# Patient Record
Sex: FEMALE | Race: BLACK OR AFRICAN AMERICAN | NOT HISPANIC OR LATINO | Employment: OTHER | URBAN - METROPOLITAN AREA
[De-identification: names, ages, dates, MRNs, and addresses within clinical notes are randomized per-mention and may not be internally consistent; named-entity substitution may affect disease eponyms.]

---

## 2017-01-05 ENCOUNTER — HOSPITAL ENCOUNTER (EMERGENCY)
Facility: HOSPITAL | Age: 47
Discharge: HOME | End: 2017-01-05
Attending: EMERGENCY MEDICINE
Payer: MEDICAID

## 2017-01-05 VITALS
WEIGHT: 185 LBS | TEMPERATURE: 99 F | RESPIRATION RATE: 16 BRPM | BODY MASS INDEX: 26.48 KG/M2 | OXYGEN SATURATION: 99 % | DIASTOLIC BLOOD PRESSURE: 61 MMHG | HEART RATE: 74 BPM | SYSTOLIC BLOOD PRESSURE: 112 MMHG | HEIGHT: 70 IN

## 2017-01-05 DIAGNOSIS — S83.92XA SPRAIN OF LEFT KNEE, UNSPECIFIED LIGAMENT, INITIAL ENCOUNTER: Primary | ICD-10-CM
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
## 2017-01-05 PROCEDURE — 99283 EMERGENCY DEPT VISIT LOW MDM: CPT | Mod: 25

## 2017-01-05 PROCEDURE — 29505 APPLICATION LONG LEG SPLINT: CPT | Mod: LT

## 2017-01-05 NOTE — ED NOTES
Discharge instructions, diagnosis, and follow up discussed with patient. Patient verbalized understanding. All questions and concerns answered. No needs expressed at the time. Pt is awake, alert, and oriented with no acute distress noted. Respirations even and unlabored. Ambulatory out of ed.

## 2017-01-05 NOTE — ED PROVIDER NOTES
Encounter Date: 1/5/2017    SCRIBE #1 NOTE: I, Shawna Gutierrez, am scribing for, and in the presence of, Dr. Liu.       History     Chief Complaint   Patient presents with    Knee Pain     fell with walking boot on pta. twisted knee.      Review of patient's allergies indicates:   Allergen Reactions    Amoxicillin Hives     HPI Comments: 1/5/2017  3:50 PM     Chief Complaint: Knee pain    The patient is a 46 y.o. female who presents with new onset of knee pain. Patient tripped and fell while walking with her boot a few minutes PTA. Pt states she twisted her left knee. She complains of intermittent, moderate dull pain to the left knee. She has swelling to the left knee which has been present since her previous injury. Pt takes norco for pain which gives her mild relief. No numbness or weakness.        The history is provided by the patient and the spouse.     Past Medical History   Diagnosis Date    Mitral incompetence      Past Medical History Pertinent Negatives   Diagnosis Date Noted    Abnormal Pap smear 8/14/2014     Past Surgical History   Procedure Laterality Date    Cardiac surgery       to repair mitral valve     History reviewed. No pertinent family history.  Social History   Substance Use Topics    Smoking status: Never Smoker    Smokeless tobacco: Never Used    Alcohol use No     Review of Systems   Constitutional: Negative for appetite change, chills and fever.   HENT: Negative for congestion, rhinorrhea and sore throat.    Respiratory: Negative for cough and shortness of breath.    Cardiovascular: Negative for chest pain.   Gastrointestinal: Negative for abdominal pain, diarrhea, nausea and vomiting.   Genitourinary: Negative for dysuria.   Musculoskeletal: Positive for arthralgias (left knee) and joint swelling (left knee). Negative for back pain and myalgias.   Skin: Negative for rash.   Neurological: Negative for weakness and numbness.   Hematological: Does not bruise/bleed easily.   All  other systems reviewed and are negative.      Physical Exam   Initial Vitals   BP Pulse Resp Temp SpO2   01/05/17 1524 01/05/17 1524 01/05/17 1524 01/05/17 1524 01/05/17 1524   112/61 74 16 98.7 °F (37.1 °C) 99 %     Physical Exam    Nursing note and vitals reviewed.  Constitutional: No distress.   HENT:   Head: Normocephalic and atraumatic.   Mouth/Throat: Mucous membranes are normal.   Eyes: EOM are normal. Pupils are equal, round, and reactive to light.   Neck: Normal range of motion.   Cardiovascular: Normal rate, regular rhythm, normal heart sounds and intact distal pulses. Exam reveals no gallop and no friction rub.    No murmur heard.  Pulmonary/Chest: Breath sounds normal. She has no wheezes. She has no rhonchi. She has no rales.   Musculoskeletal: Normal range of motion. She exhibits tenderness (lateral left knee TTP). She exhibits no edema.        Left knee: She exhibits swelling and effusion (to the lateral left knee).   Neurological: She is alert and oriented to person, place, and time.   Skin: Skin is dry and intact. No abrasion, no bruising, no ecchymosis and no laceration noted. No erythema.   Psychiatric: She has a normal mood and affect.         ED Course   Procedures  Labs Reviewed - No data to display          Medical Decision Making:   Independently Interpreted Test(s):   I have ordered and independently interpreted X-rays - see summary below.       <> Summary of X-Ray Reading(s): Left knee x-ray independently interpreted by me demonstrates soft tissue swelling with degenerative changes.  There is no fracture or dislocation seen.  ED Management:  Riya Soto is a 46 y.o. female who presents with  left knee pain and swelling after a mechanical fall.  X-rays fail demonstrate any evidence of fracture dislocation.  She is placed in a knee immobilizer given crutches orthopedics.            Scribe Attestation:   Scribe #1: I performed the above scribed service and the documentation accurately  describes the services I performed. I attest to the accuracy of the note.    Attending Attestation:           Physician Attestation for Scribe:  Physician Attestation Statement for Scribe #1: I, Dr. Liu, reviewed documentation, as scribed by Shawna Gutierrez in my presence, and it is both accurate and complete.                 ED Course     Clinical Impression:   The primary encounter diagnosis was Sprain of left knee, unspecified ligament, initial encounter. A diagnosis of Fall was also pertinent to this visit.          Terry Liu III, MD  01/05/17 8471

## 2017-01-05 NOTE — ED NOTES
C/o left knee pain and swelling after tripping and twisting her left knee PTA, pt has left foot walking boot for a foot injury and is being seeing MD for this. Swelling noted to knee able to ambulate but pain increases alert calm spouse remains at bedside. Aware to notify nurse of needs or concerns

## 2017-01-05 NOTE — ED AVS SNAPSHOT
[facility] [address] [place] [address]               Riya Thompsonph   2017  3:27 PM   ED    Description:  Female : 1970   Department:  [facility]           Your Care was Coordinated By:     Provider Role From To    Terry Liu III, MD Attending Provider 17 1543 --      Reason for Visit     Knee Pain           Diagnoses this Visit        Comments    Sprain of left knee, unspecified ligament, initial encounter    -  Primary     Fall           ED Disposition     None           To Do List           Follow-up Information     Follow up with Jermaine Young MD In 3 days.    Specialty:  Orthopedic Surgery    Contact information:    [address] [place] [address] DR Alpesh JACNITO [address]  [phone]        [facility]     [facility] Nurse Care [facility] -  Assistance  Registered nurses in the [facility] provide clinical advisement, health education, appointment booking, and other advisory services.  Call for this free service at [phone].             Medications           Message regarding Medications     Verify the changes and/or additions to your medication regime listed below are the same as discussed with your clinician today.  If any of these changes or additions are incorrect, please notify your healthcare provider.             Verify that the below list of medications is an accurate representation of the medications you are currently taking.  If none reported, the list may be blank. If incorrect, please contact your healthcare provider. Carry this list with you in case of emergency.           Current Medications     hydrocodone-acetaminophen 5-325mg (NORCO) 5-325 mg per tablet Take 1 tablet by mouth every 4 (four) hours as needed for Pain.    ibuprofen (ADVIL,MOTRIN) 600 MG tablet Take 1 tablet (600 mg total) by mouth every 6 (six) hours as needed for Pain.           Clinical Reference Information          "  Your Vitals Were     BP Pulse Temp Resp Height Weight    112/61 (BP Location: Right arm, Patient Position: Sitting) 74 98.7 °F (37.1 °C) (Oral) 16 5' 10" (1.778 m) 83.9 kg (185 lb)    Last Period SpO2 BMI          11/03/2016 99% 26.54 kg/m2        Allergies as of 1/5/2017        Reactions    Amoxicillin Hives      Immunizations Administered on Date of Encounter - 1/5/2017     None      ED Micro, Lab, POCT     None      ED Imaging Orders     Start Ordered       Status Ordering Provider    01/05/17 1553 01/05/17 1553  X-Ray Knee 3 View Left  1 time imaging      Final result       Discharge References/Attachments     KNEE SPRAIN (ENGLISH)      [facility] Sign-Up     Activating your [facility] account is as easy as 1-2-3!     1) Visit [email or web address], select Sign Up Now, enter this activation code and your date of birth, then select Next.  [email or web address]  Expires: 2/19/2017  4:33 PM      2) Create a username and password to use when you visit [facility] in the future and select a security question in case you lose your password and select Next.    3) Enter your e-mail address and click Sign Up!    Additional Information  If you have questions, please e-mail [email or web address] or call [phone] to talk to our [facility] staff. Remember, [facility] is NOT to be used for urgent needs. For medical emergencies, dial 911.          [facility] complies with applicable Federal civil rights laws and does not discriminate on the basis of race, color, national origin, age, disability, or sex.        Language Assistance Services     ATTENTION: Language assistance services are available, free of charge. Please call [phone].      ATENCIÓN: Si habla español, tiene a kilgore disposición servicios gratuitos de asistencia lingüística. Llame al [phone].     CHÚ Ý: N?u b?n nói Ti?ng Vi?t, có các d?ch v? h? tr? ngôn ng? mi?n phí dành cho b?n. G?i s? [phone].        "

## 2017-10-27 LAB
ALBUMIN SERPL-MCNC: 3.6 G/DL (ref 3.1–4.7)
ALP SERPL-CCNC: 122 IU/L (ref 40–104)
ALT (SGPT): 283 IU/L (ref 3–33)
AST SERPL-CCNC: 280 IU/L (ref 10–40)
BASOPHILS NFR BLD: 0 K/UL (ref 0–0.2)
BASOPHILS NFR BLD: 0.5 %
BILIRUB SERPL-MCNC: 1.8 MG/DL (ref 0.3–1)
BUN SERPL-MCNC: 9 MG/DL (ref 8–20)
CALCIUM SERPL-MCNC: 8.3 MG/DL (ref 7.7–10.4)
CHLORIDE: 106 MMOL/L (ref 98–110)
CO2 SERPL-SCNC: 26.9 MMOL/L (ref 22.8–31.6)
CREATININE: 0.77 MG/DL (ref 0.6–1.4)
EOSINOPHIL NFR BLD: 0.1 K/UL (ref 0–0.7)
EOSINOPHIL NFR BLD: 1.3 %
ERYTHROCYTE [DISTWIDTH] IN BLOOD BY AUTOMATED COUNT: 13.3 % (ref 11.7–14.9)
GLUCOSE: 93 MG/DL (ref 70–99)
GRAN #: 2 K/UL (ref 1.4–6.5)
GRAN%: 52.1 %
HCT VFR BLD AUTO: 30.1 % (ref 36–48)
HGB BLD-MCNC: 9.9 G/DL (ref 12–15)
IMMATURE GRANS (ABS): 0 K/UL (ref 0–1)
IMMATURE GRANULOCYTES: 0.3 %
LYMPH #: 1.3 K/UL (ref 1.2–3.4)
LYMPH%: 34.7 %
MCH RBC QN AUTO: 31.2 PG (ref 25–35)
MCHC RBC AUTO-ENTMCNC: 32.9 G/DL (ref 31–36)
MCV RBC AUTO: 95 FL (ref 79–98)
MONO #: 0.4 K/UL (ref 0.1–0.6)
MONO%: 11.1 %
NUCLEATED RBCS: 0 %
PLATELET # BLD AUTO: 177 K/UL (ref 140–440)
PMV BLD AUTO: 12.1 FL (ref 8.8–12.7)
POTASSIUM SERPL-SCNC: 3.4 MMOL/L (ref 3.5–5)
PROT SERPL-MCNC: 6.7 G/DL (ref 6–8.2)
RBC # BLD AUTO: 3.17 M/UL (ref 3.5–5.5)
SODIUM: 138 MMOL/L (ref 134–144)
WBC # BLD AUTO: 3.8 K/UL (ref 5–10)

## 2017-11-07 ENCOUNTER — OFFICE VISIT (OUTPATIENT)
Dept: SURGERY | Facility: CLINIC | Age: 47
End: 2017-11-07
Payer: MEDICAID

## 2017-11-07 VITALS
SYSTOLIC BLOOD PRESSURE: 112 MMHG | BODY MASS INDEX: 26.48 KG/M2 | DIASTOLIC BLOOD PRESSURE: 61 MMHG | WEIGHT: 185 LBS | HEIGHT: 70 IN

## 2017-11-07 DIAGNOSIS — K80.10 CHRONIC CALCULOUS CHOLECYSTITIS: Primary | ICD-10-CM

## 2017-11-07 PROCEDURE — 99024 POSTOP FOLLOW-UP VISIT: CPT | Mod: ,,, | Performed by: SURGERY

## 2017-11-07 RX ORDER — HYDROCODONE BITARTRATE AND ACETAMINOPHEN 7.5; 325 MG/1; MG/1
TABLET ORAL
Refills: 0 | COMMUNITY
Start: 2017-08-18 | End: 2019-07-26

## 2017-11-10 NOTE — PROGRESS NOTES
Subjective:       Patient ID: Riya Soto is a 47 y.o. female.    Chief Complaint: Post-op Evaluation (FU DOS 10/25/17 Laparoscopic Cholecystectomy)      HPI:  Patient presents for post op visit.  She is s/p lap antionette 10/25.  She is doing well.  She has no complaints.  Pain resolved.  Tolerating diet.  Afebrile.      Review of Systems   Constitutional: Negative for appetite change, chills, fever and unexpected weight change.   HENT: Negative for hearing loss, rhinorrhea, sore throat and voice change.    Eyes: Negative for photophobia and visual disturbance.   Respiratory: Negative for cough, choking and shortness of breath.    Cardiovascular: Negative for chest pain, palpitations and leg swelling.   Gastrointestinal: Negative for abdominal pain, blood in stool, constipation, diarrhea, nausea and vomiting.   Endocrine: Negative for cold intolerance, heat intolerance, polydipsia and polyuria.   Musculoskeletal: Negative for arthralgias, back pain, joint swelling and neck stiffness.   Skin: Negative for color change, pallor and rash.   Neurological: Negative for dizziness, seizures, syncope and headaches.   Hematological: Negative for adenopathy. Does not bruise/bleed easily.   Psychiatric/Behavioral: Negative for agitation, behavioral problems and confusion.       Objective:      Physical Exam   Constitutional: She is oriented to person, place, and time. She appears well-developed and well-nourished. She is cooperative. No distress.   HENT:   Head: Normocephalic and atraumatic.   Eyes: Conjunctivae are normal.   Neck: Neck supple.   Pulmonary/Chest: Effort normal. No respiratory distress.   Abdominal: Soft. She exhibits no distension. There is no tenderness. There is no rebound and no guarding.   Neurological: She is alert and oriented to person, place, and time.   Skin:   Incisions are clean, dry and intact  There is no evidence of infection, hematoma or seroma        Assessment/Plan:   Chronic calculous  cholecystitis    s/p lap antionette.  Doing well.  RTC PRN

## 2018-06-21 ENCOUNTER — HOSPITAL ENCOUNTER (EMERGENCY)
Facility: HOSPITAL | Age: 48
Discharge: HOME | End: 2018-06-21
Attending: EMERGENCY MEDICINE
Payer: MEDICARE

## 2018-06-21 VITALS
TEMPERATURE: 98 F | RESPIRATION RATE: 18 BRPM | HEART RATE: 73 BPM | WEIGHT: 185 LBS | DIASTOLIC BLOOD PRESSURE: 73 MMHG | BODY MASS INDEX: 27.4 KG/M2 | SYSTOLIC BLOOD PRESSURE: 121 MMHG | HEIGHT: 69 IN | OXYGEN SATURATION: 99 %

## 2018-06-21 DIAGNOSIS — M54.2 NECK PAIN: ICD-10-CM

## 2018-06-21 DIAGNOSIS — M54.12 CERVICAL RADICULOPATHY: Primary | ICD-10-CM

## 2018-06-21 PROCEDURE — 63600175 PHARM REV CODE 636 W HCPCS: Performed by: EMERGENCY MEDICINE

## 2018-06-21 PROCEDURE — 25000003 PHARM REV CODE 250: Performed by: EMERGENCY MEDICINE

## 2018-06-21 PROCEDURE — 99283 EMERGENCY DEPT VISIT LOW MDM: CPT

## 2018-06-21 RX ORDER — IBUPROFEN 400 MG/1
800 TABLET ORAL
Status: COMPLETED | OUTPATIENT
Start: 2018-06-21 | End: 2018-06-21

## 2018-06-21 RX ORDER — IBUPROFEN 600 MG/1
600 TABLET ORAL EVERY 6 HOURS PRN
Qty: 20 TABLET | Refills: 0 | Status: SHIPPED | OUTPATIENT
Start: 2018-06-21 | End: 2019-03-15 | Stop reason: SDUPTHER

## 2018-06-21 RX ORDER — METHOCARBAMOL 500 MG/1
1000 TABLET, FILM COATED ORAL 3 TIMES DAILY PRN
Qty: 30 TABLET | Refills: 0 | Status: SHIPPED | OUTPATIENT
Start: 2018-06-21 | End: 2018-06-26

## 2018-06-21 RX ORDER — METHOCARBAMOL 500 MG/1
1000 TABLET, FILM COATED ORAL
Status: COMPLETED | OUTPATIENT
Start: 2018-06-21 | End: 2018-06-21

## 2018-06-21 RX ADMIN — DEXAMETHASONE 6 MG: 1 TABLET ORAL at 08:06

## 2018-06-21 RX ADMIN — IBUPROFEN 800 MG: 400 TABLET, FILM COATED ORAL at 08:06

## 2018-06-21 RX ADMIN — METHOCARBAMOL 1000 MG: 500 TABLET ORAL at 08:06
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
## 2018-08-23 ENCOUNTER — HOSPITAL ENCOUNTER (EMERGENCY)
Facility: HOSPITAL | Age: 48
Discharge: HOME | End: 2018-08-23
Attending: EMERGENCY MEDICINE
Payer: MEDICARE

## 2018-08-23 VITALS
TEMPERATURE: 98 F | WEIGHT: 202 LBS | OXYGEN SATURATION: 99 % | SYSTOLIC BLOOD PRESSURE: 131 MMHG | HEART RATE: 77 BPM | BODY MASS INDEX: 28.92 KG/M2 | RESPIRATION RATE: 18 BRPM | HEIGHT: 70 IN | DIASTOLIC BLOOD PRESSURE: 59 MMHG

## 2018-08-23 DIAGNOSIS — J02.9 VIRAL PHARYNGITIS: Primary | ICD-10-CM

## 2018-08-23 LAB — DEPRECATED S PYO AG THROAT QL EIA: NEGATIVE

## 2018-08-23 PROCEDURE — 99283 EMERGENCY DEPT VISIT LOW MDM: CPT

## 2018-08-23 PROCEDURE — 87880 STREP A ASSAY W/OPTIC: CPT

## 2018-08-23 PROCEDURE — 87081 CULTURE SCREEN ONLY: CPT

## 2018-08-26 LAB — BACTERIA THROAT CULT: NORMAL

## 2019-03-15 ENCOUNTER — HOSPITAL ENCOUNTER (EMERGENCY)
Facility: HOSPITAL | Age: 49
Discharge: HOME | End: 2019-03-15
Attending: EMERGENCY MEDICINE
Payer: MEDICARE

## 2019-03-15 VITALS
OXYGEN SATURATION: 100 % | WEIGHT: 190 LBS | BODY MASS INDEX: 27.2 KG/M2 | SYSTOLIC BLOOD PRESSURE: 129 MMHG | HEART RATE: 74 BPM | DIASTOLIC BLOOD PRESSURE: 62 MMHG | RESPIRATION RATE: 16 BRPM | TEMPERATURE: 97 F | HEIGHT: 70 IN

## 2019-03-15 DIAGNOSIS — R10.9 LEFT FLANK PAIN: Primary | ICD-10-CM

## 2019-03-15 LAB
ALBUMIN SERPL BCP-MCNC: 4.3 G/DL
ALP SERPL-CCNC: 146 U/L
ALT SERPL W/O P-5'-P-CCNC: 33 U/L
ANION GAP SERPL CALC-SCNC: 9 MMOL/L
AST SERPL-CCNC: 48 U/L
B-HCG UR QL: NEGATIVE
BASOPHILS # BLD AUTO: 0 K/UL
BASOPHILS NFR BLD: 0.6 %
BILIRUB SERPL-MCNC: 1.2 MG/DL
BILIRUB UR QL STRIP: NEGATIVE
BUN SERPL-MCNC: 21 MG/DL
CALCIUM SERPL-MCNC: 9.9 MG/DL
CHLORIDE SERPL-SCNC: 104 MMOL/L
CLARITY UR: CLEAR
CO2 SERPL-SCNC: 26 MMOL/L
COLOR UR: YELLOW
CREAT SERPL-MCNC: 1.1 MG/DL
CTP QC/QA: YES
DIFFERENTIAL METHOD: ABNORMAL
EOSINOPHIL # BLD AUTO: 0 K/UL
EOSINOPHIL NFR BLD: 0.7 %
ERYTHROCYTE [DISTWIDTH] IN BLOOD BY AUTOMATED COUNT: 14.7 %
EST. GFR  (AFRICAN AMERICAN): >60 ML/MIN/1.73 M^2
EST. GFR  (NON AFRICAN AMERICAN): 59 ML/MIN/1.73 M^2
GLUCOSE SERPL-MCNC: 102 MG/DL
GLUCOSE UR QL STRIP: NEGATIVE
HCT VFR BLD AUTO: 36.1 %
HGB BLD-MCNC: 11.8 G/DL
HGB UR QL STRIP: ABNORMAL
KETONES UR QL STRIP: NEGATIVE
LEUKOCYTE ESTERASE UR QL STRIP: NEGATIVE
LYMPHOCYTES # BLD AUTO: 1.9 K/UL
LYMPHOCYTES NFR BLD: 32.9 %
MCH RBC QN AUTO: 29.5 PG
MCHC RBC AUTO-ENTMCNC: 32.5 G/DL
MCV RBC AUTO: 91 FL
MONOCYTES # BLD AUTO: 0.6 K/UL
MONOCYTES NFR BLD: 10.2 %
NEUTROPHILS # BLD AUTO: 3.2 K/UL
NEUTROPHILS NFR BLD: 55.6 %
NITRITE UR QL STRIP: NEGATIVE
PH UR STRIP: 6 [PH] (ref 5–8)
PLATELET # BLD AUTO: 268 K/UL
PLATELET BLD QL SMEAR: ABNORMAL
PMV BLD AUTO: 9.9 FL
POTASSIUM SERPL-SCNC: 4.1 MMOL/L
PROT SERPL-MCNC: 8.8 G/DL
PROT UR QL STRIP: NEGATIVE
RBC # BLD AUTO: 3.98 M/UL
SODIUM SERPL-SCNC: 139 MMOL/L
SP GR UR STRIP: >=1.03 (ref 1–1.03)
URN SPEC COLLECT METH UR: ABNORMAL
UROBILINOGEN UR STRIP-ACNC: 1 EU/DL
WBC # BLD AUTO: 5.8 K/UL

## 2019-03-15 PROCEDURE — 80053 COMPREHEN METABOLIC PANEL: CPT

## 2019-03-15 PROCEDURE — 25000003 PHARM REV CODE 250: Performed by: EMERGENCY MEDICINE

## 2019-03-15 PROCEDURE — 99284 EMERGENCY DEPT VISIT MOD MDM: CPT | Mod: 25

## 2019-03-15 PROCEDURE — 85025 COMPLETE CBC W/AUTO DIFF WBC: CPT

## 2019-03-15 PROCEDURE — 81025 URINE PREGNANCY TEST: CPT | Performed by: PHYSICIAN ASSISTANT

## 2019-03-15 PROCEDURE — 81003 URINALYSIS AUTO W/O SCOPE: CPT

## 2019-03-15 PROCEDURE — 36415 COLL VENOUS BLD VENIPUNCTURE: CPT

## 2019-03-15 RX ORDER — IBUPROFEN 600 MG/1
600 TABLET ORAL
Status: COMPLETED | OUTPATIENT
Start: 2019-03-15 | End: 2019-03-15

## 2019-03-15 RX ORDER — GABAPENTIN 300 MG/1
300 CAPSULE ORAL 3 TIMES DAILY
COMMUNITY
End: 2019-07-26

## 2019-03-15 RX ORDER — IBUPROFEN 600 MG/1
600 TABLET ORAL EVERY 6 HOURS PRN
Qty: 20 TABLET | Refills: 0 | Status: SHIPPED | OUTPATIENT
Start: 2019-03-15 | End: 2019-07-26

## 2019-03-15 RX ADMIN — IBUPROFEN 600 MG: 600 TABLET, FILM COATED ORAL at 06:03

## 2019-03-15 NOTE — ED PROVIDER NOTES
Encounter Date: 3/15/2019       History     Chief Complaint   Patient presents with    Flank Pain     pt c/o L flank pain that began 1pm today, -denies n/v/d, denies dysuria     HPI   Patient is a 48-year-old woman who presents emergency department complaining of left lateral flank/abdominal pain that began approximately around 1:00 p.m. today.  Symptoms are constant, nonradiating and dull in nature.  No associated dysuria, nausea, vomiting or diarrhea.  No associated fever.  No alleviating treatments.  Review of patient's allergies indicates:   Allergen Reactions    Amoxicillin Hives     Past Medical History:   Diagnosis Date    Back pain     Mitral incompetence     Neck pain      Past Surgical History:   Procedure Laterality Date    CARDIAC SURGERY      to repair mitral valve    CHOLECYSTECTOMY       Family History   Family history unknown: Yes     Social History     Tobacco Use    Smoking status: Never Smoker    Smokeless tobacco: Never Used   Substance Use Topics    Alcohol use: No     Alcohol/week: 0.0 oz    Drug use: No     Review of Systems  REVIEW OF SYSTEMS  CONSTITUTIONAL: Negative for fever.  HEENT:  Negative for sore throat.   HEART:   Negative for chest pain..  LUNG:  Negative for shortness of breath.  ABDOMEN:  Negative for nausea.  Positive for left flank pain.  :  No discharge, dysuria  EXTREMITIES:  No swelling  NEURO:  Negative for weakness.   SKIN:  Negative for rash.  Psych: No depression  HEME: Does not bruise/bleed easily.           Physical Exam     Initial Vitals [03/15/19 0205]   BP Pulse Resp Temp SpO2   129/62 74 16 97.4 °F (36.3 °C) 100 %      MAP       --         Physical Exam  Physical Exam   Nurses notes and vitals reviewed.  Constitutional:Oriented to person, place, and time. Appears well-developed and well-nourished and in no acute distress. Answering all questions appropriate   HEENT: PERRL, EOMI, Conjunctivae are normal. Moist mucous membranes. Normocephalic.  Atraumatic, no acute, traumatic or infectious process noted.  Neck: Normal range of motion, supple, no JVD.  Cardiovascular: Normal rate, regular rhythm, normal heart sounds and intact distal pulses.   Pulmonary/Chest: Effort normal and breath sounds normal. No respiratory distress. No wheezes,  rales or ronchi.   Abdominal: Soft, no distension. There is tenderness to the left lateral abdomen.  No masses appreciated. There is no rebound or gaurding.  No Edmonds's, Rovsing's, or McBurney's point tenderness.  No CVA tenderness.   Back:  No midline TTP; no acute abnormal, traumatic or infectious process noted  Musculoskeletal: Moves all extremities well.  Full range of motion.  5/5 strength.  No sensory deficits.  No C/C/E.  2+ pulses throughout  Neurological: Alert and oriented to person, place, and time. Cranial nerves II through XII are grossly intact without anyfocal motor, sensory, and cerebellar findings.  Skin: Skin is warm and dry, no rashes.  Psych: Full affect, cooperative      ED Course   Procedures  Labs Reviewed   CBC W/ AUTO DIFFERENTIAL - Abnormal; Notable for the following components:       Result Value    RBC 3.98 (*)     Hemoglobin 11.8 (*)     Hematocrit 36.1 (*)     RDW 14.7 (*)     All other components within normal limits   COMPREHENSIVE METABOLIC PANEL - Abnormal; Notable for the following components:    BUN, Bld 21 (*)     Total Protein 8.8 (*)     Total Bilirubin 1.2 (*)     Alkaline Phosphatase 146 (*)     AST 48 (*)     eGFR if non  59 (*)     All other components within normal limits   URINALYSIS, REFLEX TO URINE CULTURE - Abnormal; Notable for the following components:    Specific Gravity, UA >=1.030 (*)     Occult Blood UA Trace (*)     All other components within normal limits    Narrative:     Preferred Collection Type->Urine, Clean Catch   POCT URINE PREGNANCY          Imaging Results          CT Renal Stone Study ABD Pelvis WO (In process)                  Medical  Decision Making:   Initial Assessment:   48-year-old woman presents to the ER for evaluation of left lateral abdominal/flank pain for over 12 hr.  Well-appearing nontoxic on examination. She does have some focal tenderness in the left lateral abdomen.  No CVA tenderness. No adnexal tenderness, rebound or guarding.  Laboratory evaluation reveals no evidence of leukocytosis.  She is afebrile making infectious etiologies less likely.  Patient has chronically elevated bilirubin in mild chronic elevation of her transaminases.  I have low suspicion for acute pancreatitis, or peritonitis.  I doubt TOA or ovarian torsion.  CT abdomen pelvis without contrast was obtained shows no evidence of renal stones, no diverticulitis, colitis or any acute abnormalities.  Unsure as to with the cause of the patient's pain is.  She is provided with ibuprofen for pain.  I discussed with patient and/or family/caretaker that evaluation in the ED does not suggest any emergent or life threatening condition medical condition requiring immediate intervention beyond what was provided in the ED, and I believe patient is safe for discharge.  Regardless, an unremarkable evaluation in the ED does not preclude the development or presence of a serious of life threatening condition. As such, patient was instructed to return immediately for any worsening or change in current symptoms                        Clinical Impression:       ICD-10-CM ICD-9-CM   1. Left flank pain R10.9 789.09                                Sedrick Miranda MD  03/15/19 0651

## 2019-07-26 ENCOUNTER — CLINICAL SUPPORT (OUTPATIENT)
Dept: PRIMARY CARE CLINIC | Facility: CLINIC | Age: 49
End: 2019-07-26
Payer: MEDICARE

## 2019-07-26 ENCOUNTER — OFFICE VISIT (OUTPATIENT)
Dept: PRIMARY CARE CLINIC | Facility: CLINIC | Age: 49
End: 2019-07-26
Payer: MEDICARE

## 2019-07-26 VITALS
SYSTOLIC BLOOD PRESSURE: 96 MMHG | HEART RATE: 69 BPM | HEIGHT: 70 IN | DIASTOLIC BLOOD PRESSURE: 62 MMHG | RESPIRATION RATE: 19 BRPM | BODY MASS INDEX: 29.92 KG/M2 | WEIGHT: 209 LBS | OXYGEN SATURATION: 100 % | TEMPERATURE: 97 F

## 2019-07-26 DIAGNOSIS — S16.1XXD STRAIN OF NECK MUSCLE, SUBSEQUENT ENCOUNTER: ICD-10-CM

## 2019-07-26 DIAGNOSIS — Z12.31 VISIT FOR SCREENING MAMMOGRAM: ICD-10-CM

## 2019-07-26 DIAGNOSIS — Z90.49 HISTORY OF CHOLECYSTECTOMY: ICD-10-CM

## 2019-07-26 DIAGNOSIS — E66.9 OBESITY, UNSPECIFIED CLASSIFICATION, UNSPECIFIED OBESITY TYPE, UNSPECIFIED WHETHER SERIOUS COMORBIDITY PRESENT: ICD-10-CM

## 2019-07-26 DIAGNOSIS — M79.7 FIBROMYALGIA: ICD-10-CM

## 2019-07-26 DIAGNOSIS — S39.012D LUMBOSACRAL STRAIN, SUBSEQUENT ENCOUNTER: ICD-10-CM

## 2019-07-26 DIAGNOSIS — E66.3 OVERWEIGHT (BMI 25.0-29.9): ICD-10-CM

## 2019-07-26 DIAGNOSIS — Z98.890 HISTORY OF HEART SURGERY: ICD-10-CM

## 2019-07-26 DIAGNOSIS — G89.4 CHRONIC PAIN SYNDROME: ICD-10-CM

## 2019-07-26 DIAGNOSIS — G89.4 CHRONIC PAIN SYNDROME: Primary | ICD-10-CM

## 2019-07-26 PROBLEM — S16.1XXA CERVICAL STRAIN: Status: ACTIVE | Noted: 2019-07-26

## 2019-07-26 PROBLEM — S39.012A LUMBOSACRAL STRAIN: Status: ACTIVE | Noted: 2019-07-26

## 2019-07-26 PROBLEM — E78.5 HYPERLIPIDEMIA: Status: ACTIVE | Noted: 2019-07-26

## 2019-07-26 LAB
ALBUMIN SERPL BCP-MCNC: 4.6 G/DL (ref 3.5–5.2)
ALP SERPL-CCNC: 109 U/L (ref 38–126)
ALT SERPL W/O P-5'-P-CCNC: 22 U/L (ref 14–54)
ANION GAP SERPL CALC-SCNC: 10 MMOL/L (ref 8–16)
AST SERPL-CCNC: 36 U/L (ref 15–41)
BASOPHILS # BLD AUTO: 0 K/UL (ref 0–0.2)
BASOPHILS NFR BLD: 0.6 % (ref 0–1.9)
BILIRUB SERPL-MCNC: 1.2 MG/DL (ref 0.3–1.2)
BILIRUB SERPL-MCNC: ABNORMAL MG/DL
BLOOD URINE, POC: ABNORMAL
BUN SERPL-MCNC: 21 MG/DL (ref 6–20)
CALCIUM SERPL-MCNC: 9.4 MG/DL (ref 8.6–10)
CHLORIDE SERPL-SCNC: 99 MMOL/L (ref 101–111)
CHOLEST SERPL-MCNC: 234 MG/DL (ref 80–200)
CHOLEST/HDLC SERPL: 3.6 {RATIO} (ref 2–5)
CO2 SERPL-SCNC: 28 MMOL/L (ref 23–29)
COLOR, POC UA: YELLOW
CREAT SERPL-MCNC: 0.8 MG/DL (ref 0.5–1.4)
DIFFERENTIAL METHOD: ABNORMAL
EOSINOPHIL # BLD AUTO: 0 K/UL (ref 0–0.5)
EOSINOPHIL NFR BLD: 0.2 % (ref 0–8)
ERYTHROCYTE [DISTWIDTH] IN BLOOD BY AUTOMATED COUNT: 14.6 % (ref 11.5–14.5)
EST. GFR  (AFRICAN AMERICAN): >60 ML/MIN/1.73 M^2
EST. GFR  (NON AFRICAN AMERICAN): >60 ML/MIN/1.73 M^2
GLUCOSE SERPL-MCNC: 101 MG/DL (ref 74–118)
GLUCOSE UR QL STRIP: NORMAL
HCT VFR BLD AUTO: 37.3 % (ref 37–48.5)
HDLC SERPL-MCNC: 65 MG/DL (ref 40–75)
HDLC SERPL: 27.8 % (ref 20–50)
HGB BLD-MCNC: 12.3 G/DL (ref 12–16)
KETONES UR QL STRIP: ABNORMAL
LDLC SERPL CALC-MCNC: 159 MG/DL
LEUKOCYTE ESTERASE URINE, POC: ABNORMAL
LYMPHOCYTES # BLD AUTO: 1.5 K/UL (ref 1–4.8)
LYMPHOCYTES NFR BLD: 32.3 % (ref 18–48)
MCH RBC QN AUTO: 30.1 PG (ref 27–31)
MCHC RBC AUTO-ENTMCNC: 32.8 G/DL (ref 32–36)
MCV RBC AUTO: 92 FL (ref 82–98)
MONOCYTES # BLD AUTO: 0.5 K/UL (ref 0.3–1)
MONOCYTES NFR BLD: 11 % (ref 4–15)
NEUTROPHILS # BLD AUTO: 2.6 K/UL (ref 1.8–7.7)
NEUTROPHILS NFR BLD: 55.9 % (ref 38–73)
NITRITE, POC UA: ABNORMAL
NONHDLC SERPL-MCNC: 169 MG/DL
PH, POC UA: 6
PLATELET # BLD AUTO: 232 K/UL (ref 150–350)
PMV BLD AUTO: 10.6 FL (ref 9.2–12.9)
POTASSIUM SERPL-SCNC: 4.2 MMOL/L (ref 3.5–5.1)
PROT SERPL-MCNC: 8.8 G/DL (ref 6–8.4)
PROTEIN, POC: ABNORMAL
RBC # BLD AUTO: 4.07 M/UL (ref 4–5.4)
SODIUM SERPL-SCNC: 137 MMOL/L (ref 136–145)
SPECIFIC GRAVITY, POC UA: 1.01
T4 FREE SERPL-MCNC: 0.9 NG/DL (ref 0.61–1.12)
TRIGL SERPL-MCNC: 51 MG/DL (ref 30–150)
TSH SERPL DL<=0.005 MIU/L-ACNC: 1.5 UIU/ML (ref 0.45–5.33)
UROBILINOGEN, POC UA: 1
WBC # BLD AUTO: 4.6 K/UL (ref 3.9–12.7)

## 2019-07-26 PROCEDURE — 93010 ELECTROCARDIOGRAM REPORT: CPT | Mod: S$GLB,,, | Performed by: INTERNAL MEDICINE

## 2019-07-26 PROCEDURE — 80053 COMPREHEN METABOLIC PANEL: CPT

## 2019-07-26 PROCEDURE — 3008F PR BODY MASS INDEX (BMI) DOCUMENTED: ICD-10-PCS | Mod: CPTII,S$GLB,, | Performed by: FAMILY MEDICINE

## 2019-07-26 PROCEDURE — 3008F BODY MASS INDEX DOCD: CPT | Mod: CPTII,S$GLB,, | Performed by: FAMILY MEDICINE

## 2019-07-26 PROCEDURE — 99999 PR PBB SHADOW E&M-EST. PATIENT-LVL III: ICD-10-PCS | Mod: PBBFAC,,, | Performed by: FAMILY MEDICINE

## 2019-07-26 PROCEDURE — 81002 POCT URINE DIPSTICK WITHOUT MICROSCOPE: ICD-10-PCS | Mod: S$GLB,,, | Performed by: FAMILY MEDICINE

## 2019-07-26 PROCEDURE — 84443 ASSAY THYROID STIM HORMONE: CPT

## 2019-07-26 PROCEDURE — 84439 ASSAY OF FREE THYROXINE: CPT

## 2019-07-26 PROCEDURE — 99204 OFFICE O/P NEW MOD 45 MIN: CPT | Mod: 25,S$GLB,, | Performed by: FAMILY MEDICINE

## 2019-07-26 PROCEDURE — 36415 COLL VENOUS BLD VENIPUNCTURE: CPT | Mod: S$GLB,,, | Performed by: FAMILY MEDICINE

## 2019-07-26 PROCEDURE — 99999 PR PBB SHADOW E&M-EST. PATIENT-LVL III: CPT | Mod: PBBFAC,,, | Performed by: FAMILY MEDICINE

## 2019-07-26 PROCEDURE — 85025 COMPLETE CBC W/AUTO DIFF WBC: CPT

## 2019-07-26 PROCEDURE — 93005 ELECTROCARDIOGRAM TRACING: CPT | Mod: S$GLB,,, | Performed by: FAMILY MEDICINE

## 2019-07-26 PROCEDURE — 93010 EKG 12-LEAD: ICD-10-PCS | Mod: S$GLB,,, | Performed by: INTERNAL MEDICINE

## 2019-07-26 PROCEDURE — 36415 PR COLLECTION VENOUS BLOOD,VENIPUNCTURE: ICD-10-PCS | Mod: S$GLB,,, | Performed by: FAMILY MEDICINE

## 2019-07-26 PROCEDURE — 93005 EKG 12-LEAD: ICD-10-PCS | Mod: S$GLB,,, | Performed by: FAMILY MEDICINE

## 2019-07-26 PROCEDURE — 80061 LIPID PANEL: CPT

## 2019-07-26 PROCEDURE — 81002 URINALYSIS NONAUTO W/O SCOPE: CPT | Mod: S$GLB,,, | Performed by: FAMILY MEDICINE

## 2019-07-26 PROCEDURE — 99204 PR OFFICE/OUTPT VISIT, NEW, LEVL IV, 45-59 MIN: ICD-10-PCS | Mod: 25,S$GLB,, | Performed by: FAMILY MEDICINE

## 2019-07-26 NOTE — PROGRESS NOTES
Subjective:       Patient ID: Riya Soto is a 48 y.o. female.    Chief Complaint: Pre-op Exam and Establish Care    HPI:  48-year-old female in for PCP in Needs preop clearance-- pt not sure exact procedure. To do a left and ?? Implants     PMH   Surg -history of left foot surgery, cholecystectomy, heart valve surgery  Hosp-Hx mitral valve surgery chronic neck and back pain due AA --was going to a pain clinic   Meds norco 7.5, gabapentin  Allergies-amoxicillin    Social history-smoking none, ETOH social,single, 7 children, work disbaled due AA, lives with 5 children    Fa HX --Mo DM, HTN, CVA, Bro MI 50, Sister breast cancer age 41        ROS:  Skin: no psoriasis, eczema, skin cancer  HEENT: No headache, ocular pain, blurred vision, diplopia, epistaxis, hoarseness change in voice, thyroid trouble  Lung: No pneumonia, asthma, Tb, wheezing, SOB, no smoking   Heart: No chest pain, ankle edema, palpitations, MI, ronald murmur, hypertension, hyperlipidemia--history of valve surgery?  Mitral valve due to mitral valve incompetence no bypass stents arrhythmia  Abdomen: No nausea, vomiting, diarrhea, constipation, ulcers, hepatitis, gallbladder disease, melena, hematochezia, hematemesis  : no UTI, renal disease, stones  MS: no fractures, O/A, lupus, rheumatoid, gout  Neuro: No dizziness, LOC, seizures   No diabetes, no anemia, no anxiety, no depression   Soc Hx see above     Objective:   Physical Exam:  General: Well nourished, well developed, no acute distress  Skin: No lesions  HEENT: Eyes PERRLA, EOM intact, nose patent, throat non-erythematous edentulous upper jaw--partial plate lower jaw missing most of molars bilaterally ears TMs clear  NECK: Supple, no bruits, No JVD, no nodes  Lungs: Clear, no rales, rhonchi, wheezing  Heart: Regular rate and rhythm, no murmurs, gallops, or rubs  Abdomen: flat, bowel sounds positive, no tenderness, or organomegaly  MS:  Tenderness is cervical spine but overall range of motion  muscle strength appear to be intact tenderness in lumbar spine L1-S1 anterior flexion 10° extension 10° lateral flexion rotation 10° pain with straight leg lift pulling sensation but no radiculopathy reflexes 2/4  Neuro: Alert, CN intact, oriented X 3  Extremities: No cyanosis, clubbing, or edema         Assessment:       1. Chronic pain syndrome    2. Strain of neck muscle, subsequent encounter    3. Lumbosacral strain, subsequent encounter    4. History of heart surgery    5. History of cholecystectomy    6. Obesity, unspecified classification, unspecified obesity type, unspecified whether serious comorbidity present    7. Overweight (BMI 25.0-29.9)    8. Visit for screening mammogram    9. Fibromyalgia         Plan:       Chronic pain syndrome  -     CBC auto differential; Future; Expected date: 07/26/2019  -     Comprehensive metabolic panel; Future; Expected date: 07/26/2019  -     EKG 12-lead; Future  -     Fecal Immunochemical Test (iFOBT); Future; Expected date: 07/26/2019  -     Lipid panel; Future; Expected date: 07/26/2019  -     X-Ray Chest PA And Lateral; Future; Expected date: 07/26/2019  -     POCT urine dipstick without microscope  -     T4, free; Future; Expected date: 07/26/2019  -     TSH; Future; Expected date: 07/26/2019    Strain of neck muscle, subsequent encounter  -     CBC auto differential; Future; Expected date: 07/26/2019  -     Comprehensive metabolic panel; Future; Expected date: 07/26/2019  -     EKG 12-lead; Future  -     Fecal Immunochemical Test (iFOBT); Future; Expected date: 07/26/2019  -     Lipid panel; Future; Expected date: 07/26/2019  -     X-Ray Chest PA And Lateral; Future; Expected date: 07/26/2019  -     POCT urine dipstick without microscope  -     T4, free; Future; Expected date: 07/26/2019  -     TSH; Future; Expected date: 07/26/2019    Lumbosacral strain, subsequent encounter  -     CBC auto differential; Future; Expected date: 07/26/2019  -     Comprehensive  metabolic panel; Future; Expected date: 07/26/2019  -     EKG 12-lead; Future  -     Fecal Immunochemical Test (iFOBT); Future; Expected date: 07/26/2019  -     Lipid panel; Future; Expected date: 07/26/2019  -     X-Ray Chest PA And Lateral; Future; Expected date: 07/26/2019  -     POCT urine dipstick without microscope  -     T4, free; Future; Expected date: 07/26/2019  -     TSH; Future; Expected date: 07/26/2019    History of heart surgery  -     CBC auto differential; Future; Expected date: 07/26/2019  -     Comprehensive metabolic panel; Future; Expected date: 07/26/2019  -     EKG 12-lead; Future  -     Fecal Immunochemical Test (iFOBT); Future; Expected date: 07/26/2019  -     Lipid panel; Future; Expected date: 07/26/2019  -     X-Ray Chest PA And Lateral; Future; Expected date: 07/26/2019  -     POCT urine dipstick without microscope  -     T4, free; Future; Expected date: 07/26/2019  -     TSH; Future; Expected date: 07/26/2019    History of cholecystectomy  -     CBC auto differential; Future; Expected date: 07/26/2019  -     Comprehensive metabolic panel; Future; Expected date: 07/26/2019  -     EKG 12-lead; Future  -     Fecal Immunochemical Test (iFOBT); Future; Expected date: 07/26/2019  -     Lipid panel; Future; Expected date: 07/26/2019  -     X-Ray Chest PA And Lateral; Future; Expected date: 07/26/2019  -     POCT urine dipstick without microscope  -     T4, free; Future; Expected date: 07/26/2019  -     TSH; Future; Expected date: 07/26/2019    Obesity, unspecified classification, unspecified obesity type, unspecified whether serious comorbidity present  -     CBC auto differential; Future; Expected date: 07/26/2019  -     Comprehensive metabolic panel; Future; Expected date: 07/26/2019  -     EKG 12-lead; Future  -     Fecal Immunochemical Test (iFOBT); Future; Expected date: 07/26/2019  -     Lipid panel; Future; Expected date: 07/26/2019  -     X-Ray Chest PA And Lateral; Future; Expected  date: 07/26/2019  -     POCT urine dipstick without microscope  -     T4, free; Future; Expected date: 07/26/2019  -     TSH; Future; Expected date: 07/26/2019    Overweight (BMI 25.0-29.9)  -     CBC auto differential; Future; Expected date: 07/26/2019  -     Comprehensive metabolic panel; Future; Expected date: 07/26/2019  -     EKG 12-lead; Future  -     Fecal Immunochemical Test (iFOBT); Future; Expected date: 07/26/2019  -     Lipid panel; Future; Expected date: 07/26/2019  -     X-Ray Chest PA And Lateral; Future; Expected date: 07/26/2019  -     POCT urine dipstick without microscope  -     T4, free; Future; Expected date: 07/26/2019  -     TSH; Future; Expected date: 07/26/2019    Visit for screening mammogram  -     Mammo Digital Screening Bilat without CA; Future; Expected date: 08/09/2019  -     CBC auto differential; Future; Expected date: 07/26/2019  -     Comprehensive metabolic panel; Future; Expected date: 07/26/2019  -     EKG 12-lead; Future  -     Fecal Immunochemical Test (iFOBT); Future; Expected date: 07/26/2019  -     Lipid panel; Future; Expected date: 07/26/2019  -     X-Ray Chest PA And Lateral; Future; Expected date: 07/26/2019  -     POCT urine dipstick without microscope  -     T4, free; Future; Expected date: 07/26/2019  -     TSH; Future; Expected date: 07/26/2019    Fibromyalgia   -     T4, free; Future; Expected date: 07/26/2019  -     TSH; Future; Expected date: 07/26/2019      needs lab CBCs CMP lipids T4 TSH stool guaiac UA chest x-ray EKG is physical  Due to sister with breast cancer--needs mammogram  History mitral incompetence--with valve repair  Edentulous upper jaw missing several teeth lower jaw has upper dentures and partial plate  Health maintenance tetanus needs lipid Pap smear  Patient advised that breast implants and breast lift is an elective procedure--sister had breast cancer--needs to discuss risk of breast cancer with breast implants and 1st degree relative of  patient with breast cancer prior to procedure with surgeon

## 2019-08-06 ENCOUNTER — OFFICE VISIT (OUTPATIENT)
Dept: PRIMARY CARE CLINIC | Facility: CLINIC | Age: 49
End: 2019-08-06
Payer: MEDICARE

## 2019-08-06 VITALS
TEMPERATURE: 98 F | BODY MASS INDEX: 30.21 KG/M2 | RESPIRATION RATE: 19 BRPM | OXYGEN SATURATION: 100 % | WEIGHT: 211 LBS | HEART RATE: 70 BPM | HEIGHT: 70 IN | DIASTOLIC BLOOD PRESSURE: 76 MMHG | SYSTOLIC BLOOD PRESSURE: 103 MMHG

## 2019-08-06 DIAGNOSIS — G89.4 CHRONIC PAIN SYNDROME: Primary | ICD-10-CM

## 2019-08-06 DIAGNOSIS — S39.012D LUMBOSACRAL STRAIN, SUBSEQUENT ENCOUNTER: ICD-10-CM

## 2019-08-06 DIAGNOSIS — Z98.890 HISTORY OF HEART SURGERY: ICD-10-CM

## 2019-08-06 DIAGNOSIS — E78.5 HYPERLIPIDEMIA, UNSPECIFIED HYPERLIPIDEMIA TYPE: ICD-10-CM

## 2019-08-06 DIAGNOSIS — E66.3 OVERWEIGHT (BMI 25.0-29.9): ICD-10-CM

## 2019-08-06 PROCEDURE — 3008F BODY MASS INDEX DOCD: CPT | Mod: CPTII,S$GLB,, | Performed by: FAMILY MEDICINE

## 2019-08-06 PROCEDURE — 99999 PR PBB SHADOW E&M-EST. PATIENT-LVL III: CPT | Mod: PBBFAC,,, | Performed by: FAMILY MEDICINE

## 2019-08-06 PROCEDURE — 3008F PR BODY MASS INDEX (BMI) DOCUMENTED: ICD-10-PCS | Mod: CPTII,S$GLB,, | Performed by: FAMILY MEDICINE

## 2019-08-06 PROCEDURE — 99213 OFFICE O/P EST LOW 20 MIN: CPT | Mod: S$GLB,,, | Performed by: FAMILY MEDICINE

## 2019-08-06 PROCEDURE — 99213 PR OFFICE/OUTPT VISIT, EST, LEVL III, 20-29 MIN: ICD-10-PCS | Mod: S$GLB,,, | Performed by: FAMILY MEDICINE

## 2019-08-06 PROCEDURE — 99999 PR PBB SHADOW E&M-EST. PATIENT-LVL III: ICD-10-PCS | Mod: PBBFAC,,, | Performed by: FAMILY MEDICINE

## 2019-08-06 RX ORDER — ATORVASTATIN CALCIUM 10 MG/1
10 TABLET, FILM COATED ORAL DAILY
Qty: 90 TABLET | Refills: 3 | Status: SHIPPED | OUTPATIENT
Start: 2019-08-06 | End: 2021-10-05 | Stop reason: SDUPTHER
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
## 2019-11-04 ENCOUNTER — PATIENT OUTREACH (OUTPATIENT)
Dept: ADMINISTRATIVE | Facility: HOSPITAL | Age: 49
End: 2019-11-04

## 2019-11-20 ENCOUNTER — HOSPITAL ENCOUNTER (EMERGENCY)
Facility: HOSPITAL | Age: 49
Discharge: HOME | End: 2019-11-20
Attending: EMERGENCY MEDICINE
Payer: MEDICARE

## 2019-11-20 VITALS
SYSTOLIC BLOOD PRESSURE: 123 MMHG | HEART RATE: 72 BPM | HEIGHT: 70 IN | TEMPERATURE: 98 F | WEIGHT: 185 LBS | RESPIRATION RATE: 17 BRPM | BODY MASS INDEX: 26.48 KG/M2 | OXYGEN SATURATION: 100 % | DIASTOLIC BLOOD PRESSURE: 64 MMHG

## 2019-11-20 DIAGNOSIS — M79.673 FOOT PAIN: ICD-10-CM

## 2019-11-20 DIAGNOSIS — S93.609A SPRAIN OF FOOT, UNSPECIFIED LATERALITY, INITIAL ENCOUNTER: Primary | ICD-10-CM

## 2019-11-20 PROCEDURE — 29580 STRAPPING UNNA BOOT: CPT | Mod: LT

## 2019-11-20 PROCEDURE — 99283 EMERGENCY DEPT VISIT LOW MDM: CPT | Mod: 25

## 2019-11-20 RX ORDER — DICLOFENAC SODIUM 50 MG/1
50 TABLET, DELAYED RELEASE ORAL 3 TIMES DAILY
Qty: 15 TABLET | Refills: 0 | Status: SHIPPED | OUTPATIENT
Start: 2019-11-20 | End: 2019-11-24

## 2019-11-21 NOTE — ED PROVIDER NOTES
Encounter Date: 11/20/2019    SCRIBE #1 NOTE: IAlix, am scribing for, and in the presence of, Dr. Liu.       History     Chief Complaint   Patient presents with    Foot Pain     left foot pain x 2-3 days          Time seen by provider: 9:51 PM on 11/20/2019    Riya Soto is a 49 y.o. female who presents to the ED with c/o left foot pain. She states she fell 10 days ago injuring her foot. She reports some swelling to her foot and denies any ankle pain. No prior injury or trauma to her left foot. She has no other complaints at this time. No musculoskeletal PSHx or PMHx.     The history is provided by the patient.     Review of patient's allergies indicates:   Allergen Reactions    Amoxicillin Hives and Other (See Comments)     Past Medical History:   Diagnosis Date    Back pain     Mitral incompetence     Neck pain      Past Surgical History:   Procedure Laterality Date    CARDIAC SURGERY      to repair mitral valve    CHOLECYSTECTOMY       Family History   Problem Relation Age of Onset    Breast cancer Sister      Social History     Tobacco Use    Smoking status: Never Smoker    Smokeless tobacco: Never Used   Substance Use Topics    Alcohol use: No     Alcohol/week: 0.0 standard drinks    Drug use: No     Review of Systems   Constitutional: Negative for fever.   Respiratory: Negative for shortness of breath.    Genitourinary: Negative for flank pain.   Musculoskeletal: Negative for arthralgias and gait problem.        +left foot pain   Neurological: Negative for weakness.   Psychiatric/Behavioral: Negative for confusion.       Physical Exam     Initial Vitals [11/20/19 2144]   BP Pulse Resp Temp SpO2   123/64 72 17 97.6 °F (36.4 °C) 100 %      MAP       --         Physical Exam    Nursing note and vitals reviewed.  Constitutional: She appears well-developed and well-nourished.   HENT:   Head: Normocephalic and atraumatic.   Eyes: Conjunctivae are normal.   Neck: Normal range of motion.  Neck supple.   Cardiovascular: Normal rate, regular rhythm and normal heart sounds. Exam reveals no gallop and no friction rub.    No murmur heard.  Pulmonary/Chest: Effort normal and breath sounds normal. No respiratory distress. She has no wheezes. She has no rhonchi. She has no rales.   Abdominal: Soft. She exhibits no distension. There is no tenderness.   Musculoskeletal: Normal range of motion.        Left ankle: She exhibits no swelling. No tenderness.        Left foot: There is tenderness and swelling.   Tenderness over the left 3rd toe with mild swelling.    Neurological: She is alert and oriented to person, place, and time.   Skin: Skin is warm and dry. No erythema.   Psychiatric: She has a normal mood and affect.         ED Course   Procedures  Labs Reviewed - No data to display       Imaging Results    None          Medical Decision Making:   History:   Old Medical Records: I decided to obtain old medical records.  Clinical Tests:   Radiological Study: Ordered and Reviewed  ED Management:  49-year-old female presents with persistent foot pain after a fall.  X-rays independently interpreted by me failed to demonstrate any fracture or dislocation.  She is placed in a walking boot and referred to Orthopedic surgery.       APC / Resident Notes:   I, Dr. Terry Liu III, personally performed the services described in this documentation. All medical record entries made by the scribe were at my direction and in my presence.  I have reviewed the chart and agree that the record reflects my personal performance and is accurate and complete       Scribe Attestation:   Scribe #1: I performed the above scribed service and the documentation accurately describes the services I performed. I attest to the accuracy of the note.                          Clinical Impression:       ICD-10-CM ICD-9-CM   1. Sprain of foot, unspecified laterality, initial encounter S93.609A 845.10   2. Foot pain M79.673 729.5          Disposition:   Disposition: Discharged  Condition: Stable                     Terry Liu III, MD  11/20/19 3056

## 2019-11-21 NOTE — ED NOTES
Pt in room 12 for evaluation of foot pain.  Pt is awake, alert and oriented. Resp even and unlabored. Ben breath sounds clear.  Pt denies chest pain or sob. Pt reports left foot injury from fall x 1 week ago.  Pt has pain and swelling to top of left foot and 3rd and 4th toes.

## 2019-11-24 ENCOUNTER — HOSPITAL ENCOUNTER (EMERGENCY)
Facility: HOSPITAL | Age: 49
Discharge: HOME | End: 2019-11-24
Attending: EMERGENCY MEDICINE
Payer: MEDICARE

## 2019-11-24 VITALS
SYSTOLIC BLOOD PRESSURE: 118 MMHG | OXYGEN SATURATION: 100 % | DIASTOLIC BLOOD PRESSURE: 54 MMHG | RESPIRATION RATE: 16 BRPM | TEMPERATURE: 98 F | HEART RATE: 76 BPM | BODY MASS INDEX: 27.2 KG/M2 | WEIGHT: 190 LBS | HEIGHT: 70 IN

## 2019-11-24 DIAGNOSIS — M25.551 RIGHT HIP PAIN: Primary | ICD-10-CM

## 2019-11-24 DIAGNOSIS — M54.31 SCIATICA OF RIGHT SIDE: ICD-10-CM

## 2019-11-24 PROCEDURE — 96372 THER/PROPH/DIAG INJ SC/IM: CPT

## 2019-11-24 PROCEDURE — 63600175 PHARM REV CODE 636 W HCPCS: Performed by: NURSE PRACTITIONER

## 2019-11-24 PROCEDURE — 99284 EMERGENCY DEPT VISIT MOD MDM: CPT | Mod: 25

## 2019-11-24 RX ORDER — CYCLOBENZAPRINE HCL 10 MG
10 TABLET ORAL 3 TIMES DAILY PRN
Qty: 12 TABLET | Refills: 0 | Status: SHIPPED | OUTPATIENT
Start: 2019-11-24 | End: 2019-11-29

## 2019-11-24 RX ORDER — ORPHENADRINE CITRATE 30 MG/ML
60 INJECTION INTRAMUSCULAR; INTRAVENOUS
Status: COMPLETED | OUTPATIENT
Start: 2019-11-24 | End: 2019-11-24

## 2019-11-24 RX ORDER — KETOROLAC TROMETHAMINE 30 MG/ML
30 INJECTION, SOLUTION INTRAMUSCULAR; INTRAVENOUS
Status: COMPLETED | OUTPATIENT
Start: 2019-11-24 | End: 2019-11-24

## 2019-11-24 RX ORDER — DICLOFENAC SODIUM 75 MG/1
75 TABLET, DELAYED RELEASE ORAL 2 TIMES DAILY
Qty: 28 TABLET | Refills: 0 | Status: SHIPPED | OUTPATIENT
Start: 2019-11-24 | End: 2019-12-08

## 2019-11-24 RX ADMIN — KETOROLAC TROMETHAMINE 30 MG: 30 INJECTION, SOLUTION INTRAMUSCULAR at 09:11

## 2019-11-24 RX ADMIN — ORPHENADRINE CITRATE 60 MG: 30 INJECTION INTRAMUSCULAR; INTRAVENOUS at 09:11
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
## 2020-07-03 DIAGNOSIS — Z12.31 ENCOUNTER FOR SCREENING MAMMOGRAM FOR BREAST CANCER: ICD-10-CM

## 2020-08-02 ENCOUNTER — PATIENT OUTREACH (OUTPATIENT)
Dept: ADMINISTRATIVE | Facility: HOSPITAL | Age: 50
End: 2020-08-02

## 2021-03-29 ENCOUNTER — IMMUNIZATION (OUTPATIENT)
Dept: PRIMARY CARE CLINIC | Facility: CLINIC | Age: 51
End: 2021-03-29
Payer: MEDICARE

## 2021-03-29 DIAGNOSIS — Z23 NEED FOR VACCINATION: Primary | ICD-10-CM

## 2021-03-29 PROCEDURE — 91300 COVID-19, MRNA, LNP-S, PF, 30 MCG/0.3 ML DOSE VACCINE: CPT | Mod: S$GLB,,, | Performed by: FAMILY MEDICINE

## 2021-03-29 PROCEDURE — 0001A COVID-19, MRNA, LNP-S, PF, 30 MCG/0.3 ML DOSE VACCINE: CPT | Mod: CV19,S$GLB,, | Performed by: FAMILY MEDICINE

## 2021-03-29 PROCEDURE — 0001A COVID-19, MRNA, LNP-S, PF, 30 MCG/0.3 ML DOSE VACCINE: ICD-10-PCS | Mod: CV19,S$GLB,, | Performed by: FAMILY MEDICINE

## 2021-03-29 PROCEDURE — 91300 COVID-19, MRNA, LNP-S, PF, 30 MCG/0.3 ML DOSE VACCINE: ICD-10-PCS | Mod: S$GLB,,, | Performed by: FAMILY MEDICINE

## 2021-04-19 ENCOUNTER — IMMUNIZATION (OUTPATIENT)
Dept: PRIMARY CARE CLINIC | Facility: CLINIC | Age: 51
End: 2021-04-19
Payer: MEDICARE

## 2021-04-19 DIAGNOSIS — Z23 NEED FOR VACCINATION: Primary | ICD-10-CM

## 2021-04-19 PROCEDURE — 0002A COVID-19, MRNA, LNP-S, PF, 30 MCG/0.3 ML DOSE VACCINE: ICD-10-PCS | Mod: CV19,S$GLB,, | Performed by: FAMILY MEDICINE

## 2021-04-19 PROCEDURE — 91300 COVID-19, MRNA, LNP-S, PF, 30 MCG/0.3 ML DOSE VACCINE: ICD-10-PCS | Mod: S$GLB,,, | Performed by: FAMILY MEDICINE

## 2021-04-19 PROCEDURE — 0002A COVID-19, MRNA, LNP-S, PF, 30 MCG/0.3 ML DOSE VACCINE: CPT | Mod: CV19,S$GLB,, | Performed by: FAMILY MEDICINE

## 2021-04-19 PROCEDURE — 91300 COVID-19, MRNA, LNP-S, PF, 30 MCG/0.3 ML DOSE VACCINE: CPT | Mod: S$GLB,,, | Performed by: FAMILY MEDICINE

## 2021-10-05 ENCOUNTER — OFFICE VISIT (OUTPATIENT)
Dept: PRIMARY CARE CLINIC | Facility: CLINIC | Age: 51
End: 2021-10-05
Payer: MEDICARE

## 2021-10-05 VITALS
OXYGEN SATURATION: 96 % | RESPIRATION RATE: 18 BRPM | SYSTOLIC BLOOD PRESSURE: 110 MMHG | HEART RATE: 69 BPM | BODY MASS INDEX: 30.3 KG/M2 | DIASTOLIC BLOOD PRESSURE: 64 MMHG | WEIGHT: 211.63 LBS | HEIGHT: 70 IN

## 2021-10-05 DIAGNOSIS — Z98.890 HISTORY OF HEART SURGERY: ICD-10-CM

## 2021-10-05 DIAGNOSIS — M25.572 CHRONIC PAIN OF LEFT ANKLE: ICD-10-CM

## 2021-10-05 DIAGNOSIS — Z11.4 ENCOUNTER FOR SCREENING FOR HIV: ICD-10-CM

## 2021-10-05 DIAGNOSIS — E66.9 CLASS 1 OBESITY WITH BODY MASS INDEX (BMI) OF 30.0 TO 30.9 IN ADULT, UNSPECIFIED OBESITY TYPE, UNSPECIFIED WHETHER SERIOUS COMORBIDITY PRESENT: ICD-10-CM

## 2021-10-05 DIAGNOSIS — Z23 NEED FOR VACCINATION: ICD-10-CM

## 2021-10-05 DIAGNOSIS — G89.29 CHRONIC PAIN OF LEFT ANKLE: ICD-10-CM

## 2021-10-05 DIAGNOSIS — E66.3 OVERWEIGHT (BMI 25.0-29.9): ICD-10-CM

## 2021-10-05 DIAGNOSIS — Z11.59 NEED FOR HEPATITIS C SCREENING TEST: ICD-10-CM

## 2021-10-05 DIAGNOSIS — E78.5 HYPERLIPIDEMIA, UNSPECIFIED HYPERLIPIDEMIA TYPE: Primary | ICD-10-CM

## 2021-10-05 DIAGNOSIS — Z12.31 SCREENING MAMMOGRAM, ENCOUNTER FOR: ICD-10-CM

## 2021-10-05 PROCEDURE — 3008F BODY MASS INDEX DOCD: CPT | Mod: CPTII,S$GLB,, | Performed by: FAMILY MEDICINE

## 2021-10-05 PROCEDURE — 99214 PR OFFICE/OUTPT VISIT, EST, LEVL IV, 30-39 MIN: ICD-10-PCS | Mod: S$GLB,,, | Performed by: FAMILY MEDICINE

## 2021-10-05 PROCEDURE — G0008 FLU VACCINE (QUAD) GREATER THAN OR EQUAL TO 3YO PRESERVATIVE FREE IM: ICD-10-PCS | Mod: S$GLB,,, | Performed by: FAMILY MEDICINE

## 2021-10-05 PROCEDURE — 99999 PR PBB SHADOW E&M-EST. PATIENT-LVL III: CPT | Mod: PBBFAC,,, | Performed by: FAMILY MEDICINE

## 2021-10-05 PROCEDURE — 1159F PR MEDICATION LIST DOCUMENTED IN MEDICAL RECORD: ICD-10-PCS | Mod: CPTII,S$GLB,, | Performed by: FAMILY MEDICINE

## 2021-10-05 PROCEDURE — 1159F MED LIST DOCD IN RCRD: CPT | Mod: CPTII,S$GLB,, | Performed by: FAMILY MEDICINE

## 2021-10-05 PROCEDURE — 3078F PR MOST RECENT DIASTOLIC BLOOD PRESSURE < 80 MM HG: ICD-10-PCS | Mod: CPTII,S$GLB,, | Performed by: FAMILY MEDICINE

## 2021-10-05 PROCEDURE — 99214 OFFICE O/P EST MOD 30 MIN: CPT | Mod: S$GLB,,, | Performed by: FAMILY MEDICINE

## 2021-10-05 PROCEDURE — 90686 FLU VACCINE (QUAD) GREATER THAN OR EQUAL TO 3YO PRESERVATIVE FREE IM: ICD-10-PCS | Mod: S$GLB,,, | Performed by: FAMILY MEDICINE

## 2021-10-05 PROCEDURE — 3074F PR MOST RECENT SYSTOLIC BLOOD PRESSURE < 130 MM HG: ICD-10-PCS | Mod: CPTII,S$GLB,, | Performed by: FAMILY MEDICINE

## 2021-10-05 PROCEDURE — 3008F PR BODY MASS INDEX (BMI) DOCUMENTED: ICD-10-PCS | Mod: CPTII,S$GLB,, | Performed by: FAMILY MEDICINE

## 2021-10-05 PROCEDURE — 3078F DIAST BP <80 MM HG: CPT | Mod: CPTII,S$GLB,, | Performed by: FAMILY MEDICINE

## 2021-10-05 PROCEDURE — 99999 PR PBB SHADOW E&M-EST. PATIENT-LVL III: ICD-10-PCS | Mod: PBBFAC,,, | Performed by: FAMILY MEDICINE

## 2021-10-05 PROCEDURE — G0008 ADMIN INFLUENZA VIRUS VAC: HCPCS | Mod: S$GLB,,, | Performed by: FAMILY MEDICINE

## 2021-10-05 PROCEDURE — 90686 IIV4 VACC NO PRSV 0.5 ML IM: CPT | Mod: S$GLB,,, | Performed by: FAMILY MEDICINE

## 2021-10-05 PROCEDURE — 3074F SYST BP LT 130 MM HG: CPT | Mod: CPTII,S$GLB,, | Performed by: FAMILY MEDICINE

## 2021-10-05 RX ORDER — ATORVASTATIN CALCIUM 10 MG/1
10 TABLET, FILM COATED ORAL DAILY
Qty: 90 TABLET | Refills: 3 | Status: ON HOLD | OUTPATIENT
Start: 2021-10-05 | End: 2022-11-11 | Stop reason: HOSPADM

## 2021-10-13 ENCOUNTER — TELEPHONE (OUTPATIENT)
Dept: PRIMARY CARE CLINIC | Facility: CLINIC | Age: 51
End: 2021-10-13

## 2021-10-19 LAB
ALBUMIN SERPL-MCNC: 4.4 G/DL (ref 3.6–5.1)
ALBUMIN/GLOB SERPL: 1.3 (CALC) (ref 1–2.5)
ALP SERPL-CCNC: 137 U/L (ref 37–153)
ALT SERPL-CCNC: 18 U/L (ref 6–29)
AST SERPL-CCNC: 28 U/L (ref 10–35)
BASOPHILS # BLD AUTO: 32 CELLS/UL (ref 0–200)
BASOPHILS NFR BLD AUTO: 0.6 %
BILIRUB SERPL-MCNC: 1.1 MG/DL (ref 0.2–1.2)
BUN SERPL-MCNC: 15 MG/DL (ref 7–25)
BUN/CREAT SERPL: NORMAL (CALC) (ref 6–22)
CALCIUM SERPL-MCNC: 9.7 MG/DL (ref 8.6–10.4)
CHLORIDE SERPL-SCNC: 103 MMOL/L (ref 98–110)
CHOLEST SERPL-MCNC: 167 MG/DL
CHOLEST/HDLC SERPL: 2.9 (CALC)
CO2 SERPL-SCNC: 30 MMOL/L (ref 20–32)
CREAT SERPL-MCNC: 0.81 MG/DL (ref 0.5–1.05)
EOSINOPHIL # BLD AUTO: 42 CELLS/UL (ref 15–500)
EOSINOPHIL NFR BLD AUTO: 0.8 %
ERYTHROCYTE [DISTWIDTH] IN BLOOD BY AUTOMATED COUNT: 13.4 % (ref 11–15)
GLOBULIN SER CALC-MCNC: 3.5 G/DL (CALC) (ref 1.9–3.7)
GLUCOSE SERPL-MCNC: 99 MG/DL (ref 65–99)
HCT VFR BLD AUTO: 33.8 % (ref 35–45)
HCV AB S/CO SERPL IA: 0.03
HCV AB SERPL QL IA: NORMAL
HDLC SERPL-MCNC: 58 MG/DL
HGB BLD-MCNC: 11.1 G/DL (ref 11.7–15.5)
HIV 1+2 AB+HIV1 P24 AG SERPL QL IA: NORMAL
LDLC SERPL CALC-MCNC: 94 MG/DL (CALC)
LYMPHOCYTES # BLD AUTO: 1675 CELLS/UL (ref 850–3900)
LYMPHOCYTES NFR BLD AUTO: 31.6 %
MCH RBC QN AUTO: 30.3 PG (ref 27–33)
MCHC RBC AUTO-ENTMCNC: 32.8 G/DL (ref 32–36)
MCV RBC AUTO: 92.3 FL (ref 80–100)
MONOCYTES # BLD AUTO: 583 CELLS/UL (ref 200–950)
MONOCYTES NFR BLD AUTO: 11 %
NEUTROPHILS # BLD AUTO: 2968 CELLS/UL (ref 1500–7800)
NEUTROPHILS NFR BLD AUTO: 56 %
NONHDLC SERPL-MCNC: 109 MG/DL (CALC)
PLATELET # BLD AUTO: 234 THOUSAND/UL (ref 140–400)
PMV BLD REES-ECKER: 11.8 FL (ref 7.5–12.5)
POTASSIUM SERPL-SCNC: 4.1 MMOL/L (ref 3.5–5.3)
PROT SERPL-MCNC: 7.9 G/DL (ref 6.1–8.1)
RBC # BLD AUTO: 3.66 MILLION/UL (ref 3.8–5.1)
SODIUM SERPL-SCNC: 141 MMOL/L (ref 135–146)
T4 FREE SERPL-MCNC: 1.2 NG/DL (ref 0.8–1.8)
TRIGL SERPL-MCNC: 64 MG/DL
TSH SERPL-ACNC: 3.7 MIU/L
WBC # BLD AUTO: 5.3 THOUSAND/UL (ref 3.8–10.8)

## 2021-10-20 ENCOUNTER — HOSPITAL ENCOUNTER (EMERGENCY)
Facility: HOSPITAL | Age: 51
Discharge: HOME | End: 2021-10-20
Attending: EMERGENCY MEDICINE
Payer: MEDICARE

## 2021-10-20 VITALS
HEIGHT: 70 IN | TEMPERATURE: 99 F | BODY MASS INDEX: 27.92 KG/M2 | HEART RATE: 72 BPM | DIASTOLIC BLOOD PRESSURE: 74 MMHG | SYSTOLIC BLOOD PRESSURE: 111 MMHG | WEIGHT: 195 LBS | OXYGEN SATURATION: 97 % | RESPIRATION RATE: 16 BRPM

## 2021-10-20 DIAGNOSIS — R52 PAIN: ICD-10-CM
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
## 2021-10-20 PROCEDURE — 99283 EMERGENCY DEPT VISIT LOW MDM: CPT

## 2021-10-24 DIAGNOSIS — K14.0 GLOSSITIS: ICD-10-CM

## 2021-10-24 DIAGNOSIS — D64.9 ANEMIA, UNSPECIFIED TYPE: Primary | ICD-10-CM

## 2021-10-27 LAB
FOLATE SERPL-MCNC: 7.5 NG/ML
IRON SATN MFR SERPL: 13 % (CALC) (ref 16–45)
IRON SERPL-MCNC: 56 MCG/DL (ref 45–160)
TIBC SERPL-MCNC: 444 MCG/DL (CALC) (ref 250–450)
VIT B12 SERPL-MCNC: 318 PG/ML (ref 200–1100)

## 2021-11-01 ENCOUNTER — HOSPITAL ENCOUNTER (OUTPATIENT)
Dept: RADIOLOGY | Facility: HOSPITAL | Age: 51
Discharge: HOME | End: 2021-11-01
Attending: FAMILY MEDICINE
Payer: MEDICARE

## 2021-11-01 DIAGNOSIS — Z12.31 SCREENING MAMMOGRAM, ENCOUNTER FOR: ICD-10-CM

## 2021-11-01 PROCEDURE — 77067 SCR MAMMO BI INCL CAD: CPT | Mod: TC,PO

## 2021-12-22 ENCOUNTER — IMMUNIZATION (OUTPATIENT)
Dept: PRIMARY CARE CLINIC | Facility: CLINIC | Age: 51
End: 2021-12-22
Payer: MEDICARE

## 2021-12-22 DIAGNOSIS — Z23 NEED FOR VACCINATION: Primary | ICD-10-CM

## 2021-12-22 PROCEDURE — 0003A COVID-19, MRNA, LNP-S, PF, 30 MCG/0.3 ML DOSE VACCINE: CPT | Mod: S$GLB,,, | Performed by: FAMILY MEDICINE

## 2021-12-22 PROCEDURE — 0003A COVID-19, MRNA, LNP-S, PF, 30 MCG/0.3 ML DOSE VACCINE: ICD-10-PCS | Mod: S$GLB,,, | Performed by: FAMILY MEDICINE

## 2021-12-22 PROCEDURE — 91300 COVID-19, MRNA, LNP-S, PF, 30 MCG/0.3 ML DOSE VACCINE: CPT | Mod: S$GLB,,, | Performed by: FAMILY MEDICINE

## 2021-12-22 PROCEDURE — 91300 COVID-19, MRNA, LNP-S, PF, 30 MCG/0.3 ML DOSE VACCINE: ICD-10-PCS | Mod: S$GLB,,, | Performed by: FAMILY MEDICINE

## 2022-01-17 ENCOUNTER — HOSPITAL ENCOUNTER (EMERGENCY)
Facility: HOSPITAL | Age: 52
Discharge: HOME | End: 2022-01-17
Attending: EMERGENCY MEDICINE
Payer: COMMERCIAL

## 2022-01-17 VITALS
WEIGHT: 190 LBS | RESPIRATION RATE: 16 BRPM | SYSTOLIC BLOOD PRESSURE: 110 MMHG | OXYGEN SATURATION: 100 % | DIASTOLIC BLOOD PRESSURE: 78 MMHG | BODY MASS INDEX: 28.14 KG/M2 | HEIGHT: 69 IN | TEMPERATURE: 99 F | HEART RATE: 80 BPM

## 2022-01-17 DIAGNOSIS — M79.602 PAIN OF LEFT UPPER EXTREMITY: Primary | ICD-10-CM

## 2022-01-17 PROCEDURE — 99284 EMERGENCY DEPT VISIT MOD MDM: CPT | Mod: 25

## 2022-01-17 PROCEDURE — 63600175 PHARM REV CODE 636 W HCPCS: Performed by: EMERGENCY MEDICINE

## 2022-01-17 PROCEDURE — 96372 THER/PROPH/DIAG INJ SC/IM: CPT

## 2022-01-17 RX ORDER — KETOROLAC TROMETHAMINE 30 MG/ML
15 INJECTION, SOLUTION INTRAMUSCULAR; INTRAVENOUS
Status: COMPLETED | OUTPATIENT
Start: 2022-01-17 | End: 2022-01-17

## 2022-01-17 RX ADMIN — KETOROLAC TROMETHAMINE 15 MG: 30 INJECTION, SOLUTION INTRAMUSCULAR at 10:01

## 2022-01-18 NOTE — ED NOTES
Upon discharge, patient is AAOx4, no cardiac or respiratory complications. Follow up care reviewed with patient and has been instructed to return to the ER if needed. Patient verbalized understanding and ambulated to the lobby without difficulty. DOUGLAS RHODES

## 2022-01-18 NOTE — ED NOTES
Riya Soto presents to the ED with c/o left arm pain that has been persistent for the past 3 months. Patient reports that pain is exacerbated when sleeping. AAOx4. Mucous membranes are pink and moist. Skin is warm, dry and intact.

## 2022-01-18 NOTE — ED TRIAGE NOTES
Riya Soto is here with left arm pain for some time and is worse when lying down and with raising arms.
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
## 2022-01-19 ENCOUNTER — PATIENT OUTREACH (OUTPATIENT)
Dept: EMERGENCY MEDICINE | Facility: HOSPITAL | Age: 52
End: 2022-01-19
Payer: COMMERCIAL

## 2022-01-19 NOTE — PROGRESS NOTES
Osiris Krause  ED Navigator  Emergency Department    Project: [facility] ED Navigator  Role: Community Health Worker    Date: 01/19/2022  Patient Name: Riya Soto  MRN: [id number]  PCP: Pantera Yadav MD    Assessment:     Riya Soto is a 51 y.o. female who has presented to ED for arm pain. Patient has visited the ED 1 times in the past 3 months. Patient did not contact PCP.     ED Navigator Patient Assessment    Consent to Services  Does patient consent to completing the assessment?: Yes  Transportation  Does the patient have issues with Transportation?: No  Insurance Coverage  Do you have coverage/adequate coverage?: Yes  Type/kind of coverage: [employer]/Medicaid  Is patient able to afford co-pays/deductibles?: Yes  Is patient able to afford HME or supplies?: Yes  Does patient have an established [facility] PCP?: Yes  Able to access?: Yes  Does the patient have a lack of adequate coverage?: No  Specialist Appointment  Did the patient come to the ED to see a specialist?: No  Does the patient have a pending specialist referral?: No  Does the patient have a specialist appointment made?: No  PCP Follow Up Appointment  Does the patient have a follow up appontment with their PCP?: Yes  When was the last time you saw your PCP?: 10/5/21  Medications  Is patient able to afford medication?: Yes  Is patient unable to get medication due to lack of transportation?: No  Psychological  Does the patient have psycho-social concerns?: No  Food  Does the patient have concerns about food?: No  Communication/Education  Does the patient have limited English proficiency/English not primary language?: No  Does patient have low literacy and/or low health literacy?: Yes  Does patient have concerns with care?: No  Does patient have dissatisfaction with care?: No  Other Financial Concers  Does the patient have immediate financial distress?: No  Does the patient have general financial concerns?: No  Other Social Barriers/Concerns  Does the  patient have any additional barriers or concerns?: None         Social History     Socioeconomic History    Marital status: Single   Tobacco Use    Smoking status: Never Smoker    Smokeless tobacco: Never Used   Substance and Sexual Activity    Alcohol use: No     Alcohol/week: 0.0 standard drinks    Drug use: No    Sexual activity: Yes     Partners: Male     Social Determinants of Health     Financial Resource Strain: Low Risk     Difficulty of Paying Living Expenses: Not hard at all   Food Insecurity: No Food Insecurity    Worried About Running Out of Food in the Last Year: Never true    Ran Out of Food in the Last Year: Never true   Transportation Needs: No Transportation Needs    Lack of Transportation (Medical): No    Lack of Transportation (Non-Medical): No   Stress: No Stress Concern Present    Feeling of Stress : Not at all   Social Connections: Unknown    Frequency of Communication with Friends and Family: Three times a week    Frequency of Social Gatherings with Friends and Family: Three times a week    Marital Status: Never    Housing Stability: Unknown    Unable to Pay for Housing in the Last Year: No    Unstable Housing in the Last Year: No       Plan:   ED Navigator spoke with patient on the telephone and completed initial assessment.  Patient reported no concerns with food, transportation, housing, or utilities.  Patient has a PCP that she sees regularly and has a follow up appointment on the 20th.  Patient reported no needs or concerns at this time.  The following resources were emailed to patient:  [facility] brochure, [facility] Nurse Triage line, information on virtual visits, and the [facility] Resource Guide.

## 2022-01-24 ENCOUNTER — OFFICE VISIT (OUTPATIENT)
Dept: PRIMARY CARE CLINIC | Facility: CLINIC | Age: 52
End: 2022-01-24
Payer: COMMERCIAL

## 2022-01-24 VITALS
DIASTOLIC BLOOD PRESSURE: 66 MMHG | HEIGHT: 69 IN | BODY MASS INDEX: 30.72 KG/M2 | SYSTOLIC BLOOD PRESSURE: 108 MMHG | OXYGEN SATURATION: 99 % | HEART RATE: 84 BPM | RESPIRATION RATE: 18 BRPM | WEIGHT: 207.44 LBS

## 2022-01-24 DIAGNOSIS — M25.512 LEFT SHOULDER PAIN, UNSPECIFIED CHRONICITY: Primary | ICD-10-CM

## 2022-01-24 DIAGNOSIS — E78.5 HYPERLIPIDEMIA, UNSPECIFIED HYPERLIPIDEMIA TYPE: ICD-10-CM

## 2022-01-24 DIAGNOSIS — Z98.890 HISTORY OF HEART SURGERY: ICD-10-CM

## 2022-01-24 DIAGNOSIS — E66.9 CLASS 1 OBESITY WITH BODY MASS INDEX (BMI) OF 30.0 TO 30.9 IN ADULT, UNSPECIFIED OBESITY TYPE, UNSPECIFIED WHETHER SERIOUS COMORBIDITY PRESENT: ICD-10-CM

## 2022-01-24 DIAGNOSIS — S46.312D STRAIN OF LEFT TRICEPS, SUBSEQUENT ENCOUNTER: ICD-10-CM

## 2022-01-24 DIAGNOSIS — Z90.49 HISTORY OF CHOLECYSTECTOMY: ICD-10-CM

## 2022-01-24 PROBLEM — E66.3 OVERWEIGHT (BMI 25.0-29.9): Status: RESOLVED | Noted: 2019-07-26 | Resolved: 2022-01-24

## 2022-01-24 PROCEDURE — 99999 PR PBB SHADOW E&M-EST. PATIENT-LVL III: ICD-10-PCS | Mod: PBBFAC,,, | Performed by: FAMILY MEDICINE

## 2022-01-24 PROCEDURE — 99214 PR OFFICE/OUTPT VISIT, EST, LEVL IV, 30-39 MIN: ICD-10-PCS | Mod: S$GLB,,, | Performed by: FAMILY MEDICINE

## 2022-01-24 PROCEDURE — 99214 OFFICE O/P EST MOD 30 MIN: CPT | Mod: S$GLB,,, | Performed by: FAMILY MEDICINE

## 2022-01-24 PROCEDURE — 99999 PR PBB SHADOW E&M-EST. PATIENT-LVL III: CPT | Mod: PBBFAC,,, | Performed by: FAMILY MEDICINE

## 2022-01-24 RX ORDER — TRAMADOL HYDROCHLORIDE 50 MG/1
50 TABLET ORAL EVERY 6 HOURS PRN
Qty: 30 TABLET | Refills: 0 | Status: SHIPPED | OUTPATIENT
Start: 2022-01-24 | End: 2022-02-03

## 2022-01-24 RX ORDER — MELOXICAM 7.5 MG/1
7.5 TABLET ORAL DAILY
Qty: 60 TABLET | Refills: 2 | Status: SHIPPED | OUTPATIENT
Start: 2022-01-24 | End: 2022-03-03 | Stop reason: ALTCHOICE

## 2022-01-24 RX ORDER — CYCLOBENZAPRINE HCL 10 MG
10 TABLET ORAL 3 TIMES DAILY PRN
Qty: 30 TABLET | Refills: 5 | Status: SHIPPED | OUTPATIENT
Start: 2022-01-24 | End: 2022-02-03
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
## 2022-02-03 ENCOUNTER — HOSPITAL ENCOUNTER (OUTPATIENT)
Dept: RADIOLOGY | Facility: HOSPITAL | Age: 52
Discharge: HOME | End: 2022-02-03
Attending: FAMILY MEDICINE
Payer: COMMERCIAL

## 2022-02-03 DIAGNOSIS — M25.512 LEFT SHOULDER PAIN, UNSPECIFIED CHRONICITY: ICD-10-CM

## 2022-02-03 PROCEDURE — 73221 MRI JOINT UPR EXTREM W/O DYE: CPT | Mod: TC,PO,LT

## 2022-02-08 ENCOUNTER — OFFICE VISIT (OUTPATIENT)
Dept: ORTHOPEDICS | Facility: CLINIC | Age: 52
End: 2022-02-08
Payer: COMMERCIAL

## 2022-02-08 ENCOUNTER — PATIENT OUTREACH (OUTPATIENT)
Dept: ADMINISTRATIVE | Facility: OTHER | Age: 52
End: 2022-02-08
Payer: COMMERCIAL

## 2022-02-08 VITALS — WEIGHT: 207 LBS | HEIGHT: 69 IN | BODY MASS INDEX: 30.66 KG/M2

## 2022-02-08 DIAGNOSIS — M75.82 ROTATOR CUFF TENDINITIS, LEFT: Primary | ICD-10-CM

## 2022-02-08 DIAGNOSIS — M25.512 LEFT SHOULDER PAIN, UNSPECIFIED CHRONICITY: Primary | ICD-10-CM

## 2022-02-08 PROCEDURE — 20610 LARGE JOINT ASPIRATION/INJECTION: L GLENOHUMERAL: ICD-10-PCS | Mod: LT,S$GLB,, | Performed by: ORTHOPAEDIC SURGERY

## 2022-02-08 PROCEDURE — 20610 DRAIN/INJ JOINT/BURSA W/O US: CPT | Mod: LT,S$GLB,, | Performed by: ORTHOPAEDIC SURGERY

## 2022-02-08 PROCEDURE — 99204 OFFICE O/P NEW MOD 45 MIN: CPT | Mod: 25,S$GLB,, | Performed by: ORTHOPAEDIC SURGERY

## 2022-02-08 PROCEDURE — 99999 PR PBB SHADOW E&M-EST. PATIENT-LVL III: CPT | Mod: PBBFAC,,, | Performed by: ORTHOPAEDIC SURGERY

## 2022-02-08 PROCEDURE — 99999 PR PBB SHADOW E&M-EST. PATIENT-LVL III: ICD-10-PCS | Mod: PBBFAC,,, | Performed by: ORTHOPAEDIC SURGERY

## 2022-02-08 PROCEDURE — 99204 PR OFFICE/OUTPT VISIT, NEW, LEVL IV, 45-59 MIN: ICD-10-PCS | Mod: 25,S$GLB,, | Performed by: ORTHOPAEDIC SURGERY

## 2022-02-08 RX ORDER — METHYLPREDNISOLONE ACETATE 40 MG/ML
40 INJECTION, SUSPENSION INTRA-ARTICULAR; INTRALESIONAL; INTRAMUSCULAR; SOFT TISSUE
Status: DISCONTINUED | OUTPATIENT
Start: 2022-02-08 | End: 2022-02-08 | Stop reason: HOSPADM

## 2022-02-08 RX ADMIN — METHYLPREDNISOLONE ACETATE 40 MG: 40 INJECTION, SUSPENSION INTRA-ARTICULAR; INTRALESIONAL; INTRAMUSCULAR; SOFT TISSUE at 09:02
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
## 2022-02-08 NOTE — PROCEDURES
Large Joint Aspiration/Injection: L glenohumeral    Date/Time: 2/8/2022 9:45 AM  Performed by: George Kathleen II, MD  Authorized by: George Kathleen II, MD     Consent Done?:  Yes (Verbal)  Indications:  Pain  Timeout: prior to procedure the correct patient, procedure, and site was verified    Prep: patient was prepped and draped in usual sterile fashion      Local anesthesia used?: Yes    Local anesthetic:  Topical anesthetic    Details:  Needle Size:  22 G  Approach:  Posterior  Location:  Shoulder  Site:  L glenohumeral  Medications:  40 mg methylPREDNISolone acetate 40 mg/mL  Patient tolerance:  Patient tolerated the procedure well with no immediate complications

## 2022-02-17 ENCOUNTER — CLINICAL SUPPORT (OUTPATIENT)
Dept: REHABILITATION | Facility: HOSPITAL | Age: 52
End: 2022-02-17
Attending: ORTHOPAEDIC SURGERY
Payer: COMMERCIAL

## 2022-02-17 ENCOUNTER — HOSPITAL ENCOUNTER (EMERGENCY)
Facility: HOSPITAL | Age: 52
Discharge: HOME | End: 2022-02-17
Attending: EMERGENCY MEDICINE
Payer: COMMERCIAL

## 2022-02-17 VITALS
HEART RATE: 98 BPM | BODY MASS INDEX: 28.06 KG/M2 | OXYGEN SATURATION: 99 % | TEMPERATURE: 99 F | DIASTOLIC BLOOD PRESSURE: 78 MMHG | WEIGHT: 190 LBS | RESPIRATION RATE: 18 BRPM | SYSTOLIC BLOOD PRESSURE: 113 MMHG

## 2022-02-17 DIAGNOSIS — M25.612 IMPAIRED RANGE OF MOTION OF LEFT SHOULDER: ICD-10-CM

## 2022-02-17 DIAGNOSIS — S43.432A ANTERIOR TO POSTERIOR TEAR OF SUPERIOR GLENOID LABRUM OF LEFT SHOULDER: Primary | ICD-10-CM

## 2022-02-17 DIAGNOSIS — Z74.09 IMPAIRED FUNCTIONAL MOBILITY AND ACTIVITY TOLERANCE: ICD-10-CM

## 2022-02-17 DIAGNOSIS — I48.92 ATRIAL FLUTTER, UNSPECIFIED TYPE: Primary | ICD-10-CM

## 2022-02-17 DIAGNOSIS — R53.1 WEAKNESS: ICD-10-CM

## 2022-02-17 DIAGNOSIS — M25.512 LEFT SHOULDER PAIN, UNSPECIFIED CHRONICITY: ICD-10-CM

## 2022-02-17 DIAGNOSIS — R79.89 ABNORMAL LFTS: ICD-10-CM

## 2022-02-17 DIAGNOSIS — R29.898 WEAKNESS OF LEFT UPPER EXTREMITY: ICD-10-CM

## 2022-02-17 LAB
ALBUMIN SERPL BCP-MCNC: 4.4 G/DL (ref 3.5–5.2)
ALP SERPL-CCNC: 142 U/L (ref 55–135)
ALT SERPL W/O P-5'-P-CCNC: 63 U/L (ref 10–44)
ANION GAP SERPL CALC-SCNC: 12 MMOL/L (ref 8–16)
AST SERPL-CCNC: 65 U/L (ref 10–40)
BASOPHILS # BLD AUTO: 0.03 K/UL (ref 0–0.2)
BASOPHILS NFR BLD: 0.3 % (ref 0–1.9)
BILIRUB SERPL-MCNC: 2 MG/DL (ref 0.1–1)
BUN SERPL-MCNC: 9 MG/DL (ref 6–20)
CALCIUM SERPL-MCNC: 9.8 MG/DL (ref 8.7–10.5)
CHLORIDE SERPL-SCNC: 101 MMOL/L (ref 95–110)
CO2 SERPL-SCNC: 25 MMOL/L (ref 23–29)
CREAT SERPL-MCNC: 1.1 MG/DL (ref 0.5–1.4)
DIFFERENTIAL METHOD: ABNORMAL
EOSINOPHIL # BLD AUTO: 0 K/UL (ref 0–0.5)
EOSINOPHIL NFR BLD: 0.1 % (ref 0–8)
ERYTHROCYTE [DISTWIDTH] IN BLOOD BY AUTOMATED COUNT: 14.2 % (ref 11.5–14.5)
EST. GFR  (AFRICAN AMERICAN): >60 ML/MIN/1.73 M^2
EST. GFR  (NON AFRICAN AMERICAN): 58 ML/MIN/1.73 M^2
GLUCOSE SERPL-MCNC: 104 MG/DL (ref 70–110)
HCT VFR BLD AUTO: 40.7 % (ref 37–48.5)
HGB BLD-MCNC: 13.2 G/DL (ref 12–16)
IMM GRANULOCYTES # BLD AUTO: 0.04 K/UL (ref 0–0.04)
IMM GRANULOCYTES NFR BLD AUTO: 0.4 % (ref 0–0.5)
LYMPHOCYTES # BLD AUTO: 1.8 K/UL (ref 1–4.8)
LYMPHOCYTES NFR BLD: 18 % (ref 18–48)
MCH RBC QN AUTO: 30.4 PG (ref 27–31)
MCHC RBC AUTO-ENTMCNC: 32.4 G/DL (ref 32–36)
MCV RBC AUTO: 94 FL (ref 82–98)
MONOCYTES # BLD AUTO: 1.1 K/UL (ref 0.3–1)
MONOCYTES NFR BLD: 11.6 % (ref 4–15)
NEUTROPHILS # BLD AUTO: 6.9 K/UL (ref 1.8–7.7)
NEUTROPHILS NFR BLD: 69.6 % (ref 38–73)
NRBC BLD-RTO: 0 /100 WBC
PLATELET # BLD AUTO: 288 K/UL (ref 150–450)
PMV BLD AUTO: 11.2 FL (ref 9.2–12.9)
POTASSIUM SERPL-SCNC: 4.3 MMOL/L (ref 3.5–5.1)
PROT SERPL-MCNC: 9 G/DL (ref 6–8.4)
RBC # BLD AUTO: 4.34 M/UL (ref 4–5.4)
SODIUM SERPL-SCNC: 138 MMOL/L (ref 136–145)
TROPONIN I SERPL DL<=0.01 NG/ML-MCNC: <0.006 NG/ML (ref 0–0.03)
WBC # BLD AUTO: 9.85 K/UL (ref 3.9–12.7)

## 2022-02-17 PROCEDURE — 97161 PT EVAL LOW COMPLEX 20 MIN: CPT | Mod: PN

## 2022-02-17 PROCEDURE — 25000003 PHARM REV CODE 250: Performed by: EMERGENCY MEDICINE

## 2022-02-17 PROCEDURE — 85025 COMPLETE CBC W/AUTO DIFF WBC: CPT | Performed by: NURSE PRACTITIONER

## 2022-02-17 PROCEDURE — 99284 EMERGENCY DEPT VISIT MOD MDM: CPT | Mod: 25

## 2022-02-17 PROCEDURE — 80053 COMPREHEN METABOLIC PANEL: CPT | Performed by: NURSE PRACTITIONER

## 2022-02-17 PROCEDURE — 93010 EKG 12-LEAD: ICD-10-PCS | Mod: ,,, | Performed by: GENERAL PRACTICE

## 2022-02-17 PROCEDURE — 93005 ELECTROCARDIOGRAM TRACING: CPT

## 2022-02-17 PROCEDURE — 84484 ASSAY OF TROPONIN QUANT: CPT | Performed by: NURSE PRACTITIONER

## 2022-02-17 PROCEDURE — 36415 COLL VENOUS BLD VENIPUNCTURE: CPT | Performed by: NURSE PRACTITIONER

## 2022-02-17 PROCEDURE — 93010 ELECTROCARDIOGRAM REPORT: CPT | Mod: ,,, | Performed by: GENERAL PRACTICE

## 2022-02-17 RX ORDER — METOPROLOL TARTRATE 25 MG/1
25 TABLET, FILM COATED ORAL 2 TIMES DAILY
Qty: 28 TABLET | Refills: 0 | Status: SHIPPED | OUTPATIENT
Start: 2022-02-17 | End: 2022-03-29

## 2022-02-17 RX ORDER — METOPROLOL TARTRATE 25 MG/1
25 TABLET, FILM COATED ORAL
Status: COMPLETED | OUTPATIENT
Start: 2022-02-17 | End: 2022-02-17

## 2022-02-17 RX ADMIN — METOPROLOL TARTRATE 25 MG: 25 TABLET, FILM COATED ORAL at 07:02

## 2022-02-17 NOTE — FIRST PROVIDER EVALUATION
Emergency Department TeleTriage Encounter Note      CHIEF COMPLAINT    Chief Complaint   Patient presents with    Generalized Body Aches     Pt reports body aches x 2 days. Pt seen at urgent care and had EKG , pt presents EKG that shows A-fib    Abdominal Pain       VITAL SIGNS   Initial Vitals [02/17/22 1522]   BP Pulse Resp Temp SpO2   131/74 (!) 57 18 99.6 °F (37.6 °C) 100 %      MAP       --            ALLERGIES    Review of patient's allergies indicates:   Allergen Reactions    Amoxicillin Hives and Other (See Comments)       PROVIDER TRIAGE NOTE  This is a teletriage evaluation of a 51 y.o. female presenting to the ED complaining of body aches for two days.  Reports that she went to  and was told that she is in a-fib. No pmhx of a-fib. Denies CP. Reports SOB when walking up steps. No anticoagulant use.     Initial orders will be placed and care will be transferred to an alternate provider when patient is roomed for a full evaluation. Any additional orders and the final disposition will be determined by that provider.           ORDERS  Labs Reviewed   CBC W/ AUTO DIFFERENTIAL   COMPREHENSIVE METABOLIC PANEL   TROPONIN I       ED Orders (720h ago, onward)    Start Ordered     Status Ordering Provider    02/17/22 1600 02/17/22 1528  Vital Signs  Every 2 hours         Ordered REMEDIOS DAVIS N.    02/17/22 1529 02/17/22 1528  Insert Saline lock IV  Once         Ordered REMEDIOS DAVIS N.    02/17/22 1529 02/17/22 1528  Cardiac Monitoring - Adult  Continuous        Comments: Notify Physician If:    Ordered REMEDIOS DAVIS N.    02/17/22 1529 02/17/22 1528  Pulse Oximetry Continuous  Continuous         Ordered REMEDIOS DAVIS N.    02/17/22 1529 02/17/22 1528  Troponin I  STAT         Ordered REMEDIOS DAVIS N.    02/17/22 1528 02/17/22 1527  EKG 12-lead  Once         Ordered PAPI PAUL    02/17/22 1528 02/17/22 1528  Complete Blood Count (CBC)  STAT         Ordered  REMEDIOS DAVIS N.    02/17/22 1528 02/17/22 1528  Comprehensive Metabolic Panel (CMP)  STAT         Ordered REMEDIOS DAVIS            Virtual Visit Note: The provider triage portion of this emergency department evaluation and documentation was performed via [employer], a HIPAA-compliant telemedicine application, in concert with a tele-presenter in the room. A face to face patient evaluation with one of my colleagues will occur once the patient is placed in an emergency department room.      DISCLAIMER: This note was prepared with [employer] voice recognition transcription software. Garbled syntax, mangled pronouns, and other bizarre constructions may be attributed to that software system.
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
## 2022-02-18 ENCOUNTER — TELEPHONE (OUTPATIENT)
Dept: PRIMARY CARE CLINIC | Facility: CLINIC | Age: 52
End: 2022-02-18
Payer: COMMERCIAL

## 2022-02-18 NOTE — PHARMACY MED REC
"Admission Medication History     The home medication history was taken by Silvana Romeo CPhT.    Medication history obtained from patient     You may go to "Admission" then "Reconcile Home Medications" tabs to review and/or act upon these items.      The home medication list has been updated by the Pharmacy department.    Please read ALL comments highlighted in yellow.    Please address this information as you see fit.     Feel free to contact us if you have any questions or require assistance.      The medications listed below were removed from the home medication list.  Please reorder if appropriate:  Patient reports no longer taking the following medication(s):   Gabapentin 300mg   Meloxicam 7.5mg        Silvana Romeo CPhT.  [phone]                  .          "

## 2022-02-18 NOTE — TELEPHONE ENCOUNTER
----- Message from Madhuri Reyes sent at 2/18/2022  1:32 PM CST -----  Contact: [phone]  Pt states she has been hurting for 2 days and she states they did an EKG and they told her to follow up with her PCP and she states they are telling her she has heart failure.. please advise and give a return call

## 2022-02-18 NOTE — ED NOTES
Pt afib on monitor; intermittent episodes of noted HR ranging 90's-122 MD aware; pt denies CP, blurred vision ,dizziness; Md reports pt is safe for discharge; pt educate on importance of follow up and therapeutic regimen; patient and  agreed and verbalized understanding; pt dc per md

## 2022-02-18 NOTE — ED PROVIDER NOTES
Chief complaint:  Generalized Body Aches (Pt reports body aches x 2 days. Pt seen at urgent care and had EKG , pt presents EKG that shows A-fib) and Abdominal Pain      HPI:  Riya Soto is a 51 y.o. female with hx mitral valve repair, cholex, obesity, hyperlipidemia presenting with irregular heart rate at urgent care. EKG there reportedly interpreted as atrial fibrillation.  She had presented for nonspecific body aches and was concerned she had COVID-19.  She has had decreased appetite along with body aches for 2 days.  She denies vomiting or diarrhea.  She denies fever.  She has had less energy.  She denies abdominal pain.  No urinary complaints such as dysuria or hematuria.  No prior history of liver disease.  No new medications or over-the-counter medications.  She denies regular alcohol use.      ROS: As per HPI and below:  No chest pain, syncope, dyspnea, hemoptysis, abdominal pain, emesis, rashes, swelling, fever, dysuria, focal weakness or numbness, confusion, headache. The patient/family denies blurry vision, dysphagia, joint swelling, easy bruising.    Review of patient's allergies indicates:   Allergen Reactions    Amoxicillin Hives and Other (See Comments)       Patient's Medications   New Prescriptions    METOPROLOL TARTRATE (LOPRESSOR) 25 MG TABLET    Take 1 tablet (25 mg total) by mouth 2 (two) times daily. for 14 days   Previous Medications    ATORVASTATIN (LIPITOR) 10 MG TABLET    Take 1 tablet (10 mg total) by mouth once daily.    GABAPENTIN (NEURONTIN) 300 MG CAPSULE    gabapentin 300 mg capsule   take 1 cap PO qhs x 3 days, then take 2 cap PO QHS thereafter    MELOXICAM (MOBIC) 7.5 MG TABLET    Take 1 tablet (7.5 mg total) by mouth once daily.   Modified Medications    No medications on file   Discontinued Medications    No medications on file       PMH:  As per HPI and below:  Past Medical History:   Diagnosis Date    Back pain     Hyperlipidemia     Mitral incompetence     Neck pain       Past Surgical History:   Procedure Laterality Date    CARDIAC SURGERY      to repair mitral valve    CHOLECYSTECTOMY         Social History     Socioeconomic History    Marital status: Single   Tobacco Use    Smoking status: Never Smoker    Smokeless tobacco: Never Used   Substance and Sexual Activity    Alcohol use: No     Alcohol/week: 0.0 standard drinks    Drug use: No    Sexual activity: Yes     Partners: Male     Social Determinants of Health     Financial Resource Strain: Low Risk     Difficulty of Paying Living Expenses: Not hard at all   Food Insecurity: No Food Insecurity    Worried About Running Out of Food in the Last Year: Never true    Ran Out of Food in the Last Year: Never true   Transportation Needs: No Transportation Needs    Lack of Transportation (Medical): No    Lack of Transportation (Non-Medical): No   Stress: No Stress Concern Present    Feeling of Stress : Not at all   Social Connections: Unknown    Frequency of Communication with Friends and Family: Three times a week    Frequency of Social Gatherings with Friends and Family: Three times a week    Marital Status: Never    Housing Stability: Unknown    Unable to Pay for Housing in the Last Year: No    Unstable Housing in the Last Year: No       Family History   Problem Relation Age of Onset    Breast cancer Sister        Physical Exam:    Vitals:    02/17/22 1819   BP:    Pulse: 91   Resp: 18   Temp: 99.8 °F (37.7 °C)     GENERAL:  No apparent distress.  Alert.    HEENT:  Moist mucous membranes.  Normocephalic and atraumatic.  No scleral icterus.  NECK:  No swelling.  Midline trachea.   CARDIOVASCULAR:  Minimal tachycardia with irregular rhythm.  2+ radial pulses.  No murmur auscultated.  PULMONARY:  Lungs clear to auscultation bilaterally.  No wheezes, rales, or rhonci.  Unlabored respirations.  ABDOMEN:  Non-tender and non-distended.  No palpable hepatomegaly.    EXTREMITIES:  Warm and well perfused.  Brisk  capillary refill.  No peripheral edema.  NEUROLOGICAL:  Normal mental status.  Appropriate and conversant.    SKIN:  No rashes or ecchymoses.    BACK:  Atraumatic.  No CVA tenderness to palpation.      Labs Reviewed   CBC W/ AUTO DIFFERENTIAL - Abnormal; Notable for the following components:       Result Value    Mono # 1.1 (*)     All other components within normal limits   COMPREHENSIVE METABOLIC PANEL - Abnormal; Notable for the following components:    Total Protein 9.0 (*)     Total Bilirubin 2.0 (*)     Alkaline Phosphatase 142 (*)     AST 65 (*)     ALT 63 (*)     eGFR if non  58 (*)     All other components within normal limits   TROPONIN I       Current Discharge Medication List      START taking these medications    Details   metoprolol tartrate (LOPRESSOR) 25 MG tablet Take 1 tablet (25 mg total) by mouth 2 (two) times daily. for 14 days  Qty: 28 tablet, Refills: 0    Comments: .         CONTINUE these medications which have NOT CHANGED    Details   atorvastatin (LIPITOR) 10 MG tablet Take 1 tablet (10 mg total) by mouth once daily.  Qty: 90 tablet, Refills: 3      gabapentin (NEURONTIN) 300 MG capsule gabapentin 300 mg capsule   take 1 cap PO qhs x 3 days, then take 2 cap PO QHS thereafter      meloxicam (MOBIC) 7.5 MG tablet Take 1 tablet (7.5 mg total) by mouth once daily.  Qty: 60 tablet, Refills: 2             Orders Placed This Encounter   Procedures    Complete Blood Count (CBC)    Comprehensive Metabolic Panel (CMP)    Troponin I    Vital Signs    Cardiac Monitoring - Adult    Pulse Oximetry Continuous    EKG 12-lead    Insert Saline lock IV       Imaging Results    None         ED Course as of 02/17/22 1916   Thu Feb 17, 2022   1847 EKG:  Atrial flutter with variable block, RVR, rate of 106, normal intervals and axis.  Flutter waves apparent in aVL.  There are no acute ST or T wave changes suggestive of acute ischemia or infarction.   [MR]      ED Course User Index  [MR]  Liborio Hutchinson MD       MDM:    51 y.o. female with recent body aches with decreased appetite and noted irregular rhythm noted in urgent care.  EKG here seems to demonstrate flutter waves raising possibility of atrial flutter with irregular block.  Less likely multifocal atrial tachycardia.  Less likely atrial fibrillation although possible.  Patient has only minimal tachycardia here and I will initiate oral rate control with metoprolol b.i.d., 1st dose this evening.  I do not think she requires admission for IV rate control at this point.  There is no sign of heart failure.  I doubt ACS.  There is no ischemia on EKG with negative cardiac biomarker.  I have very low suspicion for PE.  She has no abdominal pain reproducible abdominal tenderness.  I have very low suspicion for emergent, life-threatening intra-abdominal process such as choledocholithiasis, acute hepatitis requiring admission, abscess.  I do not think abdominal imaging is indicated.  Lungs are clear and I doubt bacterial pneumonia or sepsis.  I do not think antibiotics are indicated.  She is to follow up with Cardiology for further consideration of heart rhythm with rate control and possible anticoagulation.  I did address possible anticoagulation and patient prefers to defer short-term to her cardiologist.  I feel this is reasonable.  PAUL-Vasc 1 or 2 depending if prior mitral valve repair is counted.  She is aware of the low short-term risk of stroke vs. Risk of bleeding on anticoagulation.  Follow-up with PCP for further investigation of abnormal LFTs as well as consideration of repeat.  Detailed return precautions reviewed.    Diagnoses:    1. Body aches  2. Abnormal LFTs  3. Atrial flutter with variable block     Liborio Hutchinson MD  02/17/22 1916

## 2022-02-21 ENCOUNTER — HOSPITAL ENCOUNTER (EMERGENCY)
Facility: HOSPITAL | Age: 52
Discharge: HOME | End: 2022-02-21
Attending: EMERGENCY MEDICINE
Payer: COMMERCIAL

## 2022-02-21 VITALS
WEIGHT: 190 LBS | DIASTOLIC BLOOD PRESSURE: 94 MMHG | SYSTOLIC BLOOD PRESSURE: 137 MMHG | BODY MASS INDEX: 28.14 KG/M2 | HEART RATE: 80 BPM | HEIGHT: 69 IN | OXYGEN SATURATION: 98 % | RESPIRATION RATE: 18 BRPM | TEMPERATURE: 98 F

## 2022-02-21 DIAGNOSIS — F41.1 ANXIETY REACTION: ICD-10-CM

## 2022-02-21 DIAGNOSIS — R53.83 FATIGUE: ICD-10-CM

## 2022-02-21 DIAGNOSIS — R41.0 CONFUSION: ICD-10-CM

## 2022-02-21 DIAGNOSIS — I48.91 ATRIAL FIBRILLATION WITH RAPID VENTRICULAR RESPONSE: Primary | ICD-10-CM

## 2022-02-21 LAB
ALBUMIN SERPL BCP-MCNC: 3.7 G/DL (ref 3.5–5.2)
ALP SERPL-CCNC: 143 U/L (ref 55–135)
ALT SERPL W/O P-5'-P-CCNC: 42 U/L (ref 10–44)
AMPHET+METHAMPHET UR QL: NEGATIVE
ANION GAP SERPL CALC-SCNC: 7 MMOL/L (ref 8–16)
AST SERPL-CCNC: 37 U/L (ref 10–40)
BARBITURATES UR QL SCN>200 NG/ML: NEGATIVE
BASOPHILS # BLD AUTO: 0.02 K/UL (ref 0–0.2)
BASOPHILS NFR BLD: 0.4 % (ref 0–1.9)
BENZODIAZ UR QL SCN>200 NG/ML: NEGATIVE
BILIRUB SERPL-MCNC: 1.1 MG/DL (ref 0.1–1)
BILIRUB UR QL STRIP: NEGATIVE
BILIRUB UR QL STRIP: NEGATIVE
BUN SERPL-MCNC: 13 MG/DL (ref 6–20)
BZE UR QL SCN: NEGATIVE
CALCIUM SERPL-MCNC: 9.3 MG/DL (ref 8.7–10.5)
CANNABINOIDS UR QL SCN: NEGATIVE
CHLORIDE SERPL-SCNC: 107 MMOL/L (ref 95–110)
CLARITY UR: CLEAR
CLARITY UR: CLEAR
CO2 SERPL-SCNC: 26 MMOL/L (ref 23–29)
COLOR UR: YELLOW
COLOR UR: YELLOW
CREAT SERPL-MCNC: 1 MG/DL (ref 0.5–1.4)
CREAT UR-MCNC: 123.5 MG/DL (ref 15–325)
DIFFERENTIAL METHOD: ABNORMAL
EOSINOPHIL # BLD AUTO: 0 K/UL (ref 0–0.5)
EOSINOPHIL NFR BLD: 0.2 % (ref 0–8)
ERYTHROCYTE [DISTWIDTH] IN BLOOD BY AUTOMATED COUNT: 13.6 % (ref 11.5–14.5)
EST. GFR  (AFRICAN AMERICAN): >60 ML/MIN/1.73 M^2
EST. GFR  (NON AFRICAN AMERICAN): >60 ML/MIN/1.73 M^2
GLUCOSE SERPL-MCNC: 127 MG/DL (ref 70–110)
GLUCOSE UR QL STRIP: NEGATIVE
GLUCOSE UR QL STRIP: NEGATIVE
HCT VFR BLD AUTO: 35.2 % (ref 37–48.5)
HGB BLD-MCNC: 11.3 G/DL (ref 12–16)
HGB UR QL STRIP: ABNORMAL
HGB UR QL STRIP: ABNORMAL
IMM GRANULOCYTES # BLD AUTO: 0 K/UL (ref 0–0.04)
IMM GRANULOCYTES NFR BLD AUTO: 0 % (ref 0–0.5)
KETONES UR QL STRIP: NEGATIVE
KETONES UR QL STRIP: NEGATIVE
LEUKOCYTE ESTERASE UR QL STRIP: NEGATIVE
LEUKOCYTE ESTERASE UR QL STRIP: NEGATIVE
LYMPHOCYTES # BLD AUTO: 1.8 K/UL (ref 1–4.8)
LYMPHOCYTES NFR BLD: 35.9 % (ref 18–48)
MCH RBC QN AUTO: 30 PG (ref 27–31)
MCHC RBC AUTO-ENTMCNC: 32.1 G/DL (ref 32–36)
MCV RBC AUTO: 93 FL (ref 82–98)
METHADONE UR QL SCN>300 NG/ML: NEGATIVE
MONOCYTES # BLD AUTO: 0.5 K/UL (ref 0.3–1)
MONOCYTES NFR BLD: 10.2 % (ref 4–15)
NEUTROPHILS # BLD AUTO: 2.7 K/UL (ref 1.8–7.7)
NEUTROPHILS NFR BLD: 53.3 % (ref 38–73)
NITRITE UR QL STRIP: NEGATIVE
NITRITE UR QL STRIP: NEGATIVE
NRBC BLD-RTO: 0 /100 WBC
OPIATES UR QL SCN: NEGATIVE
PCP UR QL SCN>25 NG/ML: NEGATIVE
PH UR STRIP: 7 [PH] (ref 5–8)
PH UR STRIP: 7 [PH] (ref 5–8)
PLATELET # BLD AUTO: 240 K/UL (ref 150–450)
PMV BLD AUTO: 10.4 FL (ref 9.2–12.9)
POCT GLUCOSE: 124 MG/DL (ref 70–110)
POTASSIUM SERPL-SCNC: 3.9 MMOL/L (ref 3.5–5.1)
PROT SERPL-MCNC: 8.1 G/DL (ref 6–8.4)
PROT UR QL STRIP: NEGATIVE
PROT UR QL STRIP: NEGATIVE
RBC # BLD AUTO: 3.77 M/UL (ref 4–5.4)
SARS-COV-2 RDRP RESP QL NAA+PROBE: NEGATIVE
SODIUM SERPL-SCNC: 140 MMOL/L (ref 136–145)
SP GR UR STRIP: 1.02 (ref 1–1.03)
SP GR UR STRIP: 1.02 (ref 1–1.03)
TOXICOLOGY INFORMATION: NORMAL
TROPONIN I SERPL DL<=0.01 NG/ML-MCNC: 0.01 NG/ML (ref 0–0.03)
TSH SERPL DL<=0.005 MIU/L-ACNC: 0.9 UIU/ML (ref 0.4–4)
URN SPEC COLLECT METH UR: ABNORMAL
URN SPEC COLLECT METH UR: ABNORMAL
UROBILINOGEN UR STRIP-ACNC: ABNORMAL EU/DL
UROBILINOGEN UR STRIP-ACNC: ABNORMAL EU/DL
WBC # BLD AUTO: 5.01 K/UL (ref 3.9–12.7)

## 2022-02-21 PROCEDURE — 93005 ELECTROCARDIOGRAM TRACING: CPT

## 2022-02-21 PROCEDURE — 84443 ASSAY THYROID STIM HORMONE: CPT | Performed by: EMERGENCY MEDICINE

## 2022-02-21 PROCEDURE — 36415 COLL VENOUS BLD VENIPUNCTURE: CPT | Performed by: NURSE PRACTITIONER

## 2022-02-21 PROCEDURE — 36415 COLL VENOUS BLD VENIPUNCTURE: CPT | Performed by: EMERGENCY MEDICINE

## 2022-02-21 PROCEDURE — 96360 HYDRATION IV INFUSION INIT: CPT

## 2022-02-21 PROCEDURE — 85025 COMPLETE CBC W/AUTO DIFF WBC: CPT | Performed by: NURSE PRACTITIONER

## 2022-02-21 PROCEDURE — 80307 DRUG TEST PRSMV CHEM ANLYZR: CPT | Performed by: NURSE PRACTITIONER

## 2022-02-21 PROCEDURE — U0002 COVID-19 LAB TEST NON-CDC: HCPCS | Performed by: EMERGENCY MEDICINE

## 2022-02-21 PROCEDURE — 99284 EMERGENCY DEPT VISIT MOD MDM: CPT | Mod: 25

## 2022-02-21 PROCEDURE — 87389 HIV-1 AG W/HIV-1&-2 AB AG IA: CPT | Performed by: EMERGENCY MEDICINE

## 2022-02-21 PROCEDURE — 80053 COMPREHEN METABOLIC PANEL: CPT | Performed by: NURSE PRACTITIONER

## 2022-02-21 PROCEDURE — 96361 HYDRATE IV INFUSION ADD-ON: CPT

## 2022-02-21 PROCEDURE — 82962 GLUCOSE BLOOD TEST: CPT

## 2022-02-21 PROCEDURE — 93010 ELECTROCARDIOGRAM REPORT: CPT | Mod: ,,, | Performed by: GENERAL PRACTICE

## 2022-02-21 PROCEDURE — 81003 URINALYSIS AUTO W/O SCOPE: CPT | Mod: 59 | Performed by: NURSE PRACTITIONER

## 2022-02-21 PROCEDURE — 93010 EKG 12-LEAD: ICD-10-PCS | Mod: ,,, | Performed by: GENERAL PRACTICE

## 2022-02-21 PROCEDURE — 25000003 PHARM REV CODE 250: Performed by: EMERGENCY MEDICINE

## 2022-02-21 PROCEDURE — 84484 ASSAY OF TROPONIN QUANT: CPT | Performed by: NURSE PRACTITIONER

## 2022-02-21 RX ORDER — CLONAZEPAM 0.5 MG/1
0.5 TABLET ORAL 3 TIMES DAILY PRN
Qty: 15 TABLET | Refills: 0 | Status: SHIPPED | OUTPATIENT
Start: 2022-02-21 | End: 2022-07-08

## 2022-02-21 RX ORDER — SODIUM CHLORIDE 9 MG/ML
INJECTION, SOLUTION INTRAVENOUS
Status: COMPLETED | OUTPATIENT
Start: 2022-02-21 | End: 2022-02-21

## 2022-02-21 RX ADMIN — SODIUM CHLORIDE: 0.9 INJECTION, SOLUTION INTRAVENOUS at 06:02

## 2022-02-21 NOTE — FIRST PROVIDER EVALUATION
" Emergency Department TeleTriage Encounter Note      CHIEF COMPLAINT    Chief Complaint   Patient presents with    Weakness     Recent admit / generalized weakness        VITAL SIGNS   Initial Vitals [02/21/22 1544]   BP Pulse Resp Temp SpO2   124/80 (!) 58 20 98.1 °F (36.7 °C) 98 %      MAP       --            ALLERGIES    Review of patient's allergies indicates:   Allergen Reactions    Amoxicillin Hives and Other (See Comments)       PROVIDER TRIAGE NOTE  This is a teletriage evaluation of a 51 y.o. female presenting to the ED complaining of "talking out of her head" according to .  Pt reports that she feels like someone is touching her.  Reports generalized pain.     Initial orders will be placed and care will be transferred to an alternate provider when patient is roomed for a full evaluation. Any additional orders and the final disposition will be determined by that provider.           ORDERS  Labs Reviewed   HIV 1 / 2 ANTIBODY   CBC W/ AUTO DIFFERENTIAL   COMPREHENSIVE METABOLIC PANEL   TROPONIN I   URINALYSIS, REFLEX TO URINE CULTURE   DRUG SCREEN PANEL, URINE EMERGENCY   POCT GLUCOSE MONITORING CONTINUOUS       ED Orders (720h ago, onward)    Start Ordered     Status Ordering Provider    02/21/22 1600 02/21/22 1556  Vital Signs  Every 2 hours         Ordered REMEDIOS DAVIS N.    02/21/22 1557 02/21/22 1556  Drug screen panel, emergency  STAT         Ordered REMEDIOS DAVIS N.    02/21/22 1556 02/21/22 1556  Complete Blood Count (CBC)  STAT         Ordered REMEDIOS DAVIS N.    02/21/22 1556 02/21/22 1556  Comprehensive Metabolic Panel (CMP)  STAT         Ordered REMEDIOS DAVIS N.    02/21/22 1556 02/21/22 1556  Insert Saline lock IV  Once         Ordered REMEDIOS DAVIS N.    02/21/22 1556 02/21/22 1556  POCT glucose  Once         Ordered REMEDIOS DAVIS N.    02/21/22 1556 02/21/22 1556  EKG 12-lead  Once         Ordered REMEDIOS DAVIS.    " 02/21/22 1556 02/21/22 1556  Troponin I  STAT         Ordered REMEDIOS DAVIS N.    02/21/22 1556 02/21/22 1556  Urinalysis, Reflex to Urine Culture Urine, Clean Catch  STAT         Ordered REMEDIOS DAVIS N.    02/21/22 1556 02/21/22 1556  Cardiac Monitoring - Adult  Continuous        Comments: Notify Physician If:    Ordered REMEDIOS DAVIS N.    02/21/22 1549 02/21/22 1548  HIV 1/2 Ag/Ab (4th Gen)  STAT         Pending Collection GABRIELA DA SILVA III            Virtual Visit Note: The provider triage portion of this emergency department evaluation and documentation was performed via [employer], a HIPAA-compliant telemedicine application, in concert with a tele-presenter in the room. A face to face patient evaluation with one of my colleagues will occur once the patient is placed in an emergency department room.      DISCLAIMER: This note was prepared with [employer] voice recognition transcription software. Garbled syntax, mangled pronouns, and other bizarre constructions may be attributed to that software system.

## 2022-02-22 NOTE — ED NOTES
Patient was able to answer my questions appropriately however,  reports her having confusing comments when talking with him

## 2022-02-22 NOTE — ED PROVIDER NOTES
"Encounter Date: 2/21/2022    SCRIBE #1 NOTE: I, Whit Alonzo, am scribing for, and in the presence of, Terry Liu III, MD.       History     Chief Complaint   Patient presents with    Weakness     Recent admit / generalized weakness      Time seen by provider: 6:10 PM on 02/21/2022    Riya Soto is a 51 y.o. female who presents to the ED with fatigue, confusion, and anxiety that began 4 days ago. Pt reports "my nerves are bad". Pt's  states that pt is "talking out of her head" and that she is confused. Pt denies medication for anxiety. Pt denies alcohol or drug use. The patient denies fever, congestion, cough, chest pain, abdominal pain, HA, hallucinations, N/V/D, dysuria, or any other symptoms at this time. PMHx of mitral incompetence and HLD. PSHx of cardiac surgery.   She was seen here 1 month ago for atrial flutter.    The history is provided by the patient and the spouse.     Review of patient's allergies indicates:   Allergen Reactions    Amoxicillin Hives and Other (See Comments)     Past Medical History:   Diagnosis Date    Back pain     Hyperlipidemia     Mitral incompetence     Neck pain      Past Surgical History:   Procedure Laterality Date    CARDIAC SURGERY      to repair mitral valve    CHOLECYSTECTOMY       Family History   Problem Relation Age of Onset    Breast cancer Sister      Social History     Tobacco Use    Smoking status: Never Smoker    Smokeless tobacco: Never Used   Substance Use Topics    Alcohol use: No     Alcohol/week: 0.0 standard drinks    Drug use: No     Review of Systems   Constitutional: Positive for fatigue. Negative for fever.   HENT: Negative for congestion.    Respiratory: Negative for cough.    Cardiovascular: Negative for chest pain.   Gastrointestinal: Negative for abdominal pain, diarrhea, nausea and vomiting.   Genitourinary: Negative for dysuria.   Musculoskeletal: Negative for arthralgias.   Skin: Negative for pallor.   Neurological: " Negative for headaches.   Hematological: Does not bruise/bleed easily.   Psychiatric/Behavioral: Positive for confusion. Negative for hallucinations. The patient is nervous/anxious.        Physical Exam     Initial Vitals [02/21/22 1544]   BP Pulse Resp Temp SpO2   124/80 (!) 58 20 98.1 °F (36.7 °C) 98 %      MAP       --         Physical Exam    Nursing note and vitals reviewed.  Constitutional: She appears well-developed and well-nourished.   HENT:   Head: Normocephalic and atraumatic.   Eyes: Conjunctivae and EOM are normal. Pupils are equal, round, and reactive to light.   Neck: Neck supple.   Normal range of motion.  Cardiovascular: Normal rate, regular rhythm and normal heart sounds. Exam reveals no gallop and no friction rub.    No murmur heard.  Pulmonary/Chest: Effort normal and breath sounds normal. No respiratory distress. She has no wheezes. She has no rhonchi. She has no rales.   Abdominal: Abdomen is soft. She exhibits no distension. There is no abdominal tenderness.   No right CVA tenderness.  No left CVA tenderness.   Musculoskeletal:         General: Normal range of motion.      Cervical back: Normal range of motion and neck supple.     Neurological: She is alert and oriented to person, place, and time.   Skin: Skin is warm and dry. No erythema.   Psychiatric: She has a normal mood and affect.         ED Course   Procedures  Labs Reviewed   CBC W/ AUTO DIFFERENTIAL - Abnormal; Notable for the following components:       Result Value    RBC 3.77 (*)     Hemoglobin 11.3 (*)     Hematocrit 35.2 (*)     All other components within normal limits    Narrative:     Release to patient->Immediate   COMPREHENSIVE METABOLIC PANEL - Abnormal; Notable for the following components:    Glucose 127 (*)     Total Bilirubin 1.1 (*)     Alkaline Phosphatase 143 (*)     Anion Gap 7 (*)     All other components within normal limits    Narrative:     Release to patient->Immediate   URINALYSIS, REFLEX TO URINE CULTURE -  Abnormal; Notable for the following components:    Occult Blood UA Trace (*)     Urobilinogen, UA 2.0-3.0 (*)     All other components within normal limits    Narrative:     Specimen Source->Urine   URINALYSIS, REFLEX TO URINE CULTURE - Abnormal; Notable for the following components:    Occult Blood UA Trace (*)     Urobilinogen, UA 2.0-3.0 (*)     All other components within normal limits    Narrative:     Specimen Source->Urine   POCT GLUCOSE - Abnormal; Notable for the following components:    POCT Glucose 124 (*)     All other components within normal limits   TROPONIN I    Narrative:     Release to patient->Immediate   DRUG SCREEN PANEL, URINE EMERGENCY    Narrative:     Specimen Source->Urine   SARS-COV-2 RNA AMPLIFICATION, QUAL   TSH   HIV 1 / 2 ANTIBODY   POCT GLUCOSE MONITORING CONTINUOUS     EKG Readings: (Independently Interpreted)   Rhythm: Atrial Fibrillation. Heart Rate: 109. Ectopy: No Ectopy. ST Segments: Non-Specific ST Segment Depression. T Waves: Normal. Axis: Normal.       Imaging Results    None          Medications   0.9%  NaCl infusion (0 mL/hr Intravenous Stopped 2/21/22 2007)     Medical Decision Making:   History:   Old Medical Records: I decided to obtain old medical records.  Independently Interpreted Test(s):   I have ordered and independently interpreted EKG Reading(s) - see prior notes  Clinical Tests:   Lab Tests: Ordered and Reviewed  Medical Tests: Reviewed and Ordered  ED Management:  51-year-old female presents with transient confusion which has resolved.  She reports it is my nerves.  She has no evidence of CVA although right nonhemorrhagic CVA remains a consideration.  She was initially found to have atrial fibrillation with rapid ventricular response (ventricular rate 106) but had ventricular rate in the 80s prior to discharge.  She does admit to feeling anxious with anxiety likely.  She is to see her primary care tomorrow for further management of this ongoing problem.        APC / Resident Notes:   I, Dr. Terry Liu III, personally performed the services described in this documentation. All medical record entries made by the scribe were at my direction and in my presence.  I have reviewed the chart and agree that the record reflects my personal performance and is accurate and complete     Scribe Attestation:   Scribe #1: I performed the above scribed service and the documentation accurately describes the services I performed. I attest to the accuracy of the note.                 Clinical Impression:   Final diagnoses:  [R53.83] Fatigue  [R41.0] Confusion  [I48.91] Atrial fibrillation with rapid ventricular response (Primary)  [F41.1] Anxiety reaction          ED Disposition Condition    Discharge Stable        ED Prescriptions     Medication Sig Dispense Start Date End Date Auth. Provider    clonazePAM (KLONOPIN) 0.5 MG tablet Take 1 tablet (0.5 mg total) by mouth 3 (three) times daily as needed for Anxiety. 15 tablet 2/21/2022 2/21/2023 Terry Liu III, MD        Follow-up Information     Follow up With Specialties Details Why Contact Info    Pantera Yadav MD Family Medicine In 1 day  [address] [place] [address]  [phone]             Terry Liu III, MD  02/21/22 0198

## 2022-02-23 ENCOUNTER — OFFICE VISIT (OUTPATIENT)
Dept: PRIMARY CARE CLINIC | Facility: CLINIC | Age: 52
End: 2022-02-23
Payer: COMMERCIAL

## 2022-02-23 VITALS
OXYGEN SATURATION: 97 % | RESPIRATION RATE: 18 BRPM | HEIGHT: 69 IN | SYSTOLIC BLOOD PRESSURE: 106 MMHG | DIASTOLIC BLOOD PRESSURE: 72 MMHG | BODY MASS INDEX: 29.98 KG/M2 | WEIGHT: 202.38 LBS | HEART RATE: 70 BPM

## 2022-02-23 DIAGNOSIS — G89.4 CHRONIC PAIN SYNDROME: ICD-10-CM

## 2022-02-23 DIAGNOSIS — G62.9 NEUROPATHY: ICD-10-CM

## 2022-02-23 DIAGNOSIS — F32.A DEPRESSION, UNSPECIFIED DEPRESSION TYPE: ICD-10-CM

## 2022-02-23 DIAGNOSIS — M25.612 IMPAIRED RANGE OF MOTION OF LEFT SHOULDER: ICD-10-CM

## 2022-02-23 DIAGNOSIS — Z98.890 HISTORY OF HEART SURGERY: ICD-10-CM

## 2022-02-23 DIAGNOSIS — I48.91 ATRIAL FIBRILLATION, UNSPECIFIED TYPE: Primary | ICD-10-CM

## 2022-02-23 DIAGNOSIS — E78.5 HYPERLIPIDEMIA, UNSPECIFIED HYPERLIPIDEMIA TYPE: ICD-10-CM

## 2022-02-23 DIAGNOSIS — Z01.419 WELL WOMAN EXAM WITH ROUTINE GYNECOLOGICAL EXAM: ICD-10-CM

## 2022-02-23 DIAGNOSIS — E66.9 CLASS 1 OBESITY WITH BODY MASS INDEX (BMI) OF 30.0 TO 30.9 IN ADULT, UNSPECIFIED OBESITY TYPE, UNSPECIFIED WHETHER SERIOUS COMORBIDITY PRESENT: ICD-10-CM

## 2022-02-23 DIAGNOSIS — Z90.49 HISTORY OF CHOLECYSTECTOMY: ICD-10-CM

## 2022-02-23 PROCEDURE — 99999 PR PBB SHADOW E&M-EST. PATIENT-LVL IV: CPT | Mod: PBBFAC,,, | Performed by: FAMILY MEDICINE

## 2022-02-23 PROCEDURE — 99214 PR OFFICE/OUTPT VISIT, EST, LEVL IV, 30-39 MIN: ICD-10-PCS | Mod: S$GLB,,, | Performed by: FAMILY MEDICINE

## 2022-02-23 PROCEDURE — 93005 EKG 12-LEAD: ICD-10-PCS | Mod: S$GLB,,, | Performed by: FAMILY MEDICINE

## 2022-02-23 PROCEDURE — 93005 ELECTROCARDIOGRAM TRACING: CPT | Mod: S$GLB,,, | Performed by: FAMILY MEDICINE

## 2022-02-23 PROCEDURE — 99999 PR PBB SHADOW E&M-EST. PATIENT-LVL IV: ICD-10-PCS | Mod: PBBFAC,,, | Performed by: FAMILY MEDICINE

## 2022-02-23 PROCEDURE — 93010 ELECTROCARDIOGRAM REPORT: CPT | Mod: S$GLB,,, | Performed by: INTERNAL MEDICINE

## 2022-02-23 PROCEDURE — 93010 EKG 12-LEAD: ICD-10-PCS | Mod: S$GLB,,, | Performed by: INTERNAL MEDICINE

## 2022-02-23 PROCEDURE — 99214 OFFICE O/P EST MOD 30 MIN: CPT | Mod: S$GLB,,, | Performed by: FAMILY MEDICINE

## 2022-02-23 RX ORDER — CITALOPRAM 10 MG/1
10 TABLET ORAL DAILY
Qty: 30 TABLET | Refills: 11 | Status: SHIPPED | OUTPATIENT
Start: 2022-02-23 | End: 2022-03-29

## 2022-02-23 NOTE — PATIENT INSTRUCTIONS
Increase Metoprolol 50MG once a day  Echocardiogram, 24 hour holter monitor, see cardiologist Dr. Muniz.    See Neurology due to weakness of left arm    See Psychiatry due to anxiety and depression  Celexa 10MG once a day  Klonopin take as needed
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
## 2022-02-24 ENCOUNTER — OFFICE VISIT (OUTPATIENT)
Dept: CARDIOLOGY | Facility: CLINIC | Age: 52
End: 2022-02-24
Payer: COMMERCIAL

## 2022-02-24 VITALS
BODY MASS INDEX: 29.42 KG/M2 | DIASTOLIC BLOOD PRESSURE: 58 MMHG | WEIGHT: 198.63 LBS | SYSTOLIC BLOOD PRESSURE: 100 MMHG | HEIGHT: 69 IN | HEART RATE: 87 BPM | OXYGEN SATURATION: 98 %

## 2022-02-24 DIAGNOSIS — I48.19 PERSISTENT ATRIAL FIBRILLATION: ICD-10-CM

## 2022-02-24 DIAGNOSIS — I48.91 ATRIAL FIBRILLATION, UNSPECIFIED TYPE: Primary | ICD-10-CM

## 2022-02-24 LAB — HIV 1+2 AB+HIV1 P24 AG SERPL QL IA: NEGATIVE

## 2022-02-24 PROCEDURE — 99999 PR PBB SHADOW E&M-EST. PATIENT-LVL V: ICD-10-PCS | Mod: PBBFAC,,, | Performed by: NURSE PRACTITIONER

## 2022-02-24 PROCEDURE — 99999 PR PBB SHADOW E&M-EST. PATIENT-LVL V: CPT | Mod: PBBFAC,,, | Performed by: NURSE PRACTITIONER

## 2022-02-24 PROCEDURE — 99204 PR OFFICE/OUTPT VISIT, NEW, LEVL IV, 45-59 MIN: ICD-10-PCS | Mod: S$GLB,,, | Performed by: NURSE PRACTITIONER

## 2022-02-24 PROCEDURE — 99204 OFFICE O/P NEW MOD 45 MIN: CPT | Mod: S$GLB,,, | Performed by: NURSE PRACTITIONER

## 2022-02-24 NOTE — PATIENT INSTRUCTIONS
Continue the metoprolol 25 mg twice a day, don't increase to 50 mg just yet    Start taking Eliquis 5 mg twice daily- if your insurance does not cover it please let us know and we will send it to an [facility]    I am referring you to electrophysiology to be evaluated     Get your Holter monitor and echocardiogram as scheduled    We will see you back in about 4 weeks- please call us if you have any questions, concerns or problems with the medication

## 2022-02-24 NOTE — PROGRESS NOTES
Cardiology    2/24/2022  2:19 PM    Problem list  Patient Active Problem List   Diagnosis    Obesity    History of cholecystectomy    History of heart surgery    Lumbosacral strain    Cervical strain    Chronic pain syndrome    Hyperlipidemia    Left ankle pain    Weakness of left upper extremity    Impaired range of motion of left shoulder    Impaired functional mobility and activity tolerance       CC:    Afib with RVR      HPI:  Has never had a fib before, she had a leaking valve repaired by Dr. Garcia in 2011 at [facility]  Her legs give out, Sunday before last  Seen in the ED on 2/21/22.  Feels the same as when she did then  Brother dies of massive MI just before age 50  No etoh, tobacco or caffeine  Accompanied by spouse in clinic today.    Medications  Current Outpatient Medications   Medication Sig Dispense Refill    atorvastatin (LIPITOR) 10 MG tablet Take 1 tablet (10 mg total) by mouth once daily. 90 tablet 3    citalopram (CELEXA) 10 MG tablet Take 1 tablet (10 mg total) by mouth once daily. 30 tablet 11    clonazePAM (KLONOPIN) 0.5 MG tablet Take 1 tablet (0.5 mg total) by mouth 3 (three) times daily as needed for Anxiety. 15 tablet 0    gabapentin (NEURONTIN) 300 MG capsule gabapentin 300 mg capsule   take 1 cap PO qhs x 3 days, then take 2 cap PO QHS thereafter      meloxicam (MOBIC) 7.5 MG tablet Take 1 tablet (7.5 mg total) by mouth once daily. 60 tablet 2    metoprolol tartrate (LOPRESSOR) 25 MG tablet Take 1 tablet (25 mg total) by mouth 2 (two) times daily. for 14 days 28 tablet 0     No current facility-administered medications for this visit.      Prior to Admission medications    Medication Sig Start Date End Date Taking? Authorizing Provider   atorvastatin (LIPITOR) 10 MG tablet Take 1 tablet (10 mg total) by mouth once daily. 10/5/21 10/5/22 Yes Pantera Yadav MD   citalopram (CELEXA) 10 MG tablet Take 1 tablet (10 mg total) by mouth once daily. 2/23/22  2/23/23 Yes Pantera Yadav MD   clonazePAM (KLONOPIN) 0.5 MG tablet Take 1 tablet (0.5 mg total) by mouth 3 (three) times daily as needed for Anxiety. 2/21/22 2/21/23 Yes Terry Liu III, MD   gabapentin (NEURONTIN) 300 MG capsule gabapentin 300 mg capsule   take 1 cap PO qhs x 3 days, then take 2 cap PO QHS thereafter   Yes Historical Provider   meloxicam (MOBIC) 7.5 MG tablet Take 1 tablet (7.5 mg total) by mouth once daily. 1/24/22  Yes Pantera Yadav MD   metoprolol tartrate (LOPRESSOR) 25 MG tablet Take 1 tablet (25 mg total) by mouth 2 (two) times daily. for 14 days 2/17/22 3/3/22 Yes Liborio Hutchinson MD         History  Past Medical History:   Diagnosis Date    Back pain     Hyperlipidemia     Mitral incompetence     Neck pain      Past Surgical History:   Procedure Laterality Date    CARDIAC SURGERY      to repair mitral valve    CHOLECYSTECTOMY       Social History     Socioeconomic History    Marital status: Single   Tobacco Use    Smoking status: Never Smoker    Smokeless tobacco: Never Used   Substance and Sexual Activity    Alcohol use: No     Alcohol/week: 0.0 standard drinks    Drug use: No    Sexual activity: Yes     Partners: Male     Social Determinants of Health     Financial Resource Strain: Low Risk     Difficulty of Paying Living Expenses: Not hard at all   Food Insecurity: No Food Insecurity    Worried About Running Out of Food in the Last Year: Never true    Ran Out of Food in the Last Year: Never true   Transportation Needs: No Transportation Needs    Lack of Transportation (Medical): No    Lack of Transportation (Non-Medical): No   Stress: No Stress Concern Present    Feeling of Stress : Not at all   Social Connections: Unknown    Frequency of Communication with Friends and Family: Three times a week    Frequency of Social Gatherings with Friends and Family: Three times a week    Marital Status: Never    Housing Stability: Unknown    Unable to  Pay for Housing in the Last Year: No    Unstable Housing in the Last Year: No         Allergies  Review of patient's allergies indicates:   Allergen Reactions    Amoxicillin Hives and Other (See Comments)         Review of Systems   Review of Systems   Constitutional: Negative for diaphoresis and malaise/fatigue.   HENT: Negative.    Cardiovascular: Negative for chest pain, claudication, dyspnea on exertion, irregular heartbeat, leg swelling, near-syncope, orthopnea, palpitations, paroxysmal nocturnal dyspnea and syncope.   Respiratory: Negative for shortness of breath.    Endocrine: Negative for polydipsia, polyphagia and polyuria.   Hematologic/Lymphatic: Does not bruise/bleed easily.   Gastrointestinal: Negative for bloating, nausea and vomiting.   Genitourinary: Negative.    Neurological: Positive for weakness. Negative for excessive daytime sleepiness, dizziness, light-headedness and loss of balance.   Psychiatric/Behavioral: The patient is not nervous/anxious.    Allergic/Immunologic: Negative.          Physical Exam  Wt Readings from Last 1 Encounters:   02/24/22 90.1 kg (198 lb 10.2 oz)     BP Readings from Last 3 Encounters:   02/24/22 (!) 100/58   02/21/22 (!) 137/94   02/17/22 113/78     Pulse Readings from Last 1 Encounters:   02/24/22 87     Body mass index is 29.33 kg/m².    Physical Exam  Vitals and nursing note reviewed.   Constitutional:       Appearance: Normal appearance. She is ill-appearing.      Comments: lethargic   HENT:      Head: Normocephalic and atraumatic.      Mouth/Throat:      Mouth: Mucous membranes are moist.   Eyes:      Pupils: Pupils are equal, round, and reactive to light.   Cardiovascular:      Rate and Rhythm: Normal rate.      Pulses:           Radial pulses are 2+ on the right side and 2+ on the left side.        Dorsalis pedis pulses are 2+ on the right side and 2+ on the left side.        Posterior tibial pulses are 2+ on the right side and 2+ on the left side.       Heart sounds: No murmur heard.     Comments: Irregularly irregular  Pulmonary:      Effort: Pulmonary effort is normal. No respiratory distress.      Breath sounds: Normal breath sounds.   Abdominal:      General: There is no distension.      Tenderness: There is no abdominal tenderness.   Musculoskeletal:      Cervical back: Normal range of motion.      Right lower leg: No edema.      Left lower leg: No edema.   Skin:     General: Skin is warm and dry.      Findings: No erythema.   Neurological:      General: No focal deficit present.      Mental Status: She is alert.   Psychiatric:         Mood and Affect: Mood normal.         Behavior: Behavior normal.       Problem List Items Addressed This Visit        Cardiac/Vascular    Atrial fibrillation - Primary    Overview     Noted top be in RVR in past  Has not been started on anticoagulation, CHADsVASC 2.  On metoprolol 25 mg and has increased fatigue. Last normal EKG in 2019. Await echo, start eliquis 5 mg BID  Refer to EP  Continue to avoid etoh and tobacco  Dose of metoprolol was to eb incraesed to 50 mg BID- recommend she continue at 25 mg BID as she is having hypotention           Current Assessment & Plan     As above.  Proceed with Holter as ordered           Relevant Orders    Holter monitor - 48 hour    Ambulatory referral/consult to Cardiac Electrophysiology                        Follow Up  4 weeks      @Suzie Berrios DNP

## 2022-02-25 DIAGNOSIS — I48.91 ATRIAL FIBRILLATION, UNSPECIFIED TYPE: Primary | ICD-10-CM

## 2022-03-02 ENCOUNTER — TELEPHONE (OUTPATIENT)
Dept: PSYCHOLOGY | Facility: CLINIC | Age: 52
End: 2022-03-02
Payer: COMMERCIAL

## 2022-03-02 ENCOUNTER — TELEPHONE (OUTPATIENT)
Dept: PRIMARY CARE CLINIC | Facility: CLINIC | Age: 52
End: 2022-03-02
Payer: COMMERCIAL

## 2022-03-02 NOTE — TELEPHONE ENCOUNTER
Discussed pt with Dr. Yadav. He stated that pt is clear to fly. He can't say that she won't have any issues but the issues could occur just as easy as her walking around or sitting on her couch. I called patient and explained it to the pt. She understood and had no more questions.

## 2022-03-02 NOTE — TELEPHONE ENCOUNTER
----- Message from Lucy Ivan LCSW sent at 3/2/2022 11:00 AM CST -----    ----- Message -----  From: Keke Price  Sent: 3/2/2022  10:55 AM CST  To: Lucy Ivan LCSW    Depression, unspecified depression type [F32.A] referral in please call patient back to schedule

## 2022-03-02 NOTE — TELEPHONE ENCOUNTER
----- Message from Keke Swanson sent at 3/2/2022 12:29 PM CST -----  Contact: Riya Ritter called in to say that she is going out  of town on Friday 3/4/22 by plane and she wants to know if she can fly or is it best to cancel her trip until she has her test done. Please call her at [phone].    Thanks  Td

## 2022-03-02 NOTE — TELEPHONE ENCOUNTER
Called pt scheduled new pt appointment, pt confirmed,stated to pt she had to re download my chart anabella in order to complete appointment, pt verbalized understanding

## 2022-03-03 ENCOUNTER — HOSPITAL ENCOUNTER (EMERGENCY)
Facility: HOSPITAL | Age: 52
Discharge: HOME | End: 2022-03-03
Attending: EMERGENCY MEDICINE
Payer: COMMERCIAL

## 2022-03-03 VITALS
HEART RATE: 82 BPM | OXYGEN SATURATION: 100 % | RESPIRATION RATE: 18 BRPM | HEIGHT: 69 IN | WEIGHT: 190 LBS | DIASTOLIC BLOOD PRESSURE: 72 MMHG | BODY MASS INDEX: 28.14 KG/M2 | SYSTOLIC BLOOD PRESSURE: 112 MMHG | TEMPERATURE: 98 F

## 2022-03-03 DIAGNOSIS — R52 PAIN: ICD-10-CM

## 2022-03-03 DIAGNOSIS — M25.50 ARTHRALGIA, UNSPECIFIED JOINT: Primary | ICD-10-CM

## 2022-03-03 PROCEDURE — 99284 EMERGENCY DEPT VISIT MOD MDM: CPT | Mod: 25

## 2022-03-03 RX ORDER — DICLOFENAC SODIUM 10 MG/G
2 GEL TOPICAL 4 TIMES DAILY
Qty: 2 G | Refills: 0 | Status: SHIPPED | OUTPATIENT
Start: 2022-03-03 | End: 2022-07-08

## 2022-03-04 ENCOUNTER — PES CALL (OUTPATIENT)
Dept: ADMINISTRATIVE | Facility: CLINIC | Age: 52
End: 2022-03-04
Payer: COMMERCIAL

## 2022-03-04 PROBLEM — I48.91 ATRIAL FIBRILLATION: Status: ACTIVE | Noted: 2022-03-04

## 2022-03-04 NOTE — ED NOTES
Presented with complaints of right leg pain states was seen here for the same 1 week ago NAD noted no obvious deformities noted

## 2022-03-04 NOTE — ED PROVIDER NOTES
Encounter Date: 3/3/2022       History     Chief Complaint   Patient presents with    Leg Pain     Patient states she has been having right leg pain for about 10 days states that she is in so much pain she can barely walk      Patient is a 51 y.o. female who presents to the ED 03/03/2022 with a chief complaint of right lower leg pain since approximately 2/17.     Patient states that the pain is in the back of her leg, mostly in her back of calf and knee. She says the pain is worst when she is walking. She states that she does not recall a specific injury to her leg.    Patient denies swelling in the legs, chest pain, lightheadedness, shortness of breath, cough, weakness, or numbness. She denies any recent periods of prolonged immobilization.      She has a history atrial fibrillation, mitral valve repair, obesity, and hyperlipidemia.         Review of patient's allergies indicates:   Allergen Reactions    Amoxicillin Hives and Other (See Comments)     Past Medical History:   Diagnosis Date    Back pain     Hyperlipidemia     Mitral incompetence     Neck pain      Past Surgical History:   Procedure Laterality Date    CARDIAC SURGERY      to repair mitral valve    CHOLECYSTECTOMY       Family History   Problem Relation Age of Onset    Breast cancer Sister      Social History     Tobacco Use    Smoking status: Never Smoker    Smokeless tobacco: Never Used   Substance Use Topics    Alcohol use: No     Alcohol/week: 0.0 standard drinks    Drug use: No     Review of Systems   Constitutional: Negative for activity change and fever.   HENT: Negative for congestion and rhinorrhea.    Respiratory: Negative for shortness of breath.    Cardiovascular: Negative for chest pain and leg swelling.   Gastrointestinal: Negative for abdominal pain and nausea.   Musculoskeletal: Positive for myalgias (Pain in the back of the calf and back of the knee). Negative for arthralgias and joint swelling.   Skin: Negative for rash.    Neurological: Negative for weakness and numbness.       Physical Exam     Initial Vitals [03/03/22 1726]   BP Pulse Resp Temp SpO2   (!) 114/52 84 19 97.9 °F (36.6 °C) 100 %      MAP       --         Physical Exam    Nursing note and vitals reviewed.  Constitutional: Vital signs are normal. She appears well-developed and well-nourished.   HENT:   Head: Normocephalic and atraumatic.   Eyes: Pupils are equal, round, and reactive to light.   Neck: Neck supple.   Cardiovascular: Normal rate, regular rhythm, normal heart sounds and intact distal pulses. Exam reveals no gallop and no friction rub.    No murmur heard.  Pulmonary/Chest: Breath sounds normal. No tachypnea. No respiratory distress. She has no wheezes. She has no rhonchi. She has no rales.   Abdominal: Abdomen is soft. Bowel sounds are normal. There is no abdominal tenderness.   Musculoskeletal:      Cervical back: Neck supple.      Right upper leg: Normal.      Left upper leg: Normal.      Right knee: No swelling, deformity, effusion, erythema or bony tenderness. Normal range of motion. Tenderness (tenderness along the back of the knee.  ) present. Normal alignment, normal meniscus and normal patellar mobility.      Left knee: Normal.      Right lower leg: Tenderness (tenderness along the posterior aspect of the calf ) present. No swelling, deformity, lacerations or bony tenderness. No edema.      Left lower leg: Normal.      Right ankle: Normal.        Legs:       Comments: Normal ROM of knee, ankle, and foot, bilaterally. No pain with movement of knee, ankle, or foot. No visible deformities. No swelling. No warmth. No erythema. No skin changes.      Neurological: She is alert and oriented to person, place, and time. She has normal strength.   Reflex Scores:       Patellar reflexes are 2+ on the right side.       Achilles reflexes are 2+ on the right side.  Skin: Skin is warm, dry and intact.   Psychiatric: She has a normal mood and affect. Her speech is  normal and behavior is normal.         ED Course   Procedures  Labs Reviewed - No data to display       Imaging Results          X-Ray Knee 3 View Right (Final result)  Result time 03/03/22 19:40:55    Final result by Stevie Payton MD (03/03/22 19:40:55)                 Impression:      Negative right knee.      Electronically signed by: Stevie Payton  Date:    03/03/2022  Time:    19:40             Narrative:    EXAMINATION:  XR KNEE 3 VIEW RIGHT    CLINICAL HISTORY:  Pain, unspecified    TECHNIQUE:  AP, lateral, and Merchant views of the right knee were performed.    COMPARISON:  None    FINDINGS:  Bones joint spaces and soft tissues appear intact without evidence of fracture foreign body or soft tissue swelling.  No effusion is evident.                               X-Ray Tibia Fibula 2 View Right (Final result)  Result time 03/03/22 19:12:27    Final result by Adali Yuen MD (03/03/22 19:12:27)                 Impression:      As above      Electronically signed by: Adali Yuen MD  Date:    03/03/2022  Time:    19:12             Narrative:    EXAMINATION:  XR TIBIA FIBULA 2 VIEW RIGHT    CLINICAL HISTORY:  Pain, unspecified    TECHNIQUE:  AP and lateral views of the right tibia and fibula were performed.    COMPARISON:  None.    FINDINGS:  Right tibia and fibula appear intact without acute fracture or dislocation.                               US Lower Extremity Veins Right (Final result)  Result time 03/03/22 18:49:07    Final result by Adali Yuen MD (03/03/22 18:49:07)                 Impression:      No evidence of deep venous thrombosis in the right lower extremity.      Electronically signed by: Adali Yuen MD  Date:    03/03/2022  Time:    18:49             Narrative:    EXAMINATION:  US LOWER EXTREMITY VEINS RIGHT    CLINICAL HISTORY:  Pain, unspecified    TECHNIQUE:  Duplex and color flow Doppler evaluation and graded compression of the right lower extremity veins was  performed.    COMPARISON:  12/08/2015    FINDINGS:  Right thigh veins: The common femoral, femoral, popliteal, upper greater saphenous, and deep femoral veins are patent and free of thrombus. The veins are normally compressible and have normal phasic flow and augmentation response.    Right calf veins: The visualized calf veins are patent.    Contralateral CFV: The contralateral (left) common femoral vein is patent and free of thrombus.    Miscellaneous: None                                 Medications - No data to display  Medical Decision Making:   Differential Diagnosis:   DVT  Arthralgia  Flores's cyst       APC / Resident Notes:   Patient is a 51 y.o. female who presents to the ED 03/04/2022 who underwent emergent evaluation right leg pain.  She denies any injury or trauma.  Right lower extremity +2 DP and PT pulses.  No swelling of the right lower extremity.  No deformity.  No signs of neurovascular compromise.  No skin changes or signs of any acute infectious process such as cellulitis or septic joint.  She has normal range of motion of joints.  Ultrasound for DVT without acute findings.  I do not think DVT.  Vital signs normal.  X-ray of right knee and right tibia and fibula without acute findings.  Do not think any underlying fracture.  I doubt any ligament injury in the setting of no trauma.  Patient has no instability of the joint is able ambulate with out difficulty.  I do not think further immobilization indicated at this time.  She is given anti-inflammatories for pain. Based on my clinical evaluation, I do not appreciate any immediate, emergent, or life threatening condition or etiology that warrants additional workup today and feel that the patient can be discharged with close follow up care. Follow up and return precautions discussed; patient verbalized understanding and is agreeable to plan of care. Patient discharged home in stable condition.                          Clinical Impression:   Final  diagnoses:  [R52] Pain  [M25.50] Arthralgia, unspecified joint (Primary)          ED Disposition Condition    Discharge Stable        ED Prescriptions     Medication Sig Dispense Start Date End Date Auth. Provider    diclofenac sodium (VOLTAREN ARTHRITIS PAIN) 1 % Gel Apply 2 g topically 4 (four) times daily. 2 g 3/3/2022  Sol Moe NP        Follow-up Information     Follow up With Specialties Details Why Contact Info    Pantera Yadav MD Family Medicine In 2 days  [address] [place] [address] [phone]      [facility] Emergency Dept Emergency Medicine  As needed, If symptoms worsen [address] [place] [id number] [phone]           Sol Moe NP  03/04/22 3604

## 2022-03-07 ENCOUNTER — PATIENT OUTREACH (OUTPATIENT)
Dept: EMERGENCY MEDICINE | Facility: HOSPITAL | Age: 52
End: 2022-03-07
Payer: COMMERCIAL

## 2022-03-10 ENCOUNTER — OFFICE VISIT (OUTPATIENT)
Dept: PRIMARY CARE CLINIC | Facility: CLINIC | Age: 52
End: 2022-03-10
Payer: COMMERCIAL

## 2022-03-10 ENCOUNTER — TELEPHONE (OUTPATIENT)
Dept: PRIMARY CARE CLINIC | Facility: CLINIC | Age: 52
End: 2022-03-10
Payer: COMMERCIAL

## 2022-03-10 ENCOUNTER — TELEPHONE (OUTPATIENT)
Dept: ORTHOPEDICS | Facility: CLINIC | Age: 52
End: 2022-03-10
Payer: COMMERCIAL

## 2022-03-10 DIAGNOSIS — E78.00 PURE HYPERCHOLESTEROLEMIA: ICD-10-CM

## 2022-03-10 DIAGNOSIS — G89.4 CHRONIC PAIN SYNDROME: ICD-10-CM

## 2022-03-10 DIAGNOSIS — Z90.49 HISTORY OF CHOLECYSTECTOMY: ICD-10-CM

## 2022-03-10 DIAGNOSIS — I48.20 CHRONIC ATRIAL FIBRILLATION: ICD-10-CM

## 2022-03-10 DIAGNOSIS — M25.561 CHRONIC PAIN OF RIGHT KNEE: Primary | ICD-10-CM

## 2022-03-10 DIAGNOSIS — G89.29 CHRONIC PAIN OF RIGHT KNEE: Primary | ICD-10-CM

## 2022-03-10 PROCEDURE — 99214 PR OFFICE/OUTPT VISIT, EST, LEVL IV, 30-39 MIN: ICD-10-PCS | Mod: 95,,, | Performed by: FAMILY MEDICINE

## 2022-03-10 PROCEDURE — 99214 OFFICE O/P EST MOD 30 MIN: CPT | Mod: 95,,, | Performed by: FAMILY MEDICINE
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
## 2022-03-10 NOTE — TELEPHONE ENCOUNTER
----- Message from Reinaldo Krueger sent at 3/10/2022  2:42 PM CST -----  Regarding: pt had to stop PT, call pt   Contact: pt   pt had to stop PT, call pt

## 2022-03-10 NOTE — TELEPHONE ENCOUNTER
Returned pts call. Pt stated that her leg is still hurting nad would like an appt to discuss it with the provider. Scheduled patient for an audio visit today.

## 2022-03-10 NOTE — TELEPHONE ENCOUNTER
----- Message from Lupe Lu sent at 3/10/2022 10:27 AM CST -----  Contact: Self   Pt is requesting a call regarding her right leg pain. Please advise

## 2022-03-14 LAB
AORTIC ROOT ANNULUS: 2.77 CM
AORTIC VALVE CUSP SEPERATION: 1.44 CM
AV PEAK GRADIENT: 7 MMHG
CV ECHO LV RWT: 0.34 CM
DOP CALC AO PEAK VEL: 1.32 M/S
DOP CALC LVOT AREA: 2.7 CM2
DOP CALC LVOT DIAMETER: 1.86 CM
DOP CALC MV VTI: 62.08 CM
E WAVE DECELERATION TIME: 326.05 MSEC
E/A RATIO: 1.63
ECHO LV POSTERIOR WALL: 0.89 CM (ref 0.6–1.1)
EJECTION FRACTION: 40 %
FRACTIONAL SHORTENING: 13 % (ref 28–44)
INTERVENTRICULAR SEPTUM: 0.86 CM (ref 0.6–1.1)
LEFT ATRIUM SIZE: 5.6 CM
LEFT INTERNAL DIMENSION IN SYSTOLE: 4.58 CM (ref 2.1–4)
LEFT VENTRICLE DIASTOLIC VOLUME: 132.08 ML
LEFT VENTRICLE SYSTOLIC VOLUME: 96.39 ML
LEFT VENTRICULAR INTERNAL DIMENSION IN DIASTOLE: 5.24 CM (ref 3.5–6)
LEFT VENTRICULAR MASS: 165.05 G
MV MEAN GRADIENT: 1 MMHG
MV PEAK A VEL: 1.28 M/S
MV PEAK E VEL: 2.08 M/S
MV PEAK GRADIENT: 26 MMHG
MV STENOSIS PRESSURE HALF TIME: 68.34 MS
MV VALVE AREA P 1/2 METHOD: 3.22 CM2
PISA MRMAX VEL: 0.04 M/S
PISA TR MAX VEL: 2.86 M/S
PV PEAK VELOCITY: 0.76 CM/S
RA PRESSURE: 15 MMHG
RIGHT VENTRICULAR END-DIASTOLIC DIMENSION: 3 CM
TR MAX PG: 33 MMHG
TV REST PULMONARY ARTERY PRESSURE: 48 MMHG

## 2022-03-15 ENCOUNTER — HOSPITAL ENCOUNTER (OUTPATIENT)
Dept: CARDIOLOGY | Facility: CLINIC | Age: 52
Discharge: HOME | End: 2022-03-15
Payer: COMMERCIAL

## 2022-03-15 ENCOUNTER — OFFICE VISIT (OUTPATIENT)
Dept: ELECTROPHYSIOLOGY | Facility: CLINIC | Age: 52
End: 2022-03-15
Payer: COMMERCIAL

## 2022-03-15 VITALS
SYSTOLIC BLOOD PRESSURE: 134 MMHG | WEIGHT: 202.81 LBS | BODY MASS INDEX: 30.04 KG/M2 | HEART RATE: 69 BPM | DIASTOLIC BLOOD PRESSURE: 88 MMHG | HEIGHT: 69 IN

## 2022-03-15 DIAGNOSIS — I48.91 ATRIAL FIBRILLATION, UNSPECIFIED TYPE: Primary | ICD-10-CM

## 2022-03-15 DIAGNOSIS — I48.19 PERSISTENT ATRIAL FIBRILLATION: Primary | ICD-10-CM

## 2022-03-15 DIAGNOSIS — Z98.890 HISTORY OF HEART SURGERY: ICD-10-CM

## 2022-03-15 DIAGNOSIS — I48.20 CHRONIC ATRIAL FIBRILLATION: ICD-10-CM

## 2022-03-15 DIAGNOSIS — I48.20 CHRONIC ATRIAL FIBRILLATION: Primary | ICD-10-CM

## 2022-03-15 DIAGNOSIS — I48.91 ATRIAL FIBRILLATION, UNSPECIFIED TYPE: ICD-10-CM

## 2022-03-15 PROCEDURE — 93005 ELECTROCARDIOGRAM TRACING: CPT | Mod: S$GLB,,, | Performed by: INTERNAL MEDICINE

## 2022-03-15 PROCEDURE — 93005 RHYTHM STRIP: ICD-10-PCS | Mod: S$GLB,,, | Performed by: INTERNAL MEDICINE

## 2022-03-15 PROCEDURE — 99204 PR OFFICE/OUTPT VISIT, NEW, LEVL IV, 45-59 MIN: ICD-10-PCS | Mod: S$GLB,,, | Performed by: INTERNAL MEDICINE

## 2022-03-15 PROCEDURE — 99999 PR PBB SHADOW E&M-EST. PATIENT-LVL III: CPT | Mod: PBBFAC,,, | Performed by: INTERNAL MEDICINE

## 2022-03-15 PROCEDURE — 99213 OFFICE O/P EST LOW 20 MIN: CPT | Mod: PBBFAC | Performed by: INTERNAL MEDICINE

## 2022-03-15 PROCEDURE — 93010 ELECTROCARDIOGRAM REPORT: CPT | Mod: S$GLB,,, | Performed by: INTERNAL MEDICINE

## 2022-03-15 PROCEDURE — 99999 PR PBB SHADOW E&M-EST. PATIENT-LVL III: ICD-10-PCS | Mod: PBBFAC,,, | Performed by: INTERNAL MEDICINE

## 2022-03-15 PROCEDURE — 99204 OFFICE O/P NEW MOD 45 MIN: CPT | Mod: S$GLB,,, | Performed by: INTERNAL MEDICINE

## 2022-03-15 PROCEDURE — 93005 ELECTROCARDIOGRAM TRACING: CPT | Mod: PBBFAC | Performed by: INTERNAL MEDICINE

## 2022-03-15 PROCEDURE — 93010 RHYTHM STRIP: ICD-10-PCS | Mod: S$GLB,,, | Performed by: INTERNAL MEDICINE

## 2022-03-15 NOTE — PROGRESS NOTES
Subjective:    Patient ID:  Riya Soto is a 51 y.o. female who presents for evaluation of Atrial Fibrillation    Referring Cardiology Practitioner: Suzie Berrios DNP  Primary Care Physician: Pantera Yadav MD    HPI   I had the pleasure of seeing Mrs. Soto today in our electrophysiology clinic in consultation for her atrial fibrillation. As you are aware she is a pleasant 51 year-old woman with prior MV disease? (unclear etiology/pathology, reports possible rheumatic fever as a child, prior mitral valve repair in 2011) presenting with symptomatic persistent AF also with systolic dysfunction (LVEF of 40%) with mild-mod MR/MS. She was in her usual state of health until the past few months when she began having palpitations and fatigue. She then presented to urgent care on 2/17/2022 with feeling unwell (body aches, fatigue, wanted to get checked for COVID) and was discovered to be in atrial fibrillation. She was sent to the ER. She was discharged. She then saw Suzie Berrios in cardiology clinic who initiated eliquis and referred for evaluation. An echocardiogram was recently performed noting a LVEF of 40% with mild-mod MR and mild-mod MS with reduced anterior mitral leaflet mobility. She was also initiated on metoprolol, which has been limited to low dosing due to hypotension. A 24 hour holter monitor noted persistent AF with an average ventricular rate of 89 bpm with frequent PVCs (2% burden).    I reviewed available all available electrocardiograms in Epic which show sinus rhythm in 2019 and then AF on 2/17/2022 and persistent since.    My interpretation of today's in clinic electrocardiogram is atrial fibrillation with an average ventricular rate of 90 bpm.    Review of Systems   Constitutional: Positive for malaise/fatigue. Negative for fever.   HENT: Negative for congestion and sore throat.    Eyes: Negative for blurred vision and visual disturbance.   Cardiovascular: Positive for palpitations. Negative  for chest pain, dyspnea on exertion, irregular heartbeat, near-syncope and syncope.   Respiratory: Negative for cough and shortness of breath.    Hematologic/Lymphatic: Negative for bleeding problem. Does not bruise/bleed easily.   Skin: Negative.    Musculoskeletal:        Right leg pain.   Gastrointestinal: Negative for bloating, abdominal pain, hematochezia and melena.   Neurological: Negative for focal weakness and weakness.   Psychiatric/Behavioral: Negative.         Objective:    Physical Exam  Vitals reviewed.   Constitutional:       General: She is not in acute distress.     Appearance: She is well-developed. She is not diaphoretic.   HENT:      Head: Normocephalic and atraumatic.   Eyes:      General:         Right eye: No discharge.         Left eye: No discharge.      Conjunctiva/sclera: Conjunctivae normal.   Cardiovascular:      Rate and Rhythm: Normal rate. Rhythm irregularly irregular.      Heart sounds: No murmur heard.    No friction rub. No gallop.   Pulmonary:      Effort: Pulmonary effort is normal. No respiratory distress.      Breath sounds: Normal breath sounds. No wheezing or rales.   Abdominal:      General: Bowel sounds are normal. There is no distension.      Palpations: Abdomen is soft.      Tenderness: There is no abdominal tenderness.   Musculoskeletal:      Cervical back: Neck supple.   Skin:     General: Skin is warm and dry.   Neurological:      Mental Status: She is alert and oriented to person, place, and time.   Psychiatric:         Behavior: Behavior normal.         Thought Content: Thought content normal.         Judgment: Judgment normal.           Assessment:       1. Persistent atrial fibrillation    2. History of mitral valve surgery         Plan:       In summary, Mrs. Soto is a pleasant 51 year-old woman with prior MV disease? (unclear etiology/pathology, reports possible rheumatic fever as a child, prior mitral valve repair in 2011) presenting with symptomatic  persistent AF also with systolic dysfunction (LVEF of 40%) with mild-mod MR/MS. I had a long discussion with the patient about the pathophysiology and risks of atrial fibrillation and its basic pathophysiology, including its health implications and treatment options. Specifically, I addressed the need for CVA (stroke) prophylaxis with aspirin versus oral anticoagulation (warfarin vs DOACs, discussed bleeding risks, and need to come to the ER for any head trauma for CT scanning even if asymptomatic). Since she does not have mod-severe MS I am ok with eliquis. I also discussed the goal to reduce symptomatic arrhythmic episodes by pharmacologic and/or procedural methods and utilizing a rhythm versus a rate control strategy. Due to age, symptoms and reduced EF I recommend rhythm control strategy. She is agreeable.    Plan  JUAN/DCCV  Likely start multaq after  Will discuss PVI as well once she has been in normal rhythm for a month or so.    Thank you for allowing me to participate in the care of this patient. Please do not hesitate to call me with any questions or concerns.    Elmo Jones MD, PhD  Cardiac Electrophysiology

## 2022-03-16 ENCOUNTER — PATIENT OUTREACH (OUTPATIENT)
Dept: ADMINISTRATIVE | Facility: OTHER | Age: 52
End: 2022-03-16
Payer: COMMERCIAL

## 2022-03-16 NOTE — PROGRESS NOTES
Chart was reviewed for overdue Proactive [facility] Encounters ([facility]IL[facility])  topics  Updates were requested from care everywhere  Health Maintenance has been updated  [place] immunization registry triggered

## 2022-03-17 ENCOUNTER — OFFICE VISIT (OUTPATIENT)
Dept: ORTHOPEDICS | Facility: CLINIC | Age: 52
End: 2022-03-17
Payer: COMMERCIAL

## 2022-03-17 VITALS — BODY MASS INDEX: 30.04 KG/M2 | WEIGHT: 202.81 LBS | HEIGHT: 69 IN

## 2022-03-17 DIAGNOSIS — M79.661 RIGHT CALF PAIN: Primary | ICD-10-CM

## 2022-03-17 PROCEDURE — 99999 PR PBB SHADOW E&M-EST. PATIENT-LVL III: ICD-10-PCS | Mod: PBBFAC,,, | Performed by: ORTHOPAEDIC SURGERY

## 2022-03-17 PROCEDURE — 99214 OFFICE O/P EST MOD 30 MIN: CPT | Mod: S$GLB,,, | Performed by: ORTHOPAEDIC SURGERY

## 2022-03-17 PROCEDURE — 99214 PR OFFICE/OUTPT VISIT, EST, LEVL IV, 30-39 MIN: ICD-10-PCS | Mod: S$GLB,,, | Performed by: ORTHOPAEDIC SURGERY

## 2022-03-17 PROCEDURE — 99999 PR PBB SHADOW E&M-EST. PATIENT-LVL III: CPT | Mod: PBBFAC,,, | Performed by: ORTHOPAEDIC SURGERY

## 2022-03-17 RX ORDER — METHYLPREDNISOLONE 4 MG/1
TABLET ORAL
Qty: 1 EACH | Refills: 0 | Status: SHIPPED | OUTPATIENT
Start: 2022-03-17 | End: 2022-04-06
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
## 2022-03-18 ENCOUNTER — PATIENT MESSAGE (OUTPATIENT)
Dept: ADMINISTRATIVE | Facility: HOSPITAL | Age: 52
End: 2022-03-18
Payer: COMMERCIAL

## 2022-03-18 ENCOUNTER — PATIENT MESSAGE (OUTPATIENT)
Dept: ELECTROPHYSIOLOGY | Facility: CLINIC | Age: 52
End: 2022-03-18
Payer: COMMERCIAL

## 2022-03-28 ENCOUNTER — TELEPHONE (OUTPATIENT)
Dept: ELECTROPHYSIOLOGY | Facility: CLINIC | Age: 52
End: 2022-03-28
Payer: COMMERCIAL

## 2022-03-28 NOTE — HPI
Riya Soto presents to short stay cardiac unit on 03/29/2022 for planned JUAN guided DCCV with Dr. Jones.     Ms. Soto is a 51 y.o. female with history of prior MV disease? (unclear etiology/pathology, reports possible rheumatic fever as a child, prior mitral valve repair in 2011) and persistent AF with associated systolic dysfunction and MR/MS. Ms. Soto reports feeling unwell for several months, prior to presenting to urgent care in 2/2022. It was at that time she was found to be in AF. She followed up with Dr. Berrios who initiated her on eliquis and then referred her to EP.     A recent echo showed EF 40% with mild-mod MR. A 24 hour Holter showed persistent AF with average rate of 89 bpm with PVCs. At visit with Dr. Jones, they discussed AF treatment options including rate versus rhythm control. Given symptoms and reduced EF, would recommend rhythm control with DCCV. Ms. Soto elected to proceed.     EKG:  My independent visualization of most recent EKG is AF at 87 bpm

## 2022-03-28 NOTE — NURSING
Attempted to contact patient to confirm procedure details. No answer. Left detailed voicemail including: arrival time, NPO after midnight and medication instructions, along with callback number.     Labs no results noted.    Covid not required for vaccinated patients

## 2022-03-28 NOTE — TELEPHONE ENCOUNTER
Spoke with patient in regards to her procedure tomorrow.     CONFIRMED procedure arrival time of 9:00am on Tuesday 3/29/22     Reiterated instructions including:  -Directions to check in desk  -NPO after midnight night prior to procedure  --Confirmed compliance of Eliquis.   Taking BID and will take in the am with sips of water along with her routine medication  -Pre-procedure LABS Will need to be drawn on arrival. Patient did not read her portal message and did not go to [facility] as planned.  -COVID test not required for vaccinated patients  --Do not wear mascara day of procedure  --Reviewed current visitor policy    Patient verbalizes understanding of above and appreciates call.

## 2022-03-28 NOTE — ASSESSMENT & PLAN NOTE
s/p JUAN guided DCCV with restoration of sinus rhythm    Plan:  - Continue PTA medications  - AAD: start multaq 400 mg BID  - Follow up with ECG in 1 week (4/5/22)  - Follow up with Marianela Malave NP ~ 4 weeks. Discuss PVI at that time.   - Discharge plans/instructions discussed with patient who verbalized understanding and agreement of plans of care. No further questions or concerns  voiced at this time.

## 2022-03-29 ENCOUNTER — HOSPITAL ENCOUNTER (OUTPATIENT)
Dept: CARDIOLOGY | Facility: HOSPITAL | Age: 52
Discharge: HOME | End: 2022-03-29
Attending: INTERNAL MEDICINE | Admitting: INTERNAL MEDICINE
Payer: COMMERCIAL

## 2022-03-29 ENCOUNTER — ANESTHESIA EVENT (OUTPATIENT)
Dept: MEDSURG UNIT | Facility: HOSPITAL | Age: 52
End: 2022-03-29
Payer: COMMERCIAL

## 2022-03-29 ENCOUNTER — ANESTHESIA (OUTPATIENT)
Dept: MEDSURG UNIT | Facility: HOSPITAL | Age: 52
End: 2022-03-29
Payer: COMMERCIAL

## 2022-03-29 ENCOUNTER — HOSPITAL ENCOUNTER (OUTPATIENT)
Facility: HOSPITAL | Age: 52
Discharge: HOME | End: 2022-03-29
Attending: INTERNAL MEDICINE | Admitting: INTERNAL MEDICINE
Payer: COMMERCIAL

## 2022-03-29 VITALS
WEIGHT: 190 LBS | SYSTOLIC BLOOD PRESSURE: 118 MMHG | DIASTOLIC BLOOD PRESSURE: 73 MMHG | BODY MASS INDEX: 28.14 KG/M2 | TEMPERATURE: 98 F | HEIGHT: 69 IN | OXYGEN SATURATION: 98 % | RESPIRATION RATE: 15 BRPM | HEART RATE: 81 BPM

## 2022-03-29 VITALS
BODY MASS INDEX: 28.14 KG/M2 | SYSTOLIC BLOOD PRESSURE: 127 MMHG | DIASTOLIC BLOOD PRESSURE: 74 MMHG | WEIGHT: 190 LBS | HEIGHT: 69 IN

## 2022-03-29 DIAGNOSIS — I48.91 ATRIAL FIBRILLATION, UNSPECIFIED TYPE: ICD-10-CM

## 2022-03-29 DIAGNOSIS — I48.91 ATRIAL FIBRILLATION: ICD-10-CM

## 2022-03-29 DIAGNOSIS — I48.19 PERSISTENT ATRIAL FIBRILLATION: ICD-10-CM

## 2022-03-29 LAB
ANION GAP SERPL CALC-SCNC: 8 MMOL/L (ref 8–16)
APTT BLDCRRT: 27.6 SEC (ref 21–32)
ASCENDING AORTA: 3.5 CM
BASOPHILS # BLD AUTO: 0.03 K/UL (ref 0–0.2)
BASOPHILS NFR BLD: 0.6 % (ref 0–1.9)
BSA FOR ECHO PROCEDURE: 2.05 M2
BUN SERPL-MCNC: 20 MG/DL (ref 6–20)
CALCIUM SERPL-MCNC: 9.5 MG/DL (ref 8.7–10.5)
CHLORIDE SERPL-SCNC: 103 MMOL/L (ref 95–110)
CO2 SERPL-SCNC: 27 MMOL/L (ref 23–29)
CREAT SERPL-MCNC: 0.8 MG/DL (ref 0.5–1.4)
DIFFERENTIAL METHOD: ABNORMAL
EJECTION FRACTION: 45 %
EOSINOPHIL # BLD AUTO: 0 K/UL (ref 0–0.5)
EOSINOPHIL NFR BLD: 0.6 % (ref 0–8)
ERYTHROCYTE [DISTWIDTH] IN BLOOD BY AUTOMATED COUNT: 14.6 % (ref 11.5–14.5)
EST. GFR  (AFRICAN AMERICAN): >60 ML/MIN/1.73 M^2
EST. GFR  (NON AFRICAN AMERICAN): >60 ML/MIN/1.73 M^2
GLUCOSE SERPL-MCNC: 114 MG/DL (ref 70–110)
HCT VFR BLD AUTO: 37.3 % (ref 37–48.5)
HGB BLD-MCNC: 11.9 G/DL (ref 12–16)
HR MV ECHO: 73 BPM
IMM GRANULOCYTES # BLD AUTO: 0.02 K/UL (ref 0–0.04)
IMM GRANULOCYTES NFR BLD AUTO: 0.4 % (ref 0–0.5)
INR PPP: 1.1 (ref 0.8–1.2)
LYMPHOCYTES # BLD AUTO: 1.4 K/UL (ref 1–4.8)
LYMPHOCYTES NFR BLD: 28.3 % (ref 18–48)
MCH RBC QN AUTO: 29.2 PG (ref 27–31)
MCHC RBC AUTO-ENTMCNC: 31.9 G/DL (ref 32–36)
MCV RBC AUTO: 91 FL (ref 82–98)
MONOCYTES # BLD AUTO: 0.6 K/UL (ref 0.3–1)
MONOCYTES NFR BLD: 11.5 % (ref 4–15)
MV MEAN GRADIENT: 6 MMHG
NEUTROPHILS # BLD AUTO: 2.9 K/UL (ref 1.8–7.7)
NEUTROPHILS NFR BLD: 58.6 % (ref 38–73)
NRBC BLD-RTO: 0 /100 WBC
PLATELET # BLD AUTO: 288 K/UL (ref 150–450)
PMV BLD AUTO: 10.9 FL (ref 9.2–12.9)
POTASSIUM SERPL-SCNC: 3.9 MMOL/L (ref 3.5–5.1)
PROTHROMBIN TIME: 11 SEC (ref 9–12.5)
RBC # BLD AUTO: 4.08 M/UL (ref 4–5.4)
SINUS: 3.2 CM
SODIUM SERPL-SCNC: 138 MMOL/L (ref 136–145)
STJ: 3.2 CM
WBC # BLD AUTO: 4.94 K/UL (ref 3.9–12.7)

## 2022-03-29 PROCEDURE — 93005 ELECTROCARDIOGRAM TRACING: CPT | Mod: 59

## 2022-03-29 PROCEDURE — D9220A PRA ANESTHESIA: Mod: ANES,,, | Performed by: ANESTHESIOLOGY

## 2022-03-29 PROCEDURE — D9220A PRA ANESTHESIA: ICD-10-PCS | Mod: ANES,,, | Performed by: ANESTHESIOLOGY

## 2022-03-29 PROCEDURE — 93010 EKG 12-LEAD: ICD-10-PCS | Mod: 76,,, | Performed by: INTERNAL MEDICINE

## 2022-03-29 PROCEDURE — 25000003 PHARM REV CODE 250: Performed by: INTERNAL MEDICINE

## 2022-03-29 PROCEDURE — D9220A PRA ANESTHESIA: ICD-10-PCS | Mod: CRNA,,, | Performed by: STUDENT IN AN ORGANIZED HEALTH CARE EDUCATION/TRAINING PROGRAM

## 2022-03-29 PROCEDURE — 93320 TRANSESOPHAGEAL ECHO (TEE) (CUPID ONLY): ICD-10-PCS | Mod: 26,,, | Performed by: INTERNAL MEDICINE

## 2022-03-29 PROCEDURE — 93325 TRANSESOPHAGEAL ECHO (TEE) (CUPID ONLY): ICD-10-PCS | Mod: 26,,, | Performed by: INTERNAL MEDICINE

## 2022-03-29 PROCEDURE — 92960 CARDIOVERSION ELECTRIC EXT: CPT | Mod: ,,, | Performed by: INTERNAL MEDICINE

## 2022-03-29 PROCEDURE — 93010 ELECTROCARDIOGRAM REPORT: CPT | Mod: ,,, | Performed by: INTERNAL MEDICINE

## 2022-03-29 PROCEDURE — 93320 DOPPLER ECHO COMPLETE: CPT | Mod: 26,,, | Performed by: INTERNAL MEDICINE

## 2022-03-29 PROCEDURE — 85610 PROTHROMBIN TIME: CPT | Performed by: INTERNAL MEDICINE

## 2022-03-29 PROCEDURE — 63600175 PHARM REV CODE 636 W HCPCS: Performed by: STUDENT IN AN ORGANIZED HEALTH CARE EDUCATION/TRAINING PROGRAM

## 2022-03-29 PROCEDURE — 85730 THROMBOPLASTIN TIME PARTIAL: CPT | Performed by: INTERNAL MEDICINE

## 2022-03-29 PROCEDURE — 93325 DOPPLER ECHO COLOR FLOW MAPG: CPT

## 2022-03-29 PROCEDURE — 37000009 HC ANESTHESIA EA ADD 15 MINS: Performed by: INTERNAL MEDICINE

## 2022-03-29 PROCEDURE — 93325 DOPPLER ECHO COLOR FLOW MAPG: CPT | Mod: 26,,, | Performed by: INTERNAL MEDICINE

## 2022-03-29 PROCEDURE — 92960 PR CARDIOVERSION, ELECTIVE;EXTERN: ICD-10-PCS | Mod: ,,, | Performed by: INTERNAL MEDICINE

## 2022-03-29 PROCEDURE — 92960 CARDIOVERSION ELECTRIC EXT: CPT | Performed by: INTERNAL MEDICINE

## 2022-03-29 PROCEDURE — 93312 TRANSESOPHAGEAL ECHO (TEE) (CUPID ONLY): ICD-10-PCS | Mod: 26,,, | Performed by: INTERNAL MEDICINE

## 2022-03-29 PROCEDURE — 25000003 PHARM REV CODE 250: Performed by: STUDENT IN AN ORGANIZED HEALTH CARE EDUCATION/TRAINING PROGRAM

## 2022-03-29 PROCEDURE — D9220A PRA ANESTHESIA: Mod: CRNA,,, | Performed by: STUDENT IN AN ORGANIZED HEALTH CARE EDUCATION/TRAINING PROGRAM

## 2022-03-29 PROCEDURE — 37000008 HC ANESTHESIA 1ST 15 MINUTES: Performed by: INTERNAL MEDICINE

## 2022-03-29 PROCEDURE — 93312 ECHO TRANSESOPHAGEAL: CPT | Mod: 26,,, | Performed by: INTERNAL MEDICINE

## 2022-03-29 PROCEDURE — 80048 BASIC METABOLIC PNL TOTAL CA: CPT | Performed by: INTERNAL MEDICINE

## 2022-03-29 PROCEDURE — 85025 COMPLETE CBC W/AUTO DIFF WBC: CPT | Performed by: INTERNAL MEDICINE

## 2022-03-29 RX ORDER — KETAMINE HCL IN 0.9 % NACL 50 MG/5 ML
SYRINGE (ML) INTRAVENOUS
Status: DISCONTINUED | OUTPATIENT
Start: 2022-03-29 | End: 2022-03-29

## 2022-03-29 RX ORDER — FENTANYL CITRATE 50 UG/ML
INJECTION, SOLUTION INTRAMUSCULAR; INTRAVENOUS
Status: DISCONTINUED | OUTPATIENT
Start: 2022-03-29 | End: 2022-03-29

## 2022-03-29 RX ORDER — MIDAZOLAM HYDROCHLORIDE 1 MG/ML
INJECTION, SOLUTION INTRAMUSCULAR; INTRAVENOUS
Status: DISCONTINUED | OUTPATIENT
Start: 2022-03-29 | End: 2022-03-29

## 2022-03-29 RX ORDER — METOPROLOL SUCCINATE 25 MG/1
25 TABLET, EXTENDED RELEASE ORAL DAILY
Qty: 30 TABLET | Refills: 11 | Status: SHIPPED | OUTPATIENT
Start: 2022-03-29 | End: 2022-06-01

## 2022-03-29 RX ORDER — SODIUM CHLORIDE 0.9 % (FLUSH) 0.9 %
10 SYRINGE (ML) INJECTION
Status: DISCONTINUED | OUTPATIENT
Start: 2022-03-29 | End: 2022-03-29 | Stop reason: HOSPADM

## 2022-03-29 RX ORDER — SILVER SULFADIAZINE 10 G/1000G
CREAM TOPICAL
Status: DISCONTINUED | OUTPATIENT
Start: 2022-03-29 | End: 2022-03-29 | Stop reason: HOSPADM

## 2022-03-29 RX ORDER — PROPOFOL 10 MG/ML
VIAL (ML) INTRAVENOUS
Status: DISCONTINUED | OUTPATIENT
Start: 2022-03-29 | End: 2022-03-29

## 2022-03-29 RX ORDER — LIDOCAINE HYDROCHLORIDE 20 MG/ML
INJECTION INTRAVENOUS
Status: DISCONTINUED | OUTPATIENT
Start: 2022-03-29 | End: 2022-03-29

## 2022-03-29 RX ORDER — ESMOLOL HYDROCHLORIDE 10 MG/ML
INJECTION INTRAVENOUS
Status: DISCONTINUED | OUTPATIENT
Start: 2022-03-29 | End: 2022-03-29

## 2022-03-29 RX ORDER — PROPOFOL 10 MG/ML
VIAL (ML) INTRAVENOUS CONTINUOUS PRN
Status: DISCONTINUED | OUTPATIENT
Start: 2022-03-29 | End: 2022-03-29

## 2022-03-29 RX ADMIN — MIDAZOLAM HYDROCHLORIDE 2 MG: 1 INJECTION, SOLUTION INTRAMUSCULAR; INTRAVENOUS at 11:03

## 2022-03-29 RX ADMIN — Medication 10 MG: at 11:03

## 2022-03-29 RX ADMIN — ESMOLOL HYDROCHLORIDE 20 MG: 10 INJECTION INTRAVENOUS at 11:03

## 2022-03-29 RX ADMIN — Medication 40 MCG/KG/MIN: at 11:03

## 2022-03-29 RX ADMIN — LIDOCAINE HYDROCHLORIDE 40 MG: 20 INJECTION, SOLUTION INTRAVENOUS at 11:03

## 2022-03-29 RX ADMIN — FENTANYL CITRATE 25 MCG: 0.05 INJECTION, SOLUTION INTRAMUSCULAR; INTRAVENOUS at 11:03

## 2022-03-29 RX ADMIN — SODIUM CHLORIDE: 0.9 INJECTION, SOLUTION INTRAVENOUS at 11:03

## 2022-03-29 RX ADMIN — PROPOFOL 30 MG: 10 INJECTION, EMULSION INTRAVENOUS at 11:03

## 2022-03-29 NOTE — NURSING
Received report from Janel AVENDANO, Nichol ROBERTS. Patient s/p JUAN/DCCV, moderately sedated. VSS, no s/s pain or discomfort at this time, resp even and unlabored. Silvadene to pad sites is CDI. No active bleeding. No hematoma noted. Post procedure protocol reviewed with patient and patient's family. Understanding verbalized. Family members called to bedside. Nurse call bell within reach. Will continue to monitor per post procedure protocol.

## 2022-03-29 NOTE — DISCHARGE SUMMARY
[facility] - Cardiology  Cardiac Electrophysiology  Discharge Summary      Patient Name: Riya Soto  MRN: [id number]  Admission Date: 3/29/2022  Hospital Length of Stay: 0 days  Discharge Date and Time:  03/29/2022 12:02 PM  Attending Physician: Elmo Jones MD    Discharging Provider: Alix Pederson NP  Primary Care Physician: Pantera Yadav MD    HPI:   Riya Soto presents to short stay cardiac unit on 03/29/2022 for planned JUAN guided DCCV with Dr. Jones.     Ms. Soto is a 51 y.o. female with history of prior MV disease? (unclear etiology/pathology, reports possible rheumatic fever as a child, prior mitral valve repair in 2011) and persistent AF with associated systolic dysfunction and MR/MS. Ms. Soto reports feeling unwell for several months, prior to presenting to urgent care in 2/2022. It was at that time she was found to be in AF. She followed up with Dr. Berrios who initiated her on eliquis and then referred her to EP.     A recent echo showed EF 40% with mild-mod MR. A 24 hour Holter showed persistent AF with average rate of 89 bpm with PVCs. At visit with Dr. Jones, they discussed AF treatment options including rate versus rhythm control. Given symptoms and reduced EF, would recommend rhythm control with DCCV. Ms. Soto elected to proceed.     EKG:  My independent visualization of most recent EKG is AF at 87 bpm with prolonged QTc 488 ms         Procedure(s) (LRB):  Cardioversion or Defibrillation (N/A)  ECHOCARDIOGRAM, TRANSESOPHAGEAL (N/A)     Indwelling Lines/Drains at time of discharge:  Lines/Drains/Airways     None             Hospital Course:  Ms. Soto underwent JUAN without evidence of CARLOS thrombus; EF 45%.  Proceeded with DCCV 200 J x 1 shock, converting from AF to sinus rhythm. She tolerated the procedure without any acute complications.  Post-DCCV ECG revealing of sinus rhythm at 83 bpm, QTc 495 ms. Instructed to continue eliquis 5 mg BID and to start multaq 400 mg BID. Given  prolonged QT interval will stop celexa as this can also cause QT prolongation in combination with multaq. Ms. Soto reports that she has not been taking celexa and declines SSRI alternative.  She will return in 1 week for ECG and ~ 4 weeks for clinic follow-up. Will plan to discuss PVI at that time.  Ms. Soto was assessed at bedside prior to discharge. She reported feeling well and denied chest discomfort, shortness of breath, palpitations, lightheadedness, or any other acute symptoms. Discharge instructions were discussed and all questions were answered. Ms. Soto was discharged home in stable condition.     Pending Diagnostic Studies:     Procedure Component Value Units Date/Time    EKG 12-LEAD [[id number]]     Order Status: Sent Lab Status: No result           Final Active Diagnoses:    Diagnosis Date Noted POA    Atrial fibrillation [I48.91] 03/04/2022 Yes      Problems Resolved During this Admission:     Atrial fibrillation  s/p JUAN guided DCCV with restoration of sinus rhythm    Plan:  - Continue PTA medications  - AAD: start multaq 400 mg BID  - Follow up with ECG in 1 week (4/5/22)  - Follow up with Marianela Malave NP ~ 4 weeks. Discuss PVI at that time.   - Discharge plans/instructions discussed with patient who verbalized understanding and agreement of plans of care. No further questions or concerns  voiced at this time.       Discharged Condition: good    Disposition:     Follow Up:   Follow-up Information     Marianela Malave NP Follow up in 4 week(s).    Specialty: Cardiology  Why: s/p JUAN/DCCV, discuss PVI  [facility] information:  [facility] [id number]  [phone]                       Patient Instructions:      Other restrictions (specify):   Order Comments: Because you have received sedation for this procedure:  -Limit activity for the remainder of the day.  -Do not smoke for at least 6 hours and until you are fully awake and alert.  -Do not drink alcoholic beverage for  24 hours.  -Do not drive for 24 hours.  -Defer important decision making until the following day.     Go to the Emergency Department if you develop:   -Bleeding  -Weakness or numbness  -Visual, gait or speech disturbance  -New chest pain, palpitations, shortness of breath, rapid heart beat, or fainting  -Fever    Any need to reschedule or cancel procedures, or any questions regarding your procedures should be addressed directly with the Arrhythmia Department Nurses at the following phone number: [phone].     Activity as tolerated     Medications:  Reconciled Home Medications:      Medication List      START taking these medications    dronedarone 400 mg Tab  Commonly known as: MULTAQ  Take 1 tablet (400 mg total) by mouth 2 (two) times daily with meals.     metoprolol succinate 25 MG 24 hr tablet  Commonly known as: TOPROL-XL  Take 1 tablet (25 mg total) by mouth once daily.        CONTINUE taking these medications    apixaban 5 mg Tab  Commonly known as: ELIQUIS  Take 1 tablet (5 mg total) by mouth 2 (two) times daily.     atorvastatin 10 MG tablet  Commonly known as: LIPITOR  Take 1 tablet (10 mg total) by mouth once daily.     clonazePAM 0.5 MG tablet  Commonly known as: KlonoPIN  Take 1 tablet (0.5 mg total) by mouth 3 (three) times daily as needed for Anxiety.     diclofenac sodium 1 % Gel  Commonly known as: VOLTAREN ARTHRITIS PAIN  Apply 2 g topically 4 (four) times daily.     gabapentin 300 MG capsule  Commonly known as: NEURONTIN  gabapentin 300 mg capsule   take 1 cap PO qhs x 3 days, then take 2 cap PO QHS thereafter     methylPREDNISolone 4 mg tablet  Commonly known as: MEDROL DOSEPACK  use as directed        STOP taking these medications    citalopram 10 MG tablet  Commonly known as: CeleXA     metoprolol tartrate 25 MG tablet  Commonly known as: LOPRESSOR            Time spent on the discharge of patient: 20 minutes    Alix Pederson NP  Cardiac Electrophysiology  [facility] - Cardiology

## 2022-03-29 NOTE — TRANSFER OF CARE
"Anesthesia Transfer of Care Note    Patient: Riya Soto    Procedure(s) Performed: Procedure(s) (LRB):  Cardioversion or Defibrillation (N/A)  ECHOCARDIOGRAM, TRANSESOPHAGEAL (N/A)    Patient location: PACU    Anesthesia Type: general    Transport from OR: Transported from OR on 2-3 L/min O2 by NC with adequate spontaneous ventilation    Post pain: adequate analgesia    Post assessment: no apparent anesthetic complications and tolerated procedure well    Post vital signs: stable    Level of consciousness: sedated and responds to stimulation    Nausea/Vomiting: no nausea/vomiting    Complications: none    Transfer of care protocol was followed      Last vitals:   Visit Vitals  /74 (BP Location: Right arm, Patient Position: Lying)   Pulse 78   Temp 36.8 °C (98.2 °F) (Temporal)   Resp 18   Ht 5' 9" (1.753 m)   Wt 86.2 kg (190 lb)   LMP 11/03/2016   SpO2 99%   Breastfeeding No   BMI 28.06 kg/m²     "

## 2022-03-29 NOTE — ANESTHESIA POSTPROCEDURE EVALUATION
Anesthesia Post Evaluation    Patient: Riya Soto    Procedure(s) Performed: Procedure(s) (LRB):  Cardioversion or Defibrillation (N/A)  ECHOCARDIOGRAM, TRANSESOPHAGEAL (N/A)    Final Anesthesia Type: general      Patient location during evaluation: PACU  Patient participation: Yes- Able to Participate  Level of consciousness: awake and alert and oriented  Post-procedure vital signs: reviewed and stable  Pain management: adequate  Airway patency: patent    PONV status at discharge: No PONV  Anesthetic complications: no      Cardiovascular status: blood pressure returned to baseline, hemodynamically stable and stable  Respiratory status: unassisted, room air and spontaneous ventilation  Hydration status: euvolemic  Follow-up not needed.          Vitals Value Taken Time   /73 03/29/22 1302   Temp 36.4 °C (97.5 °F) 03/29/22 1205   Pulse 81 03/29/22 1312   Resp 26 03/29/22 1311   SpO2 98 % 03/29/22 1312   Vitals shown include unvalidated device data.      No case tracking events are documented in the log.      Pain/Elias Score: Elias Score: 10 (3/29/2022 12:15 PM)

## 2022-03-29 NOTE — HOSPITAL COURSE
Ms. Soto underwent JUAN without evidence of CARLOS thrombus; EF 45%.  Proceeded with DCCV 200 J x 1 shock, converting from AF to sinus rhythm. She tolerated the procedure without any acute complications.  Post-DCCV ECG revealing of sinus rhythm. Instructed to continue eliquis 5 mg BID and to start multaq 400 mg BID. She will return in 1 week for ECG and ~ 4 weeks for clinic follow-up. Will plan to discuss PVI at that time.  Ms. Soto was assessed at bedside prior to discharge. She reported feeling well and denied chest discomfort, shortness of breath, palpitations, lightheadedness, or any other acute symptoms. Discharge instructions were discussed and all questions were answered. Ms. Soto was discharged home in stable condition.

## 2022-03-29 NOTE — PLAN OF CARE
Patient discharged per MD orders. Instructions given on medications, skin  care, activity, when to call MD, and follow up appointments. Pt verbalized understanding.  Patient AAOx4, VSS, no c/o pain or discomfort at this time. Telemetry and PIV removed. Patient left unit via wheelchair accompanied by her  with transport.

## 2022-03-29 NOTE — INTERVAL H&P NOTE
The patient has been examined and the H&P has been reviewed:    I concur with the findings and no changes have occurred since H&P was written.    Procedure risks, benefits and alternative options discussed and understood by patient/family.    Here today for JUAN/DCCV.    Anticoagulant/antiplatelets: eliquis  ECG: AF    Dysphagia or odynophagia:  No  Liver Disease, esophageal disease, or known varices:  No  Upper GI Bleeding: No  Snoring:  No  Sleep Apnea:  No  Prior neck surgery or radiation:  No  History of anesthetic difficulties:  No  Family history of anesthetic difficulties:  No  Last oral intake: yesterday before midnight  Able to move neck in all directions:  Yes          Active Hospital Problems    Diagnosis  POA    Atrial fibrillation [I48.91]  Yes     Noted top be in RVR in past  Has not been started on anticoagulation, CHADsVASC 2.  On metoprolol 25 mg and has increased fatigue. Last normal EKG in 2019. Await echo, start eliquis 5 mg BID  Refer to EP  Continue to avoid etoh and tobacco  Dose of metoprolol was to eb incraesed to 50 mg BID- recommend she continue at 25 mg BID as she is having hypotention          Resolved Hospital Problems   No resolved problems to display.

## 2022-04-04 ENCOUNTER — PATIENT OUTREACH (OUTPATIENT)
Dept: EMERGENCY MEDICINE | Facility: HOSPITAL | Age: 52
End: 2022-04-04
Payer: COMMERCIAL

## 2022-04-06 ENCOUNTER — OFFICE VISIT (OUTPATIENT)
Dept: PRIMARY CARE CLINIC | Facility: CLINIC | Age: 52
End: 2022-04-06
Payer: COMMERCIAL

## 2022-04-06 VITALS
SYSTOLIC BLOOD PRESSURE: 102 MMHG | HEIGHT: 69 IN | HEART RATE: 69 BPM | DIASTOLIC BLOOD PRESSURE: 68 MMHG | BODY MASS INDEX: 29.31 KG/M2 | RESPIRATION RATE: 18 BRPM | OXYGEN SATURATION: 99 % | WEIGHT: 197.88 LBS

## 2022-04-06 DIAGNOSIS — I48.0 PAROXYSMAL ATRIAL FIBRILLATION: ICD-10-CM

## 2022-04-06 DIAGNOSIS — Z92.89 HISTORY OF CARDIOVERSION: ICD-10-CM

## 2022-04-06 DIAGNOSIS — G89.4 CHRONIC PAIN SYNDROME: ICD-10-CM

## 2022-04-06 DIAGNOSIS — Z12.11 SPECIAL SCREENING FOR MALIGNANT NEOPLASM OF COLON: Primary | ICD-10-CM

## 2022-04-06 DIAGNOSIS — Z98.890 HISTORY OF HEART SURGERY: ICD-10-CM

## 2022-04-06 DIAGNOSIS — E78.00 PURE HYPERCHOLESTEROLEMIA: ICD-10-CM

## 2022-04-06 PROCEDURE — 99214 OFFICE O/P EST MOD 30 MIN: CPT | Mod: S$GLB,,, | Performed by: FAMILY MEDICINE

## 2022-04-06 PROCEDURE — 93010 ELECTROCARDIOGRAM REPORT: CPT | Mod: S$GLB,,, | Performed by: INTERNAL MEDICINE

## 2022-04-06 PROCEDURE — 99214 PR OFFICE/OUTPT VISIT, EST, LEVL IV, 30-39 MIN: ICD-10-PCS | Mod: S$GLB,,, | Performed by: FAMILY MEDICINE

## 2022-04-06 PROCEDURE — 93010 EKG 12-LEAD: ICD-10-PCS | Mod: S$GLB,,, | Performed by: INTERNAL MEDICINE

## 2022-04-06 PROCEDURE — 93005 ELECTROCARDIOGRAM TRACING: CPT | Mod: S$GLB,,, | Performed by: FAMILY MEDICINE

## 2022-04-06 PROCEDURE — 93005 EKG 12-LEAD: ICD-10-PCS | Mod: S$GLB,,, | Performed by: FAMILY MEDICINE

## 2022-04-06 PROCEDURE — 99999 PR PBB SHADOW E&M-EST. PATIENT-LVL IV: ICD-10-PCS | Mod: PBBFAC,,, | Performed by: FAMILY MEDICINE

## 2022-04-06 PROCEDURE — 99999 PR PBB SHADOW E&M-EST. PATIENT-LVL IV: CPT | Mod: PBBFAC,,, | Performed by: FAMILY MEDICINE
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
## 2022-04-08 ENCOUNTER — OFFICE VISIT (OUTPATIENT)
Dept: OBSTETRICS AND GYNECOLOGY | Facility: CLINIC | Age: 52
End: 2022-04-08
Payer: COMMERCIAL

## 2022-04-08 VITALS
DIASTOLIC BLOOD PRESSURE: 64 MMHG | WEIGHT: 201.06 LBS | BODY MASS INDEX: 29.69 KG/M2 | SYSTOLIC BLOOD PRESSURE: 110 MMHG

## 2022-04-08 DIAGNOSIS — Z01.419 ENCOUNTER FOR WELL WOMAN EXAM WITH ROUTINE GYNECOLOGICAL EXAM: Primary | ICD-10-CM

## 2022-04-08 DIAGNOSIS — Z01.419 WELL WOMAN EXAM WITH ROUTINE GYNECOLOGICAL EXAM: ICD-10-CM

## 2022-04-08 PROCEDURE — 99999 PR PBB SHADOW E&M-EST. PATIENT-LVL III: CPT | Mod: PBBFAC,,, | Performed by: NURSE PRACTITIONER

## 2022-04-08 PROCEDURE — 99386 PR PREVENTIVE VISIT,NEW,40-64: ICD-10-PCS | Mod: S$GLB,,, | Performed by: NURSE PRACTITIONER

## 2022-04-08 PROCEDURE — 87625 HPV TYPES 16 & 18 ONLY: CPT | Mod: 59 | Performed by: NURSE PRACTITIONER

## 2022-04-08 PROCEDURE — 88175 CYTOPATH C/V AUTO FLUID REDO: CPT | Performed by: NURSE PRACTITIONER

## 2022-04-08 PROCEDURE — 99999 PR PBB SHADOW E&M-EST. PATIENT-LVL III: ICD-10-PCS | Mod: PBBFAC,,, | Performed by: NURSE PRACTITIONER

## 2022-04-08 PROCEDURE — 87624 HPV HI-RISK TYP POOLED RSLT: CPT | Performed by: NURSE PRACTITIONER

## 2022-04-08 PROCEDURE — 99386 PREV VISIT NEW AGE 40-64: CPT | Mod: S$GLB,,, | Performed by: NURSE PRACTITIONER

## 2022-04-08 NOTE — PROGRESS NOTES
Chief Complaint: Well Woman Exam     HPI:      Riya Soto is a 51 y.o.  who presents today for well woman exam.  Patient is postmenopausal. Denies PMB.  Patient is not currently sexually active. She declines STD screening today. Ms. Soto confirms that she is safe at home.  Ms. Soto denies abnormal vaginal bleeding, discharge, pelvic pain, urinary problems, or changes in appetite.    Previous Pap:  NILM/ HR HPV +. Neg HPV 16 & 18 (2017) Denies hx of abnormal paps  Previous Mammogram: BiRads: 1 T-C Score: 10.32% (2021)   No previous Colonoscopy: case requested per PCP    Family History   Problem Relation Age of Onset    Breast cancer Sister     Colon cancer Maternal Aunt     Ovarian cancer Neg Hx      OB History        9    Para   7    Term   7            AB   2    Living   7       SAB   2    IAB        Ectopic        Multiple        Live Births                     ROS:     GENERAL: Denies unintentional weight gain or weight loss. Feeling well overall.   BREASTS: Denies pain, lumps, or nipple discharge.   ABDOMEN: Denies abdominal pain, constipation, diarrhea, nausea, vomiting, change in appetite.  URINARY: Denies frequency, dysuria, hematuria.  PSYCHIATRIC: Denies depression, anxiety or mood swings.    Physical Exam:      PHYSICAL EXAM:  /64   Wt 91.2 kg (201 lb 1 oz)   LMP 2016   BMI 29.69 kg/m²   Body mass index is 29.69 kg/m².     APPEARANCE: Well nourished, well developed, in no acute distress.  PSYCH: Appropriate mood and affect.  ABDOMEN: Soft.  No tenderness or masses.    BREASTS: Symmetrical, no skin changes or visible lesions.  No palpable masses or nipple discharge bilaterally.  PELVIC: Normal external genitalia without lesions.  Normal hair distribution.  Adequate perineal body, normal urethral meatus.  Vagina atrophic without lesions or discharge.  Cervix pink, without lesions, discharge or tenderness.  No significant cystocele or rectocele.  Bimanual  exam shows uterus to be normal size, regular, mobile and nontender.  Adnexa without masses or tenderness.      Assessment/Plan:     Encounter for well woman exam with routine gynecological exam  -     Liquid-Based Pap Smear, Screening  -     HPV High Risk Genotypes, PCR    Well woman exam with routine gynecological exam  -     Ambulatory referral/consult to Obstetrics / Gynecology        Counseling:     Patient was counseled today on current ASCCP pap guidelines, the recommendation for yearly pelvic exams, healthy diet and exercise routines, breast self awareness and annual mammograms.She is to see her PCP for other health maintenance.

## 2022-04-15 LAB
CLINICAL INFO: ABNORMAL
CYTO CVX: ABNORMAL
CYTOLOGIST CVX/VAG CYTO: ABNORMAL
CYTOLOGIST CVX/VAG CYTO: ABNORMAL
CYTOLOGY CMNT CVX/VAG CYTO-IMP: ABNORMAL
CYTOLOGY PAP THIN PREP EXPLANATION: ABNORMAL
DATE OF PREVIOUS PAP: ABNORMAL
DATE PREVIOUS BX: NO
GEN CATEG CVX/VAG CYTO-IMP: ABNORMAL
HPV I/H RISK 4 DNA CVX QL NAA+PROBE: DETECTED
HPV16 DNA CVX QL PROBE+SIG AMP: NOT DETECTED
HPV18 DNA CVX QL PROBE+SIG AMP: NOT DETECTED
LMP START DATE: ABNORMAL
MICROORGANISM CVX/VAG CYTO: ABNORMAL
PATHOLOGIST CVX/VAG CYTO: ABNORMAL
SERVICE CMNT-IMP: ABNORMAL
SPECIMEN SOURCE CVX/VAG CYTO: ABNORMAL
STAT OF ADQ CVX/VAG CYTO-IMP: ABNORMAL

## 2022-04-18 ENCOUNTER — TELEPHONE (OUTPATIENT)
Dept: OBSTETRICS AND GYNECOLOGY | Facility: CLINIC | Age: 52
End: 2022-04-18
Payer: COMMERCIAL

## 2022-04-18 ENCOUNTER — TELEPHONE (OUTPATIENT)
Dept: SURGERY | Facility: CLINIC | Age: 52
End: 2022-04-18
Payer: COMMERCIAL

## 2022-04-18 NOTE — TELEPHONE ENCOUNTER
Patient notified of pap smear results and the need for a colposcopy. Patient notified of date and time of appointment.

## 2022-04-20 RX ORDER — SODIUM, POTASSIUM,MAG SULFATES 17.5-3.13G
1 SOLUTION, RECONSTITUTED, ORAL ORAL DAILY
Qty: 1 KIT | Refills: 0 | Status: SHIPPED | OUTPATIENT
Start: 2022-04-20 | End: 2022-04-22

## 2022-04-22 ENCOUNTER — TELEPHONE (OUTPATIENT)
Dept: PRIMARY CARE CLINIC | Facility: CLINIC | Age: 52
End: 2022-04-22
Payer: COMMERCIAL

## 2022-04-22 NOTE — TELEPHONE ENCOUNTER
----- Message from Myra Wood sent at 4/22/2022  9:39 AM CDT -----  Contact: Patient, [phone]  Calling to speak with the nurse regarding her weight lost. Please call her. Thanks.

## 2022-04-22 NOTE — TELEPHONE ENCOUNTER
Returned patients call. Patient stated that she is dealing with dramatic weight loss. She thinks it is because she is on blood thinners. I scheduled her an appt to speak with Dr. Yadav on Monday because she wants to know how long she will have to be on them.

## 2022-04-25 ENCOUNTER — OFFICE VISIT (OUTPATIENT)
Dept: PRIMARY CARE CLINIC | Facility: CLINIC | Age: 52
End: 2022-04-25
Payer: COMMERCIAL

## 2022-04-25 DIAGNOSIS — Z98.890 HISTORY OF HEART SURGERY: ICD-10-CM

## 2022-04-25 DIAGNOSIS — Z90.49 HISTORY OF CHOLECYSTECTOMY: ICD-10-CM

## 2022-04-25 DIAGNOSIS — Z74.09 IMPAIRED FUNCTIONAL MOBILITY AND ACTIVITY TOLERANCE: ICD-10-CM

## 2022-04-25 DIAGNOSIS — R63.4 WEIGHT LOSS: Primary | ICD-10-CM

## 2022-04-25 DIAGNOSIS — G89.4 CHRONIC PAIN SYNDROME: ICD-10-CM

## 2022-04-25 DIAGNOSIS — I48.0 PAROXYSMAL ATRIAL FIBRILLATION: ICD-10-CM

## 2022-04-25 DIAGNOSIS — Z92.89 HISTORY OF CARDIOVERSION: ICD-10-CM

## 2022-04-25 PROCEDURE — 99214 PR OFFICE/OUTPT VISIT, EST, LEVL IV, 30-39 MIN: ICD-10-PCS | Mod: 95,,, | Performed by: FAMILY MEDICINE

## 2022-04-25 PROCEDURE — 99214 OFFICE O/P EST MOD 30 MIN: CPT | Mod: 95,,, | Performed by: FAMILY MEDICINE

## 2022-04-25 RX ORDER — CYPROHEPTADINE HYDROCHLORIDE 4 MG/1
TABLET ORAL
Qty: 60 TABLET | Refills: 5 | Status: SHIPPED | OUTPATIENT
Start: 2022-04-25 | End: 2023-06-01
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
## 2022-04-27 ENCOUNTER — HOSPITAL ENCOUNTER (OUTPATIENT)
Dept: RADIOLOGY | Facility: HOSPITAL | Age: 52
Discharge: HOME | End: 2022-04-27
Attending: FAMILY MEDICINE
Payer: COMMERCIAL

## 2022-04-27 DIAGNOSIS — R63.4 WEIGHT LOSS: ICD-10-CM

## 2022-04-27 PROBLEM — Z79.01 ANTICOAGULANT LONG-TERM USE: Status: ACTIVE | Noted: 2022-04-27

## 2022-04-27 PROCEDURE — 74019 RADEX ABDOMEN 2 VIEWS: CPT | Mod: TC,PO

## 2022-04-27 NOTE — PROGRESS NOTES
Ms. Soto is a patient of Dr. Jones and was last seen in clinic 3/15/2022.      Subjective:   Patient ID:  Riya Soto is a 51 y.o. female who presents for follow-up of Atrial Fibrillation  .     HPI:    Ms. Soto is a 51 y.o. female with MV disease, AF here for follow up after cardioversion.    Background:    Referring Cardiology Practitioner: Suzie Berrios DNP  Primary Care Physician: Pantera Yadav MD    Mrs. Soto has a hx of prior MV disease? (unclear etiology/pathology, reports possible rheumatic fever as a child, prior mitral valve repair in 2011) presenting with symptomatic persistent AF also with systolic dysfunction (LVEF of 40%) with mild-mod MR/MS. She was in her usual state of health until the past few months when she began having palpitations and fatigue. She then presented to urgent care on 2/17/2022 with feeling unwell (body aches, fatigue, wanted to get checked for COVID) and was discovered to be in atrial fibrillation. She was sent to the ER. She was discharged. She then saw Suzie Berrios in cardiology clinic who initiated eliquis and referred for evaluation. An echocardiogram was recently performed noting a LVEF of 40% with mild-mod MR and mild-mod MS with reduced anterior mitral leaflet mobility. She was also initiated on metoprolol, which has been limited to low dosing due to hypotension. A 24 hour holter monitor noted persistent AF with an average ventricular rate of 89 bpm with frequent PVCs (2% burden).    Available electrocardiograms in Epic show sinus rhythm in 2019 and then AF on 2/17/2022 and persistent since.  In clinic electrocardiogram is atrial fibrillation with an average ventricular rate of 90 bpm.    In summary, Mrs. Soto is a pleasant 51 year-old woman with prior MV disease? (unclear etiology/pathology, reports possible rheumatic fever as a child, prior mitral valve repair in 2011) presenting with symptomatic persistent AF also with systolic dysfunction (LVEF of 40%)  with mild-mod MR/MS. I had a long discussion with the patient about the pathophysiology and risks of atrial fibrillation and its basic pathophysiology, including its health implications and treatment options. Specifically, I addressed the need for CVA (stroke) prophylaxis with aspirin versus oral anticoagulation (warfarin vs DOACs, discussed bleeding risks, and need to come to the ER for any head trauma for CT scanning even if asymptomatic). Since she does not have mod-severe MS I am ok with eliquis. I also discussed the goal to reduce symptomatic arrhythmic episodes by pharmacologic and/or procedural methods and utilizing a rhythm versus a rate control strategy. Due to age, symptoms and reduced EF I recommend rhythm control strategy. She is agreeable.    Plan  JUAN/DCCV  Likely start multaq after  Will discuss PVI as well once she has been in normal rhythm for a month or so.    Update (05/06/2022):    3/29/2022: Cardioversion was successfully performed with restoration of normal sinus rhythm. Started on multaq.    Today she reports significant symptom improvement since procedure. Breathing better, sleeping better, more energy. No CP, palps, LH, syncope.    She is currently taking eliquis 5mg BID for stroke prophylaxis and denies significant bleeding episodes. She is currently being treated with multaq 400mg BID for rhythm control and toprol 25mg daily for HR control.  Kidney function is stable, with a creatinine of 0.8 on 3/29/2022.    I have personally reviewed the patient's EKG today, which shows sinus rhythm at 82bpm. NE interval is 190. QRS is 80. QTc is 472.    Relevant Cardiac Test Results:    JUAN (3/29/2022):  · The left ventricle has mildly decreased systolic function. The estimated ejection fraction is 45%.  · Normal right ventricular size with normal right ventricular systolic function.  · Biatrial enlargement.  · No thrombus is present in the appendage.  · There is mild mitral stenosis.  · Mitral valve  s/p repair with annular ring, the mean diastolic gradient is 6 mmHg at a heart rate of 73 bpm.  · Moderate to severe tricuspid regurgitation.  · Grade 2 plaque present.    Current Outpatient Medications   Medication Sig    apixaban (ELIQUIS) 5 mg Tab Take 1 tablet (5 mg total) by mouth 2 (two) times daily.    atorvastatin (LIPITOR) 10 MG tablet Take 1 tablet (10 mg total) by mouth once daily.    cyproheptadine (PERIACTIN) 4 mg tablet Half p.o. b.i.d.--not too sleepy 1 p.o. b.i.d. to increase appetite    diclofenac sodium (VOLTAREN ARTHRITIS PAIN) 1 % Gel Apply 2 g topically 4 (four) times daily.    dronedarone (MULTAQ) 400 mg Tab Take 1 tablet (400 mg total) by mouth 2 (two) times daily with meals.    metoprolol succinate (TOPROL-XL) 25 MG 24 hr tablet Take 1 tablet (25 mg total) by mouth once daily.    clonazePAM (KLONOPIN) 0.5 MG tablet Take 1 tablet (0.5 mg total) by mouth 3 (three) times daily as needed for Anxiety. (Patient not taking: Reported on 5/6/2022)    gabapentin (NEURONTIN) 300 MG capsule gabapentin 300 mg capsule   take 1 cap PO qhs x 3 days, then take 2 cap PO QHS thereafter     No current facility-administered medications for this visit.     Facility-Administered Medications Ordered in Other Visits   Medication    sodium chloride 0.9% bolus 1,000 mL     Review of Systems   Constitutional: Negative for malaise/fatigue.   Cardiovascular: Negative for chest pain, dyspnea on exertion, irregular heartbeat, leg swelling and palpitations.   Respiratory: Negative for shortness of breath.    Hematologic/Lymphatic: Negative for bleeding problem.   Skin: Negative for rash.   Musculoskeletal: Negative for myalgias.   Gastrointestinal: Negative for hematemesis, hematochezia and nausea.   Genitourinary: Negative for hematuria.   Neurological: Negative for light-headedness.   Psychiatric/Behavioral: Negative for altered mental status.   Allergic/Immunologic: Negative for persistent infections.  "    Objective:          /60   Pulse 82   Ht 5' 9" (1.753 m)   Wt 88.4 kg (194 lb 14.2 oz)   LMP 11/03/2016   BMI 28.78 kg/m²     Physical Exam  Vitals and nursing note reviewed.   Constitutional:       Appearance: Normal appearance. She is well-developed.   HENT:      Head: Normocephalic.      Nose: Nose normal.   Eyes:      Pupils: Pupils are equal, round, and reactive to light.   Cardiovascular:      Rate and Rhythm: Normal rate and regular rhythm.   Pulmonary:      Effort: No respiratory distress.      Breath sounds: Normal breath sounds.   Musculoskeletal:         General: Normal range of motion.   Skin:     General: Skin is warm and dry.      Findings: No erythema.   Neurological:      Mental Status: She is alert and oriented to person, place, and time.   Psychiatric:         Speech: Speech normal.         Behavior: Behavior normal.       Lab Results   Component Value Date     03/29/2022     10/27/2017    K 3.9 03/29/2022    BUN 20 03/29/2022    CREATININE 0.8 03/29/2022    CREATININE 0.77 10/27/2017    ALT 42 02/21/2022    AST 37 02/21/2022    HGB 11.9 (L) 03/29/2022    HCT 37.3 03/29/2022    TSH 0.899 02/21/2022    LDLCALC 94 10/18/2021       Recent Labs   Lab 03/29/22  0941   INR 1.1         Assessment:     1. Paroxysmal atrial fibrillation    2. History of cardioversion    3. Anticoagulant long-term use    4. Overweight (BMI 25.0-29.9)    5. Cardiomyopathy, unspecified type      Plan:     In summary, Ms. Soto is a 51 y.o. female with MV disease, AF here for follow up after cardioversion.  She is 6 weeks s/p cardioversion and initiation of multaq. She is doing well from a rhythm standpoint, with no documented or symptomatic recurrence of arrhythmia since procedure.  She is reporting significant symptom improvement in SR. JUAN showed EF 45%. Will update echo to evaluate for recovery of LV function in SR. She is tolerating multaq and EKG shows normal intervals.  Given her young age and " likely AF-induced CM, she is a good candidate for PVI. Risks, benefits, and alternatives discussed. She will think about it and would like clinic to touch base after echo results.  JORGE LUISVASc 1/2 on eliquis. Continue eliquis at this time.    Update echo  Consider PVI  Continue current medications  RTC after testing or as scheduled following procedure    *A copy of this note has been sent to Dr. Jones*    Follow up as scheduled.    ------------------------------------------------------------------    EMILY Clifford, NP-C  Cardiac Electrophysiology

## 2022-05-04 ENCOUNTER — PATIENT OUTREACH (OUTPATIENT)
Dept: ADMINISTRATIVE | Facility: OTHER | Age: 52
End: 2022-05-04
Payer: COMMERCIAL

## 2022-05-04 NOTE — PROGRESS NOTES
Requested updates within Care Everywhere.  Patient's chart was reviewed for overdue MILAN topics.  Health maintenance:updated  Immunizations:reconciled   Legacy:   Media:  Orders placed:  Tasked appts:  Labs Linked:  Upcoming appt:Colonoscopy 6/8/22

## 2022-05-06 ENCOUNTER — HOSPITAL ENCOUNTER (OUTPATIENT)
Dept: CARDIOLOGY | Facility: CLINIC | Age: 52
Discharge: HOME | End: 2022-05-06
Payer: COMMERCIAL

## 2022-05-06 ENCOUNTER — OFFICE VISIT (OUTPATIENT)
Dept: ELECTROPHYSIOLOGY | Facility: CLINIC | Age: 52
End: 2022-05-06
Payer: COMMERCIAL

## 2022-05-06 VITALS
DIASTOLIC BLOOD PRESSURE: 60 MMHG | HEART RATE: 82 BPM | HEIGHT: 69 IN | WEIGHT: 194.88 LBS | SYSTOLIC BLOOD PRESSURE: 100 MMHG | BODY MASS INDEX: 28.87 KG/M2

## 2022-05-06 DIAGNOSIS — I42.9 CARDIOMYOPATHY, UNSPECIFIED TYPE: ICD-10-CM

## 2022-05-06 DIAGNOSIS — Z79.01 ANTICOAGULANT LONG-TERM USE: ICD-10-CM

## 2022-05-06 DIAGNOSIS — I48.91 ATRIAL FIBRILLATION, UNSPECIFIED TYPE: ICD-10-CM

## 2022-05-06 DIAGNOSIS — Z92.89 HISTORY OF CARDIOVERSION: ICD-10-CM

## 2022-05-06 DIAGNOSIS — I48.0 PAROXYSMAL ATRIAL FIBRILLATION: Primary | ICD-10-CM

## 2022-05-06 DIAGNOSIS — E66.3 OVERWEIGHT (BMI 25.0-29.9): ICD-10-CM

## 2022-05-06 PROCEDURE — 93000 ELECTROCARDIOGRAM COMPLETE: CPT | Mod: S$GLB,,, | Performed by: INTERNAL MEDICINE

## 2022-05-06 PROCEDURE — 99214 PR OFFICE/OUTPT VISIT, EST, LEVL IV, 30-39 MIN: ICD-10-PCS | Mod: 25,S$GLB,, | Performed by: NURSE PRACTITIONER

## 2022-05-06 PROCEDURE — 99999 PR PBB SHADOW E&M-EST. PATIENT-LVL IV: ICD-10-PCS | Mod: PBBFAC,,, | Performed by: NURSE PRACTITIONER

## 2022-05-06 PROCEDURE — 99214 OFFICE O/P EST MOD 30 MIN: CPT | Mod: 25,S$GLB,, | Performed by: NURSE PRACTITIONER

## 2022-05-06 PROCEDURE — 99999 PR PBB SHADOW E&M-EST. PATIENT-LVL IV: CPT | Mod: PBBFAC,,, | Performed by: NURSE PRACTITIONER

## 2022-05-06 PROCEDURE — 93000 RHYTHM STRIP: ICD-10-PCS | Mod: S$GLB,,, | Performed by: INTERNAL MEDICINE

## 2022-05-09 ENCOUNTER — TELEPHONE (OUTPATIENT)
Dept: ORTHOPEDICS | Facility: CLINIC | Age: 52
End: 2022-05-09
Payer: COMMERCIAL

## 2022-05-09 ENCOUNTER — DOCUMENTATION ONLY (OUTPATIENT)
Dept: SURGERY | Facility: CLINIC | Age: 52
End: 2022-05-09
Payer: COMMERCIAL

## 2022-05-09 ENCOUNTER — TELEPHONE (OUTPATIENT)
Dept: ELECTROPHYSIOLOGY | Facility: CLINIC | Age: 52
End: 2022-05-09
Payer: COMMERCIAL

## 2022-05-09 NOTE — TELEPHONE ENCOUNTER
----- Message from Vilma Tomlin sent at 5/9/2022 11:47 AM CDT -----  Contact: Pt  Type: Needs Medical Advice    Who Called:Pt  Best Call Back Number:[phone]    Additional Information Requesting a call back regarding Pt was calling in regards to to some issues they are having pt wanted to speak with nurse when available please call Thank you  Please Advise-Thank you

## 2022-05-09 NOTE — CARE UPDATE
Previous Messages        Clinical Letter Summary    Letter from: Radha Rascon     Letter      Radha Rascon RN on 5/9/2022             May 9, 2022     Riya A Charles  [address] [facility] [address]                                                CLEARANCE TO HOLD ORAL ANTICOAGULATION THERAPY                                          Patient:      Riya Soto                                                      Date of Birth:  1970  Current Anticoagulation Therapy: Eliquis              A request for clearance has been received to hold Eliquis prior to a scheduled procedure: __Colonoscopy scheduled on 6/8/2022* .  It has been determined, per the current guidelines, that the above patient has been cleared to hold Eliquis  for 2 days prior to the scheduled procedure, and resumed post procedure as soon as possible per the surgeon's discretion.  If you have any further questions please contact our office at [phone].     Sincerely,     Elmo Jones MD,Ph.D     [facility]  Cardiac Electrophysiolgy   /        RE: Riya Soto -- MR#: [id number]  Page 1  of 1

## 2022-05-13 ENCOUNTER — PROCEDURE VISIT (OUTPATIENT)
Dept: OBSTETRICS AND GYNECOLOGY | Facility: CLINIC | Age: 52
End: 2022-05-13
Payer: COMMERCIAL

## 2022-05-13 VITALS
HEIGHT: 69 IN | DIASTOLIC BLOOD PRESSURE: 70 MMHG | SYSTOLIC BLOOD PRESSURE: 108 MMHG | BODY MASS INDEX: 29.36 KG/M2 | WEIGHT: 198.19 LBS

## 2022-05-13 DIAGNOSIS — R87.810 ASCUS WITH POSITIVE HIGH RISK HPV CERVICAL: Primary | ICD-10-CM

## 2022-05-13 DIAGNOSIS — Z01.818 PRE-OP EXAM: ICD-10-CM

## 2022-05-13 DIAGNOSIS — R87.610 ASCUS WITH POSITIVE HIGH RISK HPV CERVICAL: Primary | ICD-10-CM

## 2022-05-13 PROCEDURE — 57456 ENDOCERV CURETTAGE W/SCOPE: CPT | Mod: S$GLB,,, | Performed by: OBSTETRICS & GYNECOLOGY

## 2022-05-13 PROCEDURE — 88305 TISSUE EXAM BY PATHOLOGIST: CPT | Mod: 26,,, | Performed by: PATHOLOGY

## 2022-05-13 PROCEDURE — 88305 TISSUE EXAM BY PATHOLOGIST: CPT | Performed by: PATHOLOGY

## 2022-05-13 PROCEDURE — 88305 TISSUE EXAM BY PATHOLOGIST: ICD-10-PCS | Mod: 26,,, | Performed by: PATHOLOGY

## 2022-05-13 PROCEDURE — 99499 NO LOS: ICD-10-PCS | Mod: S$GLB,,, | Performed by: OBSTETRICS & GYNECOLOGY

## 2022-05-13 PROCEDURE — 99499 UNLISTED E&M SERVICE: CPT | Mod: S$GLB,,, | Performed by: OBSTETRICS & GYNECOLOGY

## 2022-05-13 PROCEDURE — 57456 PR COLPOSCOPY,CERVIX W/ADJ VAGINA, CURETTAG: ICD-10-PCS | Mod: S$GLB,,, | Performed by: OBSTETRICS & GYNECOLOGY

## 2022-05-13 NOTE — PROCEDURES
COLPOSCOPY PROCEDURE:  2022    Site: Colposcopy of the cervix and upper vagina    Pap smear on 22 was ASCUS/HPV +. She notes this is her first abnormal pap.     The colposcopy procedure was explained in detail. Benefits, alternatives including doing nothing, and risks including pain, bleeding, infection, uterine perforation, damage to surrounding organs, possibly missing abnormal tissue and need to repeat procedure were reviewed. Patient's written informed consent for procedure and time out was performed. Urine pregnancy test negative. Speculum inserted. Cervix examined.  No abnormal vessels were noted.  The cervix was then liberally coated with acetic acid solution and examined using the colposcope at multiple ramírez and with the white filter lights.  The entire transformation zone was visualized.  No acetowhite areas were seen. All edges of the lesion were visualized, borders were geographic. No abnormal vasculature was noted.  ECC was obtained.  A small amount of bleeding was noted at the biopsy sites and was controlled with pressure.  Excellent hemostasis noted.  All instruments were removed.  Sponge and instrument counts correct X 2. Complications: none.     ASSESSMENT:  Riya Soto 51 y.o.  presents for colposcopy secondary to ASCUS/HPV (human papillomavirus) positive as detailed above.  Patient desires to undergo colposcopy today.     We discussed the classification of abnormal pap smears as well as SHAMIR in detail. Etiology of SHAMIR 2, 3 secondary to HPV discussed with patient, normally due to subtypes 16, 18. We discussed HPV in detail. We discussed how it is transmitted, how it causes cervical dysplasia, and risk factors for persistence and progression. The procedure was discussed with the patient and she is aware that she might experience some post procedural cramping.  She is aware that she may take Motrin as needed for pain.       Discussed etiology of HPV, that it is sexually  "transmitted and approximately 80% of individuals have been exposed to it at some point in their lives. Discussed that it is called the "common cold" of the cervix. Smoking and age affect the body's ability to clear this virus.     PLAN:  Office will call patient with results and follow up will be made as appropriate per ASCCP guidelines.   Motrin as need for pain.  Pt was educated regarding the possibility of spotting or brown or yellow discharge post procedure.  She was told to put nothing in the vagina for 7 days, including tampons, and to abstain from intercourse for the next 7 days.  She is aware that she should call the office immediately for any heavy bleeding, fevers, pain not controlled by Motrin or any other concerns    John Little  5/13/2022    "

## 2022-05-16 ENCOUNTER — PATIENT OUTREACH (OUTPATIENT)
Dept: EMERGENCY MEDICINE | Facility: HOSPITAL | Age: 52
End: 2022-05-16
Payer: COMMERCIAL

## 2022-05-20 LAB
FINAL PATHOLOGIC DIAGNOSIS: NORMAL
Lab: NORMAL

## 2022-05-23 ENCOUNTER — OFFICE VISIT (OUTPATIENT)
Dept: ORTHOPEDICS | Facility: CLINIC | Age: 52
End: 2022-05-23
Payer: COMMERCIAL

## 2022-05-23 VITALS — HEIGHT: 69 IN | RESPIRATION RATE: 18 BRPM | WEIGHT: 198 LBS | BODY MASS INDEX: 29.33 KG/M2

## 2022-05-23 DIAGNOSIS — G89.29 CHRONIC LEFT SHOULDER PAIN: Primary | ICD-10-CM

## 2022-05-23 DIAGNOSIS — M25.512 CHRONIC LEFT SHOULDER PAIN: Primary | ICD-10-CM

## 2022-05-23 PROCEDURE — 99212 PR OFFICE/OUTPT VISIT, EST, LEVL II, 10-19 MIN: ICD-10-PCS | Mod: S$GLB,,, | Performed by: ORTHOPAEDIC SURGERY

## 2022-05-23 PROCEDURE — 99999 PR PBB SHADOW E&M-EST. PATIENT-LVL III: ICD-10-PCS | Mod: PBBFAC,,, | Performed by: ORTHOPAEDIC SURGERY

## 2022-05-23 PROCEDURE — 99212 OFFICE O/P EST SF 10 MIN: CPT | Mod: S$GLB,,, | Performed by: ORTHOPAEDIC SURGERY

## 2022-05-23 PROCEDURE — 99999 PR PBB SHADOW E&M-EST. PATIENT-LVL III: CPT | Mod: PBBFAC,,, | Performed by: ORTHOPAEDIC SURGERY

## 2022-05-23 NOTE — PROGRESS NOTES
CC:  51-year-old female follows up with her left shoulder.  She continues to have pain in the left shoulder.  We gave her an intra-articular injection about 3 months ago.  She states she got some relief with that but her pain has returned.    Left Upper Extremity Examination     Skin is intact throughout   Motor is intact distally radial, median, ulnar, AIN, PIN   +2 radial and ulnar pulses   Sensation to light touch is intact distally radial, median, and ulnar     Examination of the Left shoulder:   ROM:   For - 90   Abd - 90   Ext - 30   Int - T12     Tenderness to palpation:   Subacromial space - positive  Biceps Tendon - positive  Anterior Glenohumeral Joint - positive  AC joint - negative  Glenohumeral instability - negative  Empty Can test - positive  Speeds test - positive  Segura/Neers sign - positive  Cross-arm adduction test - positive    Dx:  Left shoulder pain with retroverted glenoid.    Plan:  We discussed treatment options.  Offered the patient another injection but she declined.  She is not interested in surgery at this point.  Were going to refer her over to Pain Management she can follow up with us as needed.

## 2022-05-24 ENCOUNTER — TELEPHONE (OUTPATIENT)
Dept: FAMILY MEDICINE | Facility: CLINIC | Age: 52
End: 2022-05-24
Payer: COMMERCIAL

## 2022-05-27 ENCOUNTER — TELEPHONE (OUTPATIENT)
Dept: OBSTETRICS AND GYNECOLOGY | Facility: CLINIC | Age: 52
End: 2022-05-27
Payer: COMMERCIAL

## 2022-05-27 ENCOUNTER — CLINICAL SUPPORT (OUTPATIENT)
Dept: CARDIOLOGY | Facility: HOSPITAL | Age: 52
End: 2022-05-27
Attending: NURSE PRACTITIONER
Payer: COMMERCIAL

## 2022-05-27 DIAGNOSIS — I48.0 PAROXYSMAL ATRIAL FIBRILLATION: ICD-10-CM

## 2022-05-31 LAB
AORTIC ROOT ANNULUS: 2.94 CM
AORTIC VALVE CUSP SEPERATION: 1.7 CM
AV INDEX (PROSTH): 0.87
AV MEAN GRADIENT: 3 MMHG
AV PEAK GRADIENT: 6 MMHG
AV VALVE AREA: 2.16 CM2
AV VELOCITY RATIO: 0.75
CV ECHO LV RWT: 0.33 CM
DOP CALC AO PEAK VEL: 1.2 M/S
DOP CALC AO VTI: 24.67 CM
DOP CALC LVOT AREA: 2.5 CM2
DOP CALC LVOT DIAMETER: 1.78 CM
DOP CALC LVOT PEAK VEL: 0.9 M/S
DOP CALC LVOT STROKE VOLUME: 53.18 CM3
DOP CALC MV VTI: 85.43 CM
DOP CALCLVOT PEAK VEL VTI: 21.38 CM
E WAVE DECELERATION TIME: 452.03 MSEC
E/A RATIO: 1.07
E/E' RATIO: 28.55 M/S
ECHO LV POSTERIOR WALL: 0.83 CM (ref 0.6–1.1)
EJECTION FRACTION: 40 %
FRACTIONAL SHORTENING: 18 % (ref 28–44)
INTERVENTRICULAR SEPTUM: 0.96 CM (ref 0.6–1.1)
LA MAJOR: 7.48 CM
LA MINOR: 5.12 CM
LEFT ATRIUM SIZE: 5.13 CM
LEFT INTERNAL DIMENSION IN SYSTOLE: 4.09 CM (ref 2.1–4)
LEFT VENTRICLE DIASTOLIC VOLUME: 116.8 ML
LEFT VENTRICLE SYSTOLIC VOLUME: 73.88 ML
LEFT VENTRICULAR INTERNAL DIMENSION IN DIASTOLE: 4.97 CM (ref 3.5–6)
LEFT VENTRICULAR MASS: 155.48 G
LV LATERAL E/E' RATIO: 26.17 M/S
LV SEPTAL E/E' RATIO: 31.4 M/S
MV MEAN GRADIENT: 2 MMHG
MV PEAK A VEL: 1.47 M/S
MV PEAK E VEL: 1.57 M/S
MV PEAK GRADIENT: 22 MMHG
MV STENOSIS PRESSURE HALF TIME: 131.09 MS
MV VALVE AREA BY CONTINUITY EQUATION: 0.62 CM2
MV VALVE AREA P 1/2 METHOD: 1.68 CM2
PISA MRMAX VEL: 0.05 M/S
PISA TR MAX VEL: 3 M/S
PV PEAK VELOCITY: 0.81 CM/S
RA MAJOR: 6 CM
RA PRESSURE: 15 MMHG
RIGHT VENTRICULAR END-DIASTOLIC DIMENSION: 2.84 CM
TDI LATERAL: 0.06 M/S
TDI SEPTAL: 0.05 M/S
TDI: 0.06 M/S
TR MAX PG: 36 MMHG
TV REST PULMONARY ARTERY PRESSURE: 51 MMHG

## 2022-06-01 ENCOUNTER — OFFICE VISIT (OUTPATIENT)
Dept: CARDIOLOGY | Facility: CLINIC | Age: 52
End: 2022-06-01
Payer: COMMERCIAL

## 2022-06-01 VITALS
WEIGHT: 197.56 LBS | HEART RATE: 81 BPM | SYSTOLIC BLOOD PRESSURE: 112 MMHG | HEIGHT: 69 IN | DIASTOLIC BLOOD PRESSURE: 61 MMHG | OXYGEN SATURATION: 98 % | BODY MASS INDEX: 29.26 KG/M2

## 2022-06-01 DIAGNOSIS — I42.9 CARDIOMYOPATHY, UNSPECIFIED TYPE: Primary | ICD-10-CM

## 2022-06-01 DIAGNOSIS — I50.20 HFREF (HEART FAILURE WITH REDUCED EJECTION FRACTION): ICD-10-CM

## 2022-06-01 DIAGNOSIS — Z01.810 PREPROCEDURAL CARDIOVASCULAR EXAMINATION: ICD-10-CM

## 2022-06-01 DIAGNOSIS — I48.19 PERSISTENT ATRIAL FIBRILLATION: ICD-10-CM

## 2022-06-01 PROCEDURE — 93000 EKG 12-LEAD: ICD-10-PCS | Mod: S$GLB,,, | Performed by: INTERNAL MEDICINE

## 2022-06-01 PROCEDURE — 99215 OFFICE O/P EST HI 40 MIN: CPT | Mod: S$GLB,,, | Performed by: NURSE PRACTITIONER

## 2022-06-01 PROCEDURE — 99999 PR PBB SHADOW E&M-EST. PATIENT-LVL IV: CPT | Mod: PBBFAC,,, | Performed by: NURSE PRACTITIONER

## 2022-06-01 PROCEDURE — 99215 PR OFFICE/OUTPT VISIT, EST, LEVL V, 40-54 MIN: ICD-10-PCS | Mod: S$GLB,,, | Performed by: NURSE PRACTITIONER

## 2022-06-01 PROCEDURE — 99999 PR PBB SHADOW E&M-EST. PATIENT-LVL IV: ICD-10-PCS | Mod: PBBFAC,,, | Performed by: NURSE PRACTITIONER

## 2022-06-01 PROCEDURE — 93000 ELECTROCARDIOGRAM COMPLETE: CPT | Mod: S$GLB,,, | Performed by: INTERNAL MEDICINE

## 2022-06-01 RX ORDER — METOPROLOL SUCCINATE 25 MG/1
25 TABLET, EXTENDED RELEASE ORAL DAILY
Qty: 30 TABLET | Refills: 11 | Status: ON HOLD | OUTPATIENT
Start: 2022-06-01 | End: 2022-11-11 | Stop reason: HOSPADM

## 2022-06-01 RX ORDER — LOSARTAN POTASSIUM 25 MG/1
25 TABLET ORAL DAILY
Qty: 30 TABLET | Refills: 11 | Status: ON HOLD | OUTPATIENT
Start: 2022-06-01 | End: 2022-11-11 | Stop reason: HOSPADM

## 2022-06-01 RX ORDER — SODIUM, POTASSIUM,MAG SULFATES 17.5-3.13G
SOLUTION, RECONSTITUTED, ORAL ORAL
Status: ON HOLD | COMMUNITY
Start: 2022-04-27 | End: 2022-06-08 | Stop reason: CLARIF

## 2022-06-01 NOTE — PATIENT INSTRUCTIONS
Stop taking your Eliquis morning of June 6th, last dose evening of June 5th.  Restart Eliquis day after procedure if no issues with bleeding    We will get you set up for a stress EKG    I am adding in a couple of meds for you, metoprolol and losartan

## 2022-06-01 NOTE — PROGRESS NOTES
Cardiology    2022  8:23 AM    Problem list  Patient Active Problem List   Diagnosis    Overweight (BMI 25.0-29.9)    History of cholecystectomy    History of mitral valve surgery    Lumbosacral strain    Cervical strain    Chronic pain syndrome    Hyperlipidemia    Left ankle pain    Weakness of left upper extremity    Impaired range of motion of left shoulder    Impaired functional mobility and activity tolerance    Atrial fibrillation    History of cardioversion    Anticoagulant long-term use       CC:  Colonoscopy clearance    HPI:  Taking multiq, eliquis and periactin,   She is not sure about metoprolol  No issues with bleeding  Does not feel any palps or skips.   No stents, last stress test years ago  Climbs stairs but gets tired, not short of breath  No edema  Has been on DOAC since Feb  Does need a colonoscopy- scheduled 22      Medications  Current Outpatient Medications   Medication Sig Dispense Refill    apixaban (ELIQUIS) 5 mg Tab Take 1 tablet (5 mg total) by mouth 2 (two) times daily. 60 tablet 6    atorvastatin (LIPITOR) 10 MG tablet Take 1 tablet (10 mg total) by mouth once daily. 90 tablet 3    clonazePAM (KLONOPIN) 0.5 MG tablet Take 1 tablet (0.5 mg total) by mouth 3 (three) times daily as needed for Anxiety. 15 tablet 0    cyproheptadine (PERIACTIN) 4 mg tablet Half p.o. b.i.d.--not too sleepy 1 p.o. b.i.d. to increase appetite 60 tablet 5    diclofenac sodium (VOLTAREN ARTHRITIS PAIN) 1 % Gel Apply 2 g topically 4 (four) times daily. 2 g 0    dronedarone (MULTAQ) 400 mg Tab Take 1 tablet (400 mg total) by mouth 2 (two) times daily with meals. 60 tablet 11    SUPREP BOWEL PREP KIT 17.5-3.13-1.6 gram SolR SMARTSI Milliliter(s) By Mouth Daily      gabapentin (NEURONTIN) 300 MG capsule gabapentin 300 mg capsule   take 1 cap PO qhs x 3 days, then take 2 cap PO QHS thereafter      metoprolol succinate (TOPROL-XL) 25 MG 24 hr tablet Take 1 tablet (25 mg  total) by mouth once daily. (Patient not taking: Reported on 2022) 30 tablet 11     No current facility-administered medications for this visit.     Facility-Administered Medications Ordered in Other Visits   Medication Dose Route Frequency Provider Last Rate Last Admin    sodium chloride 0.9% bolus 1,000 mL  1,000 mL Intravenous Once Alix Pederson NP          Prior to Admission medications    Medication Sig Start Date End Date Taking? Authorizing Provider   apixaban (ELIQUIS) 5 mg Tab Take 1 tablet (5 mg total) by mouth 2 (two) times daily. 22  Yes Suzie Berrios DNP   atorvastatin (LIPITOR) 10 MG tablet Take 1 tablet (10 mg total) by mouth once daily. 10/5/21 10/5/22 Yes Pantera Yadav MD   clonazePAM (KLONOPIN) 0.5 MG tablet Take 1 tablet (0.5 mg total) by mouth 3 (three) times daily as needed for Anxiety. 22 Yes Terry Liu III, MD   cyproheptadine (PERIACTIN) 4 mg tablet Half p.o. b.i.d.--not too sleepy 1 p.o. b.i.d. to increase appetite 22  Yes Pantera Yadav MD   diclofenac sodium (VOLTAREN ARTHRITIS PAIN) 1 % Gel Apply 2 g topically 4 (four) times daily. 3/3/22  Yes Sol Moe NP   dronedarone (MULTAQ) 400 mg Tab Take 1 tablet (400 mg total) by mouth 2 (two) times daily with meals. 3/29/22 3/29/23 Yes Alix Pederson NP   SUPREP BOWEL PREP KIT 17.5-3.13-1.6 gram SolR SMARTSI Milliliter(s) By Mouth Daily 22  Yes Historical Provider   gabapentin (NEURONTIN) 300 MG capsule gabapentin 300 mg capsule   take 1 cap PO qhs x 3 days, then take 2 cap PO QHS thereafter    Historical Provider   metoprolol succinate (TOPROL-XL) 25 MG 24 hr tablet Take 1 tablet (25 mg total) by mouth once daily.  Patient not taking: Reported on 2022 3/29/22 3/29/23  Alix Pederson NP   citalopram (CELEXA) 10 MG tablet Take 1 tablet (10 mg total) by mouth once daily. 2/23/22 3/29/22  Pantera Yadav MD   metoprolol tartrate (LOPRESSOR) 25 MG tablet Take 1 tablet (25 mg  total) by mouth 2 (two) times daily. for 14 days 2/17/22 3/29/22  Liborio Hutchinson MD         History  Past Medical History:   Diagnosis Date    Atrial fibrillation     Back pain     Hyperlipidemia     Mitral incompetence     Neck pain      Past Surgical History:   Procedure Laterality Date    CARDIAC SURGERY      to repair mitral valve    CHOLECYSTECTOMY      TRANSESOPHAGEAL ECHOCARDIOGRAPHY N/A 3/29/2022    Procedure: ECHOCARDIOGRAM, TRANSESOPHAGEAL;  Surgeon: Dominic Wallis MD;  Location: [facility] EP LAB;  Service: Cardiology;  Laterality: N/A;    TREATMENT OF CARDIAC ARRHYTHMIA N/A 3/29/2022    Procedure: Cardioversion or Defibrillation;  Surgeon: Elmo Jones MD;  Location: [facility] EP LAB;  Service: Cardiology;  Laterality: N/A;  afib, dccv, stanley, anes, [place], 3prep, Provider to perform      Social History     Socioeconomic History    Marital status: Single   Tobacco Use    Smoking status: Never Smoker    Smokeless tobacco: Never Used   Substance and Sexual Activity    Alcohol use: Yes     Alcohol/week: 0.0 standard drinks    Drug use: No    Sexual activity: Yes     Partners: Male     Social Determinants of Health     Financial Resource Strain: Low Risk     Difficulty of Paying Living Expenses: Not hard at all   Food Insecurity: No Food Insecurity    Worried About Running Out of Food in the Last Year: Never true    Ran Out of Food in the Last Year: Never true   Transportation Needs: No Transportation Needs    Lack of Transportation (Medical): No    Lack of Transportation (Non-Medical): No   Stress: No Stress Concern Present    Feeling of Stress : Not at all   Social Connections: Unknown    Frequency of Communication with Friends and Family: Three times a week    Frequency of Social Gatherings with Friends and Family: Three times a week    Marital Status: Never    Housing Stability: Unknown    Unable to Pay for Housing in the Last Year: No    Unstable Housing in the Last Year: No          Allergies  Review of patient's allergies indicates:   Allergen Reactions    Amoxicillin Hives and Other (See Comments)         Review of Systems   Review of Systems   Constitutional: Negative for diaphoresis and malaise/fatigue.   HENT: Negative.    Cardiovascular: Negative for chest pain, claudication, dyspnea on exertion, irregular heartbeat, leg swelling, near-syncope, orthopnea, palpitations, paroxysmal nocturnal dyspnea and syncope.   Respiratory: Negative for shortness of breath.    Endocrine: Negative for polydipsia, polyphagia and polyuria.   Hematologic/Lymphatic: Does not bruise/bleed easily.   Gastrointestinal: Negative for bloating, nausea and vomiting.   Genitourinary: Negative.    Neurological: Negative for excessive daytime sleepiness, dizziness, light-headedness, loss of balance and weakness.   Psychiatric/Behavioral: The patient is not nervous/anxious.    Allergic/Immunologic: Negative.          Physical Exam  Wt Readings from Last 1 Encounters:   06/01/22 89.6 kg (197 lb 8.5 oz)     BP Readings from Last 3 Encounters:   06/01/22 112/61   05/13/22 108/70   05/06/22 100/60     Pulse Readings from Last 1 Encounters:   06/01/22 81     Body mass index is 29.17 kg/m².    Physical Exam  Vitals and nursing note reviewed.   Constitutional:       Appearance: Normal appearance.      Comments: overweight   HENT:      Head: Normocephalic and atraumatic.      Mouth/Throat:      Mouth: Mucous membranes are moist.   Eyes:      Pupils: Pupils are equal, round, and reactive to light.   Cardiovascular:      Rate and Rhythm: Normal rate and regular rhythm.      Pulses:           Radial pulses are 2+ on the right side and 2+ on the left side.        Dorsalis pedis pulses are 2+ on the right side and 2+ on the left side.        Posterior tibial pulses are 2+ on the right side and 2+ on the left side.      Heart sounds: No murmur heard.  Pulmonary:      Effort: Pulmonary effort is normal. No respiratory  distress.      Breath sounds: Normal breath sounds.   Abdominal:      General: There is no distension.      Tenderness: There is no abdominal tenderness.   Musculoskeletal:      Cervical back: Normal range of motion.      Right lower leg: No edema.      Left lower leg: No edema.   Skin:     General: Skin is warm and dry.      Findings: No erythema.   Neurological:      General: No focal deficit present.      Mental Status: She is alert.   Psychiatric:         Mood and Affect: Mood normal.         Behavior: Behavior normal.       Problem List Items Addressed This Visit        Cardiac/Vascular    Atrial fibrillation    Overview     On multaq  On eliquis  Continue as now           Current Assessment & Plan     As above           Relevant Orders    IN OFFICE EKG 12-LEAD (to Muse)    HFrEF (heart failure with reduced ejection fraction)    Overview     Repeat echo shows EF 40%  Recommend  Ischemic workup  Start metoprolol and losartan           Current Assessment & Plan     As above           Preprocedural cardiovascular examination    Overview     No recommendation against proceeding with colonoscopy]  Hold eliquis 48 hours prior to colonoscopy, discussed risk for thromboembolic event  Ok to restart day after procedure if no bleeding issues            Current Assessment & Plan     As above             Other Visit Diagnoses     Cardiomyopathy, unspecified type    -  Primary    Relevant Orders    Exercise Stress - EKG                        Follow Up         @Suzie Berrios DNP

## 2022-06-07 ENCOUNTER — TELEPHONE (OUTPATIENT)
Dept: OBSTETRICS AND GYNECOLOGY | Facility: CLINIC | Age: 52
End: 2022-06-07
Payer: COMMERCIAL

## 2022-06-07 NOTE — TELEPHONE ENCOUNTER
ID confirmed.    Reviewed her biopsy from the colposcopy showed a level of SHAMIR 1. This is a mild abnormality. There was no evidence of cancer. Recommendation would be for follow up pap smear with HPV testing in 1 year. Questions answered.  John Little

## 2022-06-09 ENCOUNTER — OFFICE VISIT (OUTPATIENT)
Dept: PAIN MEDICINE | Facility: CLINIC | Age: 52
End: 2022-06-09
Payer: COMMERCIAL

## 2022-06-09 VITALS
HEIGHT: 69 IN | DIASTOLIC BLOOD PRESSURE: 71 MMHG | HEART RATE: 76 BPM | SYSTOLIC BLOOD PRESSURE: 107 MMHG | BODY MASS INDEX: 29.03 KG/M2 | WEIGHT: 196 LBS

## 2022-06-09 DIAGNOSIS — M25.612 IMPAIRED RANGE OF MOTION OF LEFT SHOULDER: Primary | ICD-10-CM

## 2022-06-09 DIAGNOSIS — G89.29 CHRONIC LEFT SHOULDER PAIN: ICD-10-CM

## 2022-06-09 DIAGNOSIS — M77.8 LEFT SHOULDER TENDINITIS: ICD-10-CM

## 2022-06-09 DIAGNOSIS — M25.512 CHRONIC LEFT SHOULDER PAIN: ICD-10-CM

## 2022-06-09 PROCEDURE — 99204 OFFICE O/P NEW MOD 45 MIN: CPT | Mod: S$GLB,,, | Performed by: ANESTHESIOLOGY

## 2022-06-09 PROCEDURE — 99999 PR PBB SHADOW E&M-EST. PATIENT-LVL III: CPT | Mod: PBBFAC,,, | Performed by: ANESTHESIOLOGY

## 2022-06-09 PROCEDURE — 99999 PR PBB SHADOW E&M-EST. PATIENT-LVL III: ICD-10-PCS | Mod: PBBFAC,,, | Performed by: ANESTHESIOLOGY

## 2022-06-09 PROCEDURE — 99204 PR OFFICE/OUTPT VISIT, NEW, LEVL IV, 45-59 MIN: ICD-10-PCS | Mod: S$GLB,,, | Performed by: ANESTHESIOLOGY

## 2022-06-09 NOTE — PROGRESS NOTES
This note was completed with dictation software and grammatical errors may exist.    Referring Physician: George Kathleen II, MD    PCP: Pantera Yadav MD      CC: Left shoulder pain    HPI:   Riya Soto is a 51 y.o. female referred to us for left shoulder pain.  Pain has been present for 4 months.  Patient has constant sharp, tingling pain over her left shoulder.  Pain radiates down her left arm at times.  Pain worsens with extension, reaching of her left shoulder.  Pain improves with rest.  She did evaluated by orthopedics.  She recently had MRI of the shoulder.  She was undergoing physical therapy but physical therapy was delayed due to a bout of atrial fibrillation.  She is currently being followed with Cardiology.  She underwent a intra-articular steroid injection with orthopedics 3 months ago which provided moderate benefit.  She does not desire interventions at this time.  She denies any worsening weakness.  No bowel bladder changes.    ROS:  CONSTITUTIONAL: No fevers, chills, night sweats, wt. loss, appetite changes  SKIN: no rashes or itching  ENT: No headaches, head trauma, vision changes, or eye pain  LYMPH NODES: None noticed   CV: No chest pain, palpitations.   RESP: No shortness of breath, dyspnea on exertion, cough, wheezing, or hemoptysis  GI: No nausea, emesis, diarrhea, constipation, melena, hematochezia, pain.    : No dysuria, hematuria, urgency, or frequency   HEME: No easy bruising, bleeding problems  PSYCHIATRIC: No depression, anxiety, psychosis, hallucinations.  NEURO: No seizures, memory loss, dizziness or difficulty sleeping  MSK:  Positive HPI      Past Medical History:   Diagnosis Date    Atrial fibrillation     Back pain     Hyperlipidemia     Mitral incompetence     Neck pain      Past Surgical History:   Procedure Laterality Date    CARDIAC SURGERY      to repair mitral valve    CHOLECYSTECTOMY      COLONOSCOPY      COLONOSCOPY N/A 6/8/2022    Procedure:  "COLONOSCOPY;  Surgeon: Sanford Andres MD;  Location: [facility] ENDO;  Service: Endoscopy;  Laterality: N/A;    TRANSESOPHAGEAL ECHOCARDIOGRAPHY N/A 03/29/2022    Procedure: ECHOCARDIOGRAM, TRANSESOPHAGEAL;  Surgeon: Dominic Wallis MD;  Location: [facility] EP LAB;  Service: Cardiology;  Laterality: N/A;    TREATMENT OF CARDIAC ARRHYTHMIA N/A 03/29/2022    Procedure: Cardioversion or Defibrillation;  Surgeon: Elmo Jones MD;  Location: [facility] EP LAB;  Service: Cardiology;  Laterality: N/A;  afib, dccv, stanley, anes, [place], 3prep, Provider to perform      Family History   Problem Relation Age of Onset    Breast cancer Sister     Colon cancer Maternal Aunt     Ovarian cancer Neg Hx      Social History     Socioeconomic History    Marital status: Single   Tobacco Use    Smoking status: Never Smoker    Smokeless tobacco: Never Used   Substance and Sexual Activity    Alcohol use: Not Currently    Drug use: No    Sexual activity: Yes     Partners: Male     Social Determinants of Health     Financial Resource Strain: Low Risk     Difficulty of Paying Living Expenses: Not hard at all   Food Insecurity: No Food Insecurity    Worried About Running Out of Food in the Last Year: Never true    Ran Out of Food in the Last Year: Never true   Transportation Needs: No Transportation Needs    Lack of Transportation (Medical): No    Lack of Transportation (Non-Medical): No   Stress: No Stress Concern Present    Feeling of Stress : Not at all   Social Connections: Unknown    Frequency of Communication with Friends and Family: Three times a week    Frequency of Social Gatherings with Friends and Family: Three times a week    Marital Status: Never    Housing Stability: Unknown    Unable to Pay for Housing in the Last Year: No    Unstable Housing in the Last Year: No         Medications/Allergies: See med card    Vitals:    06/09/22 0920   BP: 107/71   Pulse: 76   Weight: 88.9 kg (196 lb)   Height: 5' 9" (1.753 m)   PainSc: "   9   PainLoc: Shoulder         Physical exam:    GENERAL: A and O x3, the patient appears well groomed and is in no acute distress.  Skin: No rashes or obvious lesions  HEENT: normocephalic, atraumatic  CARDIOVASCULAR:  Palpable peripheral pulses  LUNGS: easy work of breathing  ABDOMEN: soft, nontender   UPPER EXTREMITIES: Decrease ROM of left shoulder.  Tenderness over posterior and anterior shoulder  LOWER EXTREMITIES:  Normal alignment, normal range of motion, no atrophy, no skin changes,  hair growth and nail growth normal and equal bilaterally. No swelling, no tenderness.  CERVICAL SPINE:  Cervical spine: ROM is full in flexion, extension and lateral rotation without increased pain.  Spurling's maneuver causes no neck pain to either side.  Myofascial exam: No Tenderness to palpation across cervical paraspinous region bilaterally.    MENTAL STATUS: normal orientation, speech, language, and fund of knowledge for social situation.  Emotional state appropriate.    MOTOR: Tone and bulk: normal bilateral upper and lower Strength: normal   Delt Bi Tri WE WF     R 5 5 5 5 5 5   L 5 5 5 5 5 5     IP ADD ABD Quad TA Gas HAM  R 5 5 5 5 5 5 5  L 5 5 5 5 5 5 5    SENSATION: Light touch and pinprick intact bilaterally  REFLEXES: normal, symmetric, nonbrisk.  Toes down, no clonus. No hoffmans.  GAIT: normal rise, base, steps, and arm swing.        Imaging:  MRI Shoulder 2/2022  IMPRESSION:     1. Mild left infraspinatus tendinosis anteriorly.  2. Prominent degree of glenoid retroversion with diminutive appearance posterior labrum and apical lifting and posterior labrum suspicious for nondisplaced posterior labral tear. If further imaging evaluation of this is desired, left shoulder MR arthrogram can be considered.  3. Mild edema adjacent otherwise unremarkable glenohumeral ligaments is nonspecific. In the appropriate clinical setting, capsular sprain or adhesive capsulitis can be considered.    Assessment:  Patient  presents with left shoulder pain.  1. Impaired range of motion of left shoulder    2. Chronic left shoulder pain    3. Left shoulder tendinitis          Plan:  1. I have stressed the importance of physical activity and exercise to improve overall health  2. Reviewed pertinent imaging and records with patient  3. Patient desires another referral for physical therapy.  Referral given.  4. Continue evaluation orthopedics.  5. Patient does not desire intra-articular injection at this time.  She will contact us if she wishes to proceed in the future.  6. She will follow-up as needed.  Thank you for referring this interesting patient, and I look forward to continuing to collaborate in her care.

## 2022-06-10 PROBLEM — K57.90 DIVERTICULOSIS: Status: ACTIVE | Noted: 2022-06-10

## 2022-07-08 ENCOUNTER — OFFICE VISIT (OUTPATIENT)
Dept: CARDIOLOGY | Facility: CLINIC | Age: 52
End: 2022-07-08
Payer: COMMERCIAL

## 2022-07-08 VITALS
BODY MASS INDEX: 29.5 KG/M2 | HEIGHT: 69 IN | OXYGEN SATURATION: 99 % | SYSTOLIC BLOOD PRESSURE: 105 MMHG | DIASTOLIC BLOOD PRESSURE: 54 MMHG | WEIGHT: 199.19 LBS | HEART RATE: 80 BPM

## 2022-07-08 DIAGNOSIS — I48.91 ATRIAL FIBRILLATION, UNSPECIFIED TYPE: ICD-10-CM

## 2022-07-08 DIAGNOSIS — I50.20 HFREF (HEART FAILURE WITH REDUCED EJECTION FRACTION): ICD-10-CM

## 2022-07-08 PROCEDURE — 99999 PR PBB SHADOW E&M-EST. PATIENT-LVL IV: ICD-10-PCS | Mod: PBBFAC,,, | Performed by: NURSE PRACTITIONER

## 2022-07-08 PROCEDURE — 99214 PR OFFICE/OUTPT VISIT, EST, LEVL IV, 30-39 MIN: ICD-10-PCS | Mod: S$GLB,,, | Performed by: NURSE PRACTITIONER

## 2022-07-08 PROCEDURE — 99999 PR PBB SHADOW E&M-EST. PATIENT-LVL IV: CPT | Mod: PBBFAC,,, | Performed by: NURSE PRACTITIONER

## 2022-07-08 PROCEDURE — 99214 OFFICE O/P EST MOD 30 MIN: CPT | Mod: S$GLB,,, | Performed by: NURSE PRACTITIONER

## 2022-07-08 NOTE — PROGRESS NOTES
Cardiology    7/8/2022  10:42 AM    Problem list  Patient Active Problem List   Diagnosis    Overweight (BMI 25.0-29.9)    History of cholecystectomy    History of mitral valve surgery    Lumbosacral strain    Cervical strain    Chronic pain syndrome    Hyperlipidemia    Left ankle pain    Weakness of left upper extremity    Impaired range of motion of left shoulder    Impaired functional mobility and activity tolerance    Atrial fibrillation    History of cardioversion    Anticoagulant long-term use    HFrEF (heart failure with reduced ejection fraction)    Preprocedural cardiovascular examination    Diverticulosis       CC:  Post stress test follow up     HPI:  Feeling ok overall- very tired as she has been staying up late for the last few nights.  No chest pain, some occasional shortness of breath. No problems with medicines.  Has an appointment coming up with EP but was told she will require sedation at this appointment and she is concerned about getting back to [place] after.  BP slightly low today- she associates this with poor sleep.    Medications  Current Outpatient Medications   Medication Sig Dispense Refill    apixaban (ELIQUIS) 5 mg Tab Take 1 tablet (5 mg total) by mouth 2 (two) times daily. 60 tablet 6    atorvastatin (LIPITOR) 10 MG tablet Take 1 tablet (10 mg total) by mouth once daily. 90 tablet 3    cyproheptadine (PERIACTIN) 4 mg tablet Half p.o. b.i.d.--not too sleepy 1 p.o. b.i.d. to increase appetite 60 tablet 5    dronedarone (MULTAQ) 400 mg Tab Take 1 tablet (400 mg total) by mouth 2 (two) times daily with meals. 60 tablet 11    losartan (COZAAR) 25 MG tablet Take 1 tablet (25 mg total) by mouth once daily. 30 tablet 11    metoprolol succinate (TOPROL-XL) 25 MG 24 hr tablet Take 1 tablet (25 mg total) by mouth once daily. 30 tablet 11    clonazePAM (KLONOPIN) 0.5 MG tablet Take 1 tablet (0.5 mg total) by mouth 3 (three) times daily as needed for Anxiety.  (Patient not taking: Reported on 7/8/2022) 15 tablet 0    diclofenac sodium (VOLTAREN ARTHRITIS PAIN) 1 % Gel Apply 2 g topically 4 (four) times daily. (Patient not taking: Reported on 7/8/2022) 2 g 0    gabapentin (NEURONTIN) 300 MG capsule gabapentin 300 mg capsule   take 1 cap PO qhs x 3 days, then take 2 cap PO QHS thereafter       No current facility-administered medications for this visit.     Facility-Administered Medications Ordered in Other Visits   Medication Dose Route Frequency Provider Last Rate Last Admin    sodium chloride 0.9% bolus 1,000 mL  1,000 mL Intravenous Once Alix Pederson NP          Prior to Admission medications    Medication Sig Start Date End Date Taking? Authorizing Provider   apixaban (ELIQUIS) 5 mg Tab Take 1 tablet (5 mg total) by mouth 2 (two) times daily. 2/24/22  Yes Suzie Berrios DNP   atorvastatin (LIPITOR) 10 MG tablet Take 1 tablet (10 mg total) by mouth once daily. 10/5/21 10/5/22 Yes Pantera Yadav MD   cyproheptadine (PERIACTIN) 4 mg tablet Half p.o. b.i.d.--not too sleepy 1 p.o. b.i.d. to increase appetite 4/25/22  Yes Pantera Yadav MD   dronedarone (MULTAQ) 400 mg Tab Take 1 tablet (400 mg total) by mouth 2 (two) times daily with meals. 3/29/22 3/29/23 Yes Alix Pederson NP   losartan (COZAAR) 25 MG tablet Take 1 tablet (25 mg total) by mouth once daily. 6/1/22 6/1/23 Yes Suzie Berrios DNP   metoprolol succinate (TOPROL-XL) 25 MG 24 hr tablet Take 1 tablet (25 mg total) by mouth once daily. 6/1/22 6/1/23 Yes Suzie Berrios DNP   clonazePAM (KLONOPIN) 0.5 MG tablet Take 1 tablet (0.5 mg total) by mouth 3 (three) times daily as needed for Anxiety.  Patient not taking: Reported on 7/8/2022 2/21/22 2/21/23  Terry Liu III, MD   diclofenac sodium (VOLTAREN ARTHRITIS PAIN) 1 % Gel Apply 2 g topically 4 (four) times daily.  Patient not taking: Reported on 7/8/2022 3/3/22   Sol Moe NP   gabapentin (NEURONTIN) 300 MG capsule gabapentin 300 mg  capsule   take 1 cap PO qhs x 3 days, then take 2 cap PO QHS thereafter    Historical Provider   citalopram (CELEXA) 10 MG tablet Take 1 tablet (10 mg total) by mouth once daily. 2/23/22 3/29/22  Pantera Yadav MD   metoprolol tartrate (LOPRESSOR) 25 MG tablet Take 1 tablet (25 mg total) by mouth 2 (two) times daily. for 14 days 2/17/22 3/29/22  Liborio Hutchinson MD         History  Past Medical History:   Diagnosis Date    Atrial fibrillation     Back pain     Hyperlipidemia     Mitral incompetence     Neck pain      Past Surgical History:   Procedure Laterality Date    CARDIAC SURGERY      to repair mitral valve    CHOLECYSTECTOMY      COLONOSCOPY      COLONOSCOPY N/A 6/8/2022    Procedure: COLONOSCOPY;  Surgeon: Sanford Andres MD;  Location[facility] ENDO;  Service: Endoscopy;  Laterality: N/A;    TRANSESOPHAGEAL ECHOCARDIOGRAPHY N/A 03/29/2022    Procedure: ECHOCARDIOGRAM, TRANSESOPHAGEAL;  Surgeon: Dominic Wallis MD;  Location: [facility] EP LAB;  Service: Cardiology;  Laterality: N/A;    TREATMENT OF CARDIAC ARRHYTHMIA N/A 03/29/2022    Procedure: Cardioversion or Defibrillation;  Surgeon: Elmo Jones MD;  Location: [facility] EP LAB;  Service: Cardiology;  Laterality: N/A;  afib, dccv, [place], [place], [place], 3prep, Provider to perform      Social History     Socioeconomic History    Marital status: Single   Tobacco Use    Smoking status: Never Smoker    Smokeless tobacco: Never Used   Substance and Sexual Activity    Alcohol use: Not Currently    Drug use: No    Sexual activity: Yes     Partners: Male     Social Determinants of Health     Financial Resource Strain: Low Risk     Difficulty of Paying Living Expenses: Not hard at all   Food Insecurity: No Food Insecurity    Worried About Running Out of Food in the Last Year: Never true    Ran Out of Food in the Last Year: Never true   Transportation Needs: No Transportation Needs    Lack of Transportation (Medical): No    Lack of Transportation  (Non-Medical): No   Stress: No Stress Concern Present    Feeling of Stress : Not at all   Social Connections: Unknown    Frequency of Communication with Friends and Family: Three times a week    Frequency of Social Gatherings with Friends and Family: Three times a week    Marital Status: Never    Housing Stability: Unknown    Unable to Pay for Housing in the Last Year: No    Unstable Housing in the Last Year: No         Allergies  Review of patient's allergies indicates:   Allergen Reactions    Amoxicillin Hives and Other (See Comments)         Review of Systems   Review of Systems   Constitutional: Negative for diaphoresis and malaise/fatigue.   HENT: Negative.    Cardiovascular: Positive for dyspnea on exertion. Negative for chest pain, claudication, irregular heartbeat, leg swelling, near-syncope, orthopnea, palpitations, paroxysmal nocturnal dyspnea and syncope.   Respiratory: Negative for shortness of breath.    Endocrine: Negative for polydipsia, polyphagia and polyuria.   Hematologic/Lymphatic: Does not bruise/bleed easily.   Gastrointestinal: Negative for bloating, nausea and vomiting.   Genitourinary: Negative.    Neurological: Negative for excessive daytime sleepiness, dizziness, light-headedness, loss of balance and weakness.   Psychiatric/Behavioral: The patient is not nervous/anxious.    Allergic/Immunologic: Negative.          Physical Exam  Wt Readings from Last 1 Encounters:   07/08/22 90.4 kg (199 lb 3 oz)     BP Readings from Last 3 Encounters:   07/08/22 (!) 105/54   06/09/22 107/71   06/08/22 124/79     Pulse Readings from Last 1 Encounters:   07/08/22 80     Body mass index is 29.41 kg/m².    Physical Exam  Vitals and nursing note reviewed.   Constitutional:       Appearance: Normal appearance.      Comments: overweight   HENT:      Head: Normocephalic and atraumatic.      Mouth/Throat:      Mouth: Mucous membranes are moist.   Eyes:      Pupils: Pupils are equal, round, and  reactive to light.   Cardiovascular:      Rate and Rhythm: Normal rate and regular rhythm.      Pulses:           Radial pulses are 2+ on the right side and 2+ on the left side.        Dorsalis pedis pulses are 2+ on the right side and 2+ on the left side.        Posterior tibial pulses are 2+ on the right side and 2+ on the left side.      Heart sounds: No murmur heard.  Pulmonary:      Effort: Pulmonary effort is normal. No respiratory distress.      Breath sounds: Normal breath sounds.   Abdominal:      General: There is no distension.      Tenderness: There is no abdominal tenderness.   Musculoskeletal:      Cervical back: Normal range of motion.      Right lower leg: No edema.      Left lower leg: No edema.   Skin:     General: Skin is warm and dry.      Findings: No erythema.   Neurological:      General: No focal deficit present.      Mental Status: She is alert.   Psychiatric:         Mood and Affect: Mood normal.         Behavior: Behavior normal.         Problem List Items Addressed This Visit        Cardiac/Vascular    Atrial fibrillation    Overview     On multaq  On eliquis  Continue as now           Current Assessment & Plan     As above           HFrEF (heart failure with reduced ejection fraction)    Overview     Repeat echo shows EF 40%   Ischemic workup negative, etiology likely a fib  Continue metoprolol and losartan- tolerating well.  She will contact us if her BPs become lower or she has any problems with dizziness/lightheadedness/syncope           Current Assessment & Plan     As above                           Follow Up  3 months      @Suzie Berrios DNP

## 2022-07-08 NOTE — PATIENT INSTRUCTIONS
Continue taking your medications as now    Contact Dr. Jones to see if they can adjust your appointment    Keep checking your blood pressures, contact us with any weakness, dizziness or fainting

## 2022-07-14 ENCOUNTER — HOSPITAL ENCOUNTER (EMERGENCY)
Facility: HOSPITAL | Age: 52
Discharge: HOME | End: 2022-07-14
Attending: EMERGENCY MEDICINE
Payer: COMMERCIAL

## 2022-07-14 VITALS
BODY MASS INDEX: 29.47 KG/M2 | RESPIRATION RATE: 16 BRPM | SYSTOLIC BLOOD PRESSURE: 126 MMHG | WEIGHT: 199 LBS | DIASTOLIC BLOOD PRESSURE: 68 MMHG | TEMPERATURE: 99 F | HEART RATE: 88 BPM | HEIGHT: 69 IN | OXYGEN SATURATION: 99 %
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
## 2022-07-14 DIAGNOSIS — S16.1XXA ACUTE CERVICAL MYOFASCIAL STRAIN, INITIAL ENCOUNTER: ICD-10-CM

## 2022-07-14 PROCEDURE — 99283 EMERGENCY DEPT VISIT LOW MDM: CPT

## 2022-07-14 RX ORDER — METAXALONE 800 MG/1
800 TABLET ORAL 3 TIMES DAILY
Qty: 30 TABLET | Refills: 0 | Status: SHIPPED | OUTPATIENT
Start: 2022-07-14 | End: 2022-07-24
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
## 2022-07-18 ENCOUNTER — TELEPHONE (OUTPATIENT)
Dept: ELECTROPHYSIOLOGY | Facility: CLINIC | Age: 52
End: 2022-07-18
Payer: COMMERCIAL

## 2022-07-19 ENCOUNTER — HOSPITAL ENCOUNTER (OUTPATIENT)
Dept: CARDIOLOGY | Facility: CLINIC | Age: 52
Discharge: HOME | End: 2022-07-19
Payer: COMMERCIAL

## 2022-07-19 ENCOUNTER — OFFICE VISIT (OUTPATIENT)
Dept: ELECTROPHYSIOLOGY | Facility: CLINIC | Age: 52
End: 2022-07-19
Payer: COMMERCIAL

## 2022-07-19 VITALS
HEART RATE: 69 BPM | DIASTOLIC BLOOD PRESSURE: 65 MMHG | BODY MASS INDEX: 28.56 KG/M2 | SYSTOLIC BLOOD PRESSURE: 102 MMHG | HEIGHT: 70 IN | WEIGHT: 199.5 LBS

## 2022-07-19 DIAGNOSIS — I34.0 NONRHEUMATIC MITRAL VALVE REGURGITATION: ICD-10-CM

## 2022-07-19 DIAGNOSIS — Z98.890 HISTORY OF HEART SURGERY: ICD-10-CM

## 2022-07-19 DIAGNOSIS — I48.19 PERSISTENT ATRIAL FIBRILLATION: Primary | ICD-10-CM

## 2022-07-19 DIAGNOSIS — I48.91 ATRIAL FIBRILLATION, UNSPECIFIED TYPE: ICD-10-CM

## 2022-07-19 DIAGNOSIS — I50.20 HFREF (HEART FAILURE WITH REDUCED EJECTION FRACTION): ICD-10-CM

## 2022-07-19 PROCEDURE — 99214 PR OFFICE/OUTPT VISIT, EST, LEVL IV, 30-39 MIN: ICD-10-PCS | Mod: S$GLB,,, | Performed by: INTERNAL MEDICINE

## 2022-07-19 PROCEDURE — 99999 PR PBB SHADOW E&M-EST. PATIENT-LVL IV: CPT | Mod: PBBFAC,,, | Performed by: INTERNAL MEDICINE

## 2022-07-19 PROCEDURE — 99999 PR PBB SHADOW E&M-EST. PATIENT-LVL IV: ICD-10-PCS | Mod: PBBFAC,,, | Performed by: INTERNAL MEDICINE

## 2022-07-19 PROCEDURE — 99214 OFFICE O/P EST MOD 30 MIN: CPT | Mod: S$GLB,,, | Performed by: INTERNAL MEDICINE

## 2022-07-19 PROCEDURE — 93000 RHYTHM STRIP: ICD-10-PCS | Mod: S$GLB,,, | Performed by: INTERNAL MEDICINE

## 2022-07-19 PROCEDURE — 93000 ELECTROCARDIOGRAM COMPLETE: CPT | Mod: S$GLB,,, | Performed by: INTERNAL MEDICINE

## 2022-07-19 NOTE — PROGRESS NOTES
Postoperative Care Instructions  Laparoscopic cholecystectomy      1  General: You may feel pulling sensations around the wound or funny aches and pains around the incisions  This is normal  Even minor surgery is a change in your body and this is your body's reaction to it  If you have had abdominal surgery, it may help to support the incision with a small pillow or blanket for comfort when moving or coughing  2  Wound care: The glue over the incisions will fall off over the next week or two  If you have staples or stitches, they will be removed by the physician at your follow up appointment  3  Showering: You may shower  Please do not soak wound in standing water such as a bath, hot tub, pool, lake, etc  Do not scrub or use exfoliants on the surgical wounds  4  Activity: You may go up and down stairs, walk as much as you are comfortable, but walk at least 3 times each day  If you have had abdominal surgery, do not perform any strenuous exercise or lift anything heavier than 15 pounds for at least 4 weeks, unless cleared by your physician  5  Diet: You may resume your regular diet  Please drink lots of water  6  Medications: Resume all of your previous medications, unless told otherwise by the doctor  A good option for pain control is to start with acetaminophen(Tylenol) 650mg and ibuprofen(Advil) 600mg and alternate taking them every 3 hours  If this is not sufficient then you make take the narcotic pain medicine as prescribed  You do not need to take the narcotic pain medication unless you are having significant pain and discomfort  Please take the narcotic medication with food  Insure that you do not take more than 4000 mg of Tylenol per day  7  Driving: You will need someone to drive you home on the day of surgery  Do not drive or make any important decisions while on narcotic pain medication  Generally, you may drive 48 hours after you've stopped taking all narcotic pain medications      8  Subjective:    Patient ID:  Riya Soto is a 51 y.o. female who presents for evaluation of Atrial Fibrillation    Referring Cardiology Practitioner: Suzie Berrios DNP  Primary Care Physician: Pantera Yadav MD    HPI  Prior Hx:  I had the pleasure of seeing Mrs. Soto today in our electrophysiology clinic in follow-up for her atrial fibrillation. As you are aware she is a pleasant 51 year-old woman with prior MV disease? (unclear etiology/pathology, reports possible rheumatic fever as a child, prior mitral valve repair in 2011) presenting with symptomatic persistent AF also with systolic dysfunction (LVEF of 40%) with mild-mod MR/MS. She was in her usual state of health until the past few months when she began having palpitations and fatigue. She then presented to urgent care on 2/17/2022 with feeling unwell (body aches, fatigue, wanted to get checked for COVID) and was discovered to be in atrial fibrillation. She was sent to the ER. She was discharged. She then saw Suzie Berrios in cardiology clinic who initiated eliquis and referred for evaluation. An echocardiogram was recently performed noting a LVEF of 40% with mild-mod MR and mild-mod MS with reduced anterior mitral leaflet mobility. She was also initiated on metoprolol, which has been limited to low dosing due to hypotension. A 24 hour holter monitor noted persistent AF with an average ventricular rate of 89 bpm with frequent PVCs (2% burden).     I reviewed available all available electrocardiograms in Epic which show sinus rhythm in 2019 and then AF on 2/17/2022 and persistent since.    3/2022: DCCV and initiated multaq.    5/2022: seen by Marianela Malave. Felt well.     Interim Hx:  Mrs. Soto returns for evaluation. She feels well in sinus rhythm. Repeat ECHO notes LVEF remains 40% with mod-severe MR with moderate mitral stenosis.    My interpretation of today's in clinic electrocardiogram is sinus rhythm with a rate of 69 bpm with a narrow  QRS.    Review of Systems   Constitutional: Negative for fever and malaise/fatigue.   HENT: Negative for congestion and sore throat.    Eyes: Negative for blurred vision and visual disturbance.   Cardiovascular: Negative for chest pain, dyspnea on exertion, irregular heartbeat, near-syncope, palpitations and syncope.   Respiratory: Negative for cough and shortness of breath.    Hematologic/Lymphatic: Negative for bleeding problem. Does not bruise/bleed easily.   Skin: Negative.    Musculoskeletal:        Right leg pain.   Gastrointestinal: Negative for bloating, abdominal pain, hematochezia and melena.   Neurological: Negative for focal weakness and weakness.   Psychiatric/Behavioral: Negative.         Objective:    Physical Exam  Vitals reviewed.   Constitutional:       General: She is not in acute distress.     Appearance: She is well-developed. She is not diaphoretic.   HENT:      Head: Normocephalic and atraumatic.   Eyes:      General:         Right eye: No discharge.         Left eye: No discharge.      Conjunctiva/sclera: Conjunctivae normal.   Cardiovascular:      Rate and Rhythm: Normal rate. Rhythm irregularly irregular.      Heart sounds: No murmur heard.    No friction rub. No gallop.   Pulmonary:      Effort: Pulmonary effort is normal. No respiratory distress.      Breath sounds: Normal breath sounds. No wheezing or rales.   Abdominal:      General: Bowel sounds are normal. There is no distension.      Palpations: Abdomen is soft.      Tenderness: There is no abdominal tenderness.   Musculoskeletal:      Cervical back: Neck supple.   Skin:     General: Skin is warm and dry.   Neurological:      Mental Status: She is alert and oriented to person, place, and time.   Psychiatric:         Behavior: Behavior normal.         Thought Content: Thought content normal.         Judgment: Judgment normal.           Assessment:       1. Persistent atrial fibrillation    2. HFrEF (heart failure with reduced ejection  Upset Stomach: You may take Maalox, Tums, or similar items for an upset stomach  If your narcotic pain medication causes an upset stomach, do not take it on an empty stomach  Try taking it with at least some crackers or toast      9  Constipation: Patients often experience constipation after surgery  We recommend starting an over-the-counter medication for this, such as Metamucil, Senokot, Colace, milk of magnesia, etc  You may stop taking these medications a couple days after your last dose of narcotic medication  If you experience significant nausea or vomiting after abdominal surgery, call the office before trying any of these medications  10  Call the office: If you are experiencing any of the following: fevers above 101 5°, significant nausea or vomiting, if the wound develops drainage and/or excessive redness around the wound, or if you have significant diarrhea or other worsening symptoms  11  Pain: A prescription for narcotic pain medication will be sent to your pharmacy upon discharge from the hospital           Laparoscopic Cholecystectomy   WHAT YOU NEED TO KNOW:   Laparoscopic cholecystectomy is surgery to remove your gallbladder  DISCHARGE INSTRUCTIONS:   Medicines: You may need any of the following:  · Prescription pain medicine  helps decrease pain  Do not wait until the pain is severe before you take this medicine  · NSAIDs  decrease swelling and pain  This medicine can be bought with or without a doctor's order  This medicine can cause stomach bleeding or kidney problems in certain people  If you take blood thinner medicine, always ask your healthcare provider if NSAIDs are safe for you  Read the medicine label and follow the directions on it before using this medicine  · Take your medicine as directed  Contact your healthcare provider if you think your medicine is not helping or if you have side effects  Tell him or her if you are allergic to any medicine   Keep a list of the fraction)    3. History of mitral valve surgery    4. Nonrheumatic mitral valve regurgitation         Plan:       In summary, Mrs. Soto is a pleasant 51 year-old woman with prior MV disease? (unclear etiology/pathology, reports possible rheumatic fever as a child, prior mitral valve repair in 2011) presenting with symptomatic persistent AF also with systolic dysfunction (LVEF of 40%) with mild-mod MR/MS s/p DCCV and has maintained sinus rhythm with multaq. However her LVEF has not improved with sinus rhythm and ECHO in sinus rhythm indicates mod-severe MR and mild-mod MS. Continue anticoagulation and multaq. I would like to refer her to Dr. Sears and Dr. Gilbert for evaluation. She will likely need a redo operation at some point.    RTC in 6 months, sooner if needed    Thank you for allowing me to participate in the care of this patient. Please do not hesitate to call me with any questions or concerns.    Elmo Jones MD, PhD  Cardiac Electrophysiology                 medicines, vitamins, and herbs you take  Include the amounts, and when and why you take them  Bring the list or the pill bottles to follow-up visits  Carry your medicine list with you in case of an emergency  Follow up with your healthcare provider 2 weeks after surgery, or as directed:  Write down your questions so you remember to ask them during your visits  Wound care:  Care for your surgical wounds as directed  Keep the wounds clean and dry  You may take a shower the day after your surgery  What to eat after surgery:  Eat low-fat foods for 4 to 6 weeks while your body learns to digest fat without a gallbladder  Slowly increase the amount of fat that you eat  Drink plenty of liquids  Ask how much liquid to drink and which liquids are best for you  When to return to work and other activities: You may return to work or other activities as soon as your pain is controlled and you feel comfortable  For many people, this is 5 to 7 days after surgery  Contact your healthcare provider if:   · You have a fever over 101°F (38°C) or chills  · You have pain or nausea that is not relieved by medicine  · You have redness and swelling around your incisions, or blood or pus is leaking from your incisions  · You are constipated or have diarrhea  · Your skin or eyes are yellow, or your bowel movements are pale  · You have questions or concerns about your surgery, condition, or care  Seek care immediately or call 911 if:   · You cannot stop vomiting  · Your bowel movements are black or bloody  · You have pain in your abdomen and it is swollen or hard  · Your arm or leg feels warm, tender, and painful  It may look swollen and red  · You feel lightheaded, short of breath, and have chest pain  · You cough up blood  © Copyright [employer] 2018 Information is for End User's use only and may not be sold, redistributed or otherwise used for commercial purposes   All illustrations and images included in CareNotes® are the copyrighted property of [employer]  or [employer]   The above information is an  only  It is not intended as medical advice for individual conditions or treatments  Talk to your doctor, nurse or pharmacist before following any medical regimen to see if it is safe and effective for you

## 2022-07-20 ENCOUNTER — TELEPHONE (OUTPATIENT)
Dept: ELECTROPHYSIOLOGY | Facility: CLINIC | Age: 52
End: 2022-07-20
Payer: COMMERCIAL

## 2022-07-20 NOTE — TELEPHONE ENCOUNTER
Called pt to schedule NP referral for Dr. Gilbert. I informed her we were having trouble scheduling for Dr. Sears but someone should reach out to her sometime today to schedule this appointment.

## 2022-07-20 NOTE — TELEPHONE ENCOUNTER
Called pt back to schedule 2 referrals from Dr. Jones. Pt verbally confirmed appointment dates/times and locations of each. I will send her a letter in the mail with the appointment information to make sure.

## 2022-07-21 ENCOUNTER — OFFICE VISIT (OUTPATIENT)
Dept: ORTHOPEDICS | Facility: CLINIC | Age: 52
End: 2022-07-21
Payer: COMMERCIAL

## 2022-07-21 VITALS — BODY MASS INDEX: 28.56 KG/M2 | WEIGHT: 199.5 LBS | HEIGHT: 70 IN

## 2022-07-21 DIAGNOSIS — M25.512 CHRONIC LEFT SHOULDER PAIN: Primary | ICD-10-CM

## 2022-07-21 DIAGNOSIS — G89.29 CHRONIC LEFT SHOULDER PAIN: Primary | ICD-10-CM

## 2022-07-21 PROCEDURE — 99024 POSTOP FOLLOW-UP VISIT: CPT | Mod: S$GLB,,, | Performed by: ORTHOPAEDIC SURGERY

## 2022-07-21 PROCEDURE — 99024 PR POST-OP FOLLOW-UP VISIT: ICD-10-PCS | Mod: S$GLB,,, | Performed by: ORTHOPAEDIC SURGERY

## 2022-07-21 PROCEDURE — 99999 PR PBB SHADOW E&M-EST. PATIENT-LVL III: CPT | Mod: PBBFAC,,, | Performed by: ORTHOPAEDIC SURGERY

## 2022-07-21 PROCEDURE — 99999 PR PBB SHADOW E&M-EST. PATIENT-LVL III: ICD-10-PCS | Mod: PBBFAC,,, | Performed by: ORTHOPAEDIC SURGERY
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
## 2022-08-01 ENCOUNTER — PATIENT OUTREACH (OUTPATIENT)
Dept: ADMINISTRATIVE | Facility: HOSPITAL | Age: 52
End: 2022-08-01
Payer: COMMERCIAL

## 2022-08-01 NOTE — PROGRESS NOTES
Health Maintenance Due   Topic Date Due    COVID-19 Vaccine (4 - Booster for Pfizer series) 03/22/2022     Updates were requested from care everywhere.  Chart was reviewed for overdue Proactive [facility] Encounters ([facility]) topics (CRS, Breast Cancer Screening, Eye exam)  Health Maintenance has been updated.  [facility] immunization registry triggered.  Immunizations were reconciled.

## 2022-08-03 ENCOUNTER — OFFICE VISIT (OUTPATIENT)
Dept: CARDIOLOGY | Facility: CLINIC | Age: 52
End: 2022-08-03
Payer: COMMERCIAL

## 2022-08-03 VITALS
WEIGHT: 203.25 LBS | DIASTOLIC BLOOD PRESSURE: 57 MMHG | BODY MASS INDEX: 29.1 KG/M2 | HEIGHT: 70 IN | HEART RATE: 84 BPM | SYSTOLIC BLOOD PRESSURE: 101 MMHG

## 2022-08-03 DIAGNOSIS — Z98.890 HISTORY OF HEART SURGERY: ICD-10-CM

## 2022-08-03 DIAGNOSIS — G89.4 CHRONIC PAIN SYNDROME: ICD-10-CM

## 2022-08-03 DIAGNOSIS — I48.0 PAROXYSMAL ATRIAL FIBRILLATION: ICD-10-CM

## 2022-08-03 DIAGNOSIS — I05.1 RHEUMATIC MITRAL REGURGITATION: ICD-10-CM

## 2022-08-03 DIAGNOSIS — E66.3 OVERWEIGHT (BMI 25.0-29.9): Primary | ICD-10-CM

## 2022-08-03 DIAGNOSIS — I50.20 HFREF (HEART FAILURE WITH REDUCED EJECTION FRACTION): ICD-10-CM

## 2022-08-03 DIAGNOSIS — I34.0 NONRHEUMATIC MITRAL VALVE REGURGITATION: ICD-10-CM

## 2022-08-03 DIAGNOSIS — Z79.01 ANTICOAGULANT LONG-TERM USE: ICD-10-CM

## 2022-08-03 PROCEDURE — 99999 PR PBB SHADOW E&M-EST. PATIENT-LVL IV: CPT | Mod: PBBFAC,,, | Performed by: INTERNAL MEDICINE

## 2022-08-03 PROCEDURE — 99215 PR OFFICE/OUTPT VISIT, EST, LEVL V, 40-54 MIN: ICD-10-PCS | Mod: S$GLB,,, | Performed by: INTERNAL MEDICINE

## 2022-08-03 PROCEDURE — 99215 OFFICE O/P EST HI 40 MIN: CPT | Mod: S$GLB,,, | Performed by: INTERNAL MEDICINE

## 2022-08-03 PROCEDURE — 99999 PR PBB SHADOW E&M-EST. PATIENT-LVL IV: ICD-10-PCS | Mod: PBBFAC,,, | Performed by: INTERNAL MEDICINE

## 2022-08-05 PROBLEM — I05.1 RHEUMATIC MITRAL REGURGITATION: Status: ACTIVE | Noted: 2022-07-19

## 2022-08-05 NOTE — PROGRESS NOTES
Subjective:   Patient ID:  Riya Soto is a 51 y.o. female who presents for evaulation of Atrial Fibrillation      HPI:  Very pleasant female presents for evaluation of valvular heart disease.      She was in her usual state of health until the past few months when she began having palpitations and fatigue. She then presented to urgent care on 2/17/2022 with feeling unwell (body aches, fatigue, wanted to get checked for COVID) and was discovered to be in atrial fibrillation. She was sent to the ER. She was discharged. She then saw Suzie Berrios in cardiology clinic who initiated eliquis and referred for evaluation. An echocardiogram was recently performed noting a LVEF of 40% with mild-mod MR and mild-mod MS with reduced anterior mitral leaflet mobility. She was also initiated on metoprolol, which has been limited to low dosing due to hypotension. A 24 hour holter monitor noted persistent AF with an average ventricular rate of 89 bpm with frequent PVCs (2% burden). Subsequently seen by Robert Cancino and Marianela Malave.  Underwent successful DCCV in Mach 2022 and since then remains in SR on Metoprolol, Multaq and Eilquis. Since DCCV patient is less fatigue and her shortness of breath has resolved.      ECG from 2019 shows NSR.  A. Fib was discovered in February 2022.    Most recent echo:    Summary    · The left ventricle is mildly enlarged with mildly decreased systolic function.  · The estimated ejection fraction is 40%.  · Grade II left ventricular diastolic dysfunction.  · There is mild left ventricular global hypokinesis.  · Mild right ventricular enlargement with mildly reduced right ventricular systolic function.  · Severe left atrial enlargement.  · Moderate right atrial enlargement.  · The mitral valve is s/p repair.  · The mean diastolic gradient across the mitral valve is 11.4 mmHg at a heart rate of 70 bpm.  · There is moderate mitral stenosis.  · Moderate-to-severe mitral regurgitation.  · Moderate to  severe tricuspid regurgitation.  · Elevated central venous pressure (15 mmHg).  · The estimated PA systolic pressure is 51 mmHg.  · There is pulmonary hypertension.    Stress ECG was negative for ischemia. Patient exercised for 4min and 41 seconds, reached THR and stopped due to fatigue and shortness of  Breath    ECG- Sinus rhythm with 1 degree AV block.    The 10-year ASCVD risk score (Otto VILLA Jr., et al., 2013) is: 0.9%    Values used to calculate the score:      Age: 51 years      Sex: Female      Is Non- : Yes      Diabetic: No      Tobacco smoker: No      Systolic Blood Pressure: 101 mmHg      Is BP treated: No      HDL Cholesterol: 45 mg/dL      Total Cholesterol: 136 mg/dL      Past Medical History:   Diagnosis Date    Atrial fibrillation     Back pain     Hyperlipidemia     Mitral incompetence     Neck pain        Past Surgical History:   Procedure Laterality Date    CARDIAC SURGERY      to repair mitral valve    CHOLECYSTECTOMY      COLONOSCOPY      COLONOSCOPY N/A 6/8/2022    Procedure: COLONOSCOPY;  Surgeon: Sanford Andres MD;  Location: [facility] ENDO;  Service: Endoscopy;  Laterality: N/A;    TRANSESOPHAGEAL ECHOCARDIOGRAPHY N/A 03/29/2022    Procedure: ECHOCARDIOGRAM, TRANSESOPHAGEAL;  Surgeon: Dominic Wallis MD;  Location: [facility] EP LAB;  Service: Cardiology;  Laterality: N/A;    TREATMENT OF CARDIAC ARRHYTHMIA N/A 03/29/2022    Procedure: Cardioversion or Defibrillation;  Surgeon: Elmo Jones MD;  Location: [facility] EP LAB;  Service: Cardiology;  Laterality: N/A;  afib, dccv, stanley, anes, [place], 3prep, Provider to perform        Social History     Socioeconomic History    Marital status: Single   Tobacco Use    Smoking status: Never Smoker    Smokeless tobacco: Never Used   Substance and Sexual Activity    Alcohol use: Not Currently    Drug use: No    Sexual activity: Yes     Partners: Male     Social Determinants of Health     Financial Resource Strain: Low Risk      Difficulty of Paying Living Expenses: Not hard at all   Food Insecurity: No Food Insecurity    Worried About Running Out of Food in the Last Year: Never true    Ran Out of Food in the Last Year: Never true   Transportation Needs: No Transportation Needs    Lack of Transportation (Medical): No    Lack of Transportation (Non-Medical): No   Stress: No Stress Concern Present    Feeling of Stress : Not at all   Social Connections: Unknown    Frequency of Communication with Friends and Family: Three times a week    Frequency of Social Gatherings with Friends and Family: Three times a week    Marital Status: Never    Housing Stability: Unknown    Unable to Pay for Housing in the Last Year: No    Unstable Housing in the Last Year: No       Review of patient's allergies indicates:   Allergen Reactions    Amoxicillin Hives and Other (See Comments)       Lab Results   Component Value Date     05/31/2022     10/27/2017    K 4.2 05/31/2022     05/31/2022     10/27/2017    CO2 25 05/31/2022    BUN 13 05/31/2022    CREATININE 1.0 05/31/2022    CREATININE 0.77 10/27/2017    GLU 93 05/31/2022    GLU 93 10/27/2017    AST 23 05/31/2022    ALT 14 05/31/2022    ALBUMIN 3.8 05/31/2022    ALBUMIN 3.6 10/27/2017    PROT 7.8 05/31/2022    BILITOT 1.2 (H) 05/31/2022    WBC 5.35 05/31/2022    HGB 10.8 (L) 05/31/2022    HCT 33.6 (L) 05/31/2022    MCV 97 05/31/2022     05/31/2022    INR 1.1 03/29/2022    TSH 1.975 05/31/2022    CHOL 136 05/31/2022    HDL 45 05/31/2022    LDLCALC 77.4 05/31/2022    TRIG 68 05/31/2022       Review of Systems   Constitutional: Negative.   HENT: Negative.    Eyes: Negative.    Cardiovascular: Negative.  Negative for chest pain, claudication, dyspnea on exertion, irregular heartbeat, leg swelling, near-syncope, palpitations and syncope.   Respiratory: Negative.  Negative for cough, shortness of breath, snoring and wheezing.    Endocrine: Negative.  Negative for cold  "intolerance, heat intolerance, polydipsia, polyphagia and polyuria.   Skin: Negative.    Musculoskeletal: Positive for neck pain.   Gastrointestinal: Negative.    Genitourinary: Negative.    Neurological: Negative.    Psychiatric/Behavioral: Negative.        Objective:   Physical Exam  Vitals and nursing note reviewed.   Constitutional:       Appearance: She is well-developed.      Comments: BP (!) 101/57   Pulse 84   Ht 5' 10" (1.778 m)   Wt 92.2 kg (203 lb 4.2 oz)   LMP 11/03/2016   BMI 29.17 kg/m²      HENT:      Head: Normocephalic.   Eyes:      Pupils: Pupils are equal, round, and reactive to light.   Neck:      Thyroid: No thyromegaly.      Vascular: No carotid bruit.   Cardiovascular:      Rate and Rhythm: Normal rate and regular rhythm.      Pulses: Intact distal pulses.           Carotid pulses are 2+ on the right side and 2+ on the left side.       Radial pulses are 2+ on the right side and 2+ on the left side.        Femoral pulses are 2+ on the right side and 2+ on the left side.       Popliteal pulses are 2+ on the right side and 2+ on the left side.        Dorsalis pedis pulses are 2+ on the right side and 2+ on the left side.        Posterior tibial pulses are 2+ on the right side and 2+ on the left side.      Heart sounds: Murmur heard.    Holosystolic murmur is present with a grade of 2/6 at the apex.    No friction rub. No gallop.   Pulmonary:      Effort: Pulmonary effort is normal. No respiratory distress.      Breath sounds: Normal breath sounds. No wheezing or rales.   Chest:      Chest wall: No tenderness.   Abdominal:      General: Bowel sounds are normal.      Palpations: Abdomen is soft.   Musculoskeletal:         General: Normal range of motion.      Cervical back: Normal range of motion and neck supple.   Skin:     General: Skin is warm and dry.   Neurological:      Mental Status: She is alert and oriented to person, place, and time.         Current Outpatient Medications "   Medication Sig    apixaban (ELIQUIS) 5 mg Tab Take 1 tablet (5 mg total) by mouth 2 (two) times daily.    atorvastatin (LIPITOR) 10 MG tablet Take 1 tablet (10 mg total) by mouth once daily.    cyproheptadine (PERIACTIN) 4 mg tablet Half p.o. b.i.d.--not too sleepy 1 p.o. b.i.d. to increase appetite    dronedarone (MULTAQ) 400 mg Tab Take 1 tablet (400 mg total) by mouth 2 (two) times daily with meals.    gabapentin (NEURONTIN) 300 MG capsule gabapentin 300 mg capsule   take 1 cap PO qhs x 3 days, then take 2 cap PO QHS thereafter    losartan (COZAAR) 25 MG tablet Take 1 tablet (25 mg total) by mouth once daily.    metoprolol succinate (TOPROL-XL) 25 MG 24 hr tablet Take 1 tablet (25 mg total) by mouth once daily.     No current facility-administered medications for this visit.     Facility-Administered Medications Ordered in Other Visits   Medication    sodium chloride 0.9% bolus 1,000 mL       Assessment and Plan:     Problem List Items Addressed This Visit        Cardiology Problems    Atrial fibrillation    HFrEF (heart failure with reduced ejection fraction)    Relevant Orders    Echo    Rheumatic mitral regurgitation       Other    Overweight (BMI 25.0-29.9) - Primary    History of mitral valve surgery    Relevant Orders    Echo    Chronic pain syndrome    Anticoagulant long-term use          Patient's Medications   New Prescriptions    No medications on file   Previous Medications    APIXABAN (ELIQUIS) 5 MG TAB    Take 1 tablet (5 mg total) by mouth 2 (two) times daily.    ATORVASTATIN (LIPITOR) 10 MG TABLET    Take 1 tablet (10 mg total) by mouth once daily.    CYPROHEPTADINE (PERIACTIN) 4 MG TABLET    Half p.o. b.i.d.--not too sleepy 1 p.o. b.i.d. to increase appetite    DRONEDARONE (MULTAQ) 400 MG TAB    Take 1 tablet (400 mg total) by mouth 2 (two) times daily with meals.    GABAPENTIN (NEURONTIN) 300 MG CAPSULE    gabapentin 300 mg capsule   take 1 cap PO qhs x 3 days, then take 2 cap PO QHS  "thereafter    LOSARTAN (COZAAR) 25 MG TABLET    Take 1 tablet (25 mg total) by mouth once daily.    METOPROLOL SUCCINATE (TOPROL-XL) 25 MG 24 HR TABLET    Take 1 tablet (25 mg total) by mouth once daily.   Modified Medications    No medications on file   Discontinued Medications    No medications on file     I have reviewed JUAN from March 2022. Anterior mitral valve leaflet has "Hockey stick" appearance indicativie of history of RHD.  Posterior leaflet is retracted.  There is likely a flexible C ring present. There is at least  moderate central MR. The gradient is around 6mmHg after DCCV.  Suspect mild-moderate rheumatic valve stenosis. Size of the ring is not known. There is severe bi-atrial enlargement, CARLOS is present and no thrombus is seen. There is severe TR, gradients were not obtained. LV function is difficult to assess.    I would like to obtain op report from 2011 from [facility] ( Dr. Garcia) to assess if any valvular  repair and or commissuroplasty? of  the mitral valve was done, besides placement of the annuloplasty ring (records were requested).    Repeat CFD  in [place] echo lab to assess valvular hemodynamics, PA pressure and LV function after restoration of sinus rhythm.    Patient may need hemodynamic stress test.   Presently she is asymptomatic, but has known mixed mitral valve disease.    For now continue on the present regimen.    Thank you for allowing me to participate in the care of this patient. Please do not hesitate to contact me with any questions or concerns.      Follow up in about 3 months (around 11/3/2022).                "

## 2022-08-08 ENCOUNTER — TELEPHONE (OUTPATIENT)
Dept: CARDIOLOGY | Facility: CLINIC | Age: 52
End: 2022-08-08
Payer: COMMERCIAL

## 2022-08-08 NOTE — TELEPHONE ENCOUNTER
Patient was seen august 3. I had her fill out a release of information sheet to get records of her open heart surgery and all cardiology records from Dr. Abraham Garcia and [facility]. I've been calling the  Office since August 4 and today is August 8. I even faxed over the the request on the 4th. Still haven't heard back from them. I also have left messages at the number which is [phone]. I called [facility] on Friday August 5. I got in contact with a lady name Wojciech in medical records. She stated to me that they didn't have records on  from when I faxed the release over to her. I stated  had open heart surgery there in 2011. She then said she had to go into their old records which is another system and it was down. She told me to call her in like an hour or two on Friday August 5. I called back when she asked me too and she didn't answer. Today is  August 8 I been calling her ext: since 8:15 this morning no answer goes straight to a busy sound. I just called again @ 2:00 and it's still going to the busy sound....      Thanks  Delfina

## 2022-08-09 ENCOUNTER — TELEPHONE (OUTPATIENT)
Dept: CARDIOLOGY | Facility: CLINIC | Age: 52
End: 2022-08-09
Payer: COMMERCIAL

## 2022-08-09 ENCOUNTER — OFFICE VISIT (OUTPATIENT)
Dept: CARDIOTHORACIC SURGERY | Facility: CLINIC | Age: 52
End: 2022-08-09
Payer: COMMERCIAL

## 2022-08-09 VITALS
WEIGHT: 200.75 LBS | OXYGEN SATURATION: 95 % | TEMPERATURE: 98 F | SYSTOLIC BLOOD PRESSURE: 108 MMHG | HEART RATE: 73 BPM | BODY MASS INDEX: 28.74 KG/M2 | DIASTOLIC BLOOD PRESSURE: 68 MMHG | HEIGHT: 70 IN

## 2022-08-09 DIAGNOSIS — Z98.890 HISTORY OF HEART SURGERY: ICD-10-CM

## 2022-08-09 DIAGNOSIS — I34.0 NONRHEUMATIC MITRAL VALVE REGURGITATION: Primary | ICD-10-CM

## 2022-08-09 DIAGNOSIS — I50.20 HFREF (HEART FAILURE WITH REDUCED EJECTION FRACTION): ICD-10-CM

## 2022-08-09 DIAGNOSIS — I05.1 RHEUMATIC MITRAL REGURGITATION: Primary | ICD-10-CM

## 2022-08-09 DIAGNOSIS — I34.0 NONRHEUMATIC MITRAL VALVE REGURGITATION: ICD-10-CM

## 2022-08-09 DIAGNOSIS — I48.0 PAROXYSMAL ATRIAL FIBRILLATION: ICD-10-CM

## 2022-08-09 PROCEDURE — 99205 OFFICE O/P NEW HI 60 MIN: CPT | Mod: S$GLB,,, | Performed by: THORACIC SURGERY (CARDIOTHORACIC VASCULAR SURGERY)

## 2022-08-09 PROCEDURE — 99999 PR PBB SHADOW E&M-EST. PATIENT-LVL III: CPT | Mod: PBBFAC,,, | Performed by: THORACIC SURGERY (CARDIOTHORACIC VASCULAR SURGERY)

## 2022-08-09 PROCEDURE — 99205 PR OFFICE/OUTPT VISIT, NEW, LEVL V, 60-74 MIN: ICD-10-PCS | Mod: S$GLB,,, | Performed by: THORACIC SURGERY (CARDIOTHORACIC VASCULAR SURGERY)

## 2022-08-09 PROCEDURE — 99999 PR PBB SHADOW E&M-EST. PATIENT-LVL III: ICD-10-PCS | Mod: PBBFAC,,, | Performed by: THORACIC SURGERY (CARDIOTHORACIC VASCULAR SURGERY)

## 2022-08-09 NOTE — TELEPHONE ENCOUNTER
----- Message from Yamilka Martinez MA sent at 8/9/2022  1:21 PM CDT -----  Contact: Minal Garcia [phone]  She is calling to say that this pt has never been seen by .

## 2022-08-09 NOTE — PROGRESS NOTES
Subjective:      Patient ID: Riya Soto is a 51 y.o. female.    Chief Complaint: No chief complaint on file.      HPI:  Riya Soto is a 51 y.o. female who presents for surgical evaluation of MS/MR. Medical conditions include MV disease? (unclear etiology/pathology, reports possible rheumatic fever as a child), s/p mitral valve repair in 2011 at [facility] (she thinks the surgeon was Dr. Garcia), symptomatic persistent AF also with systolic dysfunction (LVEF of 40%). She was in her usual state of health until the past few months when she began having palpitations and fatigue. She then presented to urgent care on 2/17/2022 with feeling unwell (body aches, fatigue, wanted to get checked for COVID) and was discovered to be in atrial fibrillation. She was sent to the ER. She was discharged. She then saw Suzie Berrios in cardiology clinic who initiated eliquis and referred for evaluation. An echocardiogram was recently performed noting a LVEF of 40% with mild-mod MR and mild-mod MS with reduced anterior mitral leaflet mobility. She was also initiated on metoprolol, which has been limited to low dosing due to hypotension. A 24 hour holter monitor noted persistent AF with an average ventricular rate of 89 bpm with frequent PVCs (2% burden). Was cardioverted in March and started on Multaq. Reports that she has no shortness of breath and is asymptomatic since her cardioversion. Denies any lower extremity swelling, dizziness, chest pain, orthopnea, or palpitations. No prior strokes, seizures, blood clots, stents. No smoking history. Reports that she does not drink daily but every now and then has a daiquiri.      Tbili 1.2 (previously 2)     Family and social history reviewed    Review of patient's allergies indicates:   Allergen Reactions    Amoxicillin Hives and Other (See Comments)     Past Medical History:   Diagnosis Date    Atrial fibrillation     Back pain     Hyperlipidemia     Mitral  incompetence     Neck pain      Past Surgical History:   Procedure Laterality Date    CARDIAC SURGERY      to repair mitral valve    CHOLECYSTECTOMY      COLONOSCOPY      COLONOSCOPY N/A 6/8/2022    Procedure: COLONOSCOPY;  Surgeon: Sanford Andres MD;  Location: [facility] ENDO;  Service: Endoscopy;  Laterality: N/A;    TRANSESOPHAGEAL ECHOCARDIOGRAPHY N/A 03/29/2022    Procedure: ECHOCARDIOGRAM, TRANSESOPHAGEAL;  Surgeon: Dominic Wallis MD;  Location: [facility] EP LAB;  Service: Cardiology;  Laterality: N/A;    TREATMENT OF CARDIAC ARRHYTHMIA N/A 03/29/2022    Procedure: Cardioversion or Defibrillation;  Surgeon: Elmo Jones MD;  Location: [facility] EP LAB;  Service: Cardiology;  Laterality: N/A;  afib, dccv, stanley, anes, [place], 3prep, Provider to perform      Family History     Problem Relation (Age of Onset)    Breast cancer Sister    Colon cancer Maternal Aunt        Social History     Socioeconomic History    Marital status: Single   Tobacco Use    Smoking status: Never Smoker    Smokeless tobacco: Never Used   Substance and Sexual Activity    Alcohol use: Not Currently    Drug use: No    Sexual activity: Yes     Partners: Male     Social Determinants of Health     Financial Resource Strain: Low Risk     Difficulty of Paying Living Expenses: Not hard at all   Food Insecurity: No Food Insecurity    Worried About Running Out of Food in the Last Year: Never true    Ran Out of Food in the Last Year: Never true   Transportation Needs: No Transportation Needs    Lack of Transportation (Medical): No    Lack of Transportation (Non-Medical): No   Stress: No Stress Concern Present    Feeling of Stress : Not at all   Social Connections: Unknown    Frequency of Communication with Friends and Family: Three times a week    Frequency of Social Gatherings with Friends and Family: Three times a week    Marital Status: Never    Housing Stability: Unknown    Unable to Pay for Housing in the Last Year: No     Unstable Housing in the Last Year: No       Current medications Reviewed    Review of Systems   Constitutional: Negative for activity change and fatigue.   HENT: Negative for nosebleeds.    Eyes: Negative for visual disturbance.   Respiratory: Negative for shortness of breath.    Cardiovascular: Negative for chest pain, palpitations and leg swelling.   Gastrointestinal: Negative for nausea.   Musculoskeletal: Negative for gait problem.   Skin: Negative for color change.   Neurological: Negative for dizziness and seizures.   Hematological: Does not bruise/bleed easily.   Psychiatric/Behavioral: Negative for sleep disturbance.     Objective:   Physical Exam  Constitutional:       General: She is not in acute distress.  HENT:      Head: Normocephalic and atraumatic.   Eyes:      Pupils: Pupils are equal, round, and reactive to light.   Cardiovascular:      Rate and Rhythm: Normal rate.   Pulmonary:      Effort: Pulmonary effort is normal. No respiratory distress.   Abdominal:      General: There is no distension.   Musculoskeletal:         General: Normal range of motion.      Cervical back: Normal range of motion.   Skin:     Coloration: Skin is not pale.   Neurological:      General: No focal deficit present.      Mental Status: She is alert.   Psychiatric:         Mood and Affect: Mood normal.         Behavior: Behavior normal.         Diagnostic Results: reviewed   Exercise Stress 6/27/22    The EKG portion of this study is negative for ischemia.    The patient reported no chest pain during the stress test.    The blood pressure response to stress was normal.    During stress, frequent PVCs are noted.  Ventricular bigeminy noted in recovery.    TTE 5/31/22  · The left ventricle is mildly enlarged with mildly decreased systolic function.  · The estimated ejection fraction is 40%.  · Grade II left ventricular diastolic dysfunction.  · There is mild left ventricular global hypokinesis.  · Mild right ventricular  enlargement with mildly reduced right ventricular systolic function.  · Severe left atrial enlargement.  · Moderate right atrial enlargement.  · The mitral valve is s/p repair.  · The mean diastolic gradient across the mitral valve is 11.4 mmHg at a heart rate of 70 bpm.  · There is moderate mitral stenosis.  · Moderate-to-severe mitral regurgitation.  · Moderate to severe tricuspid regurgitation.  · Elevated central venous pressure (15 mmHg).  · The estimated PA systolic pressure is 51 mmHg.  · There is pulmonary hypertension.     JUAN 3/29/22  · The left ventricle has mildly decreased systolic function. The estimated ejection fraction is 45%.  · Normal right ventricular size with normal right ventricular systolic function.  · Biatrial enlargement.  · No thrombus is present in the appendage.  · There is mild mitral stenosis.  · Mitral valve s/p repair with annular ring, the mean diastolic gradient is 6 mmHg at a heart rate of 73 bpm.  · Moderate to severe tricuspid regurgitation.  · Grade 2 plaque present.    Assessment:   MR/MS  AFib  Plan:     CTS Attending Note:    I have personally taken the history and examined this patient and agree with the SAMM's note as stated above.  Very pleasant 51-year-old woman with mitral valve repair on the [place] in 2011. She her recently presented with symptomatic atrial fibrillation.  She has since been cardioverted.  She has mixed mitral stenosis and mitral regurgitation.  She is minimally symptomatic now that she is out of atrial fibrillation.  However, she has pulmonary hypertension as well as at least moderate to severe if not severe tricuspid regurgitation.  Her her ejection fraction is in the mid 40s.  Given these changes, I recommended reoperation, with replacement of the mitral valve and tricuspid valve repair.  A Maze procedure is also indicated at the time of operation.  We discussed tissue and mechanical prostheses, and I strongly encouraged her to consider  replacement with a mechanical valve given her young age.  We will make arrangements for preoperative diagnostic coronary angiography.

## 2022-08-09 NOTE — TELEPHONE ENCOUNTER
Called patient to schedule echo on 8/8/22 left a message. Call again this morning 8/9/22 left another message letting patient know to call back to get echo scheduled.      Thanks  Delfina

## 2022-08-11 ENCOUNTER — DOCUMENTATION ONLY (OUTPATIENT)
Dept: CARDIOLOGY | Facility: CLINIC | Age: 52
End: 2022-08-11
Payer: COMMERCIAL

## 2022-08-11 NOTE — PROGRESS NOTES
Records from [facility] received and reviewed.    In 2011 patient was seen by Dr. Barrera with progressive symptoms of chest pain and shortness of breath.  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    Coronary angiography showed no significant coronary artery disease. 3 + MR was noted. ( right heart cath; PCWP 23mmHg, PA pressure 34/20, mean 27mmHg, RV 40/13 with a mean 17mmHg, LVEDP 16mmHg.    Mitral valve repair was recommended.    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    I would like to review results of repeat echo following restoration of sinus rhythm and reassess LV function, transvalvular gradient and PA pressure.  If there is no significant positive change then I would recommend MVR.

## 2022-08-16 DIAGNOSIS — I50.20 HFREF (HEART FAILURE WITH REDUCED EJECTION FRACTION): ICD-10-CM

## 2022-08-16 DIAGNOSIS — I50.20 HFREF (HEART FAILURE WITH REDUCED EJECTION FRACTION): Primary | ICD-10-CM

## 2022-08-16 DIAGNOSIS — I34.0 MITRAL VALVE INSUFFICIENCY, UNSPECIFIED ETIOLOGY: Primary | ICD-10-CM

## 2022-08-16 RX ORDER — SODIUM CHLORIDE 9 MG/ML
INJECTION, SOLUTION INTRAVENOUS CONTINUOUS
Status: CANCELLED | OUTPATIENT
Start: 2022-08-16 | End: 2022-08-16

## 2022-08-16 RX ORDER — DIPHENHYDRAMINE HCL 50 MG
50 CAPSULE ORAL ONCE
Status: CANCELLED | OUTPATIENT
Start: 2022-08-16 | End: 2022-08-16

## 2022-08-17 ENCOUNTER — LAB VISIT (OUTPATIENT)
Dept: LAB | Facility: HOSPITAL | Age: 52
End: 2022-08-17
Attending: INTERNAL MEDICINE
Payer: COMMERCIAL

## 2022-08-17 ENCOUNTER — OFFICE VISIT (OUTPATIENT)
Dept: CARDIOLOGY | Facility: CLINIC | Age: 52
End: 2022-08-17
Payer: COMMERCIAL

## 2022-08-17 ENCOUNTER — EDUCATION (OUTPATIENT)
Dept: CARDIOLOGY | Facility: CLINIC | Age: 52
End: 2022-08-17
Payer: COMMERCIAL

## 2022-08-17 VITALS
WEIGHT: 201.5 LBS | SYSTOLIC BLOOD PRESSURE: 101 MMHG | BODY MASS INDEX: 28.85 KG/M2 | HEIGHT: 70 IN | DIASTOLIC BLOOD PRESSURE: 61 MMHG | HEART RATE: 65 BPM | OXYGEN SATURATION: 99 %

## 2022-08-17 DIAGNOSIS — I34.0 NONRHEUMATIC MITRAL VALVE REGURGITATION: ICD-10-CM

## 2022-08-17 DIAGNOSIS — I50.20 HFREF (HEART FAILURE WITH REDUCED EJECTION FRACTION): ICD-10-CM

## 2022-08-17 LAB
ANION GAP SERPL CALC-SCNC: 6 MMOL/L (ref 8–16)
BUN SERPL-MCNC: 15 MG/DL (ref 6–20)
CALCIUM SERPL-MCNC: 9.4 MG/DL (ref 8.7–10.5)
CHLORIDE SERPL-SCNC: 106 MMOL/L (ref 95–110)
CO2 SERPL-SCNC: 28 MMOL/L (ref 23–29)
CREAT SERPL-MCNC: 0.8 MG/DL (ref 0.5–1.4)
ERYTHROCYTE [DISTWIDTH] IN BLOOD BY AUTOMATED COUNT: 13.7 % (ref 11.5–14.5)
EST. GFR  (NO RACE VARIABLE): >60 ML/MIN/1.73 M^2
GLUCOSE SERPL-MCNC: 67 MG/DL (ref 70–110)
HCT VFR BLD AUTO: 34.8 % (ref 37–48.5)
HGB BLD-MCNC: 11.4 G/DL (ref 12–16)
MCH RBC QN AUTO: 31.3 PG (ref 27–31)
MCHC RBC AUTO-ENTMCNC: 32.8 G/DL (ref 32–36)
MCV RBC AUTO: 96 FL (ref 82–98)
PLATELET # BLD AUTO: 244 K/UL (ref 150–450)
PMV BLD AUTO: 11.8 FL (ref 9.2–12.9)
POTASSIUM SERPL-SCNC: 3.5 MMOL/L (ref 3.5–5.1)
RBC # BLD AUTO: 3.64 M/UL (ref 4–5.4)
SODIUM SERPL-SCNC: 140 MMOL/L (ref 136–145)
WBC # BLD AUTO: 5.07 K/UL (ref 3.9–12.7)

## 2022-08-17 PROCEDURE — 1159F MED LIST DOCD IN RCRD: CPT | Mod: CPTII,S$GLB,, | Performed by: INTERNAL MEDICINE

## 2022-08-17 PROCEDURE — 4010F ACE/ARB THERAPY RXD/TAKEN: CPT | Mod: CPTII,S$GLB,, | Performed by: INTERNAL MEDICINE

## 2022-08-17 PROCEDURE — 3008F BODY MASS INDEX DOCD: CPT | Mod: CPTII,S$GLB,, | Performed by: INTERNAL MEDICINE

## 2022-08-17 PROCEDURE — 99214 OFFICE O/P EST MOD 30 MIN: CPT | Mod: 25,S$GLB,, | Performed by: INTERNAL MEDICINE

## 2022-08-17 PROCEDURE — 3078F DIAST BP <80 MM HG: CPT | Mod: CPTII,S$GLB,, | Performed by: INTERNAL MEDICINE

## 2022-08-17 PROCEDURE — 99214 PR OFFICE/OUTPT VISIT, EST, LEVL IV, 30-39 MIN: ICD-10-PCS | Mod: 25,S$GLB,, | Performed by: INTERNAL MEDICINE

## 2022-08-17 PROCEDURE — 3008F PR BODY MASS INDEX (BMI) DOCUMENTED: ICD-10-PCS | Mod: CPTII,S$GLB,, | Performed by: INTERNAL MEDICINE

## 2022-08-17 PROCEDURE — 1159F PR MEDICATION LIST DOCUMENTED IN MEDICAL RECORD: ICD-10-PCS | Mod: CPTII,S$GLB,, | Performed by: INTERNAL MEDICINE

## 2022-08-17 PROCEDURE — 3074F PR MOST RECENT SYSTOLIC BLOOD PRESSURE < 130 MM HG: ICD-10-PCS | Mod: CPTII,S$GLB,, | Performed by: INTERNAL MEDICINE

## 2022-08-17 PROCEDURE — 80048 BASIC METABOLIC PNL TOTAL CA: CPT | Performed by: INTERNAL MEDICINE

## 2022-08-17 PROCEDURE — 85027 COMPLETE CBC AUTOMATED: CPT | Performed by: INTERNAL MEDICINE

## 2022-08-17 PROCEDURE — 3078F PR MOST RECENT DIASTOLIC BLOOD PRESSURE < 80 MM HG: ICD-10-PCS | Mod: CPTII,S$GLB,, | Performed by: INTERNAL MEDICINE

## 2022-08-17 PROCEDURE — 36415 COLL VENOUS BLD VENIPUNCTURE: CPT | Performed by: INTERNAL MEDICINE

## 2022-08-17 PROCEDURE — 99999 PR PBB SHADOW E&M-EST. PATIENT-LVL III: ICD-10-PCS | Mod: PBBFAC,,, | Performed by: INTERNAL MEDICINE

## 2022-08-17 PROCEDURE — 3074F SYST BP LT 130 MM HG: CPT | Mod: CPTII,S$GLB,, | Performed by: INTERNAL MEDICINE

## 2022-08-17 PROCEDURE — 4010F PR ACE/ARB THEARPY RXD/TAKEN: ICD-10-PCS | Mod: CPTII,S$GLB,, | Performed by: INTERNAL MEDICINE

## 2022-08-17 PROCEDURE — 99999 PR PBB SHADOW E&M-EST. PATIENT-LVL III: CPT | Mod: PBBFAC,,, | Performed by: INTERNAL MEDICINE

## 2022-08-17 NOTE — PROGRESS NOTES
Clinic Note  8/17/2022      Subjective:       Patient ID:  Riya is a 51 y.o. female being seen for an established visit.    Chief Complaint: No chief complaint on file.    HPI  patient is a 51 y.o female who presents to the office for evaluation for diagnostic preoperative coronary angiography.   She has a history mitral valve disease (?rheumatic HD) s/p repair in 2011. recently diagnosed with afib s/p cardioversion. TTE with MR and MS ( mean gradiet 2 mmHg; valve area 1.68) c/b PH (PASP 36mmHg). EF 40%. evaluated by Dr. Gilbert for valve replacement with mechanical valve.   s/p stress EKG on 6/1/22 with no ischemic changes.     Review of Systems   Constitutional: Negative for chills, fever and weight loss.   HENT: Negative.    Eyes: Negative for blurred vision.   Respiratory: Negative for cough and shortness of breath.    Cardiovascular: Negative for chest pain and palpitations.   Gastrointestinal: Negative for abdominal pain, blood in stool, constipation, diarrhea, melena, nausea and vomiting.   Musculoskeletal: Negative for myalgias.   Skin: Negative.    Neurological: Negative for dizziness, loss of consciousness and weakness.       Past Medical History:   Diagnosis Date    Atrial fibrillation     Back pain     Hyperlipidemia     Mitral incompetence     Neck pain        Family History   Problem Relation Age of Onset    Breast cancer Sister     Colon cancer Maternal Aunt     Ovarian cancer Neg Hx         reports that she has never smoked. She has never used smokeless tobacco. She reports previous alcohol use. She reports that she does not use drugs.    Medication List with Changes/Refills   Current Medications    APIXABAN (ELIQUIS) 5 MG TAB    Take 1 tablet (5 mg total) by mouth 2 (two) times daily.    ATORVASTATIN (LIPITOR) 10 MG TABLET    Take 1 tablet (10 mg total) by mouth once daily.    CYPROHEPTADINE (PERIACTIN) 4 MG TABLET    Half p.o. b.i.d.--not too sleepy 1 p.o. b.i.d. to increase appetite     "DRONEDARONE (MULTAQ) 400 MG TAB    Take 1 tablet (400 mg total) by mouth 2 (two) times daily with meals.    GABAPENTIN (NEURONTIN) 300 MG CAPSULE    gabapentin 300 mg capsule   take 1 cap PO qhs x 3 days, then take 2 cap PO QHS thereafter    LOSARTAN (COZAAR) 25 MG TABLET    Take 1 tablet (25 mg total) by mouth once daily.    METOPROLOL SUCCINATE (TOPROL-XL) 25 MG 24 HR TABLET    Take 1 tablet (25 mg total) by mouth once daily.     Review of patient's allergies indicates:   Allergen Reactions    Amoxicillin Hives and Other (See Comments)       Patient Active Problem List   Diagnosis    Overweight (BMI 25.0-29.9)    History of cholecystectomy    History of mitral valve surgery    Lumbosacral strain    Cervical strain    Chronic pain syndrome    Hyperlipidemia    Left ankle pain    Weakness of left upper extremity    Impaired range of motion of left shoulder    Impaired functional mobility and activity tolerance    Atrial fibrillation    History of cardioversion    Anticoagulant long-term use    HFrEF (heart failure with reduced ejection fraction)    Preprocedural cardiovascular examination    Diverticulosis    Rheumatic mitral regurgitation           Objective:      [facility] 11/03/2016   Estimated body mass index is 28.8 kg/m² as calculated from the following:    Height as of 8/9/22: 5' 10" (1.778 m).    Weight as of 8/9/22: 91 kg (200 lb 11.7 oz).  Physical Exam  Constitutional:       General: She is not in acute distress.     Appearance: She is not diaphoretic.   HENT:      Head: Normocephalic and atraumatic.      Mouth/Throat:      Pharynx: No oropharyngeal exudate.   Eyes:      General: No scleral icterus.     Conjunctiva/sclera: Conjunctivae normal.      Pupils: Pupils are equal, round, and reactive to light.   Neck:      Vascular: No JVD.      Trachea: No tracheal deviation.   Cardiovascular:      Rate and Rhythm: Normal rate and regular rhythm.      Heart sounds: No murmur heard.    No friction " rub. No gallop.   Pulmonary:      Effort: Pulmonary effort is normal. No respiratory distress.      Breath sounds: Normal breath sounds. No wheezing.   Chest:      Chest wall: No tenderness.   Abdominal:      General: Bowel sounds are normal. There is no distension.      Tenderness: There is no abdominal tenderness. There is no rebound.   Musculoskeletal:         General: No deformity. Normal range of motion.      Cervical back: Normal range of motion and neck supple.   Lymphadenopathy:      Cervical: No cervical adenopathy.   Skin:     General: Skin is warm and dry.   Neurological:      Mental Status: She is alert and oriented to person, place, and time.      Cranial Nerves: No cranial nerve deficit.   Psychiatric:         Mood and Affect: Affect normal.           Assessment and Plan:         Diagnoses and all orders for this visit:    Nonrheumatic mitral valve regurgitation  Pt planned for mitral valve repair by Dr. Gilbert.  Planning on diagnostic LHC.    --LHC  - Access: Right radial  - Catheters: Hector  - Creatinine/CrCl: 1  - Allergies: No shellfish / Iodine allergy  - Pre-Hydration: NS  - Pre-Op Med: Bendaryl 50mg pO   - All patient's questions were answered.  -The risks, benefits and alternatives of the procedure were explained to the patient.   -The risks of coronary angiography include but are not limited to: bleeding, infection, heart rhythm abnormalities, allergic reactions, kidney injury and potential need for dialysis, stroke and death.   - Should stenting be indicated, the patient has agreed to dual anti-platelet therapy for 1-consecutive year with a drug-eluting stent and a minimum of 1-month with the use of a bare metal stent  - Additionally, pt is aware that non-compliance is likely to result in stent clotting with heart attack, heart failure, and/or death  -The risks of moderate sedation include hypotension, respiratory depression, arrhythmias, bronchospasm, and death.   - Informed consent was  obtained and the  patient is agreeable to proceed with the procedure.          No follow-ups on file.    Other Orders Placed This Visit:  No orders of the defined types were placed in this encounter.        Jose Armando Arellano       Staff    I have reviewed the notes, assessments, and/or procedures performed by Dr Arellano, I concur with her/his documentation of Riya Soto.      Lexa Villarreal MD [facility]  Interventional Cardiology  Structural/Valvular heart disease  [phone]

## 2022-08-17 NOTE — PROGRESS NOTES
OUTPATIENT CATHETERIZATION INSTRUCTIONS    You have been scheduled for a procedure in the catheterization lab on Monday, August 29, 2022.     Please report to the Cardiology Waiting Area on the Third floor of the hospital and check in at 7 AM.   You will then be taken to the SSCU (Short Stay Cardiac Unit) and prepared for your procedure. Please be aware that this is not the time of your procedure but the time you are to arrive. The procedures are scheduled on an hourly basis; however, emergency cases take precedence over all other cases.       1. No solid foods 8 hours prior to your procedure.  You may have clear liquids until the time of your admission which should be 2 hours prior to your procedure.  You are encouraged to drink at least 8 ounces of clear liquids prior to your admission to SSCU.  Patients with gastric emptying issues should be fasting for 6- 8 hours prior to the procedure.  Clear liquids include water, black coffee, clear juices, and performance drinks - no pulp or milk.    2. Heart failure or dialysis patients will be limited to 8 ounces (1 cup) of clear liquids up until 2 hours of the procedure.    3.   You may take your regular morning medications with water. If there are any medications that you should not take you will be instructed to hold them that morning. If you         are diabetic and on Metformin (Glucophage) do not take it the day before, the day of, and for 2 days after your procedure.  4.   If you are on Eliquis hold 3 days prior to your procedure.      The procedure will take 1-2 hours to perform. After the procedure, you will return to SSCU on the third floor of the hospital. You will need to lie still (or keep your arm still) for the next 2 to 4 hours to minimize bleeding from the puncture site.  This time is determined by your physician.  Your family may remain in the room with you during this time.       You may be able to be discharged home that same afternoon if there is  "someone to drive you home and there are no complications.  Your doctor will determine, based on your progress, the date and time of your discharge. The results of your procedure will be discussed with you before you are discharged. Any further testing or procedures will be scheduled for you either before you leave or after your discharge..       If you should have any questions, concerns, or need to change the date of your procedure, please call JUAN ALBERTO Holbrook @ [phone]",    Special Instructions:          THE ABOVE INSTRUCTIONS WERE GIVEN TO THE PATIENT VERBALLY AND THEY VERBALIZED UNDERSTANDING.  THEY DO NOT REQUIRE ANY SPECIAL NEEDS AND DO NOT HAVE ANY LEARNING BARRIERS.          Directions for Reporting to Cardiology Waiting Area in the Hospital  If you park in the Parking Garage:  Take elevators to the1st floor of the parking garage.  Continue past the gift shop, coffee shop, and piano.  Take a right and go to the gold elevators. (Elevator B)  Take the elevator to the 3rd floor.  Follow the arrow on the sign on the wall that says Cath Lab Registration/EP Lab Registration.  Follow the long hallway all the way around until you come to a big open area.  This is the registration area.  Check in at Reception Desk.    OR    If family is dropping you off:  Have them drop you off at the front of the Hospital under the green overhang.  Enter through the doors and take a right.  Take the E elevators to the 3rd floor Cardiology Waiting Area.  Check in at the Reception Desk in the waiting room.              "

## 2022-08-29 ENCOUNTER — HOSPITAL ENCOUNTER (OUTPATIENT)
Facility: HOSPITAL | Age: 52
Discharge: HOME | End: 2022-08-29
Attending: INTERNAL MEDICINE | Admitting: INTERNAL MEDICINE
Payer: COMMERCIAL

## 2022-08-29 VITALS
BODY MASS INDEX: 28.2 KG/M2 | OXYGEN SATURATION: 99 % | HEIGHT: 70 IN | WEIGHT: 197 LBS | RESPIRATION RATE: 11 BRPM | TEMPERATURE: 97 F | SYSTOLIC BLOOD PRESSURE: 117 MMHG | DIASTOLIC BLOOD PRESSURE: 70 MMHG | HEART RATE: 61 BPM

## 2022-08-29 DIAGNOSIS — I34.0 MITRAL REGURGITATION: ICD-10-CM

## 2022-08-29 DIAGNOSIS — I34.0 MITRAL VALVE INSUFFICIENCY, UNSPECIFIED ETIOLOGY: ICD-10-CM

## 2022-08-29 DIAGNOSIS — I50.20 HFREF (HEART FAILURE WITH REDUCED EJECTION FRACTION): ICD-10-CM

## 2022-08-29 LAB
ABO + RH BLD: NORMAL
ANION GAP SERPL CALC-SCNC: 7 MMOL/L (ref 8–16)
APTT BLDCRRT: 24.2 SEC (ref 21–32)
BLD GP AB SCN CELLS X3 SERPL QL: NORMAL
BUN SERPL-MCNC: 15 MG/DL (ref 6–20)
CALCIUM SERPL-MCNC: 9.5 MG/DL (ref 8.7–10.5)
CHLORIDE SERPL-SCNC: 107 MMOL/L (ref 95–110)
CO2 SERPL-SCNC: 28 MMOL/L (ref 23–29)
CREAT SERPL-MCNC: 0.9 MG/DL (ref 0.5–1.4)
ERYTHROCYTE [DISTWIDTH] IN BLOOD BY AUTOMATED COUNT: 13.5 % (ref 11.5–14.5)
EST. GFR  (NO RACE VARIABLE): >60 ML/MIN/1.73 M^2
GLUCOSE SERPL-MCNC: 95 MG/DL (ref 70–110)
HCT VFR BLD AUTO: 34.5 % (ref 37–48.5)
HGB BLD-MCNC: 11.3 G/DL (ref 12–16)
INR PPP: 1 (ref 0.8–1.2)
MCH RBC QN AUTO: 30.1 PG (ref 27–31)
MCHC RBC AUTO-ENTMCNC: 32.8 G/DL (ref 32–36)
MCV RBC AUTO: 92 FL (ref 82–98)
PLATELET # BLD AUTO: 239 K/UL (ref 150–450)
PMV BLD AUTO: 11.4 FL (ref 9.2–12.9)
POTASSIUM SERPL-SCNC: 3.7 MMOL/L (ref 3.5–5.1)
PROTHROMBIN TIME: 10.6 SEC (ref 9–12.5)
RBC # BLD AUTO: 3.75 M/UL (ref 4–5.4)
SODIUM SERPL-SCNC: 142 MMOL/L (ref 136–145)
WBC # BLD AUTO: 4.16 K/UL (ref 3.9–12.7)

## 2022-08-29 PROCEDURE — 85027 COMPLETE CBC AUTOMATED: CPT | Performed by: INTERNAL MEDICINE

## 2022-08-29 PROCEDURE — 25000003 PHARM REV CODE 250: Performed by: INTERNAL MEDICINE

## 2022-08-29 PROCEDURE — C1887 CATHETER, GUIDING: HCPCS | Performed by: INTERNAL MEDICINE

## 2022-08-29 PROCEDURE — 93454 CORONARY ARTERY ANGIO S&I: CPT | Performed by: INTERNAL MEDICINE

## 2022-08-29 PROCEDURE — 93454 CORONARY ARTERY ANGIO S&I: CPT | Mod: 26,,, | Performed by: INTERNAL MEDICINE

## 2022-08-29 PROCEDURE — 93010 ELECTROCARDIOGRAM REPORT: CPT | Mod: ,,, | Performed by: INTERNAL MEDICINE

## 2022-08-29 PROCEDURE — 80048 BASIC METABOLIC PNL TOTAL CA: CPT | Performed by: INTERNAL MEDICINE

## 2022-08-29 PROCEDURE — 99152 PR MOD CONSCIOUS SEDATION, SAME PHYS, 5+ YRS, FIRST 15 MIN: ICD-10-PCS | Mod: ,,, | Performed by: INTERNAL MEDICINE

## 2022-08-29 PROCEDURE — 63600175 PHARM REV CODE 636 W HCPCS: Performed by: INTERNAL MEDICINE

## 2022-08-29 PROCEDURE — 25500020 PHARM REV CODE 255: Performed by: INTERNAL MEDICINE

## 2022-08-29 PROCEDURE — 86850 RBC ANTIBODY SCREEN: CPT | Performed by: INTERNAL MEDICINE

## 2022-08-29 PROCEDURE — 85610 PROTHROMBIN TIME: CPT | Performed by: INTERNAL MEDICINE

## 2022-08-29 PROCEDURE — 93010 EKG 12-LEAD: ICD-10-PCS | Mod: ,,, | Performed by: INTERNAL MEDICINE

## 2022-08-29 PROCEDURE — 99152 MOD SED SAME PHYS/QHP 5/>YRS: CPT | Mod: ,,, | Performed by: INTERNAL MEDICINE

## 2022-08-29 PROCEDURE — C1894 INTRO/SHEATH, NON-LASER: HCPCS | Performed by: INTERNAL MEDICINE

## 2022-08-29 PROCEDURE — C1769 GUIDE WIRE: HCPCS | Performed by: INTERNAL MEDICINE

## 2022-08-29 PROCEDURE — 99152 MOD SED SAME PHYS/QHP 5/>YRS: CPT | Performed by: INTERNAL MEDICINE

## 2022-08-29 PROCEDURE — 93454 PR CATH PLACE/CORONARY ANGIO, IMG SUPER/INTERP: ICD-10-PCS | Mod: 26,,, | Performed by: INTERNAL MEDICINE

## 2022-08-29 PROCEDURE — 93005 ELECTROCARDIOGRAM TRACING: CPT | Mod: 59

## 2022-08-29 PROCEDURE — 85730 THROMBOPLASTIN TIME PARTIAL: CPT | Performed by: INTERNAL MEDICINE

## 2022-08-29 RX ORDER — LIDOCAINE HYDROCHLORIDE 20 MG/ML
INJECTION, SOLUTION INFILTRATION; PERINEURAL
Status: DISCONTINUED | OUTPATIENT
Start: 2022-08-29 | End: 2022-08-29 | Stop reason: HOSPADM

## 2022-08-29 RX ORDER — FENTANYL CITRATE 50 UG/ML
INJECTION, SOLUTION INTRAMUSCULAR; INTRAVENOUS
Status: DISCONTINUED | OUTPATIENT
Start: 2022-08-29 | End: 2022-08-29 | Stop reason: HOSPADM

## 2022-08-29 RX ORDER — SODIUM CHLORIDE 9 MG/ML
INJECTION, SOLUTION INTRAVENOUS CONTINUOUS
Status: ACTIVE | OUTPATIENT
Start: 2022-08-29 | End: 2022-08-29

## 2022-08-29 RX ORDER — MIDAZOLAM HYDROCHLORIDE 1 MG/ML
INJECTION, SOLUTION INTRAMUSCULAR; INTRAVENOUS
Status: DISCONTINUED | OUTPATIENT
Start: 2022-08-29 | End: 2022-08-29 | Stop reason: HOSPADM

## 2022-08-29 RX ORDER — HEPARIN SOD,PORCINE/0.9 % NACL 1000/500ML
INTRAVENOUS SOLUTION INTRAVENOUS
Status: DISCONTINUED | OUTPATIENT
Start: 2022-08-29 | End: 2022-08-29 | Stop reason: HOSPADM

## 2022-08-29 RX ORDER — SODIUM CHLORIDE 9 MG/ML
INJECTION, SOLUTION INTRAVENOUS
Status: DISCONTINUED | OUTPATIENT
Start: 2022-08-29 | End: 2022-08-29 | Stop reason: HOSPADM

## 2022-08-29 RX ORDER — ONDANSETRON 8 MG/1
8 TABLET, ORALLY DISINTEGRATING ORAL EVERY 8 HOURS PRN
Status: DISCONTINUED | OUTPATIENT
Start: 2022-08-29 | End: 2022-08-29 | Stop reason: HOSPADM

## 2022-08-29 RX ORDER — HEPARIN SODIUM 1000 [USP'U]/ML
INJECTION, SOLUTION INTRAVENOUS; SUBCUTANEOUS
Status: DISCONTINUED | OUTPATIENT
Start: 2022-08-29 | End: 2022-08-29 | Stop reason: HOSPADM

## 2022-08-29 RX ORDER — DIPHENHYDRAMINE HCL 50 MG
50 CAPSULE ORAL ONCE
Status: COMPLETED | OUTPATIENT
Start: 2022-08-29 | End: 2022-08-29

## 2022-08-29 RX ORDER — ACETAMINOPHEN 325 MG/1
650 TABLET ORAL EVERY 4 HOURS PRN
Status: DISCONTINUED | OUTPATIENT
Start: 2022-08-29 | End: 2022-08-29 | Stop reason: HOSPADM

## 2022-08-29 RX ADMIN — DIPHENHYDRAMINE HYDROCHLORIDE 50 MG: 50 CAPSULE ORAL at 07:08

## 2022-08-29 RX ADMIN — SODIUM CHLORIDE: 0.9 INJECTION, SOLUTION INTRAVENOUS at 07:08

## 2022-08-29 NOTE — ASSESSMENT & PLAN NOTE
Patient presents to the hospital for diagnostic LHC in light of upcoming mitral and tricuspid valve replacement.     --LHC  - Anti-platelet Therapy: ASA   - Access: Right radial  - Catheters: Hector  - Creatinine/CrCl: 0.8  - Allergies: No shellfish / Iodine allergy  - Pre-Hydration: NS  - Pre-Op Med: Bendaryl 50mg pO   - All patient's questions were answered.  -The risks, benefits and alternatives of the procedure were explained to the patient.   -The risks of coronary angiography include but are not limited to: bleeding, infection, heart rhythm abnormalities, allergic reactions, kidney injury and potential need for dialysis, stroke and death.   - Should stenting be indicated, the patient has agreed to dual anti-platelet therapy for 1-consecutive year with a drug-eluting stent and a minimum of 1-month with the use of a bare metal stent  - Additionally, pt is aware that non-compliance is likely to result in stent clotting with heart attack, heart failure, and/or death  -The risks of moderate sedation include hypotension, respiratory depression, arrhythmias, bronchospasm, and death.   - Informed consent was obtained and the  patient is agreeable to proceed with the procedure.

## 2022-08-29 NOTE — HPI
patient is a 51 y.o female who presents to the office for evaluation for diagnostic preoperative coronary angiography.   She has a history mitral valve disease (?rheumatic HD) s/p repair in 2011. recently diagnosed with afib s/p cardioversion. TTE with MR and MS ( mean gradiet 2 mmHg; valve area 1.68) c/b PH (PASP 36mmHg). EF 40%. evaluated by Dr. Gilbert for valve replacement with mechanical valve.   s/p stress EKG on 6/1/22 with no ischemic changes.

## 2022-08-29 NOTE — HOSPITAL COURSE
Patient presented to the hospital for diagnostic angiography in light of upcoming tricuspid and bicuspid valve replacement by CTS. Angiography w/o evidence of CAD. S/p 125 cc IVF/2hrs. Stable for discharge home.

## 2022-08-29 NOTE — SUBJECTIVE & OBJECTIVE
Past Medical History:   Diagnosis Date    A-fib     Atrial fibrillation     Back pain     Hyperlipidemia     Mitral incompetence     Mitral valve stenosis     Neck pain        Past Surgical History:   Procedure Laterality Date    CARDIAC SURGERY      to repair mitral valve    CHOLECYSTECTOMY      COLONOSCOPY      COLONOSCOPY N/A 6/8/2022    Procedure: COLONOSCOPY;  Surgeon: Sanford Andres MD;  Location: [facility] ENDO;  Service: Endoscopy;  Laterality: N/A;    TRANSESOPHAGEAL ECHOCARDIOGRAPHY N/A 03/29/2022    Procedure: ECHOCARDIOGRAM, TRANSESOPHAGEAL;  Surgeon: Dominic Wallis MD;  Location: [facility] EP LAB;  Service: Cardiology;  Laterality: N/A;    TREATMENT OF CARDIAC ARRHYTHMIA N/A 03/29/2022    Procedure: Cardioversion or Defibrillation;  Surgeon: Elmo Jones MD;  Location: [facility] EP LAB;  Service: Cardiology;  Laterality: N/A;  afib, dccv, stanley, anes, NH, 3prep, Provider to perform        Review of patient's allergies indicates:   Allergen Reactions    Amoxicillin Hives and Other (See Comments)       PTA Medications   Medication Sig    atorvastatin (LIPITOR) 10 MG tablet Take 1 tablet (10 mg total) by mouth once daily.    cyproheptadine (PERIACTIN) 4 mg tablet Half p.o. b.i.d.--not too sleepy 1 p.o. b.i.d. to increase appetite    dronedarone (MULTAQ) 400 mg Tab Take 1 tablet (400 mg total) by mouth 2 (two) times daily with meals.    gabapentin (NEURONTIN) 300 MG capsule gabapentin 300 mg capsule   take 1 cap PO qhs x 3 days, then take 2 cap PO QHS thereafter    losartan (COZAAR) 25 MG tablet Take 1 tablet (25 mg total) by mouth once daily.    metoprolol succinate (TOPROL-XL) 25 MG 24 hr tablet Take 1 tablet (25 mg total) by mouth once daily.    apixaban (ELIQUIS) 5 mg Tab Take 1 tablet (5 mg total) by mouth 2 (two) times daily.     Family History       Problem Relation (Age of Onset)    Breast cancer Sister    Colon cancer Maternal Aunt          Tobacco Use    Smoking status: Never    Smokeless tobacco: Never    Substance and Sexual Activity    Alcohol use: Not Currently    Drug use: No    Sexual activity: Yes     Partners: Male     Review of Systems   Constitutional: Negative for malaise/fatigue and weight loss.   HENT:  Negative for congestion.    Cardiovascular:  Negative for chest pain, dyspnea on exertion and palpitations.   Respiratory:  Negative for shortness of breath.    Objective:     Vital Signs (Most Recent):  Pulse: 64 (08/29/22 0725)  Resp: 20 (08/29/22 0725)  BP: 106/66 (08/29/22 0727)  SpO2: 99 % (08/29/22 0725) Vital Signs (24h Range):  Pulse:  [64] 64  Resp:  [20] 20  SpO2:  [99 %] 99 %  BP: (106-120)/(66-69) 106/66     Weight: 89.4 kg (197 lb)  Body mass index is 28.67 kg/m².    SpO2: 99 %  O2 Device (Oxygen Therapy): room air    No intake or output data in the 24 hours ending 08/29/22 0818    Lines/Drains/Airways       Peripheral Intravenous Line  Duration                  Peripheral IV - Single Lumen 02/21/22 1811 20 G Left Forearm 188 days         Peripheral IV - Single Lumen 08/29/22 0740 18 G Left Antecubital <1 day                    Physical Exam  Vitals reviewed.   HENT:      Head: Normocephalic.      Mouth/Throat:      Mouth: Mucous membranes are moist.   Eyes:      General: No scleral icterus.     Conjunctiva/sclera: Conjunctivae normal.   Cardiovascular:      Rate and Rhythm: Normal rate.   Pulmonary:      Effort: Pulmonary effort is normal.   Abdominal:      General: There is no distension.   Musculoskeletal:         General: No swelling.      Right lower leg: No edema.      Left lower leg: No edema.   Skin:     General: Skin is warm.   Neurological:      Mental Status: She is alert.   Psychiatric:         Mood and Affect: Mood normal.       Significant Labs: All pertinent lab results from the last 24 hours have been reviewed.    Significant Imaging: Echocardiogram: Transthoracic echo (TTE) complete (Cupid Only):   Results for orders placed or performed in visit on 05/27/22   Echo   Result  Value Ref Range    TDI SEPTAL 0.05 m/s    LV LATERAL E/E' RATIO 26.17 m/s    LV SEPTAL E/E' RATIO 31.40 m/s    AORTIC VALVE CUSP SEPERATION 1.70 cm    TDI LATERAL 0.06 m/s    PV PEAK VELOCITY 0.81 cm/s    LVIDd 4.97 3.5 - 6.0 cm    IVS 0.96 0.6 - 1.1 cm    Posterior Wall 0.83 0.6 - 1.1 cm    Ao root annulus 2.94 cm    LVIDs 4.09 (A) 2.1 - 4.0 cm    FS 18 28 - 44 %    LV mass 155.48 g    LA size 5.13 cm    RVDD 2.84 cm    Left Ventricle Relative Wall Thickness 0.33 cm    AV mean gradient 3 mmHg    AV valve area 2.16 cm2    AV Velocity Ratio 0.75     AV index (prosthetic) 0.87     MV mean gradient 2 mmHg    MV valve area p 1/2 method 1.68 cm2    MV valve area by continuity eq 0.62 cm2    E/A ratio 1.07     Mean e' 0.06 m/s    E wave deceleration time 452.03 msec    LVOT diameter 1.78 cm    LVOT area 2.5 cm2    LVOT peak satish 0.90 m/s    LVOT peak VTI 21.38 cm    Ao peak satish 1.20 m/s    Ao VTI 24.67 cm    Mr max satish 0.05 m/s    LVOT stroke volume 53.18 cm3    AV peak gradient 6 mmHg    MV peak gradient 22 mmHg    E/E' ratio 28.55 m/s    MV Peak E Satish 1.57 m/s    TR Max Satish 3.00 m/s    MV VTI 85.43 cm    MV stenosis pressure 1/2 time 131.09 ms    MV Peak A Satish 1.47 m/s    LV Systolic Volume 73.88 mL    LV Diastolic Volume 116.80 mL    RA Major Axis 6.00 cm    Left Atrium Minor Axis 5.12 cm    Left Atrium Major Axis 7.48 cm    Triscuspid Valve Regurgitation Peak Gradient 36 mmHg    Right Atrial Pressure (from IVC) 15 mmHg    EF 40 %    TV rest pulmonary artery pressure 51 mmHg    Narrative    · The left ventricle is mildly enlarged with mildly decreased systolic   function.  · The estimated ejection fraction is 40%.  · Grade II left ventricular diastolic dysfunction.  · There is mild left ventricular global hypokinesis.  · Mild right ventricular enlargement with mildly reduced right ventricular   systolic function.  · Severe left atrial enlargement.  · Moderate right atrial enlargement.  · The mitral valve is s/p  repair.  · The mean diastolic gradient across the mitral valve is 11.4 mmHg at a   heart rate of 70 bpm.  · There is moderate mitral stenosis.  · Moderate-to-severe mitral regurgitation.  · Moderate to severe tricuspid regurgitation.  · Elevated central venous pressure (15 mmHg).  · The estimated PA systolic pressure is 51 mmHg.  · There is pulmonary hypertension.

## 2022-08-29 NOTE — Clinical Note
45 ml of contrast were injected throughout the case. 55 mL of contrast was the total wasted during the case. 100 mL was the total amount used during the case.

## 2022-08-29 NOTE — H&P
[facility] Short Stay Cardiac Unit  Interventional Cardiology  H&P    Patient Name: Riya Soto  MRN: [id number]  Admission Date: 8/29/2022  Code Status: No Order   Attending Provider: Lexa Harley MD   Primary Care Physician: Pantera Yadav MD  Principal Problem:<principal problem not specified>    Patient information was obtained from patient, past medical records and ER records.     Subjective:     Chief Complaint:       HPI:  patient is a 51 y.o female who presents to the office for evaluation for diagnostic preoperative coronary angiography.   She has a history mitral valve disease (?rheumatic HD) s/p repair in 2011. recently diagnosed with afib s/p cardioversion. TTE with MR and MS ( mean gradiet 2 mmHg; valve area 1.68) c/b PH (PASP 36mmHg). EF 40%. evaluated by Dr. Gilbert for valve replacement with mechanical valve.   s/p stress EKG on 6/1/22 with no ischemic changes.       Past Medical History:   Diagnosis Date    A-fib     Atrial fibrillation     Back pain     Hyperlipidemia     Mitral incompetence     Mitral valve stenosis     Neck pain        Past Surgical History:   Procedure Laterality Date    CARDIAC SURGERY      to repair mitral valve    CHOLECYSTECTOMY      COLONOSCOPY      COLONOSCOPY N/A 6/8/2022    Procedure: COLONOSCOPY;  Surgeon: Sanford Andres MD;  Location: [facility] [place] ENDO;  Service: Endoscopy;  Laterality: N/A;    TRANSESOPHAGEAL ECHOCARDIOGRAPHY N/A 03/29/2022    Procedure: ECHOCARDIOGRAM, TRANSESOPHAGEAL;  Surgeon: Dominic Wallis MD;  Location: [facility] EP LAB;  Service: Cardiology;  Laterality: N/A;    TREATMENT OF CARDIAC ARRHYTHMIA N/A 03/29/2022    Procedure: Cardioversion or Defibrillation;  Surgeon: Elmo Jones MD;  Location: [facility] EP LAB;  Service: Cardiology;  Laterality: N/A;  afib, dccv, [place], [place], [place], 3prep, Provider to perform        Review of patient's allergies indicates:   Allergen Reactions    Amoxicillin Hives and Other (See Comments)       PTA  Medications   Medication Sig    atorvastatin (LIPITOR) 10 MG tablet Take 1 tablet (10 mg total) by mouth once daily.    cyproheptadine (PERIACTIN) 4 mg tablet Half p.o. b.i.d.--not too sleepy 1 p.o. b.i.d. to increase appetite    dronedarone (MULTAQ) 400 mg Tab Take 1 tablet (400 mg total) by mouth 2 (two) times daily with meals.    gabapentin (NEURONTIN) 300 MG capsule gabapentin 300 mg capsule   take 1 cap PO qhs x 3 days, then take 2 cap PO QHS thereafter    losartan (COZAAR) 25 MG tablet Take 1 tablet (25 mg total) by mouth once daily.    metoprolol succinate (TOPROL-XL) 25 MG 24 hr tablet Take 1 tablet (25 mg total) by mouth once daily.    apixaban (ELIQUIS) 5 mg Tab Take 1 tablet (5 mg total) by mouth 2 (two) times daily.     Family History       Problem Relation (Age of Onset)    Breast cancer Sister    Colon cancer Maternal Aunt          Tobacco Use    Smoking status: Never    Smokeless tobacco: Never   Substance and Sexual Activity    Alcohol use: Not Currently    Drug use: No    Sexual activity: Yes     Partners: Male     Review of Systems   Constitutional: Negative for malaise/fatigue and weight loss.   HENT:  Negative for congestion.    Cardiovascular:  Negative for chest pain, dyspnea on exertion and palpitations.   Respiratory:  Negative for shortness of breath.    Objective:     Vital Signs (Most Recent):  Pulse: 64 (08/29/22 0725)  Resp: 20 (08/29/22 0725)  BP: 106/66 (08/29/22 0727)  SpO2: 99 % (08/29/22 0725) Vital Signs (24h Range):  Pulse:  [64] 64  Resp:  [20] 20  SpO2:  [99 %] 99 %  BP: (106-120)/(66-69) 106/66     Weight: 89.4 kg (197 lb)  Body mass index is 28.67 kg/m².    SpO2: 99 %  O2 Device (Oxygen Therapy): room air    No intake or output data in the 24 hours ending 08/29/22 0818    Lines/Drains/Airways       Peripheral Intravenous Line  Duration                  Peripheral IV - Single Lumen 02/21/22 1811 20 G Left Forearm 188 days         Peripheral IV - Single Lumen  08/29/22 0740 18 G Left Antecubital <1 day                    Physical Exam  Vitals reviewed.   HENT:      Head: Normocephalic.      Mouth/Throat:      Mouth: Mucous membranes are moist.   Eyes:      General: No scleral icterus.     Conjunctiva/sclera: Conjunctivae normal.   Cardiovascular:      Rate and Rhythm: Normal rate.   Pulmonary:      Effort: Pulmonary effort is normal.   Abdominal:      General: There is no distension.   Musculoskeletal:         General: No swelling.      Right lower leg: No edema.      Left lower leg: No edema.   Skin:     General: Skin is warm.   Neurological:      Mental Status: She is alert.   Psychiatric:         Mood and Affect: Mood normal.       Significant Labs: All pertinent lab results from the last 24 hours have been reviewed.    Significant Imaging: Echocardiogram: Transthoracic echo (TTE) complete (Cupid Only):   Results for orders placed or performed in visit on 05/27/22   Echo   Result Value Ref Range    TDI SEPTAL 0.05 m/s    LV LATERAL E/E' RATIO 26.17 m/s    LV SEPTAL E/E' RATIO 31.40 m/s    AORTIC VALVE CUSP SEPERATION 1.70 cm    TDI LATERAL 0.06 m/s    PV PEAK VELOCITY 0.81 cm/s    LVIDd 4.97 3.5 - 6.0 cm    IVS 0.96 0.6 - 1.1 cm    Posterior Wall 0.83 0.6 - 1.1 cm    Ao root annulus 2.94 cm    LVIDs 4.09 (A) 2.1 - 4.0 cm    FS 18 28 - 44 %    LV mass 155.48 g    LA size 5.13 cm    RVDD 2.84 cm    Left Ventricle Relative Wall Thickness 0.33 cm    AV mean gradient 3 mmHg    AV valve area 2.16 cm2    AV Velocity Ratio 0.75     AV index (prosthetic) 0.87     MV mean gradient 2 mmHg    MV valve area p 1/2 method 1.68 cm2    MV valve area by continuity eq 0.62 cm2    E/A ratio 1.07     Mean e' 0.06 m/s    E wave deceleration time 452.03 msec    LVOT diameter 1.78 cm    LVOT area 2.5 cm2    LVOT peak miko 0.90 m/s    LVOT peak VTI 21.38 cm    Ao peak miko 1.20 m/s    Ao VTI 24.67 cm    Mr max miko 0.05 m/s    LVOT stroke volume 53.18 cm3    AV peak gradient 6 mmHg    MV peak  gradient 22 mmHg    E/E' ratio 28.55 m/s    MV Peak E Satish 1.57 m/s    TR Max Satish 3.00 m/s    MV VTI 85.43 cm    MV stenosis pressure 1/2 time 131.09 ms    MV Peak A Satish 1.47 m/s    LV Systolic Volume 73.88 mL    LV Diastolic Volume 116.80 mL    RA Major Axis 6.00 cm    Left Atrium Minor Axis 5.12 cm    Left Atrium Major Axis 7.48 cm    Triscuspid Valve Regurgitation Peak Gradient 36 mmHg    Right Atrial Pressure (from IVC) 15 mmHg    EF 40 %    TV rest pulmonary artery pressure 51 mmHg    Narrative    · The left ventricle is mildly enlarged with mildly decreased systolic   function.  · The estimated ejection fraction is 40%.  · Grade II left ventricular diastolic dysfunction.  · There is mild left ventricular global hypokinesis.  · Mild right ventricular enlargement with mildly reduced right ventricular   systolic function.  · Severe left atrial enlargement.  · Moderate right atrial enlargement.  · The mitral valve is s/p repair.  · The mean diastolic gradient across the mitral valve is 11.4 mmHg at a   heart rate of 70 bpm.  · There is moderate mitral stenosis.  · Moderate-to-severe mitral regurgitation.  · Moderate to severe tricuspid regurgitation.  · Elevated central venous pressure (15 mmHg).  · The estimated PA systolic pressure is 51 mmHg.  · There is pulmonary hypertension.        Assessment and Plan:     Rheumatic mitral regurgitation  Patient presents to the hospital for diagnostic LHC in light of upcoming mitral and tricuspid valve replacement.     --LHC  - Anti-platelet Therapy: ASA   - Access: Right radial  - Catheters: Hector  - Creatinine/CrCl: 0.8  - Allergies: No shellfish / Iodine allergy  - Pre-Hydration: NS  - Pre-Op Med: Bendaryl 50mg pO   - All patient's questions were answered.  -The risks, benefits and alternatives of the procedure were explained to the patient.   -The risks of coronary angiography include but are not limited to: bleeding, infection, heart rhythm abnormalities, allergic  reactions, kidney injury and potential need for dialysis, stroke and death.   - Should stenting be indicated, the patient has agreed to dual anti-platelet therapy for 1-consecutive year with a drug-eluting stent and a minimum of 1-month with the use of a bare metal stent  - Additionally, pt is aware that non-compliance is likely to result in stent clotting with heart attack, heart failure, and/or death  -The risks of moderate sedation include hypotension, respiratory depression, arrhythmias, bronchospasm, and death.   - Informed consent was obtained and the  patient is agreeable to proceed with the procedure.          VTE Risk Mitigation (From admission, onward)    None          Jose Armando Arellano MD  Interventional Cardiology   [facility] Short Stay Cardiac [facility]

## 2022-08-29 NOTE — Clinical Note
The catheter was repositioned into the ostium   right coronary artery. An angiography was performed of the right coronary arteries. Multiple views were taken.

## 2022-08-29 NOTE — DISCHARGE SUMMARY
[facility] - [facility] Lab  Interventional Cardiology  Discharge Summary      Patient Name: Riya Soto  MRN: [id number]  Admission Date: 8/29/2022  Hospital Length of Stay: 0 days  Discharge Date and Time:  08/29/2022 9:47 AM  Attending Physician: Lexa Harley MD  Discharging Provider: Jose Armando Arellano MD  Primary Care Physician: Pantera Yadav MD    HPI:  patient is a 51 y.o female who presents to the office for evaluation for diagnostic preoperative coronary angiography.   She has a history mitral valve disease (?rheumatic HD) s/p repair in 2011. recently diagnosed with afib s/p cardioversion. TTE with MR and MS ( mean gradiet 2 mmHg; valve area 1.68) c/b PH (PASP 36mmHg). EF 40%. evaluated by Dr. Gilbert for valve replacement with mechanical valve.   s/p stress EKG on 6/1/22 with no ischemic changes.       Procedure(s) (LRB):  ANGIOGRAM, CORONARY ARTERY (N/A)     Indwelling Lines/Drains at time of discharge:  Lines/Drains/Airways     None                 Hospital Course:  Patient presented to the hospital for diagnostic angiography in light of upcoming tricuspid and bicuspid valve replacement by CTS. Angiography w/o evidence of CAD. S/p 125 cc IVF/2hrs. Stable for discharge home.      Goals of Care Treatment Preferences:             Significant Diagnostic Studies:     Pending Diagnostic Studies:     None        No new Assessment & Plan notes have been filed under this hospital service since the last note was generated.  Service: Interventional Cardiology      Discharged Condition: stable    Follow Up:    Patient Instructions:   No discharge procedures on file.  Medications:  Reconciled Home Medications:      Medication List      CONTINUE taking these medications    apixaban 5 mg Tab  Commonly known as: ELIQUIS  Take 1 tablet (5 mg total) by mouth 2 (two) times daily.     atorvastatin 10 MG tablet  Commonly known as: LIPITOR  Take 1 tablet (10 mg total) by mouth once daily.     cyproheptadine 4 mg  tablet  Commonly known as: PERIACTIN  Half p.o. b.i.d.--not too sleepy 1 p.o. b.i.d. to increase appetite     dronedarone 400 mg Tab  Commonly known as: MULTAQ  Take 1 tablet (400 mg total) by mouth 2 (two) times daily with meals.     gabapentin 300 MG capsule  Commonly known as: NEURONTIN  gabapentin 300 mg capsule   take 1 cap PO qhs x 3 days, then take 2 cap PO QHS thereafter     losartan 25 MG tablet  Commonly known as: COZAAR  Take 1 tablet (25 mg total) by mouth once daily.     metoprolol succinate 25 MG 24 hr tablet  Commonly known as: TOPROL-XL  Take 1 tablet (25 mg total) by mouth once daily.        ASK your doctor about these medications    citalopram 10 MG tablet  Commonly known as: CeleXA  Take 1 tablet (10 mg total) by mouth once daily.     metoprolol tartrate 25 MG tablet  Commonly known as: LOPRESSOR  Take 1 tablet (25 mg total) by mouth 2 (two) times daily. for 14 days            Time spent on the discharge of patient: 35 minutes    Jose Armando Arellano MD  Interventional Cardiology  [facility]

## 2022-08-29 NOTE — PROGRESS NOTES
Received patient from Cath Lab, patient is drowsy but arousable, on room air, no complaints; bed is low and locked with side rails up

## 2022-08-29 NOTE — Clinical Note
The catheter was inserted into the ostium   left main. An angiography was performed of the left coronary arteries. Multiple views were taken.

## 2022-08-30 ENCOUNTER — TELEPHONE (OUTPATIENT)
Dept: CARDIOTHORACIC SURGERY | Facility: CLINIC | Age: 52
End: 2022-08-30
Payer: COMMERCIAL

## 2022-08-30 NOTE — TELEPHONE ENCOUNTER
Called pt to discuss surgery dates - pt requested a date in November. Pt will call me back once she decides.Provided my direct number for her to call back.

## 2022-09-16 ENCOUNTER — PATIENT OUTREACH (OUTPATIENT)
Dept: EMERGENCY MEDICINE | Facility: HOSPITAL | Age: 52
End: 2022-09-16
Payer: COMMERCIAL

## 2022-10-10 ENCOUNTER — TELEPHONE (OUTPATIENT)
Dept: CARDIOTHORACIC SURGERY | Facility: CLINIC | Age: 52
End: 2022-10-10
Payer: COMMERCIAL

## 2022-10-10 DIAGNOSIS — I48.0 PAROXYSMAL ATRIAL FIBRILLATION: ICD-10-CM

## 2022-10-10 DIAGNOSIS — I05.1 RHEUMATIC MITRAL REGURGITATION: Primary | ICD-10-CM

## 2022-10-10 NOTE — TELEPHONE ENCOUNTER
Returned call to pt. Pt would like to schedule surgery first available after November 5. Case booked for Monday, November 7. Will coordinate pre-op testing appointments and follow up with pt later this week. Pt verbalized understanding of all information.       ----- Message from Job Truong sent at 10/10/2022  3:01 PM CDT -----  Contact: @[phone]  Pt is calling in to schedule her open heart surgery, please call to discuss further.

## 2022-10-12 ENCOUNTER — OFFICE VISIT (OUTPATIENT)
Dept: CARDIOLOGY | Facility: CLINIC | Age: 52
End: 2022-10-12
Payer: COMMERCIAL

## 2022-10-12 ENCOUNTER — OFFICE VISIT (OUTPATIENT)
Dept: PRIMARY CARE CLINIC | Facility: CLINIC | Age: 52
End: 2022-10-12
Payer: COMMERCIAL

## 2022-10-12 VITALS
BODY MASS INDEX: 28.68 KG/M2 | OXYGEN SATURATION: 99 % | SYSTOLIC BLOOD PRESSURE: 106 MMHG | WEIGHT: 200.31 LBS | HEIGHT: 70 IN | HEART RATE: 84 BPM | DIASTOLIC BLOOD PRESSURE: 58 MMHG

## 2022-10-12 VITALS
OXYGEN SATURATION: 99 % | RESPIRATION RATE: 18 BRPM | HEART RATE: 72 BPM | BODY MASS INDEX: 28.8 KG/M2 | DIASTOLIC BLOOD PRESSURE: 62 MMHG | HEIGHT: 70 IN | WEIGHT: 201.19 LBS | SYSTOLIC BLOOD PRESSURE: 90 MMHG

## 2022-10-12 DIAGNOSIS — Z90.49 HISTORY OF CHOLECYSTECTOMY: ICD-10-CM

## 2022-10-12 DIAGNOSIS — I50.20 HFREF (HEART FAILURE WITH REDUCED EJECTION FRACTION): ICD-10-CM

## 2022-10-12 DIAGNOSIS — Z98.890 HISTORY OF HEART SURGERY: ICD-10-CM

## 2022-10-12 DIAGNOSIS — E78.00 PURE HYPERCHOLESTEROLEMIA: ICD-10-CM

## 2022-10-12 DIAGNOSIS — I48.0 PAROXYSMAL ATRIAL FIBRILLATION: ICD-10-CM

## 2022-10-12 DIAGNOSIS — E66.3 OVERWEIGHT (BMI 25.0-29.9): ICD-10-CM

## 2022-10-12 DIAGNOSIS — I48.91 ATRIAL FIBRILLATION, UNSPECIFIED TYPE: ICD-10-CM

## 2022-10-12 DIAGNOSIS — Z79.01 ANTICOAGULANT LONG-TERM USE: ICD-10-CM

## 2022-10-12 DIAGNOSIS — I48.20 CHRONIC ATRIAL FIBRILLATION: ICD-10-CM

## 2022-10-12 DIAGNOSIS — Z01.818 PRE-OP TESTING: ICD-10-CM

## 2022-10-12 DIAGNOSIS — Z92.89 HISTORY OF CARDIOVERSION: ICD-10-CM

## 2022-10-12 DIAGNOSIS — Z12.31 ENCOUNTER FOR SCREENING MAMMOGRAM FOR MALIGNANT NEOPLASM OF BREAST: Primary | ICD-10-CM

## 2022-10-12 DIAGNOSIS — I05.1 RHEUMATIC MITRAL REGURGITATION: ICD-10-CM

## 2022-10-12 DIAGNOSIS — I05.1 RHEUMATIC MITRAL REGURGITATION: Primary | ICD-10-CM

## 2022-10-12 DIAGNOSIS — I08.0 MITRAL VALVE INSUFFICIENCY AND AORTIC VALVE INSUFFICIENCY: ICD-10-CM

## 2022-10-12 DIAGNOSIS — K57.90 DIVERTICULOSIS: ICD-10-CM

## 2022-10-12 PROCEDURE — 3074F PR MOST RECENT SYSTOLIC BLOOD PRESSURE < 130 MM HG: ICD-10-PCS | Mod: CPTII,S$GLB,, | Performed by: FAMILY MEDICINE

## 2022-10-12 PROCEDURE — 3074F SYST BP LT 130 MM HG: CPT | Mod: CPTII,S$GLB,, | Performed by: NURSE PRACTITIONER

## 2022-10-12 PROCEDURE — 3074F PR MOST RECENT SYSTOLIC BLOOD PRESSURE < 130 MM HG: ICD-10-PCS | Mod: CPTII,S$GLB,, | Performed by: NURSE PRACTITIONER

## 2022-10-12 PROCEDURE — 99214 PR OFFICE/OUTPT VISIT, EST, LEVL IV, 30-39 MIN: ICD-10-PCS | Mod: S$GLB,,, | Performed by: FAMILY MEDICINE

## 2022-10-12 PROCEDURE — 1160F PR REVIEW ALL MEDS BY PRESCRIBER/CLIN PHARMACIST DOCUMENTED: ICD-10-PCS | Mod: CPTII,S$GLB,, | Performed by: NURSE PRACTITIONER

## 2022-10-12 PROCEDURE — 1159F MED LIST DOCD IN RCRD: CPT | Mod: CPTII,S$GLB,, | Performed by: NURSE PRACTITIONER

## 2022-10-12 PROCEDURE — 1160F RVW MEDS BY RX/DR IN RCRD: CPT | Mod: CPTII,S$GLB,, | Performed by: NURSE PRACTITIONER

## 2022-10-12 PROCEDURE — 99212 OFFICE O/P EST SF 10 MIN: CPT | Mod: S$GLB,,, | Performed by: NURSE PRACTITIONER

## 2022-10-12 PROCEDURE — 3074F SYST BP LT 130 MM HG: CPT | Mod: CPTII,S$GLB,, | Performed by: FAMILY MEDICINE

## 2022-10-12 PROCEDURE — 4010F ACE/ARB THERAPY RXD/TAKEN: CPT | Mod: CPTII,S$GLB,, | Performed by: NURSE PRACTITIONER

## 2022-10-12 PROCEDURE — 99214 OFFICE O/P EST MOD 30 MIN: CPT | Mod: S$GLB,,, | Performed by: FAMILY MEDICINE

## 2022-10-12 PROCEDURE — 99999 PR PBB SHADOW E&M-EST. PATIENT-LVL IV: ICD-10-PCS | Mod: PBBFAC,,, | Performed by: NURSE PRACTITIONER

## 2022-10-12 PROCEDURE — 99212 PR OFFICE/OUTPT VISIT, EST, LEVL II, 10-19 MIN: ICD-10-PCS | Mod: S$GLB,,, | Performed by: NURSE PRACTITIONER

## 2022-10-12 PROCEDURE — 3078F PR MOST RECENT DIASTOLIC BLOOD PRESSURE < 80 MM HG: ICD-10-PCS | Mod: CPTII,S$GLB,, | Performed by: NURSE PRACTITIONER

## 2022-10-12 PROCEDURE — 99999 PR PBB SHADOW E&M-EST. PATIENT-LVL IV: ICD-10-PCS | Mod: PBBFAC,,, | Performed by: FAMILY MEDICINE

## 2022-10-12 PROCEDURE — 3078F DIAST BP <80 MM HG: CPT | Mod: CPTII,S$GLB,, | Performed by: FAMILY MEDICINE

## 2022-10-12 PROCEDURE — 4010F PR ACE/ARB THEARPY RXD/TAKEN: ICD-10-PCS | Mod: CPTII,S$GLB,, | Performed by: NURSE PRACTITIONER

## 2022-10-12 PROCEDURE — 4010F ACE/ARB THERAPY RXD/TAKEN: CPT | Mod: CPTII,S$GLB,, | Performed by: FAMILY MEDICINE

## 2022-10-12 PROCEDURE — 99999 PR PBB SHADOW E&M-EST. PATIENT-LVL IV: CPT | Mod: PBBFAC,,, | Performed by: FAMILY MEDICINE

## 2022-10-12 PROCEDURE — 99999 PR PBB SHADOW E&M-EST. PATIENT-LVL IV: CPT | Mod: PBBFAC,,, | Performed by: NURSE PRACTITIONER

## 2022-10-12 PROCEDURE — 4010F PR ACE/ARB THEARPY RXD/TAKEN: ICD-10-PCS | Mod: CPTII,S$GLB,, | Performed by: FAMILY MEDICINE

## 2022-10-12 PROCEDURE — 1159F PR MEDICATION LIST DOCUMENTED IN MEDICAL RECORD: ICD-10-PCS | Mod: CPTII,S$GLB,, | Performed by: NURSE PRACTITIONER

## 2022-10-12 PROCEDURE — 3078F DIAST BP <80 MM HG: CPT | Mod: CPTII,S$GLB,, | Performed by: NURSE PRACTITIONER

## 2022-10-12 PROCEDURE — 3078F PR MOST RECENT DIASTOLIC BLOOD PRESSURE < 80 MM HG: ICD-10-PCS | Mod: CPTII,S$GLB,, | Performed by: FAMILY MEDICINE

## 2022-10-12 NOTE — PROGRESS NOTES
Subjective:       Patient ID: Riya Soto is a 51 y.o. female.    Chief Complaint: Follow-up    HPI:  51-year-old black female--no specific problem--in for routine checkup--eating well--+BM--ambulating well  History of atrial fib--scheduled for open heart surgery November 7th 2022.  Has mitral valve problems to get a mechanical mitral valve--with some leaking on the right-sided needs also to be repaired.   Had heart surgery 11 years ago--was told that is with probably cause atrial fib.     ROS  Skin--no psoriasis eczema skin cancer  HEENT---no headaches ocular pain blurred vision diplopia epistaxis hoarseness change in voice thyroid trouble  Lung--no pneumonia asthma TB no smoking  Heart--history of mitral valve surgery--scheduled for additional mitral valve surgery--hyperlipidemia--atrial fib with history of cardioversion--CHF--no chest pain ankle edema palpitations MI--no stent bypass arrhythmia  Abdomen--no nausea vomiting diarrhea constipation ulcers hepatitis passing blood vomiting blood black bowel movement + diverticulosis +history cholecystectomy  -no UTI renal disease stones  GYN last menstrual period age 48--due for mammogram November for  Musculoskeletal cervical strain lumbosacral strain weakness left upper arm problems with left shoulder  Neurologic no dizziness passing out seizures  No diabetes?  Anemia--no anxiety or depression  Single--7 children--SSI due to heart---lives with 7 children         General: Well nourished, well developed, no acute distress +obesity appears depressed  Skin: No lesions   HEENT: Eyes PERRLA, EOM intact, nose patent, throat non-erythematous ears TMs clear  NECK: Supple, no bruits, No JVD, no nodes  Lungs: Clear, no rales, rhonchi, wheezing  Heart: Regular rate and rhythm, no murmurs, gallops, or rubs  Abdomen: flat, bowel sounds positive, no tenderness, or organomegaly  MS:  No significant change Some deformity of the left ankle had 3 surgery still has screw  present --main problem left shoulder --sore with palpation--AC joint and triceps and supraspinatus area ., pain with raising arm overhead especially painful with trying to reach back to undo bra  Neuro: Alert, CN intact, oriented X 3  Extremities: No cyanosis, clubbing, or edema         Assessment:       1. Encounter for screening mammogram for malignant neoplasm of breast    2. Rheumatic mitral regurgitation    3. Pure hypercholesterolemia    4. History of cardioversion    5. History of mitral valve surgery    6. HFrEF (heart failure with reduced ejection fraction)    7. Chronic atrial fibrillation    8. Anticoagulant long-term use    9. Overweight (BMI 25.0-29.9)    10. History of cholecystectomy    11. Diverticulosis        Plan:       Encounter for screening mammogram for malignant neoplasm of breast  -     Mammo Digital Screening Bilat w/ Reyes; Future; Expected date: 10/12/2022    Rheumatic mitral regurgitation    Pure hypercholesterolemia    History of cardioversion    History of mitral valve surgery    HFrEF (heart failure with reduced ejection fraction)    Chronic atrial fibrillation    Anticoagulant long-term use    Overweight (BMI 25.0-29.9)    History of cholecystectomy    Diverticulosis      Main-Reason for Visit-  Heart surgery 11-7-2022 to have mitral valve surgery to do lab 11-3-2022--asked if needed surgical clearance stated no  Atrial fibrillation --history of cardioversion 03/29/2022----patient on Eliquis/metoprolol  Hyperlipidemia patient refills of Lipitor   Menopausal syndrome--had Mirena removed 3 years ago No period since   History anemia/cholecystectomy/heart surgery (systolic had leaking valve 1 surgery was done was normal)  Lab to be done Nov 3 rd needs add lipid   Health maintenance shingles COVID flu

## 2022-10-12 NOTE — PATIENT INSTRUCTIONS
Continue taking your medications as you have    Check your blood pressure occasionally    We will plan to see you back in 3 months, sooner if you need us.

## 2022-10-12 NOTE — PROGRESS NOTES
Cardiology    10/13/2022  9:34 AM    Problem list  Patient Active Problem List   Diagnosis    Overweight (BMI 25.0-29.9)    History of cholecystectomy    History of mitral valve surgery    Lumbosacral strain    Cervical strain    Chronic pain syndrome    Hyperlipidemia    Left ankle pain    Weakness of left upper extremity    Impaired range of motion of left shoulder    Impaired functional mobility and activity tolerance    Atrial fibrillation    History of cardioversion    Anticoagulant long-term use    HFrEF (heart failure with reduced ejection fraction)    Preprocedural cardiovascular examination    Diverticulosis    Rheumatic mitral regurgitation    Mitral valve insufficiency and aortic valve insufficiency       CC:  A fib, mitral valve     HPI:  Will be having MV replacement in the next few weeks.  Has been feeling rather well- Not checking BP at home. No dizziness, lightheadedness or weakness.      Medications  Current Outpatient Medications   Medication Sig Dispense Refill    atorvastatin (LIPITOR) 10 MG tablet Take 1 tablet (10 mg total) by mouth once daily. 90 tablet 3    cyproheptadine (PERIACTIN) 4 mg tablet Half p.o. b.i.d.--not too sleepy 1 p.o. b.i.d. to increase appetite 60 tablet 5    dronedarone (MULTAQ) 400 mg Tab Take 1 tablet (400 mg total) by mouth 2 (two) times daily with meals. 60 tablet 11    ELIQUIS 5 mg Tab TAKE 1 TABLET(5 MG) BY MOUTH TWICE DAILY 60 tablet 6    gabapentin (NEURONTIN) 300 MG capsule gabapentin 300 mg capsule   take 1 cap PO qhs x 3 days, then take 2 cap PO QHS thereafter      losartan (COZAAR) 25 MG tablet Take 1 tablet (25 mg total) by mouth once daily. 30 tablet 11    metoprolol succinate (TOPROL-XL) 25 MG 24 hr tablet Take 1 tablet (25 mg total) by mouth once daily. 30 tablet 11     No current facility-administered medications for this visit.     Facility-Administered Medications Ordered in Other Visits   Medication Dose Route Frequency Provider Last Rate Last  Admin    sodium chloride 0.9% bolus 1,000 mL  1,000 mL Intravenous Once Alix Pederson NP          Prior to Admission medications    Medication Sig Start Date End Date Taking? Authorizing Provider   atorvastatin (LIPITOR) 10 MG tablet Take 1 tablet (10 mg total) by mouth once daily. 10/5/21 10/12/22 Yes Pantera Yadav MD   cyproheptadine (PERIACTIN) 4 mg tablet Half p.o. b.i.d.--not too sleepy 1 p.o. b.i.d. to increase appetite 4/25/22  Yes Pantera Yadav MD   dronedarone (MULTAQ) 400 mg Tab Take 1 tablet (400 mg total) by mouth 2 (two) times daily with meals. 3/29/22 3/29/23 Yes Alix Pederson NP   ELIQUIS 5 mg Tab TAKE 1 TABLET(5 MG) BY MOUTH TWICE DAILY 9/28/22  Yes Suzie Berrios DNP   gabapentin (NEURONTIN) 300 MG capsule gabapentin 300 mg capsule   take 1 cap PO qhs x 3 days, then take 2 cap PO QHS thereafter   Yes Historical Provider   losartan (COZAAR) 25 MG tablet Take 1 tablet (25 mg total) by mouth once daily. 6/1/22 6/1/23 Yes Suzie Berrios DNP   metoprolol succinate (TOPROL-XL) 25 MG 24 hr tablet Take 1 tablet (25 mg total) by mouth once daily. 6/1/22 6/1/23 Yes Suzie Berrios DNP   citalopram (CELEXA) 10 MG tablet Take 1 tablet (10 mg total) by mouth once daily. 2/23/22 3/29/22  Pantera Yadav MD   metoprolol tartrate (LOPRESSOR) 25 MG tablet Take 1 tablet (25 mg total) by mouth 2 (two) times daily. for 14 days 2/17/22 3/29/22  Liborio Hutchinson MD         History  Past Medical History:   Diagnosis Date    A-fib     Atrial fibrillation     Back pain     Hyperlipidemia     Mitral incompetence     Mitral valve stenosis     Neck pain      Past Surgical History:   Procedure Laterality Date    CARDIAC SURGERY      to repair mitral valve    CHOLECYSTECTOMY      COLONOSCOPY      COLONOSCOPY N/A 6/8/2022    Procedure: COLONOSCOPY;  Surgeon: Sanford Andres MD;  Location: [facility];  Service: Endoscopy;  Laterality: N/A;    CORONARY ANGIOGRAPHY N/A 8/29/2022    Procedure: ANGIOGRAM,  CORONARY ARTERY;  Surgeon: Lexa Harley MD;  Location: [facility] CATH LAB;  Service: Cardiology;  Laterality: N/A;    TRANSESOPHAGEAL ECHOCARDIOGRAPHY N/A 03/29/2022    Procedure: ECHOCARDIOGRAM, TRANSESOPHAGEAL;  Surgeon: Dominic Wallis MD;  Location: [facility] EP LAB;  Service: Cardiology;  Laterality: N/A;    TREATMENT OF CARDIAC ARRHYTHMIA N/A 03/29/2022    Procedure: Cardioversion or Defibrillation;  Surgeon: Elmo Jones MD;  Location: [facility] EP LAB;  Service: Cardiology;  Laterality: N/A;  afib, dccv, stanley, [place], [place], 3prep, Provider to perform      Social History     Socioeconomic History    Marital status: Single   Tobacco Use    Smoking status: Never    Smokeless tobacco: Never   Substance and Sexual Activity    Alcohol use: Not Currently    Drug use: No    Sexual activity: Yes     Partners: Male     Social Determinants of Health     Financial Resource Strain: Low Risk     Difficulty of Paying Living Expenses: Not hard at all   Food Insecurity: No Food Insecurity    Worried About Running Out of Food in the Last Year: Never true    Ran Out of Food in the Last Year: Never true   Transportation Needs: No Transportation Needs    Lack of Transportation (Medical): No    Lack of Transportation (Non-Medical): No   Stress: No Stress Concern Present    Feeling of Stress : Not at all   Social Connections: Unknown    Frequency of Communication with Friends and Family: Three times a week    Frequency of Social Gatherings with Friends and Family: Three times a week    Marital Status: Never    Housing Stability: Unknown    Unable to Pay for Housing in the Last Year: No    Unstable Housing in the Last Year: No         Allergies  Review of patient's allergies indicates:   Allergen Reactions    Amoxicillin Hives and Other (See Comments)         Review of Systems   Review of Systems   Constitutional: Negative for diaphoresis and malaise/fatigue.   HENT: Negative.     Cardiovascular:  Negative for chest pain, claudication,  dyspnea on exertion, irregular heartbeat, leg swelling, near-syncope, orthopnea, palpitations, paroxysmal nocturnal dyspnea and syncope.   Respiratory:  Negative for shortness of breath.    Endocrine: Negative for polydipsia, polyphagia and polyuria.   Hematologic/Lymphatic: Does not bruise/bleed easily.   Gastrointestinal:  Negative for bloating, nausea and vomiting.   Genitourinary: Negative.    Neurological:  Negative for excessive daytime sleepiness, dizziness, light-headedness, loss of balance and weakness.   Psychiatric/Behavioral:  The patient is not nervous/anxious.    Allergic/Immunologic: Negative.        Physical Exam  Wt Readings from Last 1 Encounters:   10/12/22 91.3 kg (201 lb 2.7 oz)     BP Readings from Last 3 Encounters:   10/12/22 90/62   10/12/22 (!) 106/58   08/29/22 117/70     Pulse Readings from Last 1 Encounters:   10/12/22 72     Body mass index is 29.15 kg/m².    Physical Exam  Vitals and nursing note reviewed.   Constitutional:       Appearance: Normal appearance.   HENT:      Head: Normocephalic and atraumatic.      Mouth/Throat:      Mouth: Mucous membranes are moist.   Eyes:      Pupils: Pupils are equal, round, and reactive to light.   Cardiovascular:      Rate and Rhythm: Normal rate and regular rhythm.      Pulses:           Radial pulses are 2+ on the right side and 2+ on the left side.        Dorsalis pedis pulses are 2+ on the right side and 2+ on the left side.        Posterior tibial pulses are 2+ on the right side and 2+ on the left side.      Heart sounds: No murmur heard.  Pulmonary:      Effort: Pulmonary effort is normal. No respiratory distress.      Breath sounds: Normal breath sounds.   Abdominal:      General: There is no distension.      Tenderness: There is no abdominal tenderness.   Musculoskeletal:      Cervical back: Normal range of motion.      Right lower leg: No edema.      Left lower leg: No edema.   Skin:     General: Skin is warm and dry.      Findings: No  erythema.   Neurological:      General: No focal deficit present.      Mental Status: She is alert.   Psychiatric:         Mood and Affect: Mood normal.         Behavior: Behavior normal.               Problem List Items Addressed This Visit          Cardiac/Vascular    Atrial fibrillation    Overview     On multaq  On eliquis  Continue as now         Current Assessment & Plan     As above         HFrEF (heart failure with reduced ejection fraction)    Overview     Repeat echo shows EF 40%   Ischemic workup negative, etiology likely a fib  Continue metoprolol and losartan- tolerating well.  She will contact us if her BPs become lower or she has any problems with dizziness/lightheadedness/syncope         Current Assessment & Plan     As above         Mitral valve insufficiency and aortic valve insufficiency    Overview     Will have procedure with Dr. Gilbert         Current Assessment & Plan     As above                @Suzie Berrios DNP

## 2022-10-13 PROBLEM — I08.0 MITRAL VALVE INSUFFICIENCY AND AORTIC VALVE INSUFFICIENCY: Status: ACTIVE | Noted: 2022-10-13

## 2022-10-20 ENCOUNTER — TELEPHONE (OUTPATIENT)
Dept: ELECTROPHYSIOLOGY | Facility: CLINIC | Age: 52
End: 2022-10-20
Payer: COMMERCIAL

## 2022-10-20 DIAGNOSIS — I05.0 MITRAL VALVE STENOSIS, UNSPECIFIED ETIOLOGY: Primary | ICD-10-CM

## 2022-10-20 NOTE — TELEPHONE ENCOUNTER
Spoke with patient regarding repeat echo now that she has been in sinus rhythm. She is agreeable. Would like for it to be done here at [facility] in the next week.

## 2022-10-21 ENCOUNTER — TELEPHONE (OUTPATIENT)
Dept: CARDIOTHORACIC SURGERY | Facility: CLINIC | Age: 52
End: 2022-10-21
Payer: COMMERCIAL

## 2022-10-21 NOTE — TELEPHONE ENCOUNTER
Attempted call to pt to review pre-op testing appointments. No answer, left VM with request for call back and provided my direct line.

## 2022-10-24 ENCOUNTER — HOSPITAL ENCOUNTER (OUTPATIENT)
Dept: CARDIOLOGY | Facility: HOSPITAL | Age: 52
Discharge: HOME | End: 2022-10-24
Attending: INTERNAL MEDICINE
Payer: COMMERCIAL

## 2022-10-24 ENCOUNTER — TELEPHONE (OUTPATIENT)
Dept: CARDIOTHORACIC SURGERY | Facility: CLINIC | Age: 52
End: 2022-10-24
Payer: COMMERCIAL

## 2022-10-24 VITALS
HEIGHT: 69 IN | WEIGHT: 201 LBS | HEART RATE: 65 BPM | DIASTOLIC BLOOD PRESSURE: 60 MMHG | BODY MASS INDEX: 29.77 KG/M2 | SYSTOLIC BLOOD PRESSURE: 106 MMHG

## 2022-10-24 DIAGNOSIS — I05.0 MITRAL VALVE STENOSIS, UNSPECIFIED ETIOLOGY: ICD-10-CM

## 2022-10-24 LAB
ASCENDING AORTA: 2.6 CM
AV INDEX (PROSTH): 0.55
AV MEAN GRADIENT: 6 MMHG
AV PEAK GRADIENT: 10 MMHG
AV VALVE AREA: 1.81 CM2
AV VELOCITY RATIO: 0.64
BSA FOR ECHO PROCEDURE: 2.11 M2
CV ECHO LV RWT: 0.36 CM
DOP CALC AO PEAK VEL: 1.57 M/S
DOP CALC AO VTI: 32.26 CM
DOP CALC LVOT AREA: 3.3 CM2
DOP CALC LVOT DIAMETER: 2.04 CM
DOP CALC LVOT PEAK VEL: 1 M/S
DOP CALC LVOT STROKE VOLUME: 58.31 CM3
DOP CALC MV VTI: 98 CM
DOP CALCLVOT PEAK VEL VTI: 17.85 CM
E WAVE DECELERATION TIME: 494.35 MSEC
E/A RATIO: 1.09
E/E' RATIO: 27.07 M/S
ECHO LV POSTERIOR WALL: 0.94 CM (ref 0.6–1.1)
EJECTION FRACTION: 50 %
FRACTIONAL SHORTENING: 30 % (ref 28–44)
HR MV ECHO: 72 BPM
INTERVENTRICULAR SEPTUM: 0.94 CM (ref 0.6–1.1)
LA MAJOR: 6.27 CM
LA MINOR: 6.39 CM
LA WIDTH: 4.98 CM
LEFT ATRIUM SIZE: 5.17 CM
LEFT ATRIUM VOLUME INDEX MOD: 39 ML/M2
LEFT ATRIUM VOLUME INDEX: 66.9 ML/M2
LEFT ATRIUM VOLUME MOD: 80.69 CM3
LEFT ATRIUM VOLUME: 138.52 CM3
LEFT INTERNAL DIMENSION IN SYSTOLE: 3.67 CM (ref 2.1–4)
LEFT VENTRICLE DIASTOLIC VOLUME INDEX: 63.85 ML/M2
LEFT VENTRICLE DIASTOLIC VOLUME: 132.16 ML
LEFT VENTRICLE MASS INDEX: 88 G/M2
LEFT VENTRICLE SYSTOLIC VOLUME INDEX: 27.5 ML/M2
LEFT VENTRICLE SYSTOLIC VOLUME: 56.87 ML
LEFT VENTRICULAR INTERNAL DIMENSION IN DIASTOLE: 5.25 CM (ref 3.5–6)
LEFT VENTRICULAR MASS: 181.78 G
LV LATERAL E/E' RATIO: 25.38 M/S
LV SEPTAL E/E' RATIO: 29 M/S
MV A" WAVE DURATION": 11.13 MSEC
MV MEAN GRADIENT: 10 MMHG
MV PEAK A VEL: 1.87 M/S
MV PEAK E VEL: 2.03 M/S
MV PEAK GRADIENT: 28 MMHG
MV STENOSIS PRESSURE HALF TIME: 143.36 MS
MV VALVE AREA BY CONTINUITY EQUATION: 0.6 CM2
MV VALVE AREA P 1/2 METHOD: 1.53 CM2
PISA MRMAX VEL: 0.05 M/S
PISA TR MAX VEL: 3.57 M/S
PULM VEIN S/D RATIO: 1.75
PV PEAK D VEL: 0.28 M/S
PV PEAK S VEL: 0.49 M/S
RA MAJOR: 5.57 CM
RA PRESSURE: 3 MMHG
RA WIDTH: 3.95 CM
RIGHT VENTRICULAR END-DIASTOLIC DIMENSION: 4.27 CM
RV TISSUE DOPPLER FREE WALL SYSTOLIC VELOCITY 1 (APICAL 4 CHAMBER VIEW): 11.2 CM/S
SINUS: 2.4 CM
STJ: 2.46 CM
TDI LATERAL: 0.08 M/S
TDI SEPTAL: 0.07 M/S
TDI: 0.08 M/S
TR MAX PG: 51 MMHG
TRICUSPID ANNULAR PLANE SYSTOLIC EXCURSION: 1.96 CM
TV REST PULMONARY ARTERY PRESSURE: 54 MMHG

## 2022-10-24 PROCEDURE — 93306 TTE W/DOPPLER COMPLETE: CPT

## 2022-10-24 PROCEDURE — 93306 ECHO (CUPID ONLY): ICD-10-PCS | Mod: 26,,, | Performed by: INTERNAL MEDICINE

## 2022-10-24 PROCEDURE — 93306 TTE W/DOPPLER COMPLETE: CPT | Mod: 26,,, | Performed by: INTERNAL MEDICINE

## 2022-10-24 NOTE — TELEPHONE ENCOUNTER
Called pt to review pre-op testing appointments which have been scheduled for Thursday, 11/3. Pt states she had a repeat echo today ordered by Dr. Jones and is awaiting results of that. She would still like to proceed with surgery as planned for 11/7 with pre-op testing on 11/3. Reviewed appointments and advised pt I will mail slips to her confirmed mailing address.     Also reviewed pt's medications. Pt will need to take last dose of Eliquis on 10/30 and last dose of losartan on 11/3. Pt verbalized understanding of all information.

## 2022-10-25 ENCOUNTER — TELEPHONE (OUTPATIENT)
Dept: ELECTROPHYSIOLOGY | Facility: CLINIC | Age: 52
End: 2022-10-25
Payer: COMMERCIAL

## 2022-10-25 ENCOUNTER — TELEPHONE (OUTPATIENT)
Dept: CARDIOLOGY | Facility: CLINIC | Age: 52
End: 2022-10-25
Payer: COMMERCIAL

## 2022-10-25 NOTE — TELEPHONE ENCOUNTER
Notified patient of echo results and reconfirmation that she should proceed with planned surgery with Dr. Gilbert. She understood.

## 2022-10-25 NOTE — TELEPHONE ENCOUNTER
----- Message from Elmo Jones MD sent at 10/24/2022 12:09 PM CDT -----  Samuel Tran and Gene    Mrs. Soto had her repeat ECHO. Looks better? Thoughts?    Elmo    ----- Message -----  From: Bert Garcia MD  Sent: 10/24/2022   9:52 AM CDT  To: Elmo Jones MD

## 2022-10-25 NOTE — TELEPHONE ENCOUNTER
----- Message from Chelsea Sears MD sent at 10/25/2022  8:36 AM CDT -----  Please inform the patient that her echo shows some improvement in her heart function, but overall the changes are likely only temporary. I agree that she would benefit form heart surgery as scheduled.  I have sent two notes to DR. Jones and Ofelia addressing these findings.

## 2022-10-25 NOTE — PROGRESS NOTES
Please inform the patient that her echo shows some improvement in her heart function, but overall the changes are likely only temporary. I agree that she would benefit form heart surgery as scheduled.  I have sent two notes to DR. Jones and Ofelia addressing these findings.

## 2022-10-28 ENCOUNTER — TELEPHONE (OUTPATIENT)
Dept: CARDIOTHORACIC SURGERY | Facility: CLINIC | Age: 52
End: 2022-10-28
Payer: COMMERCIAL

## 2022-10-28 NOTE — TELEPHONE ENCOUNTER
Called pt to remind her of last dose of Eliquis which should be taken on Sunday, 10/30. Pt verbalized understanding of this information.

## 2022-11-03 ENCOUNTER — HOSPITAL ENCOUNTER (OUTPATIENT)
Dept: PULMONOLOGY | Facility: CLINIC | Age: 52
Discharge: HOME | End: 2022-11-03
Payer: COMMERCIAL

## 2022-11-03 ENCOUNTER — OFFICE VISIT (OUTPATIENT)
Dept: CARDIOTHORACIC SURGERY | Facility: CLINIC | Age: 52
End: 2022-11-03
Payer: COMMERCIAL

## 2022-11-03 ENCOUNTER — HOSPITAL ENCOUNTER (OUTPATIENT)
Dept: CARDIOLOGY | Facility: CLINIC | Age: 52
Discharge: HOME | End: 2022-11-03
Payer: COMMERCIAL

## 2022-11-03 ENCOUNTER — HOSPITAL ENCOUNTER (OUTPATIENT)
Dept: VASCULAR SURGERY | Facility: CLINIC | Age: 52
Discharge: HOME | End: 2022-11-03
Attending: THORACIC SURGERY (CARDIOTHORACIC VASCULAR SURGERY)
Payer: COMMERCIAL

## 2022-11-03 ENCOUNTER — HOSPITAL ENCOUNTER (OUTPATIENT)
Dept: RADIOLOGY | Facility: HOSPITAL | Age: 52
Discharge: HOME | End: 2022-11-03
Attending: THORACIC SURGERY (CARDIOTHORACIC VASCULAR SURGERY)
Payer: COMMERCIAL

## 2022-11-03 VITALS
OXYGEN SATURATION: 99 % | WEIGHT: 204.56 LBS | DIASTOLIC BLOOD PRESSURE: 57 MMHG | SYSTOLIC BLOOD PRESSURE: 109 MMHG | RESPIRATION RATE: 18 BRPM | HEIGHT: 69 IN | HEART RATE: 72 BPM | BODY MASS INDEX: 30.3 KG/M2

## 2022-11-03 DIAGNOSIS — I48.0 PAROXYSMAL ATRIAL FIBRILLATION: ICD-10-CM

## 2022-11-03 DIAGNOSIS — I48.91 ATRIAL FIBRILLATION, UNSPECIFIED TYPE: Primary | ICD-10-CM

## 2022-11-03 DIAGNOSIS — Z01.818 PRE-OP TESTING: ICD-10-CM

## 2022-11-03 DIAGNOSIS — I05.1 RHEUMATIC MITRAL REGURGITATION: ICD-10-CM

## 2022-11-03 DIAGNOSIS — I07.1 TRICUSPID VALVE INSUFFICIENCY, UNSPECIFIED ETIOLOGY: ICD-10-CM

## 2022-11-03 LAB
DLCO SINGLE BREATH LLN: 20.52
DLCO SINGLE BREATH PRE REF: 59.3 %
DLCO SINGLE BREATH REF: 26.25
DLCOC SBVA LLN: 3.37
DLCOC SBVA REF: 4.68
DLCOC SINGLE BREATH LLN: 20.52
DLCOC SINGLE BREATH REF: 26.25
DLCOCSBVAULN: 5.99
DLCOCSINGLEBREATHULN: 31.98
DLCOSINGLEBREATHULN: 31.98
DLCOVA LLN: 3.37
DLCOVA PRE REF: 105.2 %
DLCOVA PRE: 4.92 ML/(MIN*MMHG*L) (ref 3.37–5.99)
DLCOVA REF: 4.68
DLCOVAULN: 5.99
FEF 25 75 LLN: 1.17
FEF 25 75 PRE REF: 67.6 %
FEF 25 75 REF: 2.49
FEV05 LLN: 1.28
FEV05 REF: 2.13
FEV1 FVC LLN: 69
FEV1 FVC PRE REF: 99.6 %
FEV1 FVC REF: 80
FEV1 LLN: 1.97
FEV1 PRE REF: 61 %
FEV1 REF: 2.64
FVC LLN: 2.51
FVC PRE REF: 60.9 %
FVC REF: 3.31
IVC PRE: 1.92 L (ref 2.51–4.15)
IVC SINGLE BREATH LLN: 2.51
IVC SINGLE BREATH PRE REF: 58.1 %
IVC SINGLE BREATH REF: 3.31
IVCSINGLEBREATHULN: 4.15
MVV LLN: 99
MVV PRE REF: 49.1 %
MVV REF: 116
PEF LLN: 4.44
PEF PRE REF: 80.5 %
PEF REF: 6.7
PHYSICIAN COMMENT: ABNORMAL
PRE DLCO: 15.58 ML/(MIN*MMHG) (ref 20.52–31.98)
PRE FEF 25 75: 1.68 L/S (ref 1.17–4.29)
PRE FET 100: 6.07 SEC
PRE FEV05 REF: 65.3 %
PRE FEV1 FVC: 79.73 % (ref 69.12–89.39)
PRE FEV1: 1.61 L (ref 1.97–3.27)
PRE FEV5: 1.39 L (ref 1.28–2.99)
PRE FVC: 2.02 L (ref 2.51–4.15)
PRE MVV: 56.92 L/MIN (ref 98.51–133.28)
PRE PEF: 5.4 L/S (ref 4.44–8.97)
VA PRE: 3.16 L (ref 5.46–5.46)
VA SINGLE BREATH LLN: 5.46
VA SINGLE BREATH PRE REF: 57.9 %
VA SINGLE BREATH REF: 5.46
VASINGLEBREATHULN: 5.46

## 2022-11-03 PROCEDURE — 86920 COMPATIBILITY TEST SPIN: CPT | Performed by: STUDENT IN AN ORGANIZED HEALTH CARE EDUCATION/TRAINING PROGRAM

## 2022-11-03 PROCEDURE — 3078F DIAST BP <80 MM HG: CPT | Mod: CPTII,S$GLB,, | Performed by: THORACIC SURGERY (CARDIOTHORACIC VASCULAR SURGERY)

## 2022-11-03 PROCEDURE — 93880 PR DUPLEX SCAN EXTRACRANIAL,BILAT: ICD-10-PCS | Mod: S$GLB,,, | Performed by: SURGERY

## 2022-11-03 PROCEDURE — 71046 X-RAY EXAM CHEST 2 VIEWS: CPT | Mod: TC,FY

## 2022-11-03 PROCEDURE — 94729 PR C02/MEMBANE DIFFUSE CAPACITY: ICD-10-PCS | Mod: S$GLB,,, | Performed by: INTERNAL MEDICINE

## 2022-11-03 PROCEDURE — 93010 ELECTROCARDIOGRAM REPORT: CPT | Mod: S$GLB,,, | Performed by: INTERNAL MEDICINE

## 2022-11-03 PROCEDURE — 71250 CT CHEST WITHOUT CONTRAST: ICD-10-PCS | Mod: 26,,, | Performed by: RADIOLOGY

## 2022-11-03 PROCEDURE — 99499 UNLISTED E&M SERVICE: CPT | Mod: S$GLB,,, | Performed by: THORACIC SURGERY (CARDIOTHORACIC VASCULAR SURGERY)

## 2022-11-03 PROCEDURE — 3008F PR BODY MASS INDEX (BMI) DOCUMENTED: ICD-10-PCS | Mod: CPTII,S$GLB,, | Performed by: THORACIC SURGERY (CARDIOTHORACIC VASCULAR SURGERY)

## 2022-11-03 PROCEDURE — 99499 NO LOS: ICD-10-PCS | Mod: S$GLB,,, | Performed by: THORACIC SURGERY (CARDIOTHORACIC VASCULAR SURGERY)

## 2022-11-03 PROCEDURE — 71250 CT THORAX DX C-: CPT | Mod: TC

## 2022-11-03 PROCEDURE — 71250 CT THORAX DX C-: CPT | Mod: 26,,, | Performed by: RADIOLOGY

## 2022-11-03 PROCEDURE — 71046 X-RAY EXAM CHEST 2 VIEWS: CPT | Mod: 26,,, | Performed by: RADIOLOGY

## 2022-11-03 PROCEDURE — 94010 BREATHING CAPACITY TEST: CPT | Mod: S$GLB,,, | Performed by: INTERNAL MEDICINE

## 2022-11-03 PROCEDURE — 3044F HG A1C LEVEL LT 7.0%: CPT | Mod: CPTII,S$GLB,, | Performed by: THORACIC SURGERY (CARDIOTHORACIC VASCULAR SURGERY)

## 2022-11-03 PROCEDURE — 3074F SYST BP LT 130 MM HG: CPT | Mod: CPTII,S$GLB,, | Performed by: THORACIC SURGERY (CARDIOTHORACIC VASCULAR SURGERY)

## 2022-11-03 PROCEDURE — 93005 ELECTROCARDIOGRAM TRACING: CPT | Mod: S$GLB,,, | Performed by: THORACIC SURGERY (CARDIOTHORACIC VASCULAR SURGERY)

## 2022-11-03 PROCEDURE — 1159F MED LIST DOCD IN RCRD: CPT | Mod: CPTII,S$GLB,, | Performed by: THORACIC SURGERY (CARDIOTHORACIC VASCULAR SURGERY)

## 2022-11-03 PROCEDURE — 94010 BREATHING CAPACITY TEST: ICD-10-PCS | Mod: S$GLB,,, | Performed by: INTERNAL MEDICINE

## 2022-11-03 PROCEDURE — 99999 PR PBB SHADOW E&M-EST. PATIENT-LVL III: ICD-10-PCS | Mod: PBBFAC,,, | Performed by: THORACIC SURGERY (CARDIOTHORACIC VASCULAR SURGERY)

## 2022-11-03 PROCEDURE — 4010F ACE/ARB THERAPY RXD/TAKEN: CPT | Mod: CPTII,S$GLB,, | Performed by: THORACIC SURGERY (CARDIOTHORACIC VASCULAR SURGERY)

## 2022-11-03 PROCEDURE — 3044F PR MOST RECENT HEMOGLOBIN A1C LEVEL <7.0%: ICD-10-PCS | Mod: CPTII,S$GLB,, | Performed by: THORACIC SURGERY (CARDIOTHORACIC VASCULAR SURGERY)

## 2022-11-03 PROCEDURE — 93010 EKG 12-LEAD: ICD-10-PCS | Mod: S$GLB,,, | Performed by: INTERNAL MEDICINE

## 2022-11-03 PROCEDURE — 93005 EKG 12-LEAD: ICD-10-PCS | Mod: S$GLB,,, | Performed by: THORACIC SURGERY (CARDIOTHORACIC VASCULAR SURGERY)

## 2022-11-03 PROCEDURE — 3008F BODY MASS INDEX DOCD: CPT | Mod: CPTII,S$GLB,, | Performed by: THORACIC SURGERY (CARDIOTHORACIC VASCULAR SURGERY)

## 2022-11-03 PROCEDURE — 3074F PR MOST RECENT SYSTOLIC BLOOD PRESSURE < 130 MM HG: ICD-10-PCS | Mod: CPTII,S$GLB,, | Performed by: THORACIC SURGERY (CARDIOTHORACIC VASCULAR SURGERY)

## 2022-11-03 PROCEDURE — 4010F PR ACE/ARB THEARPY RXD/TAKEN: ICD-10-PCS | Mod: CPTII,S$GLB,, | Performed by: THORACIC SURGERY (CARDIOTHORACIC VASCULAR SURGERY)

## 2022-11-03 PROCEDURE — 3078F PR MOST RECENT DIASTOLIC BLOOD PRESSURE < 80 MM HG: ICD-10-PCS | Mod: CPTII,S$GLB,, | Performed by: THORACIC SURGERY (CARDIOTHORACIC VASCULAR SURGERY)

## 2022-11-03 PROCEDURE — 1159F PR MEDICATION LIST DOCUMENTED IN MEDICAL RECORD: ICD-10-PCS | Mod: CPTII,S$GLB,, | Performed by: THORACIC SURGERY (CARDIOTHORACIC VASCULAR SURGERY)

## 2022-11-03 PROCEDURE — 71046 XR CHEST PA AND LATERAL PRE-OP: ICD-10-PCS | Mod: 26,,, | Performed by: RADIOLOGY

## 2022-11-03 PROCEDURE — 99999 PR PBB SHADOW E&M-EST. PATIENT-LVL III: CPT | Mod: PBBFAC,,, | Performed by: THORACIC SURGERY (CARDIOTHORACIC VASCULAR SURGERY)

## 2022-11-03 PROCEDURE — 94729 DIFFUSING CAPACITY: CPT | Mod: S$GLB,,, | Performed by: INTERNAL MEDICINE

## 2022-11-03 PROCEDURE — 93880 EXTRACRANIAL BILAT STUDY: CPT | Mod: S$GLB,,, | Performed by: SURGERY

## 2022-11-03 RX ORDER — ONDANSETRON 2 MG/ML
4 INJECTION INTRAMUSCULAR; INTRAVENOUS EVERY 12 HOURS PRN
Status: CANCELLED | OUTPATIENT
Start: 2022-11-03

## 2022-11-03 RX ORDER — POTASSIUM CHLORIDE 14.9 MG/ML
60 INJECTION INTRAVENOUS
Status: CANCELLED | OUTPATIENT
Start: 2022-11-03

## 2022-11-03 RX ORDER — MUPIROCIN 20 MG/G
1 OINTMENT TOPICAL
Status: CANCELLED | OUTPATIENT
Start: 2022-11-03

## 2022-11-03 RX ORDER — MUPIROCIN 20 MG/G
1 OINTMENT TOPICAL 2 TIMES DAILY
Status: CANCELLED | OUTPATIENT
Start: 2022-11-03 | End: 2022-11-08

## 2022-11-03 RX ORDER — METOPROLOL TARTRATE 25 MG/1
25 TABLET, FILM COATED ORAL
Status: CANCELLED | OUTPATIENT
Start: 2022-11-03

## 2022-11-03 RX ORDER — ASPIRIN 325 MG
325 TABLET ORAL DAILY
Status: CANCELLED | OUTPATIENT
Start: 2022-11-03

## 2022-11-03 RX ORDER — DOCUSATE SODIUM 100 MG/1
100 CAPSULE, LIQUID FILLED ORAL 2 TIMES DAILY
Status: CANCELLED | OUTPATIENT
Start: 2022-11-03

## 2022-11-03 RX ORDER — POTASSIUM CHLORIDE 14.9 MG/ML
20 INJECTION INTRAVENOUS
Status: CANCELLED | OUTPATIENT
Start: 2022-11-03

## 2022-11-03 RX ORDER — MAGNESIUM SULFATE HEPTAHYDRATE 40 MG/ML
4 INJECTION, SOLUTION INTRAVENOUS
Status: CANCELLED | OUTPATIENT
Start: 2022-11-03

## 2022-11-03 RX ORDER — OXYCODONE HYDROCHLORIDE 10 MG/1
10 TABLET ORAL EVERY 4 HOURS PRN
Status: CANCELLED | OUTPATIENT
Start: 2022-11-03

## 2022-11-03 RX ORDER — POLYETHYLENE GLYCOL 3350 17 G/17G
17 POWDER, FOR SOLUTION ORAL DAILY
Status: CANCELLED | OUTPATIENT
Start: 2022-11-04

## 2022-11-03 RX ORDER — SODIUM CHLORIDE 0.9 % (FLUSH) 0.9 %
10 SYRINGE (ML) INJECTION
Status: CANCELLED | OUTPATIENT
Start: 2022-11-03

## 2022-11-03 RX ORDER — CLINDAMYCIN PHOSPHATE 900 MG/50ML
900 INJECTION, SOLUTION INTRAVENOUS
Status: CANCELLED | OUTPATIENT
Start: 2022-11-03 | End: 2022-11-05

## 2022-11-03 RX ORDER — ASPIRIN 325 MG
325 TABLET, DELAYED RELEASE (ENTERIC COATED) ORAL DAILY
Status: CANCELLED | OUTPATIENT
Start: 2022-11-04

## 2022-11-03 RX ORDER — FENTANYL CITRATE 50 UG/ML
25 INJECTION, SOLUTION INTRAMUSCULAR; INTRAVENOUS
Status: CANCELLED | OUTPATIENT
Start: 2022-11-03

## 2022-11-03 RX ORDER — CLINDAMYCIN PHOSPHATE 900 MG/50ML
900 INJECTION, SOLUTION INTRAVENOUS
Status: CANCELLED | OUTPATIENT
Start: 2022-11-03

## 2022-11-03 RX ORDER — ALBUMIN HUMAN 50 G/1000ML
25 SOLUTION INTRAVENOUS ONCE AS NEEDED
Status: CANCELLED | OUTPATIENT
Start: 2022-11-03 | End: 2034-04-01

## 2022-11-03 RX ORDER — LIDOCAINE HYDROCHLORIDE 10 MG/ML
1 INJECTION, SOLUTION EPIDURAL; INFILTRATION; INTRACAUDAL; PERINEURAL
Status: CANCELLED | OUTPATIENT
Start: 2022-11-03

## 2022-11-03 RX ORDER — PROPOFOL 10 MG/ML
0-50 INJECTION, EMULSION INTRAVENOUS CONTINUOUS
Status: CANCELLED | OUTPATIENT
Start: 2022-11-03

## 2022-11-03 RX ORDER — BISACODYL 10 MG
10 SUPPOSITORY, RECTAL RECTAL DAILY PRN
Status: CANCELLED | OUTPATIENT
Start: 2022-11-03

## 2022-11-03 RX ORDER — IPRATROPIUM BROMIDE AND ALBUTEROL SULFATE 2.5; .5 MG/3ML; MG/3ML
3 SOLUTION RESPIRATORY (INHALATION) EVERY 4 HOURS PRN
Status: CANCELLED | OUTPATIENT
Start: 2022-11-03 | End: 2022-11-04

## 2022-11-03 RX ORDER — ATORVASTATIN CALCIUM 40 MG/1
40 TABLET, FILM COATED ORAL NIGHTLY
Status: CANCELLED | OUTPATIENT
Start: 2022-11-03

## 2022-11-03 RX ORDER — IPRATROPIUM BROMIDE AND ALBUTEROL SULFATE 2.5; .5 MG/3ML; MG/3ML
3 SOLUTION RESPIRATORY (INHALATION) EVERY 4 HOURS
Status: CANCELLED | OUTPATIENT
Start: 2022-11-03 | End: 2022-11-04

## 2022-11-03 RX ORDER — ASPIRIN 325 MG
325 TABLET ORAL ONCE
Status: CANCELLED | OUTPATIENT
Start: 2022-11-03 | End: 2022-11-03

## 2022-11-03 RX ORDER — FENTANYL CITRATE 50 UG/ML
50 INJECTION, SOLUTION INTRAMUSCULAR; INTRAVENOUS
Status: CANCELLED | OUTPATIENT
Start: 2022-11-06

## 2022-11-03 RX ORDER — METOCLOPRAMIDE HYDROCHLORIDE 5 MG/ML
5 INJECTION INTRAMUSCULAR; INTRAVENOUS EVERY 6 HOURS PRN
Status: CANCELLED | OUTPATIENT
Start: 2022-11-03

## 2022-11-03 RX ORDER — FAMOTIDINE 10 MG/ML
20 INJECTION INTRAVENOUS 2 TIMES DAILY
Status: CANCELLED | OUTPATIENT
Start: 2022-11-03

## 2022-11-03 RX ORDER — FENTANYL CITRATE 50 UG/ML
25 INJECTION, SOLUTION INTRAMUSCULAR; INTRAVENOUS
Status: CANCELLED | OUTPATIENT
Start: 2022-11-03 | End: 2022-11-05

## 2022-11-03 RX ORDER — POTASSIUM CHLORIDE 20 MEQ/1
20 TABLET, EXTENDED RELEASE ORAL EVERY 12 HOURS
Status: CANCELLED | OUTPATIENT
Start: 2022-11-03

## 2022-11-03 RX ORDER — OXYCODONE HYDROCHLORIDE 5 MG/1
5 TABLET ORAL EVERY 4 HOURS PRN
Status: CANCELLED | OUTPATIENT
Start: 2022-11-03

## 2022-11-03 RX ORDER — POTASSIUM CHLORIDE 29.8 MG/ML
40 INJECTION INTRAVENOUS
Status: CANCELLED | OUTPATIENT
Start: 2022-11-03

## 2022-11-03 RX ORDER — FAMOTIDINE 20 MG/1
20 TABLET, FILM COATED ORAL 2 TIMES DAILY
Status: CANCELLED | OUTPATIENT
Start: 2022-11-03

## 2022-11-03 RX ORDER — MAGNESIUM SULFATE HEPTAHYDRATE 40 MG/ML
2 INJECTION, SOLUTION INTRAVENOUS
Status: CANCELLED | OUTPATIENT
Start: 2022-11-03

## 2022-11-03 RX ORDER — ACETAMINOPHEN 325 MG/1
650 TABLET ORAL EVERY 4 HOURS PRN
Status: CANCELLED | OUTPATIENT
Start: 2022-11-03

## 2022-11-03 RX ORDER — SODIUM CHLORIDE 9 MG/ML
INJECTION, SOLUTION INTRAVENOUS CONTINUOUS
Status: CANCELLED | OUTPATIENT
Start: 2022-11-03

## 2022-11-03 RX ORDER — ASPIRIN 300 MG/1
300 SUPPOSITORY RECTAL ONCE AS NEEDED
Status: CANCELLED | OUTPATIENT
Start: 2022-11-03 | End: 2034-04-01

## 2022-11-03 RX ORDER — DEXTROSE MONOHYDRATE, SODIUM CHLORIDE, AND POTASSIUM CHLORIDE 50; 1.49; 4.5 G/1000ML; G/1000ML; G/1000ML
INJECTION, SOLUTION INTRAVENOUS CONTINUOUS
Status: CANCELLED | OUTPATIENT
Start: 2022-11-03

## 2022-11-03 NOTE — H&P (VIEW-ONLY)
Subjective:      Patient ID: Riya Soto is a 52 y.o. female.    Chief Complaint: No chief complaint on file.      HPI:  Riya Soto is a 52 y.o. female who presents for pre-op Mitral valve replacement, tricuspid valve repair, and MAZE. Medical conditions include MV disease? (unclear etiology/pathology, reports possible rheumatic fever as a child), s/p mitral valve repair in 2011 at [facility] (she thinks the surgeon was Dr. Garcia), symptomatic persistent AF also with systolic dysfunction (LVEF of 40%). She was in her usual state of health until the past few months when she began having palpitations and fatigue. She then presented to urgent care on 2/17/2022 with feeling unwell (body aches, fatigue, wanted to get checked for COVID) and was discovered to be in atrial fibrillation. She was sent to the ER. She was discharged. She then saw Suzie Berrios in cardiology clinic who initiated eliquis and referred for evaluation. An echocardiogram was recently performed noting a LVEF of 40% with mild-mod MR and mild-mod MS with reduced anterior mitral leaflet mobility. She was also initiated on metoprolol, which has been limited to low dosing due to hypotension. A 24 hour holter monitor noted persistent AF with an average ventricular rate of 89 bpm with frequent PVCs (2% burden). Was cardioverted in March and started on Multaq. Reports that she has no shortness of breath and is asymptomatic since her cardioversion. Denies any lower extremity swelling, dizziness, chest pain, orthopnea, or palpitations. No prior strokes, seizures, blood clots, stents. No smoking history. Reports that she does not drink daily but every now and then has a daiquiri.     Family and social history reviewed  Tbili 1.2      Current Outpatient Medications   Medication Instructions    atorvastatin (LIPITOR) 10 mg, Oral, Daily    cyproheptadine (PERIACTIN) 4 mg tablet Half p.o. b.i.d.--not too sleepy 1 p.o. b.i.d. to increase  appetite    dronedarone (MULTAQ) 400 mg, Oral, 2 times daily with meals    ELIQUIS 5 mg Tab TAKE 1 TABLET(5 MG) BY MOUTH TWICE DAILY    gabapentin (NEURONTIN) 300 MG capsule gabapentin 300 mg capsule   take 1 cap PO qhs x 3 days, then take 2 cap PO QHS thereafter    losartan (COZAAR) 25 mg, Oral, Daily    metoprolol succinate (TOPROL-XL) 25 mg, Oral, Daily         Review of patient's allergies indicates:   Allergen Reactions    Amoxicillin Hives and Other (See Comments)     Past Medical History:   Diagnosis Date    A-fib     Atrial fibrillation     Back pain     Hyperlipidemia     Mitral incompetence     Mitral valve stenosis     Neck pain      Past Surgical History:   Procedure Laterality Date    CARDIAC SURGERY      to repair mitral valve    CHOLECYSTECTOMY      COLONOSCOPY      COLONOSCOPY N/A 6/8/2022    Procedure: COLONOSCOPY;  Surgeon: Sanford Andres MD;  Location: [facility] ENDO;  Service: Endoscopy;  Laterality: N/A;    CORONARY ANGIOGRAPHY N/A 8/29/2022    Procedure: ANGIOGRAM, CORONARY ARTERY;  Surgeon: Lexa Harley MD;  Location: [facility] CATH LAB;  Service: Cardiology;  Laterality: N/A;    TRANSESOPHAGEAL ECHOCARDIOGRAPHY N/A 03/29/2022    Procedure: ECHOCARDIOGRAM, TRANSESOPHAGEAL;  Surgeon: Dominic Wallis MD;  Location: [facility] EP LAB;  Service: Cardiology;  Laterality: N/A;    TREATMENT OF CARDIAC ARRHYTHMIA N/A 03/29/2022    Procedure: Cardioversion or Defibrillation;  Surgeon: Elmo Jones MD;  Location: [facility] EP LAB;  Service: Cardiology;  Laterality: N/A;  afib, dccv, stanley, [place], [place], 3prep, Provider to perform      Family History       Problem Relation (Age of Onset)    Breast cancer Sister    Colon cancer Maternal Aunt          Social History     Socioeconomic History    Marital status: Single   Tobacco Use    Smoking status: Never    Smokeless tobacco: Never   Substance and Sexual Activity    Alcohol use: Not Currently    Drug use: No    Sexual activity: Yes     Partners: Male     Social  Determinants of Health     Financial Resource Strain: Low Risk     Difficulty of Paying Living Expenses: Not hard at all   Food Insecurity: No Food Insecurity    Worried About Running Out of Food in the Last Year: Never true    Ran Out of Food in the Last Year: Never true   Transportation Needs: No Transportation Needs    Lack of Transportation (Medical): No    Lack of Transportation (Non-Medical): No   Stress: No Stress Concern Present    Feeling of Stress : Not at all   Social Connections: Unknown    Frequency of Communication with Friends and Family: Three times a week    Frequency of Social Gatherings with Friends and Family: Three times a week    Marital Status: Never    Housing Stability: Unknown    Unable to Pay for Housing in the Last Year: No    Unstable Housing in the Last Year: No       Current medications Reviewed    Review of Systems   Constitutional:  Negative for activity change.   HENT:  Negative for nosebleeds.    Eyes:  Negative for visual disturbance.   Respiratory:  Negative for shortness of breath.    Cardiovascular:  Negative for chest pain.   Gastrointestinal:  Negative for nausea.   Musculoskeletal:  Negative for gait problem.   Skin:  Negative for color change.   Neurological:  Negative for dizziness and seizures.   Hematological:  Does not bruise/bleed easily.   Psychiatric/Behavioral:  Negative for sleep disturbance.    Objective:   Physical Exam  Constitutional:       General: She is not in acute distress.  HENT:      Head: Normocephalic and atraumatic.   Eyes:      Pupils: Pupils are equal, round, and reactive to light.   Cardiovascular:      Rate and Rhythm: Normal rate.   Pulmonary:      Effort: Pulmonary effort is normal. No respiratory distress.   Abdominal:      General: There is no distension.   Musculoskeletal:         General: Normal range of motion.      Cervical back: Normal range of motion.   Skin:     Coloration: Skin is not pale.   Neurological:      General: No focal  deficit present.      Mental Status: She is alert.   Psychiatric:         Mood and Affect: Mood normal.         Behavior: Behavior normal.       Diagnostic Results: reviewed   CT Chest reviewed     Carotid US 11/3/22  RIGHT SIDE:   1-39% Right ICA stenosis.   Minimal Homogeneous plaque noted in the right internal carotid artery.   Antegrade flow noted in the right vertebral artery.     LEFT SIDE:   1-39% Left ICA stenosis.   Minimal Homogeneous plaque noted in the left internal carotid artery.   Antegrade flow noted in the left vertebral artery.     TTE 10/24/22  Severe left atrial enlargement.  The left ventricle is mildly enlarged with low normal systolic function.  The estimated ejection fraction is 50%.  Mild right atrial enlargement.  Normal right ventricular size with normal right ventricular systolic function.  The mitral valve is s/p repair. The mean pressure gradient across the mitral valve is 9 mmHg at a heart rate of 72 bpm.  Mild-to-moderate mitral regurgitation.  Moderate to severe tricuspid regurgitation.  There is pulmonary hypertension. The estimated PA systolic pressure is 54 mmHg.  Normal central venous pressure (3 mmHg).      Exercise Stress 6/27/22    The EKG portion of this study is negative for ischemia.    The patient reported no chest pain during the stress test.    The blood pressure response to stress was normal.    During stress, frequent PVCs are noted.  Ventricular bigeminy noted in recovery.     TTE 5/31/22  The left ventricle is mildly enlarged with mildly decreased systolic function.  The estimated ejection fraction is 40%.  Grade II left ventricular diastolic dysfunction.  There is mild left ventricular global hypokinesis.  Mild right ventricular enlargement with mildly reduced right ventricular systolic function.  Severe left atrial enlargement.  Moderate right atrial enlargement.  The mitral valve is s/p repair.  The mean diastolic gradient across the mitral valve is 11.4 mmHg at a  heart rate of 70 bpm.  There is moderate mitral stenosis.  Moderate-to-severe mitral regurgitation.  Moderate to severe tricuspid regurgitation.  Elevated central venous pressure (15 mmHg).  The estimated PA systolic pressure is 51 mmHg.  There is pulmonary hypertension.     JUAN 3/29/22  The left ventricle has mildly decreased systolic function. The estimated ejection fraction is 45%.  Normal right ventricular size with normal right ventricular systolic function.  Biatrial enlargement.  No thrombus is present in the appendage.  There is mild mitral stenosis.  Mitral valve s/p repair with annular ring, the mean diastolic gradient is 6 mmHg at a heart rate of 73 bpm.  Moderate to severe tricuspid regurgitation.  Grade 2 plaque present.    NYHA 1    Assessment:   MR   TR  AFIb   Re-do sternotomy   Plan:     CTS Attending Note:    I have personally taken the history and examined this patient and agree with the SAMM's note as stated above.  Very pleasant 52-year-old woman with mixed mitral stenosis and mitral regurgitation as well as severe tricuspid regurgitation and atrial fibrillation.  We plan redo sternotomy, mitral valve replacement with a mechanical prosthesis, tricuspid valve repair, and Maze procedure.  After discussion of the advantages and disadvantages of tissue and mechanical prostheses she indicated she desires a mechanical valve.  Her questions have been answered, and she wishes to proceed.

## 2022-11-03 NOTE — PROGRESS NOTES
Subjective:      Patient ID: Riya Soto is a 52 y.o. female.    Chief Complaint: No chief complaint on file.      HPI:  Riya Soto is a 52 y.o. female who presents for pre-op Mitral valve replacement, tricuspid valve repair, and MAZE. Medical conditions include MV disease? (unclear etiology/pathology, reports possible rheumatic fever as a child), s/p mitral valve repair in 2011 at [facility] (she thinks the surgeon was Dr. Garcia), symptomatic persistent AF also with systolic dysfunction (LVEF of 40%). She was in her usual state of health until the past few months when she began having palpitations and fatigue. She then presented to urgent care on 2/17/2022 with feeling unwell (body aches, fatigue, wanted to get checked for COVID) and was discovered to be in atrial fibrillation. She was sent to the ER. She was discharged. She then saw Suzie Berrios in cardiology clinic who initiated eliquis and referred for evaluation. An echocardiogram was recently performed noting a LVEF of 40% with mild-mod MR and mild-mod MS with reduced anterior mitral leaflet mobility. She was also initiated on metoprolol, which has been limited to low dosing due to hypotension. A 24 hour holter monitor noted persistent AF with an average ventricular rate of 89 bpm with frequent PVCs (2% burden). Was cardioverted in March and started on Multaq. Reports that she has no shortness of breath and is asymptomatic since her cardioversion. Denies any lower extremity swelling, dizziness, chest pain, orthopnea, or palpitations. No prior strokes, seizures, blood clots, stents. No smoking history. Reports that she does not drink daily but every now and then has a daiquiri.     Family and social history reviewed  Tbili 1.2      Current Outpatient Medications   Medication Instructions    atorvastatin (LIPITOR) 10 mg, Oral, Daily    cyproheptadine (PERIACTIN) 4 mg tablet Half p.o. b.i.d.--not too sleepy 1 p.o. b.i.d. to increase  appetite    dronedarone (MULTAQ) 400 mg, Oral, 2 times daily with meals    ELIQUIS 5 mg Tab TAKE 1 TABLET(5 MG) BY MOUTH TWICE DAILY    gabapentin (NEURONTIN) 300 MG capsule gabapentin 300 mg capsule   take 1 cap PO qhs x 3 days, then take 2 cap PO QHS thereafter    losartan (COZAAR) 25 mg, Oral, Daily    metoprolol succinate (TOPROL-XL) 25 mg, Oral, Daily         Review of patient's allergies indicates:   Allergen Reactions    Amoxicillin Hives and Other (See Comments)     Past Medical History:   Diagnosis Date    A-fib     Atrial fibrillation     Back pain     Hyperlipidemia     Mitral incompetence     Mitral valve stenosis     Neck pain      Past Surgical History:   Procedure Laterality Date    CARDIAC SURGERY      to repair mitral valve    CHOLECYSTECTOMY      COLONOSCOPY      COLONOSCOPY N/A 6/8/2022    Procedure: COLONOSCOPY;  Surgeon: Sanford Andres MD;  Location: [facility] ENDO;  Service: Endoscopy;  Laterality: N/A;    CORONARY ANGIOGRAPHY N/A 8/29/2022    Procedure: ANGIOGRAM, CORONARY ARTERY;  Surgeon: Lexa Harley MD;  Location: [facility] CATH LAB;  Service: Cardiology;  Laterality: N/A;    TRANSESOPHAGEAL ECHOCARDIOGRAPHY N/A 03/29/2022    Procedure: ECHOCARDIOGRAM, TRANSESOPHAGEAL;  Surgeon: Dominic Wallis MD;  Location: [facility] EP LAB;  Service: Cardiology;  Laterality: N/A;    TREATMENT OF CARDIAC ARRHYTHMIA N/A 03/29/2022    Procedure: Cardioversion or Defibrillation;  Surgeon: Elmo Jones MD;  Location: [facility] EP LAB;  Service: Cardiology;  Laterality: N/A;  afib, dccv, stanley, [place], [place], 3prep, Provider to perform      Family History       Problem Relation (Age of Onset)    Breast cancer Sister    Colon cancer Maternal Aunt          Social History     Socioeconomic History    Marital status: Single   Tobacco Use    Smoking status: Never    Smokeless tobacco: Never   Substance and Sexual Activity    Alcohol use: Not Currently    Drug use: No    Sexual activity: Yes     Partners: Male     Social  Determinants of Health     Financial Resource Strain: Low Risk     Difficulty of Paying Living Expenses: Not hard at all   Food Insecurity: No Food Insecurity    Worried About Running Out of Food in the Last Year: Never true    Ran Out of Food in the Last Year: Never true   Transportation Needs: No Transportation Needs    Lack of Transportation (Medical): No    Lack of Transportation (Non-Medical): No   Stress: No Stress Concern Present    Feeling of Stress : Not at all   Social Connections: Unknown    Frequency of Communication with Friends and Family: Three times a week    Frequency of Social Gatherings with Friends and Family: Three times a week    Marital Status: Never    Housing Stability: Unknown    Unable to Pay for Housing in the Last Year: No    Unstable Housing in the Last Year: No       Current medications Reviewed    Review of Systems   Constitutional:  Negative for activity change.   HENT:  Negative for nosebleeds.    Eyes:  Negative for visual disturbance.   Respiratory:  Negative for shortness of breath.    Cardiovascular:  Negative for chest pain.   Gastrointestinal:  Negative for nausea.   Musculoskeletal:  Negative for gait problem.   Skin:  Negative for color change.   Neurological:  Negative for dizziness and seizures.   Hematological:  Does not bruise/bleed easily.   Psychiatric/Behavioral:  Negative for sleep disturbance.    Objective:   Physical Exam  Constitutional:       General: She is not in acute distress.  HENT:      Head: Normocephalic and atraumatic.   Eyes:      Pupils: Pupils are equal, round, and reactive to light.   Cardiovascular:      Rate and Rhythm: Normal rate.   Pulmonary:      Effort: Pulmonary effort is normal. No respiratory distress.   Abdominal:      General: There is no distension.   Musculoskeletal:         General: Normal range of motion.      Cervical back: Normal range of motion.   Skin:     Coloration: Skin is not pale.   Neurological:      General: No focal  deficit present.      Mental Status: She is alert.   Psychiatric:         Mood and Affect: Mood normal.         Behavior: Behavior normal.       Diagnostic Results: reviewed   CT Chest reviewed     Carotid US 11/3/22  RIGHT SIDE:   1-39% Right ICA stenosis.   Minimal Homogeneous plaque noted in the right internal carotid artery.   Antegrade flow noted in the right vertebral artery.     LEFT SIDE:   1-39% Left ICA stenosis.   Minimal Homogeneous plaque noted in the left internal carotid artery.   Antegrade flow noted in the left vertebral artery.     TTE 10/24/22  Severe left atrial enlargement.  The left ventricle is mildly enlarged with low normal systolic function.  The estimated ejection fraction is 50%.  Mild right atrial enlargement.  Normal right ventricular size with normal right ventricular systolic function.  The mitral valve is s/p repair. The mean pressure gradient across the mitral valve is 9 mmHg at a heart rate of 72 bpm.  Mild-to-moderate mitral regurgitation.  Moderate to severe tricuspid regurgitation.  There is pulmonary hypertension. The estimated PA systolic pressure is 54 mmHg.  Normal central venous pressure (3 mmHg).      Exercise Stress 6/27/22    The EKG portion of this study is negative for ischemia.    The patient reported no chest pain during the stress test.    The blood pressure response to stress was normal.    During stress, frequent PVCs are noted.  Ventricular bigeminy noted in recovery.     TTE 5/31/22  The left ventricle is mildly enlarged with mildly decreased systolic function.  The estimated ejection fraction is 40%.  Grade II left ventricular diastolic dysfunction.  There is mild left ventricular global hypokinesis.  Mild right ventricular enlargement with mildly reduced right ventricular systolic function.  Severe left atrial enlargement.  Moderate right atrial enlargement.  The mitral valve is s/p repair.  The mean diastolic gradient across the mitral valve is 11.4 mmHg at a  heart rate of 70 bpm.  There is moderate mitral stenosis.  Moderate-to-severe mitral regurgitation.  Moderate to severe tricuspid regurgitation.  Elevated central venous pressure (15 mmHg).  The estimated PA systolic pressure is 51 mmHg.  There is pulmonary hypertension.     JUAN 3/29/22  The left ventricle has mildly decreased systolic function. The estimated ejection fraction is 45%.  Normal right ventricular size with normal right ventricular systolic function.  Biatrial enlargement.  No thrombus is present in the appendage.  There is mild mitral stenosis.  Mitral valve s/p repair with annular ring, the mean diastolic gradient is 6 mmHg at a heart rate of 73 bpm.  Moderate to severe tricuspid regurgitation.  Grade 2 plaque present.    NYHA 1    Assessment:   MR   TR  AFIb   Re-do sternotomy   Plan:     CTS Attending Note:    I have personally taken the history and examined this patient and agree with the SAMM's note as stated above.  Very pleasant 52-year-old woman with mixed mitral stenosis and mitral regurgitation as well as severe tricuspid regurgitation and atrial fibrillation.  We plan redo sternotomy, mitral valve replacement with a mechanical prosthesis, tricuspid valve repair, and Maze procedure.  After discussion of the advantages and disadvantages of tissue and mechanical prostheses she indicated she desires a mechanical valve.  Her questions have been answered, and she wishes to proceed.

## 2022-11-04 ENCOUNTER — RESEARCH ENCOUNTER (OUTPATIENT)
Dept: RESEARCH | Facility: HOSPITAL | Age: 52
End: 2022-11-04
Payer: COMMERCIAL

## 2022-11-04 ENCOUNTER — TELEPHONE (OUTPATIENT)
Dept: CARDIOTHORACIC SURGERY | Facility: CLINIC | Age: 52
End: 2022-11-04
Payer: COMMERCIAL

## 2022-11-04 NOTE — PROGRESS NOTES
Sponsor: [employer]  Study Title: [employer] Mitral, Aortic aNd Tricuspid post-maRket Study in a reAl- world setting - MANTRA  IRB Number: [id number]  : Roman Gilbert MD  Present for Discussion: Patient and EDIWN Shen   Is LAR Consenting for Subject: ESTELLE     Participant ID: [id number]  Date Consent Signed: 11/03/2022    Prior to the Informed Consent (IC) being signed, or any study protocol required data collection, testing, procedure, or intervention being performed, the following was done and/or discussed:   Subject was given a copy of the IC for review  Purpose of the study and the qualifications to participate  Study design, follow-up schedule, and tests or procedures done at each visit  Confidentiality and HIPPA Authorization for Release of Medical Records for the research trial/ subject's rights/ research related injury  Risks, Benefits, Alternative Treatments, Compensation, and Costs  Participation in the research trial is voluntary and subject may withdraw at any time  Contact information for study related questions      Subject verbalizes understanding of the above: Yes, teach back method used with informed consent process.   Contact information for CRC and PI given to subject: PI information on ICF and cards for EDWIN Shen and EDWIN Simeon were stapled to ICF.   Subject able to adequately summarize: the purpose of the study, the risks associated with the study, and all procedures, testing, and follow-ups associated with the study: Yes. Teach back used and patient verbalized understanding of the study.     Subject signed the informed consent form for the [facility] research study with an IRB approval date of 17-FEB-2022. Each page of the consent form was reviewed with patient (and pt's family) and all questions answered satisfactorily. Subject signed the consent form and received a copy of same. The original consent was scanned into electronic medical records (Epic) and filed  into the subject's research study binder.     This post-market study is to monitor ongoing safety and performance of the [employer] devices and accessories used for aortic, mitral, and tricuspid valvular diseases after implant. Dr. LYRIC Gilbert was contacted and approved the study team consenting this subject for the trial.    The subject is anticipating to have the [employer] - STANDARD device implanted during their procedure.    For Study Team:   Was the KCCQ-12 administered? Completed by the patient in clinic   Was the EQ-5D-5L administered? Completed by the patient in clinic

## 2022-11-04 NOTE — TELEPHONE ENCOUNTER
Called pt and informed her of arrival time for surgery.  Pt instructed to report to [facility] at 5:00 a.m. Monday morning.  Pt reminded to perform shower with antibacterial soap Sunday night and Monday morning, and to become NPO at midnight Sunday into Monday. Pt may take metoprolol with a small sip of water Monday morning. Pt verbalized understanding of all information and has no questions at this time.

## 2022-11-06 ENCOUNTER — ANESTHESIA EVENT (OUTPATIENT)
Dept: SURGERY | Facility: HOSPITAL | Age: 52
DRG: 220 | End: 2022-11-06
Payer: COMMERCIAL

## 2022-11-06 RX ORDER — HYDROCODONE BITARTRATE AND ACETAMINOPHEN 500; 5 MG/1; MG/1
TABLET ORAL
Status: DISCONTINUED | OUTPATIENT
Start: 2022-11-07 | End: 2022-11-07

## 2022-11-06 NOTE — ANESTHESIA PREPROCEDURE EVALUATION
[facility]  Anesthesia Pre-Operative Evaluation         Patient Name: Riya Soto  YOB: 1970  MRN: [id number]    SUBJECTIVE:     Pre-operative evaluation for Procedure(s) (LRB):  REPLACEMENT, MITRAL VALVE (N/A)  REPAIR, TRICUSPID VALVE (N/A)  MAZE (N/A)     11/06/2022    Riya Soto is a 52 y.o. 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 2/2 rheumatic fever as child s/p MV repair 2011, severe tricuspid regurg, Afib on dronedarone, HLD, and HFrEF (40%) who presents for the above procedure.    TTE 10/20/22   Severe left atrial enlargement.   The left ventricle is mildly enlarged with low normal systolic function.   The estimated ejection fraction is 50%.   Mild right atrial enlargement.   Normal right ventricular size with normal right ventricular systolic function.   The mitral valve is s/p repair. The mean pressure gradient across the mitral valve is 9 mmHg at a heart rate of 72 bpm.   Mild-to-moderate mitral regurgitation.   Moderate to severe tricuspid regurgitation.   There is pulmonary hypertension. The estimated PA systolic pressure is 54 mmHg.   Normal central venous pressure (3 mmHg).    Cardiac cath 8/17/22  Normal coronary arteries    LDA: None documented.    Prev airway: None documented.     Drips: None documented.    Patient Active Problem List   Diagnosis    Overweight (BMI 25.0-29.9)    History of cholecystectomy    History of mitral valve surgery    Lumbosacral strain    Cervical strain    Chronic pain syndrome    Hyperlipidemia    Left ankle pain    Weakness of left upper extremity    Impaired range of motion of left shoulder    Impaired functional mobility and activity tolerance    Atrial fibrillation    History of cardioversion    Anticoagulant long-term use    HFrEF (heart failure with reduced ejection fraction)    Preprocedural cardiovascular examination    Diverticulosis    Rheumatic mitral regurgitation    Mitral  valve insufficiency and aortic valve insufficiency    Pre-op testing    Tricuspid regurgitation       Review of patient's allergies indicates:   Allergen Reactions    Amoxicillin Hives and Other (See Comments)       Current Inpatient Medications:      Current Facility-Administered Medications on File Prior to Encounter   Medication Dose Route Frequency Provider Last Rate Last Admin    sodium chloride 0.9% bolus 1,000 mL  1,000 mL Intravenous Once Alix Pederson NP         Current Outpatient Medications on File Prior to Encounter   Medication Sig Dispense Refill    atorvastatin (LIPITOR) 10 MG tablet Take 1 tablet (10 mg total) by mouth once daily. 90 tablet 3    cyproheptadine (PERIACTIN) 4 mg tablet Half p.o. b.i.d.--not too sleepy 1 p.o. b.i.d. to increase appetite 60 tablet 5    dronedarone (MULTAQ) 400 mg Tab Take 1 tablet (400 mg total) by mouth 2 (two) times daily with meals. 60 tablet 11    ELIQUIS 5 mg Tab TAKE 1 TABLET(5 MG) BY MOUTH TWICE DAILY (Patient not taking: Reported on 11/3/2022) 60 tablet 6    gabapentin (NEURONTIN) 300 MG capsule gabapentin 300 mg capsule   take 1 cap PO qhs x 3 days, then take 2 cap PO QHS thereafter      losartan (COZAAR) 25 MG tablet Take 1 tablet (25 mg total) by mouth once daily. (Patient not taking: Reported on 11/3/2022) 30 tablet 11    metoprolol succinate (TOPROL-XL) 25 MG 24 hr tablet Take 1 tablet (25 mg total) by mouth once daily. 30 tablet 11    [DISCONTINUED] citalopram (CELEXA) 10 MG tablet Take 1 tablet (10 mg total) by mouth once daily. 30 tablet 11    [DISCONTINUED] metoprolol tartrate (LOPRESSOR) 25 MG tablet Take 1 tablet (25 mg total) by mouth 2 (two) times daily. for 14 days 28 tablet 0       Past Surgical History:   Procedure Laterality Date    CARDIAC SURGERY      to repair mitral valve    CHOLECYSTECTOMY      COLONOSCOPY      COLONOSCOPY N/A 6/8/2022    Procedure: COLONOSCOPY;  Surgeon: Sanford Andres MD;  Location: [facility];   Service: Endoscopy;  Laterality: N/A;    CORONARY ANGIOGRAPHY N/A 8/29/2022    Procedure: ANGIOGRAM, CORONARY ARTERY;  Surgeon: Lexa Harley MD;  Location: [facility] CATH LAB;  Service: Cardiology;  Laterality: N/A;    TRANSESOPHAGEAL ECHOCARDIOGRAPHY N/A 03/29/2022    Procedure: ECHOCARDIOGRAM, TRANSESOPHAGEAL;  Surgeon: Dominic Wallis MD;  Location: [facility] EP LAB;  Service: Cardiology;  Laterality: N/A;    TREATMENT OF CARDIAC ARRHYTHMIA N/A 03/29/2022    Procedure: Cardioversion or Defibrillation;  Surgeon: Elmo Jones MD;  Location: [facility] EP LAB;  Service: Cardiology;  Laterality: N/A;  afib, dccv, [place], [place], [place], 3prep, Provider to perform        Social History     Socioeconomic History    Marital status: Single   Tobacco Use    Smoking status: Never    Smokeless tobacco: Never   Substance and Sexual Activity    Alcohol use: Not Currently    Drug use: No    Sexual activity: Yes     Partners: Male     Social Determinants of Health     Financial Resource Strain: Low Risk     Difficulty of Paying Living Expenses: Not hard at all   Food Insecurity: No Food Insecurity    Worried About Running Out of Food in the Last Year: Never true    Ran Out of Food in the Last Year: Never true   Transportation Needs: No Transportation Needs    Lack of Transportation (Medical): No    Lack of Transportation (Non-Medical): No   Stress: No Stress Concern Present    Feeling of Stress : Not at all   Social Connections: Unknown    Frequency of Communication with Friends and Family: Three times a week    Frequency of Social Gatherings with Friends and Family: Three times a week    Marital Status: Never    Housing Stability: Unknown    Unable to Pay for Housing in the Last Year: No    Unstable Housing in the Last Year: No       OBJECTIVE:     Vital Signs Range (Last 24H):         CBC:   No results for input(s): WBC, RBC, HGB, HCT, PLT, MCV, MCH, MCHC in the last 72 hours.    CMP: No results for input(s): NA,  K, CL, CO2, BUN, CREATININE, GLU, MG, PHOS, CALCIUM, ALBUMIN, PROT, ALKPHOS, ALT, AST, BILITOT in the last 72 hours.    INR:  No results for input(s): PT, INR, PROTIME, APTT in the last 72 hours.    Diagnostic Studies: No relevant studies.    EKG: No recent studies available.    2D ECHO:  No results found for this or any previous visit.      ASSESSMENT/PLAN:           Pre-op Assessment    I have reviewed the Patient Summary Reports.     I have reviewed the Nursing Notes. I have reviewed the NPO Status.   I have reviewed the Medications.     Review of Systems  Anesthesia Hx:  No problems with previous Anesthesia  Denies Family Hx of Anesthesia complications.   Denies Personal Hx of Anesthesia complications.   Hematology/Oncology:  Hematology Normal   Oncology Normal     EENT/Dental:EENT/Dental Normal   Cardiovascular:   Valvular problems/Murmurs Dysrhythmias atrial fibrillation CHF hyperlipidemia    Pulmonary:  Pulmonary Normal    Renal/:  Renal/ Normal     Hepatic/GI:  Hepatic/GI Normal    Neurological:  Neurology Normal    Endocrine:  Endocrine Normal    Psych:  Psychiatric Normal           Physical Exam  General: Well nourished, Alert, Oriented and Cooperative    Airway:  Mallampati: III / II  Mouth Opening: Normal  TM Distance: Normal  Tongue: Normal  Neck ROM: Normal ROM    Dental:  Intact    Chest/Lungs:  Normal Respiratory Rate    Heart:  Rate: Normal        Anesthesia Plan  Type of Anesthesia, risks & benefits discussed:    Anesthesia Type: Gen ETT  Intra-op Monitoring Plan: Standard ASA Monitors, Art Line, Central Line, JUAN, PA and CO  Post Op Pain Control Plan: multimodal analgesia, IV/PO Opioids PRN and peripheral nerve block  Induction:  IV and rapid sequence  Airway Plan: Direct, Post-Induction  Informed Consent: Informed consent signed with the Patient and all parties understand the risks and agree with anesthesia plan.  All questions answered. Patient consented to blood products? Yes  ASA Score:  4  Day of Surgery Review of History & Physical: H&P Update referred to the surgeon/provider.    Ready For Surgery From Anesthesia Perspective.     .

## 2022-11-07 ENCOUNTER — ANESTHESIA (OUTPATIENT)
Dept: SURGERY | Facility: HOSPITAL | Age: 52
DRG: 220 | End: 2022-11-07
Payer: COMMERCIAL

## 2022-11-07 ENCOUNTER — HOSPITAL ENCOUNTER (INPATIENT)
Facility: HOSPITAL | Age: 52
LOS: 6 days | Discharge: HOME HEALTH | DRG: 220 | End: 2022-11-13
Attending: THORACIC SURGERY (CARDIOTHORACIC VASCULAR SURGERY) | Admitting: THORACIC SURGERY (CARDIOTHORACIC VASCULAR SURGERY)
Payer: COMMERCIAL

## 2022-11-07 DIAGNOSIS — I49.8 BRADYARRHYTHMIA: ICD-10-CM

## 2022-11-07 DIAGNOSIS — R73.9 STRESS HYPERGLYCEMIA: ICD-10-CM

## 2022-11-07 DIAGNOSIS — Z86.79 S/P MAZE OPERATION FOR ATRIAL FIBRILLATION: ICD-10-CM

## 2022-11-07 DIAGNOSIS — Z98.890 HISTORY OF HEART SURGERY: ICD-10-CM

## 2022-11-07 DIAGNOSIS — I34.0 MITRAL VALVE INSUFFICIENCY, UNSPECIFIED ETIOLOGY: ICD-10-CM

## 2022-11-07 DIAGNOSIS — Z95.2 S/P MVR (MITRAL VALVE REPLACEMENT): Primary | ICD-10-CM

## 2022-11-07 DIAGNOSIS — I48.91 ATRIAL FIBRILLATION, UNSPECIFIED TYPE: ICD-10-CM

## 2022-11-07 DIAGNOSIS — I05.1 RHEUMATIC MITRAL REGURGITATION: ICD-10-CM

## 2022-11-07 DIAGNOSIS — I49.9 ARRHYTHMIA: ICD-10-CM

## 2022-11-07 DIAGNOSIS — Z98.890 S/P MVR (MITRAL VALVE REPAIR): Primary | ICD-10-CM

## 2022-11-07 DIAGNOSIS — I48.91 A-FIB: ICD-10-CM

## 2022-11-07 DIAGNOSIS — Z98.890 S/P MAZE OPERATION FOR ATRIAL FIBRILLATION: ICD-10-CM

## 2022-11-07 LAB
ALBUMIN SERPL BCP-MCNC: 3 G/DL (ref 3.5–5.2)
ALLENS TEST: ABNORMAL
ALP SERPL-CCNC: 73 U/L (ref 55–135)
ALT SERPL W/O P-5'-P-CCNC: 25 U/L (ref 10–44)
ANION GAP SERPL CALC-SCNC: 12 MMOL/L (ref 8–16)
ANISOCYTOSIS BLD QL SMEAR: SLIGHT
APTT BLDCRRT: 33.2 SEC (ref 21–32)
AST SERPL-CCNC: 160 U/L (ref 10–40)
BASOPHILS # BLD AUTO: 0.03 K/UL (ref 0–0.2)
BASOPHILS NFR BLD: 0.2 % (ref 0–1.9)
BILIRUB SERPL-MCNC: 1.8 MG/DL (ref 0.1–1)
BUN SERPL-MCNC: 15 MG/DL (ref 6–20)
CALCIUM SERPL-MCNC: 9.3 MG/DL (ref 8.7–10.5)
CHLORIDE SERPL-SCNC: 111 MMOL/L (ref 95–110)
CO2 SERPL-SCNC: 20 MMOL/L (ref 23–29)
CREAT SERPL-MCNC: 0.9 MG/DL (ref 0.5–1.4)
DELSYS: ABNORMAL
DIFFERENTIAL METHOD: ABNORMAL
EOSINOPHIL # BLD AUTO: 0 K/UL (ref 0–0.5)
EOSINOPHIL NFR BLD: 0 % (ref 0–8)
ERYTHROCYTE [DISTWIDTH] IN BLOOD BY AUTOMATED COUNT: 14.1 % (ref 11.5–14.5)
ERYTHROCYTE [SEDIMENTATION RATE] IN BLOOD BY WESTERGREN METHOD: 18 MM/H
EST. GFR  (NO RACE VARIABLE): >60 ML/MIN/1.73 M^2
FIO2: 0
FIO2: 100
FIO2: 100
FIO2: 40
FIO2: 50
FIO2: 55
FIO2: 60
FIO2: 70
FLOW: 5
FLOW: 5
GLUCOSE SERPL-MCNC: 161 MG/DL (ref 70–110)
GLUCOSE SERPL-MCNC: 167 MG/DL (ref 70–110)
GLUCOSE SERPL-MCNC: 191 MG/DL (ref 70–110)
GLUCOSE SERPL-MCNC: 196 MG/DL (ref 70–110)
GLUCOSE SERPL-MCNC: 234 MG/DL (ref 70–110)
GLUCOSE SERPL-MCNC: 272 MG/DL (ref 70–110)
GLUCOSE SERPL-MCNC: 288 MG/DL (ref 70–110)
GLUCOSE SERPL-MCNC: 300 MG/DL (ref 70–110)
GLUCOSE SERPL-MCNC: 307 MG/DL (ref 70–110)
GLUCOSE SERPL-MCNC: 309 MG/DL (ref 70–110)
GLUCOSE SERPL-MCNC: 92 MG/DL (ref 70–110)
HCO3 UR-SCNC: 21.7 MMOL/L (ref 24–28)
HCO3 UR-SCNC: 22 MMOL/L (ref 24–28)
HCO3 UR-SCNC: 22.4 MMOL/L (ref 24–28)
HCO3 UR-SCNC: 22.5 MMOL/L (ref 24–28)
HCO3 UR-SCNC: 22.7 MMOL/L (ref 24–28)
HCO3 UR-SCNC: 23.6 MMOL/L (ref 24–28)
HCO3 UR-SCNC: 23.8 MMOL/L (ref 24–28)
HCO3 UR-SCNC: 24.4 MMOL/L (ref 24–28)
HCO3 UR-SCNC: 24.5 MMOL/L (ref 24–28)
HCO3 UR-SCNC: 24.6 MMOL/L (ref 24–28)
HCO3 UR-SCNC: 24.7 MMOL/L (ref 24–28)
HCO3 UR-SCNC: 25.1 MMOL/L (ref 24–28)
HCO3 UR-SCNC: 25.3 MMOL/L (ref 24–28)
HCO3 UR-SCNC: 26.3 MMOL/L (ref 24–28)
HCT VFR BLD AUTO: 31.5 % (ref 37–48.5)
HCT VFR BLD CALC: 22 %PCV (ref 36–54)
HCT VFR BLD CALC: 22 %PCV (ref 36–54)
HCT VFR BLD CALC: 24 %PCV (ref 36–54)
HCT VFR BLD CALC: 25 %PCV (ref 36–54)
HCT VFR BLD CALC: 27 %PCV (ref 36–54)
HCT VFR BLD CALC: 29 %PCV (ref 36–54)
HCT VFR BLD CALC: 29 %PCV (ref 36–54)
HGB BLD-MCNC: 10.3 G/DL (ref 12–16)
IMM GRANULOCYTES # BLD AUTO: 0.13 K/UL (ref 0–0.04)
IMM GRANULOCYTES NFR BLD AUTO: 0.7 % (ref 0–0.5)
INR PPP: 1.3 (ref 0.8–1.2)
LDH SERPL L TO P-CCNC: 1.82 MMOL/L (ref 0.36–1.25)
LDH SERPL L TO P-CCNC: 2.41 MMOL/L (ref 0.36–1.25)
LDH SERPL L TO P-CCNC: 2.85 MMOL/L (ref 0.36–1.25)
LDH SERPL L TO P-CCNC: 6.13 MMOL/L (ref 0.36–1.25)
LDH SERPL L TO P-CCNC: 6.39 MMOL/L (ref 0.36–1.25)
LDH SERPL L TO P-CCNC: 6.55 MMOL/L (ref 0.36–1.25)
LDH SERPL L TO P-CCNC: 6.9 MMOL/L (ref 0.36–1.25)
LDH SERPL L TO P-CCNC: 7.37 MMOL/L (ref 0.36–1.25)
LYMPHOCYTES # BLD AUTO: 1.5 K/UL (ref 1–4.8)
LYMPHOCYTES NFR BLD: 7.7 % (ref 18–48)
MAGNESIUM SERPL-MCNC: 2.1 MG/DL (ref 1.6–2.6)
MAGNESIUM SERPL-MCNC: 2.1 MG/DL (ref 1.6–2.6)
MCH RBC QN AUTO: 30.4 PG (ref 27–31)
MCHC RBC AUTO-ENTMCNC: 32.7 G/DL (ref 32–36)
MCV RBC AUTO: 93 FL (ref 82–98)
MODE: ABNORMAL
MONOCYTES # BLD AUTO: 2.4 K/UL (ref 0.3–1)
MONOCYTES NFR BLD: 12.7 % (ref 4–15)
NEUTROPHILS # BLD AUTO: 14.8 K/UL (ref 1.8–7.7)
NEUTROPHILS NFR BLD: 78.7 % (ref 38–73)
NRBC BLD-RTO: 0 /100 WBC
PCO2 BLDA: 35.9 MMHG (ref 35–45)
PCO2 BLDA: 36.2 MMHG (ref 35–45)
PCO2 BLDA: 37.9 MMHG (ref 35–45)
PCO2 BLDA: 42.1 MMHG (ref 35–45)
PCO2 BLDA: 43.9 MMHG (ref 35–45)
PCO2 BLDA: 44 MMHG (ref 35–45)
PCO2 BLDA: 44.3 MMHG (ref 35–45)
PCO2 BLDA: 44.9 MMHG (ref 35–45)
PCO2 BLDA: 44.9 MMHG (ref 35–45)
PCO2 BLDA: 45.1 MMHG (ref 35–45)
PCO2 BLDA: 45.5 MMHG (ref 35–45)
PCO2 BLDA: 47.1 MMHG (ref 35–45)
PCO2 BLDA: 48.3 MMHG (ref 35–45)
PCO2 BLDA: 48.5 MMHG (ref 35–45)
PEEP: 5
PH SMN: 7.27 [PH] (ref 7.35–7.45)
PH SMN: 7.29 [PH] (ref 7.35–7.45)
PH SMN: 7.29 [PH] (ref 7.35–7.45)
PH SMN: 7.32 [PH] (ref 7.35–7.45)
PH SMN: 7.33 [PH] (ref 7.35–7.45)
PH SMN: 7.34 [PH] (ref 7.35–7.45)
PH SMN: 7.35 [PH] (ref 7.35–7.45)
PH SMN: 7.36 [PH] (ref 7.35–7.45)
PH SMN: 7.36 [PH] (ref 7.35–7.45)
PH SMN: 7.37 [PH] (ref 7.35–7.45)
PH SMN: 7.37 [PH] (ref 7.35–7.45)
PH SMN: 7.39 [PH] (ref 7.35–7.45)
PH SMN: 7.41 [PH] (ref 7.35–7.45)
PH SMN: 7.42 [PH] (ref 7.35–7.45)
PHOSPHATE SERPL-MCNC: 1.9 MG/DL (ref 2.7–4.5)
PHOSPHATE SERPL-MCNC: 1.9 MG/DL (ref 2.7–4.5)
PLATELET # BLD AUTO: 121 K/UL (ref 150–450)
PLATELET BLD QL SMEAR: ABNORMAL
PMV BLD AUTO: 11.5 FL (ref 9.2–12.9)
PO2 BLDA: 138 MMHG (ref 80–100)
PO2 BLDA: 147 MMHG (ref 80–100)
PO2 BLDA: 170 MMHG (ref 80–100)
PO2 BLDA: 208 MMHG (ref 80–100)
PO2 BLDA: 213 MMHG (ref 80–100)
PO2 BLDA: 222 MMHG (ref 80–100)
PO2 BLDA: 238 MMHG (ref 80–100)
PO2 BLDA: 246 MMHG (ref 80–100)
PO2 BLDA: 300 MMHG (ref 80–100)
PO2 BLDA: 315 MMHG (ref 80–100)
PO2 BLDA: 360 MMHG (ref 80–100)
PO2 BLDA: 49 MMHG (ref 40–60)
PO2 BLDA: 505 MMHG (ref 80–100)
PO2 BLDA: 51 MMHG (ref 40–60)
POC BE: -1 MMOL/L
POC BE: -2 MMOL/L
POC BE: -2 MMOL/L
POC BE: -3 MMOL/L
POC BE: -3 MMOL/L
POC BE: -4 MMOL/L
POC BE: -4 MMOL/L
POC BE: -5 MMOL/L
POC BE: 0 MMOL/L
POC BE: 1 MMOL/L
POC IONIZED CALCIUM: 0.86 MMOL/L (ref 1.06–1.42)
POC IONIZED CALCIUM: 0.97 MMOL/L (ref 1.06–1.42)
POC IONIZED CALCIUM: 0.98 MMOL/L (ref 1.06–1.42)
POC IONIZED CALCIUM: 0.99 MMOL/L (ref 1.06–1.42)
POC IONIZED CALCIUM: 0.99 MMOL/L (ref 1.06–1.42)
POC IONIZED CALCIUM: 1.01 MMOL/L (ref 1.06–1.42)
POC IONIZED CALCIUM: 1.01 MMOL/L (ref 1.06–1.42)
POC IONIZED CALCIUM: 1.02 MMOL/L (ref 1.06–1.42)
POC IONIZED CALCIUM: 1.09 MMOL/L (ref 1.06–1.42)
POC IONIZED CALCIUM: 1.14 MMOL/L (ref 1.06–1.42)
POC IONIZED CALCIUM: 1.29 MMOL/L (ref 1.06–1.42)
POC IONIZED CALCIUM: 1.33 MMOL/L (ref 1.06–1.42)
POC SATURATED O2: 100 % (ref 95–100)
POC SATURATED O2: 80 % (ref 95–100)
POC SATURATED O2: 81 % (ref 95–100)
POC SATURATED O2: 99 % (ref 95–100)
POC TCO2: 23 MMOL/L (ref 23–27)
POC TCO2: 23 MMOL/L (ref 24–29)
POC TCO2: 24 MMOL/L (ref 23–27)
POC TCO2: 25 MMOL/L (ref 23–27)
POC TCO2: 25 MMOL/L (ref 24–29)
POC TCO2: 26 MMOL/L (ref 23–27)
POC TCO2: 27 MMOL/L (ref 23–27)
POC TCO2: 28 MMOL/L (ref 23–27)
POCT GLUCOSE: 122 MG/DL (ref 70–110)
POCT GLUCOSE: 124 MG/DL (ref 70–110)
POCT GLUCOSE: 127 MG/DL (ref 70–110)
POCT GLUCOSE: 132 MG/DL (ref 70–110)
POCT GLUCOSE: 137 MG/DL (ref 70–110)
POCT GLUCOSE: 148 MG/DL (ref 70–110)
POCT GLUCOSE: 149 MG/DL (ref 70–110)
POCT GLUCOSE: 73 MG/DL (ref 70–110)
POCT GLUCOSE: 94 MG/DL (ref 70–110)
POTASSIUM BLD-SCNC: 2.9 MMOL/L (ref 3.5–5.1)
POTASSIUM BLD-SCNC: 2.9 MMOL/L (ref 3.5–5.1)
POTASSIUM BLD-SCNC: 3 MMOL/L (ref 3.5–5.1)
POTASSIUM BLD-SCNC: 3.4 MMOL/L (ref 3.5–5.1)
POTASSIUM BLD-SCNC: 3.6 MMOL/L (ref 3.5–5.1)
POTASSIUM BLD-SCNC: 3.8 MMOL/L (ref 3.5–5.1)
POTASSIUM BLD-SCNC: 3.9 MMOL/L (ref 3.5–5.1)
POTASSIUM BLD-SCNC: 4.1 MMOL/L (ref 3.5–5.1)
POTASSIUM BLD-SCNC: 4.5 MMOL/L (ref 3.5–5.1)
POTASSIUM BLD-SCNC: 4.5 MMOL/L (ref 3.5–5.1)
POTASSIUM BLD-SCNC: 4.6 MMOL/L (ref 3.5–5.1)
POTASSIUM BLD-SCNC: 5.5 MMOL/L (ref 3.5–5.1)
POTASSIUM SERPL-SCNC: 3.4 MMOL/L (ref 3.5–5.1)
POTASSIUM SERPL-SCNC: 4.7 MMOL/L (ref 3.5–5.1)
PROT SERPL-MCNC: 5.3 G/DL (ref 6–8.4)
PROTHROMBIN TIME: 13.2 SEC (ref 9–12.5)
PS: 5
PS: 5
RBC # BLD AUTO: 3.39 M/UL (ref 4–5.4)
ROULEAUX BLD QL SMEAR: PRESENT
SAMPLE: ABNORMAL
SITE: ABNORMAL
SODIUM BLD-SCNC: 138 MMOL/L (ref 136–145)
SODIUM BLD-SCNC: 139 MMOL/L (ref 136–145)
SODIUM BLD-SCNC: 139 MMOL/L (ref 136–145)
SODIUM BLD-SCNC: 140 MMOL/L (ref 136–145)
SODIUM BLD-SCNC: 140 MMOL/L (ref 136–145)
SODIUM BLD-SCNC: 141 MMOL/L (ref 136–145)
SODIUM BLD-SCNC: 141 MMOL/L (ref 136–145)
SODIUM BLD-SCNC: 142 MMOL/L (ref 136–145)
SODIUM BLD-SCNC: 145 MMOL/L (ref 136–145)
SODIUM SERPL-SCNC: 143 MMOL/L (ref 136–145)
VT: 450
WBC # BLD AUTO: 18.74 K/UL (ref 3.9–12.7)
WBC TOXIC VACUOLES BLD QL SMEAR: PRESENT

## 2022-11-07 PROCEDURE — 36000712 HC OR TIME LEV V 1ST 15 MIN: Performed by: THORACIC SURGERY (CARDIOTHORACIC VASCULAR SURGERY)

## 2022-11-07 PROCEDURE — P9045 ALBUMIN (HUMAN), 5%, 250 ML: HCPCS | Mod: JG

## 2022-11-07 PROCEDURE — 27800595 HC HEART VALVES

## 2022-11-07 PROCEDURE — 93325 PR DOPPLER COLOR FLOW VELOCITY MAP: ICD-10-PCS | Mod: 26,,, | Performed by: ANESTHESIOLOGY

## 2022-11-07 PROCEDURE — 93503 INSERT/PLACE HEART CATHETER: CPT | Mod: 59,,, | Performed by: ANESTHESIOLOGY

## 2022-11-07 PROCEDURE — 99900035 HC TECH TIME PER 15 MIN (STAT)

## 2022-11-07 PROCEDURE — 94002 VENT MGMT INPAT INIT DAY: CPT

## 2022-11-07 PROCEDURE — 85610 PROTHROMBIN TIME: CPT | Performed by: PHYSICIAN ASSISTANT

## 2022-11-07 PROCEDURE — 33464 PR VALVULOPLAS TRICUS W RING INSERT: ICD-10-PCS | Mod: 51,,, | Performed by: THORACIC SURGERY (CARDIOTHORACIC VASCULAR SURGERY)

## 2022-11-07 PROCEDURE — P9021 RED BLOOD CELLS UNIT: HCPCS | Performed by: STUDENT IN AN ORGANIZED HEALTH CARE EDUCATION/TRAINING PROGRAM

## 2022-11-07 PROCEDURE — D9220A PRA ANESTHESIA: Mod: ,,, | Performed by: ANESTHESIOLOGY

## 2022-11-07 PROCEDURE — 37000009 HC ANESTHESIA EA ADD 15 MINS: Performed by: THORACIC SURGERY (CARDIOTHORACIC VASCULAR SURGERY)

## 2022-11-07 PROCEDURE — 36000713 HC OR TIME LEV V EA ADD 15 MIN: Performed by: THORACIC SURGERY (CARDIOTHORACIC VASCULAR SURGERY)

## 2022-11-07 PROCEDURE — 37799 UNLISTED PX VASCULAR SURGERY: CPT

## 2022-11-07 PROCEDURE — 84100 ASSAY OF PHOSPHORUS: CPT | Performed by: PHYSICIAN ASSISTANT

## 2022-11-07 PROCEDURE — 93312 PR ECHO HEART,TRANSESOPHAGEAL: ICD-10-PCS | Mod: 26,59,, | Performed by: ANESTHESIOLOGY

## 2022-11-07 PROCEDURE — 25000242 PHARM REV CODE 250 ALT 637 W/ HCPCS: Performed by: PHYSICIAN ASSISTANT

## 2022-11-07 PROCEDURE — 27100026 HC SHUNT SENSOR, TERUMO

## 2022-11-07 PROCEDURE — 80053 COMPREHEN METABOLIC PANEL: CPT | Mod: 91

## 2022-11-07 PROCEDURE — 93320 PR DOPPLER ECHO HEART,COMPLETE: ICD-10-PCS | Mod: 26,,, | Performed by: ANESTHESIOLOGY

## 2022-11-07 PROCEDURE — 99291 CRITICAL CARE FIRST HOUR: CPT | Mod: ,,, | Performed by: ANESTHESIOLOGY

## 2022-11-07 PROCEDURE — 93010 ELECTROCARDIOGRAM REPORT: CPT | Mod: 76,,, | Performed by: INTERNAL MEDICINE

## 2022-11-07 PROCEDURE — 33464 VALVULOPLASTY TRICUSPID: CPT | Mod: 51,,, | Performed by: THORACIC SURGERY (CARDIOTHORACIC VASCULAR SURGERY)

## 2022-11-07 PROCEDURE — 93005 ELECTROCARDIOGRAM TRACING: CPT

## 2022-11-07 PROCEDURE — 36592 COLLECT BLOOD FROM PICC: CPT

## 2022-11-07 PROCEDURE — 93325 DOPPLER ECHO COLOR FLOW MAPG: CPT | Mod: 26,,, | Performed by: ANESTHESIOLOGY

## 2022-11-07 PROCEDURE — 76942 ECHO GUIDE FOR BIOPSY: CPT | Performed by: STUDENT IN AN ORGANIZED HEALTH CARE EDUCATION/TRAINING PROGRAM

## 2022-11-07 PROCEDURE — 20000000 HC ICU ROOM

## 2022-11-07 PROCEDURE — 84132 ASSAY OF SERUM POTASSIUM: CPT | Performed by: PHYSICIAN ASSISTANT

## 2022-11-07 PROCEDURE — 84295 ASSAY OF SERUM SODIUM: CPT

## 2022-11-07 PROCEDURE — 37000008 HC ANESTHESIA 1ST 15 MINUTES: Performed by: THORACIC SURGERY (CARDIOTHORACIC VASCULAR SURGERY)

## 2022-11-07 PROCEDURE — 64461 PVB THORACIC SINGLE INJ SITE: CPT | Mod: 50,59,, | Performed by: ANESTHESIOLOGY

## 2022-11-07 PROCEDURE — 85025 COMPLETE CBC W/AUTO DIFF WBC: CPT | Performed by: PHYSICIAN ASSISTANT

## 2022-11-07 PROCEDURE — 82800 BLOOD PH: CPT

## 2022-11-07 PROCEDURE — 25000003 PHARM REV CODE 250: Performed by: ANESTHESIOLOGY

## 2022-11-07 PROCEDURE — 85014 HEMATOCRIT: CPT

## 2022-11-07 PROCEDURE — 33430 PR REPLACEMENT OF MITRAL VALVE: ICD-10-PCS | Mod: ,,, | Performed by: THORACIC SURGERY (CARDIOTHORACIC VASCULAR SURGERY)

## 2022-11-07 PROCEDURE — 84100 ASSAY OF PHOSPHORUS: CPT | Mod: 91

## 2022-11-07 PROCEDURE — 82330 ASSAY OF CALCIUM: CPT

## 2022-11-07 PROCEDURE — 27201423 OPTIME MED/SURG SUP & DEVICES STERILE SUPPLY: Performed by: THORACIC SURGERY (CARDIOTHORACIC VASCULAR SURGERY)

## 2022-11-07 PROCEDURE — 94761 N-INVAS EAR/PLS OXIMETRY MLT: CPT

## 2022-11-07 PROCEDURE — 63600175 PHARM REV CODE 636 W HCPCS: Performed by: ANESTHESIOLOGY

## 2022-11-07 PROCEDURE — 88305 TISSUE EXAM BY PATHOLOGIST: ICD-10-PCS | Mod: 26,,, | Performed by: PATHOLOGY

## 2022-11-07 PROCEDURE — 27201004 HC FEMORAL BYPASS CANNULA

## 2022-11-07 PROCEDURE — 82565 ASSAY OF CREATININE: CPT

## 2022-11-07 PROCEDURE — 63600175 PHARM REV CODE 636 W HCPCS: Mod: JG

## 2022-11-07 PROCEDURE — 27000175 HC ADULT BYPASS PUMP

## 2022-11-07 PROCEDURE — 27201037 HC PRESSURE MONITORING SET UP

## 2022-11-07 PROCEDURE — 80053 COMPREHEN METABOLIC PANEL: CPT | Performed by: STUDENT IN AN ORGANIZED HEALTH CARE EDUCATION/TRAINING PROGRAM

## 2022-11-07 PROCEDURE — 63600175 PHARM REV CODE 636 W HCPCS: Performed by: PHYSICIAN ASSISTANT

## 2022-11-07 PROCEDURE — 93010 EKG 12-LEAD: ICD-10-PCS | Mod: 76,,, | Performed by: INTERNAL MEDICINE

## 2022-11-07 PROCEDURE — 93010 ELECTROCARDIOGRAM REPORT: CPT | Mod: ,,, | Performed by: INTERNAL MEDICINE

## 2022-11-07 PROCEDURE — 63600175 PHARM REV CODE 636 W HCPCS: Performed by: STUDENT IN AN ORGANIZED HEALTH CARE EDUCATION/TRAINING PROGRAM

## 2022-11-07 PROCEDURE — 88305 TISSUE EXAM BY PATHOLOGIST: CPT | Performed by: PATHOLOGY

## 2022-11-07 PROCEDURE — 85730 THROMBOPLASTIN TIME PARTIAL: CPT | Performed by: PHYSICIAN ASSISTANT

## 2022-11-07 PROCEDURE — 93503 PR INSERT/PLACE FLOW DIRECT CATH: ICD-10-PCS | Mod: 59,,, | Performed by: ANESTHESIOLOGY

## 2022-11-07 PROCEDURE — 27202828 HC DILATOR KIT, VASCULAR

## 2022-11-07 PROCEDURE — C2618 PROBE/NEEDLE, CRYO: HCPCS | Performed by: THORACIC SURGERY (CARDIOTHORACIC VASCULAR SURGERY)

## 2022-11-07 PROCEDURE — C1729 CATH, DRAINAGE: HCPCS | Performed by: THORACIC SURGERY (CARDIOTHORACIC VASCULAR SURGERY)

## 2022-11-07 PROCEDURE — 25000003 PHARM REV CODE 250: Performed by: STUDENT IN AN ORGANIZED HEALTH CARE EDUCATION/TRAINING PROGRAM

## 2022-11-07 PROCEDURE — 27000191 HC C-V MONITORING

## 2022-11-07 PROCEDURE — 93312 ECHO TRANSESOPHAGEAL: CPT | Mod: 26,59,, | Performed by: ANESTHESIOLOGY

## 2022-11-07 PROCEDURE — 82803 BLOOD GASES ANY COMBINATION: CPT

## 2022-11-07 PROCEDURE — 25000003 PHARM REV CODE 250: Performed by: PHYSICIAN ASSISTANT

## 2022-11-07 PROCEDURE — 93320 DOPPLER ECHO COMPLETE: CPT | Mod: 26,,, | Performed by: ANESTHESIOLOGY

## 2022-11-07 PROCEDURE — D9220A PRA ANESTHESIA: ICD-10-PCS | Mod: ,,, | Performed by: ANESTHESIOLOGY

## 2022-11-07 PROCEDURE — 99223 1ST HOSP IP/OBS HIGH 75: CPT | Mod: ,,, | Performed by: NURSE PRACTITIONER

## 2022-11-07 PROCEDURE — 25000003 PHARM REV CODE 250

## 2022-11-07 PROCEDURE — 85520 HEPARIN ASSAY: CPT

## 2022-11-07 PROCEDURE — 99223 PR INITIAL HOSPITAL CARE,LEVL III: ICD-10-PCS | Mod: ,,, | Performed by: NURSE PRACTITIONER

## 2022-11-07 PROCEDURE — 88305 TISSUE EXAM BY PATHOLOGIST: CPT | Mod: 26,,, | Performed by: PATHOLOGY

## 2022-11-07 PROCEDURE — 83605 ASSAY OF LACTIC ACID: CPT

## 2022-11-07 PROCEDURE — 33530 PR CABG, REOPERATE >1 MON ORIG: ICD-10-PCS | Mod: ,,, | Performed by: THORACIC SURGERY (CARDIOTHORACIC VASCULAR SURGERY)

## 2022-11-07 PROCEDURE — 83735 ASSAY OF MAGNESIUM: CPT | Mod: 91

## 2022-11-07 PROCEDURE — 94640 AIRWAY INHALATION TREATMENT: CPT

## 2022-11-07 PROCEDURE — 83735 ASSAY OF MAGNESIUM: CPT | Performed by: PHYSICIAN ASSISTANT

## 2022-11-07 PROCEDURE — C1769 GUIDE WIRE: HCPCS | Performed by: THORACIC SURGERY (CARDIOTHORACIC VASCULAR SURGERY)

## 2022-11-07 PROCEDURE — 36620 PR INSERT CATH,ART,PERCUT,SHORTTERM: ICD-10-PCS | Mod: 59,,, | Performed by: ANESTHESIOLOGY

## 2022-11-07 PROCEDURE — 99900026 HC AIRWAY MAINTENANCE (STAT)

## 2022-11-07 PROCEDURE — 27200953 HC CARDIOPLEGIA SYSTEM

## 2022-11-07 PROCEDURE — 33530 CORONARY ARTERY BYPASS/REOP: CPT | Mod: ,,, | Performed by: THORACIC SURGERY (CARDIOTHORACIC VASCULAR SURGERY)

## 2022-11-07 PROCEDURE — 27800903 OPTIME MED/SURG SUP & DEVICES OTHER IMPLANTS: Performed by: THORACIC SURGERY (CARDIOTHORACIC VASCULAR SURGERY)

## 2022-11-07 PROCEDURE — 27000221 HC OXYGEN, UP TO 24 HOURS

## 2022-11-07 PROCEDURE — 64461 PR PVB THORACIC SINGLE INJ SITE, INCL IMAGING: ICD-10-PCS | Mod: 50,59,, | Performed by: ANESTHESIOLOGY

## 2022-11-07 PROCEDURE — 27202608 HC CANNULA, MISC

## 2022-11-07 PROCEDURE — 27100088 HC CELL SAVER

## 2022-11-07 PROCEDURE — 99291 PR CRITICAL CARE, E/M 30-74 MINUTES: ICD-10-PCS | Mod: ,,, | Performed by: ANESTHESIOLOGY

## 2022-11-07 PROCEDURE — 84132 ASSAY OF SERUM POTASSIUM: CPT

## 2022-11-07 PROCEDURE — 27200966 HC CLOSED SUCTION SYSTEM

## 2022-11-07 PROCEDURE — 33430 REPLACEMENT OF MITRAL VALVE: CPT | Mod: ,,, | Performed by: THORACIC SURGERY (CARDIOTHORACIC VASCULAR SURGERY)

## 2022-11-07 PROCEDURE — 36620 INSERTION CATHETER ARTERY: CPT | Mod: 59,,, | Performed by: ANESTHESIOLOGY

## 2022-11-07 DEVICE — RING ANNULOPLASTY TRICUSPID: Type: IMPLANTABLE DEVICE | Site: HEART | Status: FUNCTIONAL

## 2022-11-07 DEVICE — PUTTY HEMASORB BONE RESORBABLE: Type: IMPLANTABLE DEVICE | Site: CHEST  WALL | Status: FUNCTIONAL

## 2022-11-07 DEVICE — VALVE STANDARD MITRAL SIZE 29: Type: IMPLANTABLE DEVICE | Site: HEART | Status: FUNCTIONAL

## 2022-11-07 RX ORDER — PROPOFOL 10 MG/ML
0-50 INJECTION, EMULSION INTRAVENOUS CONTINUOUS
Status: DISCONTINUED | OUTPATIENT
Start: 2022-11-07 | End: 2022-11-08

## 2022-11-07 RX ORDER — METOCLOPRAMIDE HYDROCHLORIDE 5 MG/ML
5 INJECTION INTRAMUSCULAR; INTRAVENOUS EVERY 6 HOURS PRN
Status: DISCONTINUED | OUTPATIENT
Start: 2022-11-07 | End: 2022-11-13 | Stop reason: HOSPADM

## 2022-11-07 RX ORDER — CLINDAMYCIN PHOSPHATE 900 MG/50ML
INJECTION, SOLUTION INTRAVENOUS
Status: DISCONTINUED | OUTPATIENT
Start: 2022-11-07 | End: 2022-11-07

## 2022-11-07 RX ORDER — DEXMEDETOMIDINE HYDROCHLORIDE 4 UG/ML
0-1.4 INJECTION, SOLUTION INTRAVENOUS CONTINUOUS
Status: DISCONTINUED | OUTPATIENT
Start: 2022-11-07 | End: 2022-11-08

## 2022-11-07 RX ORDER — FENTANYL CITRATE 50 UG/ML
50 INJECTION, SOLUTION INTRAMUSCULAR; INTRAVENOUS
Status: DISCONTINUED | OUTPATIENT
Start: 2022-11-10 | End: 2022-11-07

## 2022-11-07 RX ORDER — ASPIRIN 325 MG
325 TABLET ORAL DAILY
Status: DISCONTINUED | OUTPATIENT
Start: 2022-11-07 | End: 2022-11-13 | Stop reason: HOSPADM

## 2022-11-07 RX ORDER — DOCUSATE SODIUM 100 MG/1
100 CAPSULE, LIQUID FILLED ORAL 2 TIMES DAILY
Status: DISCONTINUED | OUTPATIENT
Start: 2022-11-07 | End: 2022-11-10

## 2022-11-07 RX ORDER — PROPOFOL 10 MG/ML
VIAL (ML) INTRAVENOUS CONTINUOUS PRN
Status: DISCONTINUED | OUTPATIENT
Start: 2022-11-07 | End: 2022-11-07

## 2022-11-07 RX ORDER — FENTANYL CITRATE 50 UG/ML
25 INJECTION, SOLUTION INTRAMUSCULAR; INTRAVENOUS
Status: DISCONTINUED | OUTPATIENT
Start: 2022-11-07 | End: 2022-11-07

## 2022-11-07 RX ORDER — DEXTROSE MONOHYDRATE, SODIUM CHLORIDE, AND POTASSIUM CHLORIDE 50; 1.49; 4.5 G/1000ML; G/1000ML; G/1000ML
INJECTION, SOLUTION INTRAVENOUS CONTINUOUS
Status: DISCONTINUED | OUTPATIENT
Start: 2022-11-07 | End: 2022-11-09

## 2022-11-07 RX ORDER — CLINDAMYCIN PHOSPHATE 900 MG/50ML
900 INJECTION, SOLUTION INTRAVENOUS
Status: COMPLETED | OUTPATIENT
Start: 2022-11-07 | End: 2022-11-09

## 2022-11-07 RX ORDER — OXYCODONE HYDROCHLORIDE 10 MG/1
10 TABLET ORAL EVERY 4 HOURS PRN
Status: DISCONTINUED | OUTPATIENT
Start: 2022-11-07 | End: 2022-11-13 | Stop reason: HOSPADM

## 2022-11-07 RX ORDER — FAMOTIDINE 10 MG/ML
20 INJECTION INTRAVENOUS 2 TIMES DAILY
Status: DISCONTINUED | OUTPATIENT
Start: 2022-11-07 | End: 2022-11-08

## 2022-11-07 RX ORDER — MAGNESIUM SULFATE HEPTAHYDRATE 40 MG/ML
2 INJECTION, SOLUTION INTRAVENOUS
Status: DISCONTINUED | OUTPATIENT
Start: 2022-11-07 | End: 2022-11-09

## 2022-11-07 RX ORDER — LIDOCAINE HYDROCHLORIDE 10 MG/ML
1 INJECTION, SOLUTION EPIDURAL; INFILTRATION; INTRACAUDAL; PERINEURAL
Status: COMPLETED | OUTPATIENT
Start: 2022-11-07 | End: 2022-11-07

## 2022-11-07 RX ORDER — MAGNESIUM SULFATE HEPTAHYDRATE 40 MG/ML
4 INJECTION, SOLUTION INTRAVENOUS
Status: DISCONTINUED | OUTPATIENT
Start: 2022-11-07 | End: 2022-11-09

## 2022-11-07 RX ORDER — ALBUMIN HUMAN 50 G/1000ML
25 SOLUTION INTRAVENOUS ONCE
Status: COMPLETED | OUTPATIENT
Start: 2022-11-07 | End: 2022-11-07

## 2022-11-07 RX ORDER — PROPOFOL 10 MG/ML
VIAL (ML) INTRAVENOUS
Status: DISCONTINUED | OUTPATIENT
Start: 2022-11-07 | End: 2022-11-07

## 2022-11-07 RX ORDER — CLINDAMYCIN PHOSPHATE 900 MG/50ML
900 INJECTION, SOLUTION INTRAVENOUS
Status: DISCONTINUED | OUTPATIENT
Start: 2022-11-07 | End: 2022-11-07 | Stop reason: HOSPADM

## 2022-11-07 RX ORDER — SODIUM CHLORIDE 0.9 % (FLUSH) 0.9 %
10 SYRINGE (ML) INJECTION
Status: DISCONTINUED | OUTPATIENT
Start: 2022-11-07 | End: 2022-11-13 | Stop reason: HOSPADM

## 2022-11-07 RX ORDER — BISACODYL 10 MG
10 SUPPOSITORY, RECTAL RECTAL DAILY PRN
Status: DISCONTINUED | OUTPATIENT
Start: 2022-11-07 | End: 2022-11-13 | Stop reason: HOSPADM

## 2022-11-07 RX ORDER — PROTAMINE SULFATE 10 MG/ML
INJECTION, SOLUTION INTRAVENOUS
Status: DISCONTINUED | OUTPATIENT
Start: 2022-11-07 | End: 2022-11-07

## 2022-11-07 RX ORDER — DEXMEDETOMIDINE HYDROCHLORIDE 4 UG/ML
INJECTION, SOLUTION INTRAVENOUS
Status: DISPENSED
Start: 2022-11-07 | End: 2022-11-08

## 2022-11-07 RX ORDER — ALBUMIN HUMAN 50 G/1000ML
SOLUTION INTRAVENOUS CONTINUOUS PRN
Status: DISCONTINUED | OUTPATIENT
Start: 2022-11-07 | End: 2022-11-07

## 2022-11-07 RX ORDER — ACETAMINOPHEN 500 MG
1000 TABLET ORAL ONCE
Status: COMPLETED | OUTPATIENT
Start: 2022-11-07 | End: 2022-11-07

## 2022-11-07 RX ORDER — OXYCODONE HYDROCHLORIDE 5 MG/1
5 TABLET ORAL EVERY 4 HOURS PRN
Status: DISCONTINUED | OUTPATIENT
Start: 2022-11-07 | End: 2022-11-13 | Stop reason: HOSPADM

## 2022-11-07 RX ORDER — NEOSTIGMINE METHYLSULFATE 0.5 MG/ML
INJECTION, SOLUTION INTRAVENOUS
Status: DISCONTINUED | OUTPATIENT
Start: 2022-11-07 | End: 2022-11-07

## 2022-11-07 RX ORDER — CITALOPRAM 10 MG/1
10 TABLET ORAL DAILY
Status: DISCONTINUED | OUTPATIENT
Start: 2022-11-08 | End: 2022-11-13 | Stop reason: HOSPADM

## 2022-11-07 RX ORDER — ACETAMINOPHEN 325 MG/1
650 TABLET ORAL EVERY 4 HOURS PRN
Status: DISCONTINUED | OUTPATIENT
Start: 2022-11-07 | End: 2022-11-08

## 2022-11-07 RX ORDER — FENTANYL CITRATE 50 UG/ML
INJECTION, SOLUTION INTRAMUSCULAR; INTRAVENOUS
Status: DISCONTINUED | OUTPATIENT
Start: 2022-11-07 | End: 2022-11-07

## 2022-11-07 RX ORDER — POLYETHYLENE GLYCOL 3350 17 G/17G
17 POWDER, FOR SOLUTION ORAL DAILY
Status: DISCONTINUED | OUTPATIENT
Start: 2022-11-07 | End: 2022-11-10

## 2022-11-07 RX ORDER — HEPARIN SODIUM 1000 [USP'U]/ML
INJECTION, SOLUTION INTRAVENOUS; SUBCUTANEOUS
Status: DISCONTINUED | OUTPATIENT
Start: 2022-11-07 | End: 2022-11-07

## 2022-11-07 RX ORDER — ONDANSETRON 2 MG/ML
INJECTION INTRAMUSCULAR; INTRAVENOUS
Status: DISCONTINUED | OUTPATIENT
Start: 2022-11-07 | End: 2022-11-07

## 2022-11-07 RX ORDER — VASOPRESSIN 20 [USP'U]/ML
INJECTION, SOLUTION INTRAMUSCULAR; SUBCUTANEOUS
Status: DISCONTINUED | OUTPATIENT
Start: 2022-11-07 | End: 2022-11-07

## 2022-11-07 RX ORDER — IPRATROPIUM BROMIDE AND ALBUTEROL SULFATE 2.5; .5 MG/3ML; MG/3ML
3 SOLUTION RESPIRATORY (INHALATION) EVERY 4 HOURS
Status: DISPENSED | OUTPATIENT
Start: 2022-11-07 | End: 2022-11-08

## 2022-11-07 RX ORDER — ASPIRIN 300 MG/1
300 SUPPOSITORY RECTAL ONCE AS NEEDED
Status: DISCONTINUED | OUTPATIENT
Start: 2022-11-07 | End: 2022-11-07

## 2022-11-07 RX ORDER — SODIUM CHLORIDE 9 MG/ML
INJECTION, SOLUTION INTRAVENOUS CONTINUOUS
Status: DISCONTINUED | OUTPATIENT
Start: 2022-11-07 | End: 2022-11-09

## 2022-11-07 RX ORDER — LIDOCAINE HYDROCHLORIDE 20 MG/ML
INJECTION, SOLUTION EPIDURAL; INFILTRATION; INTRACAUDAL; PERINEURAL
Status: DISCONTINUED | OUTPATIENT
Start: 2022-11-07 | End: 2022-11-07

## 2022-11-07 RX ORDER — IPRATROPIUM BROMIDE AND ALBUTEROL SULFATE 2.5; .5 MG/3ML; MG/3ML
3 SOLUTION RESPIRATORY (INHALATION) EVERY 4 HOURS PRN
Status: ACTIVE | OUTPATIENT
Start: 2022-11-07 | End: 2022-11-08

## 2022-11-07 RX ORDER — POTASSIUM CHLORIDE 14.9 MG/ML
60 INJECTION INTRAVENOUS
Status: DISCONTINUED | OUTPATIENT
Start: 2022-11-07 | End: 2022-11-09

## 2022-11-07 RX ORDER — ROCURONIUM BROMIDE 10 MG/ML
INJECTION, SOLUTION INTRAVENOUS
Status: DISCONTINUED | OUTPATIENT
Start: 2022-11-07 | End: 2022-11-07

## 2022-11-07 RX ORDER — POTASSIUM CHLORIDE 14.9 MG/ML
20 INJECTION INTRAVENOUS
Status: DISCONTINUED | OUTPATIENT
Start: 2022-11-07 | End: 2022-11-09

## 2022-11-07 RX ORDER — MUPIROCIN 20 MG/G
1 OINTMENT TOPICAL 2 TIMES DAILY
Status: COMPLETED | OUTPATIENT
Start: 2022-11-07 | End: 2022-11-12

## 2022-11-07 RX ORDER — MUPIROCIN 20 MG/G
1 OINTMENT TOPICAL
Status: COMPLETED | OUTPATIENT
Start: 2022-11-07 | End: 2022-11-07

## 2022-11-07 RX ORDER — ONDANSETRON 2 MG/ML
4 INJECTION INTRAMUSCULAR; INTRAVENOUS EVERY 12 HOURS PRN
Status: DISCONTINUED | OUTPATIENT
Start: 2022-11-07 | End: 2022-11-13 | Stop reason: HOSPADM

## 2022-11-07 RX ORDER — ATORVASTATIN CALCIUM 10 MG/1
10 TABLET, FILM COATED ORAL NIGHTLY
Status: DISCONTINUED | OUTPATIENT
Start: 2022-11-09 | End: 2022-11-13 | Stop reason: HOSPADM

## 2022-11-07 RX ORDER — TRANEXAMIC ACID 100 MG/ML
INJECTION, SOLUTION INTRAVENOUS
Status: DISCONTINUED | OUTPATIENT
Start: 2022-11-07 | End: 2022-11-07

## 2022-11-07 RX ORDER — HYDROMORPHONE HYDROCHLORIDE 1 MG/ML
0.5 INJECTION, SOLUTION INTRAMUSCULAR; INTRAVENOUS; SUBCUTANEOUS EVERY 4 HOURS PRN
Status: DISCONTINUED | OUTPATIENT
Start: 2022-11-07 | End: 2022-11-08

## 2022-11-07 RX ORDER — ACETAMINOPHEN 500 MG
1000 TABLET ORAL EVERY 8 HOURS
Status: DISCONTINUED | OUTPATIENT
Start: 2022-11-07 | End: 2022-11-13 | Stop reason: HOSPADM

## 2022-11-07 RX ORDER — POTASSIUM CHLORIDE 29.8 MG/ML
40 INJECTION INTRAVENOUS
Status: DISCONTINUED | OUTPATIENT
Start: 2022-11-07 | End: 2022-11-09

## 2022-11-07 RX ORDER — EPINEPHRINE 0.1 MG/ML
INJECTION INTRAVENOUS
Status: DISCONTINUED | OUTPATIENT
Start: 2022-11-07 | End: 2022-11-07

## 2022-11-07 RX ORDER — KETAMINE HCL IN 0.9 % NACL 50 MG/5 ML
SYRINGE (ML) INTRAVENOUS
Status: DISCONTINUED | OUTPATIENT
Start: 2022-11-07 | End: 2022-11-07

## 2022-11-07 RX ORDER — ALBUMIN HUMAN 50 G/1000ML
25 SOLUTION INTRAVENOUS ONCE AS NEEDED
Status: DISCONTINUED | OUTPATIENT
Start: 2022-11-07 | End: 2022-11-13 | Stop reason: HOSPADM

## 2022-11-07 RX ORDER — ASPIRIN 325 MG
325 TABLET ORAL ONCE
Status: DISCONTINUED | OUTPATIENT
Start: 2022-11-07 | End: 2022-11-07

## 2022-11-07 RX ORDER — MIDAZOLAM HYDROCHLORIDE 1 MG/ML
INJECTION INTRAMUSCULAR; INTRAVENOUS
Status: DISCONTINUED | OUTPATIENT
Start: 2022-11-07 | End: 2022-11-07

## 2022-11-07 RX ORDER — ASPIRIN 325 MG
325 TABLET ORAL DAILY
Status: DISCONTINUED | OUTPATIENT
Start: 2022-11-07 | End: 2022-11-07

## 2022-11-07 RX ORDER — METOPROLOL TARTRATE 25 MG/1
25 TABLET, FILM COATED ORAL
Status: DISCONTINUED | OUTPATIENT
Start: 2022-11-07 | End: 2022-11-07 | Stop reason: HOSPADM

## 2022-11-07 RX ADMIN — VASOPRESSIN 0.04 UNITS/MIN: 20 INJECTION INTRAVENOUS at 10:11

## 2022-11-07 RX ADMIN — Medication 50 MCG/KG/MIN: at 01:11

## 2022-11-07 RX ADMIN — TACROLIMUS 900 MG: 0.5 CAPSULE ORAL at 07:11

## 2022-11-07 RX ADMIN — NOREPINEPHRINE BITARTRATE 0.05 MCG/KG/MIN: 1 INJECTION, SOLUTION, CONCENTRATE INTRAVENOUS at 10:11

## 2022-11-07 RX ADMIN — LIDOCAINE HYDROCHLORIDE 100 MG: 20 INJECTION, SOLUTION EPIDURAL; INFILTRATION; INTRACAUDAL; PERINEURAL at 12:11

## 2022-11-07 RX ADMIN — FAMOTIDINE 20 MG: 10 INJECTION INTRAVENOUS at 08:11

## 2022-11-07 RX ADMIN — SODIUM CHLORIDE, SODIUM GLUCONATE, SODIUM ACETATE, POTASSIUM CHLORIDE, MAGNESIUM CHLORIDE, SODIUM PHOSPHATE, DIBASIC, AND POTASSIUM PHOSPHATE: .53; .5; .37; .037; .03; .012; .00082 INJECTION, SOLUTION INTRAVENOUS at 07:11

## 2022-11-07 RX ADMIN — HEPARIN SODIUM 29000 UNITS: 1000 INJECTION, SOLUTION INTRAVENOUS; SUBCUTANEOUS at 09:11

## 2022-11-07 RX ADMIN — VASOPRESSIN 2 UNITS: 20 INJECTION INTRAVENOUS at 09:11

## 2022-11-07 RX ADMIN — MUPIROCIN 1 G: 20 OINTMENT TOPICAL at 06:11

## 2022-11-07 RX ADMIN — Medication 20 MG: at 08:11

## 2022-11-07 RX ADMIN — LIDOCAINE HYDROCHLORIDE 10 MG: 10 INJECTION, SOLUTION EPIDURAL; INFILTRATION; INTRACAUDAL; PERINEURAL at 05:11

## 2022-11-07 RX ADMIN — HYDROMORPHONE HYDROCHLORIDE 0.5 MG: 1 INJECTION, SOLUTION INTRAMUSCULAR; INTRAVENOUS; SUBCUTANEOUS at 10:11

## 2022-11-07 RX ADMIN — EPINEPHRINE 0.02 MCG/KG/MIN: 1 INJECTION INTRAMUSCULAR; INTRAVENOUS; SUBCUTANEOUS at 07:11

## 2022-11-07 RX ADMIN — GLYCOPYRROLATE 0.8 MG: 0.2 INJECTION, SOLUTION INTRAMUSCULAR; INTRAVENOUS at 03:11

## 2022-11-07 RX ADMIN — FENTANYL CITRATE 25 MCG: 50 INJECTION INTRAMUSCULAR; INTRAVENOUS at 06:11

## 2022-11-07 RX ADMIN — ACETAMINOPHEN 1000 MG: 500 TABLET ORAL at 07:11

## 2022-11-07 RX ADMIN — ROCURONIUM BROMIDE 100 MG: 10 INJECTION INTRAVENOUS at 07:11

## 2022-11-07 RX ADMIN — LIDOCAINE HYDROCHLORIDE 100 MG: 20 INJECTION, SOLUTION EPIDURAL; INFILTRATION; INTRACAUDAL; PERINEURAL at 08:11

## 2022-11-07 RX ADMIN — FENTANYL CITRATE 50 MCG: 50 INJECTION, SOLUTION INTRAMUSCULAR; INTRAVENOUS at 07:11

## 2022-11-07 RX ADMIN — CALCIUM CHLORIDE 300 MG: 100 INJECTION, SOLUTION INTRAVENOUS at 12:11

## 2022-11-07 RX ADMIN — TACROLIMUS 900 MG: 0.5 CAPSULE ORAL at 10:11

## 2022-11-07 RX ADMIN — VASOPRESSIN 2 UNITS: 20 INJECTION INTRAVENOUS at 01:11

## 2022-11-07 RX ADMIN — ROCURONIUM BROMIDE 20 MG: 10 INJECTION INTRAVENOUS at 12:11

## 2022-11-07 RX ADMIN — HEPARIN SODIUM 5000 UNITS: 1000 INJECTION, SOLUTION INTRAVENOUS; SUBCUTANEOUS at 10:11

## 2022-11-07 RX ADMIN — IPRATROPIUM BROMIDE AND ALBUTEROL SULFATE 3 ML: 2.5; .5 SOLUTION RESPIRATORY (INHALATION) at 03:11

## 2022-11-07 RX ADMIN — ASPIRIN 325 MG ORAL TABLET 325 MG: 325 PILL ORAL at 06:11

## 2022-11-07 RX ADMIN — TACROLIMUS 900 MG: 0.5 CAPSULE ORAL at 01:11

## 2022-11-07 RX ADMIN — MUPIROCIN 1 G: 20 OINTMENT TOPICAL at 08:11

## 2022-11-07 RX ADMIN — PROTAMINE SULFATE 10 MG: 10 INJECTION, SOLUTION INTRAVENOUS at 01:11

## 2022-11-07 RX ADMIN — SODIUM CHLORIDE 5 UNITS/HR: 9 INJECTION, SOLUTION INTRAVENOUS at 10:11

## 2022-11-07 RX ADMIN — SODIUM PHOSPHATE, MONOBASIC, MONOHYDRATE AND SODIUM PHOSPHATE, DIBASIC, ANHYDROUS 20.01 MMOL: 142; 276 INJECTION, SOLUTION INTRAVENOUS at 10:11

## 2022-11-07 RX ADMIN — NEOSTIGMINE METHYLSULFATE 5 MG: 0.5 INJECTION, SOLUTION INTRAVENOUS at 03:11

## 2022-11-07 RX ADMIN — SODIUM CHLORIDE: 0.9 INJECTION, SOLUTION INTRAVENOUS at 05:11

## 2022-11-07 RX ADMIN — POLYETHYLENE GLYCOL 3350 17 G: 17 POWDER, FOR SOLUTION ORAL at 04:11

## 2022-11-07 RX ADMIN — VASOPRESSIN 2 UNITS: 20 INJECTION INTRAVENOUS at 12:11

## 2022-11-07 RX ADMIN — ROCURONIUM BROMIDE 100 MG: 10 INJECTION INTRAVENOUS at 08:11

## 2022-11-07 RX ADMIN — EPINEPHRINE 10 MCG: 0.1 INJECTION, SOLUTION INTRAVENOUS at 07:11

## 2022-11-07 RX ADMIN — CALCIUM CHLORIDE 400 MG: 100 INJECTION, SOLUTION INTRAVENOUS at 01:11

## 2022-11-07 RX ADMIN — SODIUM CHLORIDE: 0.9 INJECTION, SOLUTION INTRAVENOUS at 06:11

## 2022-11-07 RX ADMIN — Medication 15 MG: at 09:11

## 2022-11-07 RX ADMIN — FENTANYL CITRATE 25 MCG: 50 INJECTION INTRAMUSCULAR; INTRAVENOUS at 05:11

## 2022-11-07 RX ADMIN — ACETAMINOPHEN 1000 MG: 500 TABLET ORAL at 06:11

## 2022-11-07 RX ADMIN — Medication 15 MG: at 12:11

## 2022-11-07 RX ADMIN — ALBUMIN HUMAN: 50 SOLUTION INTRAVENOUS at 01:11

## 2022-11-07 RX ADMIN — PROPOFOL 100 MG: 10 INJECTION, EMULSION INTRAVENOUS at 07:11

## 2022-11-07 RX ADMIN — IPRATROPIUM BROMIDE AND ALBUTEROL SULFATE 3 ML: 2.5; .5 SOLUTION RESPIRATORY (INHALATION) at 07:11

## 2022-11-07 RX ADMIN — LIDOCAINE HYDROCHLORIDE 100 MG: 20 INJECTION, SOLUTION EPIDURAL; INFILTRATION; INTRACAUDAL; PERINEURAL at 07:11

## 2022-11-07 RX ADMIN — IPRATROPIUM BROMIDE AND ALBUTEROL SULFATE 3 ML: 2.5; .5 SOLUTION RESPIRATORY (INHALATION) at 11:11

## 2022-11-07 RX ADMIN — FENTANYL CITRATE 50 MCG: 50 INJECTION, SOLUTION INTRAMUSCULAR; INTRAVENOUS at 06:11

## 2022-11-07 RX ADMIN — MIDAZOLAM HYDROCHLORIDE 2 MG: 1 INJECTION, SOLUTION INTRAMUSCULAR; INTRAVENOUS at 06:11

## 2022-11-07 RX ADMIN — ALBUMIN (HUMAN) 25 G: 12.5 SOLUTION INTRAVENOUS at 09:11

## 2022-11-07 RX ADMIN — PROPOFOL 50 MG: 10 INJECTION, EMULSION INTRAVENOUS at 07:11

## 2022-11-07 RX ADMIN — ONDANSETRON 4 MG: 2 INJECTION INTRAMUSCULAR; INTRAVENOUS at 08:11

## 2022-11-07 RX ADMIN — ALBUMIN (HUMAN) 25 G: 12.5 SOLUTION INTRAVENOUS at 06:11

## 2022-11-07 RX ADMIN — TRANEXAMIC ACID 1000 MG: 100 INJECTION, SOLUTION INTRAVENOUS at 07:11

## 2022-11-07 RX ADMIN — POTASSIUM CHLORIDE 40 MEQ: 29.8 INJECTION, SOLUTION INTRAVENOUS at 05:11

## 2022-11-07 RX ADMIN — PROTAMINE SULFATE 240 MG: 10 INJECTION, SOLUTION INTRAVENOUS at 01:11

## 2022-11-07 RX ADMIN — EPINEPHRINE 5 MCG: 0.1 INJECTION, SOLUTION INTRAVENOUS at 08:11

## 2022-11-07 RX ADMIN — VASOPRESSIN 1 UNITS: 20 INJECTION INTRAVENOUS at 07:11

## 2022-11-07 NOTE — PROGRESS NOTES
Autotransfusion/Rapid Infusion Record:      11/07/2022  Autotransfusionist:  George Watkins    Surgeon(s) and Role:     * Bobby Gilbert MD - Primary     * Jason Roca MD - Assisting     * Yvan Jack MD - Fellow  Anesthesiologist:  No responsible provider has been recorded for the case.    Past Medical History:   Diagnosis Date    A-fib     Atrial fibrillation     Back pain     Hyperlipidemia     Mitral incompetence     Mitral valve stenosis     Neck pain        Procedure(s) (LRB):  REPLACEMENT, MITRAL VALVE (N/A)  REPAIR, TRICUSPID VALVE (N/A)  MAZE (N/A)  STERNOTOMY (N/A)     3:23 PM    Equipment:    Cell Saver     R.I.S.  : [employer] Model: CATSmart or CATSplus : Daily Aisle   Model: [id number]     Serial number: [id number]   Serial number:    Disposable lot #: [id number]   Disposable lot #:      Were extra cardiotomies used for cell saver?  no   if yes, #:      Solutions:  Anticoagulant: ACD-A   Expiration date: 1/24 Volume used: 600   Wash solution: 0.9% NaCl   Expiration date: 7/25 Volume used: 4912     Cell saver checklist  Time completed:           [x]   Circuit assembled correctly     [x]   Cell saver powered and operational     [x]   Vacuum connected, functional, adjust to max -150mmHg     [x]   Anticoagulant drip rate adjusted     [x]   Transfer bag properly labeled with patient name, expiration time, volume,       anticoagulant, OR number, and initials     [x]   Cell saver disinfected after use (completed at end of case)       Cell Saver volumes:    Total volume processed:     2670 mL     Total volume pRBCs recovered     503 mL     Volume pRBCs infused     503 mL         RIS checklist   Time completed:  []   RIS circuit assembled correctly     []   RIS power and operational     []   RIS disinfected after use (completed at end of case)       RIS volumes:    Total volume infused:    (see anesthesia record for blood       product information)    mL       Additional  comments:

## 2022-11-07 NOTE — PROGRESS NOTES
11/07/2022  George Watkins    Current provider:  Bobby Gilbert MD    Device interrogation:  No flowsheet data found.       Rounded on Riya Soto to ensure all mechanical assist device settings (IABP or VAD) were appropriate and all parameters were within limits.  I was able to ensure all back up equipment was present, the staff had no issues, and the Perfusion Department daily rounding was complete.      For implantable VADs: Interrogation of Ventricular assist device was performed with analysis of device parameters and review of device function. I have personally reviewed the interrogation findings and agree with findings as stated.     In emergency, the nursing units have been notified to contact the perfusion department either by:  Calling [id number] from 630am to 4pm Mon thru Fri, utilizing the On-Call Finder functionality of Epic and searching for Perfusion, or by contacting the hospital  from 4pm to 630am and on weekends and asking to speak with the perfusionist on call.    3:22 PM

## 2022-11-07 NOTE — ANESTHESIA PROCEDURE NOTES
Arterial    Diagnosis: Mitral stenosis    Patient location during procedure: done in OR    Staffing  Authorizing Provider: Hue Rose MD  Performing Provider: Americo Amador MD    Anesthesiologist was present at the time of the procedure.    Preanesthetic Checklist  Completed: patient identified, IV checked, site marked, risks and benefits discussed, surgical consent, monitors and equipment checked, pre-op evaluation, timeout performed and anesthesia consent givenArterial  Skin Prep: chlorhexidine gluconate  Local Infiltration: lidocaine  Orientation: left  Location: radial    Catheter Size: 20 G  Catheter placement by Ultrasound guidance. Heme positive aspiration all ports.   Vessel Caliber: patent  Needle advanced into vessel with real time Ultrasound guidance.Insertion Attempts: 1  Assessment  Dressing: secured with tape and tegaderm  Patient: Tolerated well

## 2022-11-07 NOTE — CONSULTS
[facility] - [facility]  Endocrinology  Diabetes Consult Note    Consult Requested by: Bobby Gilbert MD   Reason for admit: Mitral valve insufficiency    HISTORY OF PRESENT ILLNESS:  Reason for Consult: Management of Hyperglycemia     Surgical Procedure and Date:   11/7/22  REPLACEMENT, MITRAL VALVE (N/A)  REPAIR, TRICUSPID VALVE (N/A)  MAZE (N/A)       HPI:   Patient is a 52 y.o. 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 Endocrinology consulted for management of hyperglycemia.      Lab Results   Component Value Date    HGBA1C 5.2 11/03/2022           Interval HPI:   Overnight events: Admitted to SICU s/p MVR. BG reasonably well controlled on IV intensive insulin protocol. Insulin infusion stopped on arrival to SICU for BG of 73. Diet NPO Except for: Sips with Medication    Eating:   NPO  Nausea: No  Hypoglycemia and intervention: No  Fever: No  TPN and/or TF: No  If yes, type of TF/TPN and rate: n/a    PMH, PSH, FH, SH reviewed     ROS:  Unable to obtain due to: Sedation,Intubation,Altered mental status,Critical illness,Reviewed ROS from note dated 11/7/22 by  Yvan Jack MD    Review of Systems    Current Medications and/or Treatments Impacting Glycemic Control  Immunotherapy:    Immunosuppressants       None          Steroids:   Hormones (From admission, onward)      None          Pressors:    Autonomic Drugs (From admission, onward)      Start     Stop Route Frequency Ordered    11/07/22 1500  EPINEPHrine 5 mg in dextrose 5% 250 mL infusion (premix)        Question Answer Comment   Begin at (in mcg/kg/min): 0.02    Titrate by: (in mcg/kg/min) 0.02    Titrate interval: (in minutes) 5    Titrate to maintain: (SBP or MAP or Cardiac Index) MAP    Greater than: (in mmHg) 65    Cardiac index greater than: (in L/min) 2.2    Maximum dose: (in mcg/kg/min) 2         -- IV Continuous 11/07/22 1446          Hyperglycemia/Diabetes Medications:   Antihyperglycemics (From admission, onward)      Start     Stop Route Frequency Ordered    11/07/22 1500  insulin regular in 0.9 % NaCl 100 unit/100 mL (1 unit/mL) infusion        Question Answer Comment   Insulin rate changes (DO NOT MODIFY ANSWER) \\[employer]\epic\Images\Pharmacy\InsulinInfusions\CTS INSULIN [id number].pdf    Enter initial dose (Units/hr): 1        -- IV Continuous 11/07/22 1446             PHYSICAL EXAMINATION:  Vitals:    11/07/22 0546   BP: 116/64   Pulse: 67   Resp: 17   Temp: 98.6 °F (37 °C)     Body mass index is 28.06 kg/m².    Physical Exam  Constitutional: Well developed, NAD.  ENT: External ears no masses with nose patent  Neck: Supple; trachea midline  Cardiovascular: Normal heart sounds, no LE edema. DP +2 bilaterally.  Lungs: Normal effort; lungs anterior bilaterally clear to auscultation.  Intubated on a ventilator.  Abdomen: Soft, no masses, no hernias.  Hypoactive BS noted.  MS: No clubbing or cyanosis of nails noted; unable to assess gait.  Skin: No rashes, lesions, or ulcers; no nodules.  Mid-sternal incision with telfa island dressing, CDI.  CT x 2.  LLE wrapped with ACE wrap.  Psychiatric: JOHN  Neurological: JOHN  Foot: Nails in good condition, no amputations noted        Labs Reviewed and Include   Recent Labs   Lab 11/07/22  1514   GLU 92   CALCIUM 9.3   ALBUMIN 3.0*   PROT 5.3*      K 3.4*   CO2 20*   *   BUN 15   CREATININE 0.9   ALKPHOS 73   ALT 25   *   BILITOT 1.8*     Lab Results   Component Value Date    WBC 18.74 (H) 11/07/2022    HGB 10.3 (L) 11/07/2022    HCT 29 (L) 11/07/2022    MCV 93 11/07/2022     (L) 11/07/2022     No results for input(s): TSH, FREET4 in the last 168 hours.  Lab Results   Component Value Date    HGBA1C 5.2 11/03/2022       Nutritional status:   Body mass index is 28.06 kg/m².  Lab Results   Component Value Date    ALBUMIN 3.0 (L)  11/07/2022    ALBUMIN 4.1 11/03/2022    ALBUMIN 3.8 05/31/2022     No results found for: PREALBUMIN    Estimated Creatinine Clearance: 84.7 mL/min (based on SCr of 0.9 mg/dL).    Accu-Checks  No results for input(s): POCTGLUCOSE in the last 72 hours.     ASSESSMENT and PLAN    * Mitral valve insufficiency  Managed per primary team          Stress hyperglycemia  BG goal 110-140 (CTS protocol)     Restart IV insulin infusion protocol with 2 consecutive BG readings > 180  Requires intensive BG monitoring while on protocol (q hourly)     ** Please call Endocrine for any BG related issues **    ** Please notify Endocrine for any change and/or advance in diet**    Discharge planning: TBD        S/P mitral valve replacement  Managed per primary team  Optimize BG control        Atrial fibrillation  May increase insulin resistance if uncontrolled             Plan discussed with patient, family, and RN at bedside.     Loree Pinon NP  Endocrinology  [facility] - [facility]

## 2022-11-07 NOTE — ASSESSMENT & PLAN NOTE
Riya Soto is a 52 y.o. 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 2/2 rheumatic fever as child s/p MV repair 2011, severe tricuspid regurg, Afib on dronedarone, HLD, and HFrEF (40%) who is s/p replacement of tricuspid valve, repair of mitral valve and MAZE repair       Neuro/Psych:     - Sedation: propofol gtt    - Pain:    - Scheduled Tylenol 1g q8h   - Fentanyl PRN while intubated, Oxy PRN             Cardiac:     - S/P mitral valve replacement and maze with Dr. Gilbert on 11/7/2022    - BP Goal: SBP <140, MAP 60-80    - Cleviprex/Cardene PRN    - Pressors:epi .05, vaso .04    - Anti-HTNs: Will start when appropriate    - Rhythm: NSR. Afib s/p MAZE    - Beta blocker: Will start when appropriate    - Statin: Atorvastatin 10 mg QD (home medication)       Pulmonary:     - Goal SpO2 >92%    - Will wean ventilator support as tolerated to extubate    - Chest Tubes x 4 (2 Meds & 2 Pleural)    - ABGs PRN      Renal:    - Trend BUN/Cr     - Maintain Coon, record strict Is/Os    Recent Labs   Lab 11/03/22  1413   BUN 15   CREATININE 1.1         FEN / GI:     - Daily CMP, PRN K/Mag/Phos per protocol     - Replace electrolytes as needed    - Nutrition: NPO pending extubation    - Bowel Regimen: Miralax, docusate      ID:     - Afebrile    - WBC stable    - Abx: Complete perioperative cefazolin 2g Q8H x 5 doses    Recent Labs   Lab 11/03/22  1413   WBC 5.36         Heme/Onc:     - Hgb ** pre-operatively    - CBC daily    - ASA 325mg daily    Recent Labs   Lab 11/03/22  1413   HGB 11.2*      APTT 25.6   INR 1.0         Endocrine:     - CTS Goal -140    - HgbA1c: 5.2    - Endocrinology consulted for insulin management      PPx:   Feeding: NPO pending extubation   Analgesia/Sedation: propofol  Thromboembolic Prevention: SCDs  HOB >30: Yes  Stress Ulcer: pepcid while intubated   Glucose Control: Yes, insulin management per Endocrinology     Lines/Drains/Airway:   ETT   Left radial arterial  line   RIJ CVC   Coon   Chest Tubes: 4 (2 Mediastinal, 2 Pleural)    Pacing Wires: Temporary ventricular pacing wires      Dispo/Code Status/Palliative:     - Continue SICU Care    - Full Code

## 2022-11-07 NOTE — HPI
Riya Soto is a 52 y.o. female who presents for pre-op Mitral valve replacement, tricuspid valve repair, and MAZE. Medical conditions include MV disease? (unclear etiology/pathology, reports possible rheumatic fever as a child), s/p mitral valve repair in 2011 at [facility] (she thinks the surgeon was Dr. Garcia), symptomatic persistent AF also with systolic dysfunction (LVEF of 40%). She was in her usual state of health until the past few months when she began having palpitations and fatigue. She then presented to urgent care on 2/17/2022 with feeling unwell (body aches, fatigue, wanted to get checked for COVID) and was discovered to be in atrial fibrillation. She was sent to the ER. She was discharged. She then saw Suzie Berrios in cardiology clinic who initiated eliquis and referred for evaluation. An echocardiogram was recently performed noting a LVEF of 40% with mild-mod MR and mild-mod MS with reduced anterior mitral leaflet mobility. She was also initiated on metoprolol, which has been limited to low dosing due to hypotension. A 24 hour holter monitor noted persistent AF with an average ventricular rate of 89 bpm with frequent PVCs (2% burden). Was cardioverted in March and started on Multaq. Reports that she has no shortness of breath and is asymptomatic since her cardioversion. Denies any lower extremity swelling, dizziness, chest pain, orthopnea, or palpitations. No prior strokes, seizures, blood clots, stents. No smoking history. Reports that she does not drink daily but every now and then has a daiquiri.    She presents to the SICU now s/p redo sternotomy, mitral valve replacement with a mechanical prosthesis, tricuspid valve repair, and Maze procedure on 11/7. On admission she is intubated, sedated, and in stable condition. Pressor requirements upon admission are .05 of Epi, and .04 of Vaso. Blood pressures are stable with MAPs maintaining greater than 65. She has a [facility] CVC, PIV, and  Chest tubes x4 with appropriate output.    Intraoperatively she received 3 L of crystalloid, 2 units of PRBC, 0 FFP, 0 Platelets, 0 Cryo and 500 of Cell Saver. Urine output intraoperatively recorded as 700mL. Her pre-operative echocardiogram showed mild decreased left and right ventricular function, mitral stenosis, mitral regurgitation and tricuspid regurgitation. Post-operative echo revealed normal biventricular function on pressors and no residual TR.    Immediate post-operative plans include hemodynamic stabilization and weaning of cardiac and respiratory support. Plans to wean vasopressors as tolerated, wean vent support with goals of extubation, and closely monitor fluid status with strict I's+O's and continued fluid resuscitation as needed.

## 2022-11-07 NOTE — TRANSFER OF CARE
"Anesthesia Transfer of Care Note    Patient: Riya Soto    Procedure(s) Performed: Procedure(s) (LRB):  REPLACEMENT, MITRAL VALVE (N/A)  REPAIR, TRICUSPID VALVE (N/A)  MAZE (N/A)  STERNOTOMY (N/A)    Patient location: ICU    Anesthesia Type: general    Transport from OR: Transported from OR intubated on 100% O2 by AMBU with assisted ventilation    Post pain: adequate analgesia    Post assessment: no apparent anesthetic complications    Post vital signs: stable    Level of consciousness: sedated    Nausea/Vomiting: no nausea/vomiting    Complications: none    Transfer of care protocol was followed      Last vitals:   Visit Vitals  /64   Pulse 67   Temp 37 °C (98.6 °F) (Oral)   Resp 17   Ht 5' 9" (1.753 m)   Wt 86.2 kg (190 lb)   LMP 11/03/2016   SpO2 99%   Breastfeeding No   BMI 28.06 kg/m²     "

## 2022-11-07 NOTE — SUBJECTIVE & OBJECTIVE
Follow-up For: Procedure(s) (LRB):  REPLACEMENT, MITRAL VALVE (N/A)  REPAIR, TRICUSPID VALVE (N/A)  MAZE (N/A)  STERNOTOMY (N/A)    Post-Operative Day: Day of Surgery     Past Medical History:   Diagnosis Date    A-fib     Atrial fibrillation     Back pain     Hyperlipidemia     Mitral incompetence     Mitral valve stenosis     Neck pain        Past Surgical History:   Procedure Laterality Date    CARDIAC SURGERY      to repair mitral valve    CHOLECYSTECTOMY      COLONOSCOPY      COLONOSCOPY N/A 6/8/2022    Procedure: COLONOSCOPY;  Surgeon: Sanford Andres MD;  Location: [facility] ENDO;  Service: Endoscopy;  Laterality: N/A;    CORONARY ANGIOGRAPHY N/A 8/29/2022    Procedure: ANGIOGRAM, CORONARY ARTERY;  Surgeon: Lexa Harley MD;  Location: [facility] CATH LAB;  Service: Cardiology;  Laterality: N/A;    TRANSESOPHAGEAL ECHOCARDIOGRAPHY N/A 03/29/2022    Procedure: ECHOCARDIOGRAM, TRANSESOPHAGEAL;  Surgeon: Dominic Wallis MD;  Location: [facility] EP LAB;  Service: Cardiology;  Laterality: N/A;    TREATMENT OF CARDIAC ARRHYTHMIA N/A 03/29/2022    Procedure: Cardioversion or Defibrillation;  Surgeon: Elmo Jones MD;  Location: [facility] EP LAB;  Service: Cardiology;  Laterality: N/A;  afib, dccv, stanley, anes, DE, 3prep, Provider to perform        Review of patient's allergies indicates:   Allergen Reactions    Amoxicillin Hives and Other (See Comments)       Family History       Problem Relation (Age of Onset)    Breast cancer Sister    Colon cancer Maternal Aunt          Tobacco Use    Smoking status: Never    Smokeless tobacco: Never   Substance and Sexual Activity    Alcohol use: Yes     Comment: socially    Drug use: No    Sexual activity: Yes     Partners: Male      Review of Systems   Unable to perform ROS: Intubated   Objective:     Vital Signs (Most Recent):  Temp: 98.6 °F (37 °C) (11/07/22 0546)  Pulse: 81 (11/07/22 1515)  Resp: 20 (11/07/22 1515)  BP: 116/64 (11/07/22 0546)  SpO2: 100 % (11/07/22 1515) Vital Signs (24h  Range):  Temp:  [98.6 °F (37 °C)] 98.6 °F (37 °C)  Pulse:  [67-81] 81  Resp:  [17-20] 20  SpO2:  [99 %-100 %] 100 %  BP: (116)/(64) 116/64  Arterial Line BP: (114)/(57) 114/57     Weight: 86.2 kg (190 lb)  Body mass index is 28.06 kg/m².      Intake/Output Summary (Last 24 hours) at 11/7/2022 1541  Last data filed at 11/7/2022 1448  Gross per 24 hour   Intake 4400 ml   Output 790 ml   Net 3610 ml       Physical Exam  Vitals and nursing note reviewed.   Constitutional:       Comments: sedated   HENT:      Head: Normocephalic and atraumatic.   Cardiovascular:      Rate and Rhythm: Normal rate and regular rhythm.      Comments: Midline incision c/d/I  Chest tubes x4 (2 meds, 2 pleural)  Temporary ventricular pacing wires; not paced    Pulmonary:      Comments: Mechanical ventilation   Abdominal:      Palpations: Abdomen is soft.   Skin:     General: Skin is warm and dry.       Vents:  Vent Mode: A/C (11/07/22 1510)  Ventilator Initiated: Yes (11/07/22 1510)  Set Rate: 18 BPM (11/07/22 1510)  Vt Set: 450 mL (11/07/22 1510)  PEEP/CPAP: 5 cmH20 (11/07/22 1510)  Oxygen Concentration (%): 100 (11/07/22 1515)  Peak Airway Pressure: 24 cmH2O (11/07/22 1510)  Plateau Pressure: 0 cmH20 (11/07/22 1510)  Total Ve: 8.35 L/m (11/07/22 1510)  Negative Inspiratory Force (cm H2O): 0 (11/07/22 1510)    Lines/Drains/Airways       Central Venous Catheter Line  Duration              Introducer with Double Lumen 11/07/22 1521 right internal jugular <1 day    Pulmonary Artery Catheter Assessment  11/07/22 0726 <1 day              Drain  Duration                  Urethral Catheter 11/07/22 Temperature probe;Non-latex 14 Fr. <1 day         Y Chest Tube 1 and 2 11/07/22 1 Left Mediastinal 19 Fr. Right Mediastinal 19 Fr. <1 day         Y Chest Tube 3 and 4 11/07/22 Left Pleural 19 Fr. Right Pleural 19 Fr. <1 day              Arterial Line  Duration             Arterial Line 11/07/22 0830 Left Radial <1 day              Line  Duration                   Pacer Wires 11/07/22 1307 <1 day              Peripheral Intravenous Line  Duration                  Peripheral IV - Single Lumen 11/07/22 0558 18 G Left;Posterior Wrist <1 day                    Significant Labs:    CBC/Anemia Profile:  Recent Labs   Lab 11/07/22  1232 11/07/22  1304 11/07/22  1514   HCT 25* 22* 29*        Chemistries:  No results for input(s): NA, K, CL, CO2, BUN, CREATININE, CALCIUM, ALBUMIN, PROT, BILITOT, ALKPHOS, ALT, AST, GLUCOSE, MG, PHOS in the last 48 hours.    All pertinent labs within the past 24 hours have been reviewed.    Significant Imaging: I have reviewed all pertinent imaging results/findings within the past 24 hours.

## 2022-11-07 NOTE — ANESTHESIA PROCEDURE NOTES
[place] [facility]    Diagnosis: Mitral stenosis  Patient location during procedure: done in OR  Procedure start time: 11/7/2022 7:26 AM  Timeout: 11/7/2022 7:25 AM  Procedure end time: 11/7/2022 7:39 AM    Staffing  Authorizing Provider: uHe Rose MD  Performing Provider: Americo Amador MD    Anesthesiologist was present at the time of the procedure.  Preanesthetic Checklist  Completed: patient identified, IV checked, site marked, risks and benefits discussed, surgical consent, monitors and equipment checked, pre-op evaluation, timeout performed and anesthesia consent given  [place] [facility]  Skin Prep: chlorhexidine gluconate  Local anesthetic: GA.  Location: right,  internal jugular vein  Vessel Caliber: large, patent, compressibility normal  Introducer: 9 Fr single lumen, manometry used.  Device: CCO/Oximetric Catheter   Catheter Size: 8 Fr  Catheter placement by yes. Heme positive aspiration all ports. PAC floated with balloon up until wedgedSterile sheath usedInsertion Attempts: 1  Indication: intravenous therapy, hemodynamic monitoring  Ultrasound Guidance  Needle advanced into vessel with real time Ultrasound guidance.  Sterile sheath used.  Assessment  Central Line Bundle Protocol followed. Hand hygiene before procedure, surgical cap worn, surgical mask worn, sterile surgical gloves worn, large sterile drape used.  Verification: ultrasound and blood return  Dressing: secured with tape and tegaderm  Patient: Tolerated Well

## 2022-11-07 NOTE — ANESTHESIA PROCEDURE NOTES
Peripheral Block    Patient location during procedure: pre-op   Block not for primary anesthetic.  Reason for block: post-op pain management   Post-op Pain Location: Bilateral chest pain   Start time: 11/7/2022 7:06 AM  Timeout: 11/7/2022 7:05 AM   End time: 11/7/2022 7:11 AM    Staffing  Authorizing Provider: Hue Rose MD  Performing Provider: Americo Amador MD    Preanesthetic Checklist  Completed: patient identified, IV checked, site marked, risks and benefits discussed, surgical consent, monitors and equipment checked, pre-op evaluation and timeout performed  Peripheral Block  Patient position: sitting  Prep: ChloraPrep  Patient monitoring: heart rate, cardiac monitor, continuous pulse ox, continuous capnometry and frequent blood pressure checks  Block type: erector spinae plane  Laterality: bilateral  Injection technique: single shot  Interspace: T7-8    Needle  Needle type: Echogenic   Needle gauge: 20 G  Needle length: 4 in  Needle localization: anatomical landmarks and ultrasound guidance   -ultrasound image captured on disc.  Assessment  Injection assessment: negative aspiration, negative parasthesia and local visualized surrounding nerve  Paresthesia pain: none  Heart rate change: no  Slow fractionated injection: yes  Pain Tolerance: comfortable throughout block and no complaints      Additional Notes  Patient tolerated well. 30 cc 0.375% bupivacaine injected per side

## 2022-11-07 NOTE — HPI
Reason for Consult: Management of Hyperglycemia     Surgical Procedure and Date:   11/7/22  REPLACEMENT, MITRAL VALVE (N/A)  REPAIR, TRICUSPID VALVE (N/A)  MAZE (N/A)       HPI:   Patient is a 52 y.o. 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 Endocrinology consulted for management of hyperglycemia.      Lab Results   Component Value Date    HGBA1C 5.2 11/03/2022

## 2022-11-07 NOTE — ANESTHESIA PROCEDURE NOTES
JUAN    Diagnosis: Mitral Stenosis  Patient location during procedure: OR  Exam type: Baseline    Staffing  Performed: anesthesiologist and fellow     Anesthesiologist: Hue Rose MD        Anesthesiologist Present  Yes      Setup & Induction  Patient preparation: bite block inserted  Probe Insertion: easy  Exam: completeDoppler Echo: 2D, 3D, color flow mapping, continuous wave Doppler and pulse wave Doppler.  Exam     Left Heart  Left Atruim: dilated  Left appendage velocity:40    Left Ventricle: cm, normal (men 4.2-5.9; women 3.8-5.2)  LV Wall Thickness (posterior wall):cm, normal (men 0.6-1.0 cm; women 0.6-0.9 cm)    LVAD:no  Estimated Ejection Fraction: > 55% normal (on epi gtt)  Regional Wall Abnormalities: no RWMA            Right Heart  Right Ventricle: dilated  Right Ventricle Function: mildly decreased    Intra Atrial Septum  PFO: no shunt by color flow doppler  no IAS aneurysm  no lipomatous hypertrophy  no Atrial Septal Defect (Asd)    Right Ventricle  Size: dilated, Free Wall Thickness: normal        Mitral Valve:   Morphology:normal  Jet Description: noneStenosis: no significant stenosis.    Tricuspid Valve:  Morphology: normal  Regurgitation: severe    Pulmonic Valve:  Morphology:normal  PULMONIC REGURGITATION: trivial.    Great Vessels  Ascending Aorta Atherosclerosis: 1=nl-min dz  Aortic Arch Atherosclerosis: 2=mild dz (<3mm)  IABP: no  Descending Aorta Atherosclerosis: 2=mild dz (<3mm)  Aorta    Descending aorta IABP: no    Effusions none    SummaryFindings discussed with surgeon.    Other Findings   Diagnostic intraoperative JUAN performed at the request of Dr. Gilbert for redo sternotomy, MVR, TV repair, and MAZE.     Baseline exam:  - On low dose epi gtt  - LV systolic function was globally hypokinetic prior to initiation of epi gtt with LVEF ~40-45%. With epi gtt, LV systolic function is normal with LVEF >55%.  - RV systolic function is mildly reduced  - No regional wall motion  abnormalities  - Severe bi-atrial enlargement  - Evidence of prior mitral repair with annuloplasty band. There is severe mitral stenosis with mean pressure gradient of 13 mmHg. There is moderate mitral regurgitation without evidence of flow reversal in the pulmonary veins.   - Severe tricuspid regurgitation with holosystolic flow reversal in the hepatic veins  - Aortic and pulmonic valves are within normal limits in structure and function  - No PFO via color flow doppler  - No significant aortic pathology  - No effusions    Post-procedure exam:  - s/p 20 mm MVR and 26 mm TV repair and MAZE  - on epi, vaso, NE support  - Normal LV and RV function on inotropic support as above  - No new RWMA  - Mechanical MV well seated and in adequate position with adequate leaflet excursion. Mean PG 4mmHg through MV. No evidence of transvalvular or paravalvular leak.   - AV unchanged in structure and function.  - Tricuspid valve repaired with annular ring well positioned. No TR or TS. Mean pressure gradient across valve is <5 mmHg.   - Pulmonic valve not seen 2/2 artifact of mechanical MV.   - No PFO, no significant pleural or pericardial effusion. No aortic dissection.     Stable s/p chest closure  Probe removed atraumatically

## 2022-11-07 NOTE — H&P
[facility]  Critical Care - Surgery  History & Physical    Patient Name: Riya Soto  MRN: [id number]  Admission Date: 11/7/2022  Code Status: Full Code  Attending Physician: Bobby Gilbert MD   Primary Care Provider: Pantera Yadav MD   Principal Problem: <principal problem not specified>    Subjective:     HPI:  Riya Soto is a 52 y.o. female who presents for pre-op Mitral valve replacement, tricuspid valve repair, and MAZE. Medical conditions include MV disease? (unclear etiology/pathology, reports possible rheumatic fever as a child), s/p mitral valve repair in 2011 at [facility] (she thinks the surgeon was Dr. Garcia), symptomatic persistent AF also with systolic dysfunction (LVEF of 40%). She was in her usual state of health until the past few months when she began having palpitations and fatigue. She then presented to urgent care on 2/17/2022 with feeling unwell (body aches, fatigue, wanted to get checked for COVID) and was discovered to be in atrial fibrillation. She was sent to the ER. She was discharged. She then saw Suzie Berrios in cardiology clinic who initiated eliquis and referred for evaluation. An echocardiogram was recently performed noting a LVEF of 40% with mild-mod MR and mild-mod MS with reduced anterior mitral leaflet mobility. She was also initiated on metoprolol, which has been limited to low dosing due to hypotension. A 24 hour holter monitor noted persistent AF with an average ventricular rate of 89 bpm with frequent PVCs (2% burden). Was cardioverted in March and started on Multaq. Reports that she has no shortness of breath and is asymptomatic since her cardioversion. Denies any lower extremity swelling, dizziness, chest pain, orthopnea, or palpitations. No prior strokes, seizures, blood clots, stents. No smoking history. Reports that she does not drink daily but every now and then has a daiquiri.    She presents to the SICU now s/p redo  sternotomy, mitral valve replacement with a mechanical prosthesis, tricuspid valve repair, and Maze procedure on 11/7. On admission she is intubated, sedated, and in stable condition. Pressor requirements upon admission are .05 of Epi, and .04 of Vaso. Blood pressures are stable with MAPs maintaining greater than 65. She has a RIJ CVC, PIV, and Chest tubes x4 with appropriate output.    Intraoperatively she received 3 L of crystalloid, 2 units of PRBC, 0 FFP, 0 Platelets, 0 Cryo and 500 of Cell Saver. Urine output intraoperatively recorded as 700mL. Her pre-operative echocardiogram showed mild decreased left and right ventricular function, mitral stenosis, mitral regurgitation and tricuspid regurgitation. Post-operative echo revealed normal biventricular function on pressors and no residual TR.    Immediate post-operative plans include hemodynamic stabilization and weaning of cardiac and respiratory support. Plans to wean vasopressors as tolerated, wean vent support with goals of extubation, and closely monitor fluid status with strict I's+O's and continued fluid resuscitation as needed.        Hospital/ICU Course:  No notes on file    Follow-up For: Procedure(s) (LRB):  REPLACEMENT, MITRAL VALVE (N/A)  REPAIR, TRICUSPID VALVE (N/A)  MAZE (N/A)  STERNOTOMY (N/A)    Post-Operative Day: Day of Surgery     Past Medical History:   Diagnosis Date    A-fib     Atrial fibrillation     Back pain     Hyperlipidemia     Mitral incompetence     Mitral valve stenosis     Neck pain        Past Surgical History:   Procedure Laterality Date    CARDIAC SURGERY      to repair mitral valve    CHOLECYSTECTOMY      COLONOSCOPY      COLONOSCOPY N/A 6/8/2022    Procedure: COLONOSCOPY;  Surgeon: Sanford Andres MD;  Location: [facility] ENDO;  Service: Endoscopy;  Laterality: N/A;    CORONARY ANGIOGRAPHY N/A 8/29/2022    Procedure: ANGIOGRAM, CORONARY ARTERY;  Surgeon: Lexa Harley MD;  Location: [facility] CATH LAB;  Service:  Cardiology;  Laterality: N/A;    TRANSESOPHAGEAL ECHOCARDIOGRAPHY N/A 03/29/2022    Procedure: ECHOCARDIOGRAM, TRANSESOPHAGEAL;  Surgeon: Dominic Wallis MD;  Location: [facility] EP LAB;  Service: Cardiology;  Laterality: N/A;    TREATMENT OF CARDIAC ARRHYTHMIA N/A 03/29/2022    Procedure: Cardioversion or Defibrillation;  Surgeon: Elmo Jones MD;  Location: [facility] EP LAB;  Service: Cardiology;  Laterality: N/A;  afib, dccv, stanley, anes, NC, 3prep, Provider to perform        Review of patient's allergies indicates:   Allergen Reactions    Amoxicillin Hives and Other (See Comments)       Family History       Problem Relation (Age of Onset)    Breast cancer Sister    Colon cancer Maternal Aunt          Tobacco Use    Smoking status: Never    Smokeless tobacco: Never   Substance and Sexual Activity    Alcohol use: Yes     Comment: socially    Drug use: No    Sexual activity: Yes     Partners: Male      Review of Systems   Unable to perform ROS: Intubated   Objective:     Vital Signs (Most Recent):  Temp: 98.6 °F (37 °C) (11/07/22 0546)  Pulse: 81 (11/07/22 1515)  Resp: 20 (11/07/22 1515)  BP: 116/64 (11/07/22 0546)  SpO2: 100 % (11/07/22 1515) Vital Signs (24h Range):  Temp:  [98.6 °F (37 °C)] 98.6 °F (37 °C)  Pulse:  [67-81] 81  Resp:  [17-20] 20  SpO2:  [99 %-100 %] 100 %  BP: (116)/(64) 116/64  Arterial Line BP: (114)/(57) 114/57     Weight: 86.2 kg (190 lb)  Body mass index is 28.06 kg/m².      Intake/Output Summary (Last 24 hours) at 11/7/2022 1541  Last data filed at 11/7/2022 1448  Gross per 24 hour   Intake 4400 ml   Output 790 ml   Net 3610 ml       Physical Exam  Vitals and nursing note reviewed.   Constitutional:       Comments: sedated   HENT:      Head: Normocephalic and atraumatic.   Cardiovascular:      Rate and Rhythm: Normal rate and regular rhythm.      Comments: Midline incision c/d/I  Chest tubes x4 (2 meds, 2 pleural)  Temporary ventricular pacing wires; not paced    Pulmonary:      Comments:  Mechanical ventilation   Abdominal:      Palpations: Abdomen is soft.   Skin:     General: Skin is warm and dry.       Vents:  Vent Mode: A/C (11/07/22 1510)  Ventilator Initiated: Yes (11/07/22 1510)  Set Rate: 18 BPM (11/07/22 1510)  Vt Set: 450 mL (11/07/22 1510)  PEEP/CPAP: 5 cmH20 (11/07/22 1510)  Oxygen Concentration (%): 100 (11/07/22 1515)  Peak Airway Pressure: 24 cmH2O (11/07/22 1510)  Plateau Pressure: 0 cmH20 (11/07/22 1510)  Total Ve: 8.35 L/m (11/07/22 1510)  Negative Inspiratory Force (cm H2O): 0 (11/07/22 1510)    Lines/Drains/Airways       Central Venous Catheter Line  Duration              Introducer with Double Lumen 11/07/22 1521 right internal jugular <1 day    Pulmonary Artery Catheter Assessment  11/07/22 0726 <1 day              Drain  Duration                  Urethral Catheter 11/07/22 Temperature probe;Non-latex 14 Fr. <1 day         Y Chest Tube 1 and 2 11/07/22 1 Left Mediastinal 19 Fr. Right Mediastinal 19 Fr. <1 day         Y Chest Tube 3 and 4 11/07/22 Left Pleural 19 Fr. Right Pleural 19 Fr. <1 day              Arterial Line  Duration             Arterial Line 11/07/22 0830 Left Radial <1 day              Line  Duration                  Pacer Wires 11/07/22 1307 <1 day              Peripheral Intravenous Line  Duration                  Peripheral IV - Single Lumen 11/07/22 0558 18 G Left;Posterior Wrist <1 day                    Significant Labs:    CBC/Anemia Profile:  Recent Labs   Lab 11/07/22  1232 11/07/22  1304 11/07/22  1514   HCT 25* 22* 29*        Chemistries:  No results for input(s): NA, K, CL, CO2, BUN, CREATININE, CALCIUM, ALBUMIN, PROT, BILITOT, ALKPHOS, ALT, AST, GLUCOSE, MG, PHOS in the last 48 hours.    All pertinent labs within the past 24 hours have been reviewed.    Significant Imaging: I have reviewed all pertinent imaging results/findings within the past 24 hours.    Assessment/Plan:     Mitral valve insufficiency  Riya Peazolph is a 52 y.o. 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 2/2 rheumatic fever as child s/p MV repair 2011, severe tricuspid regurg, Afib on dronedarone, HLD, and HFrEF (40%) who is s/p replacement of tricuspid valve, repair of mitral valve and MAZE repair       Neuro/Psych:     - Sedation: propofol gtt    - Pain:    - Scheduled Tylenol 1g q8h   - Fentanyl PRN while intubated, Oxy PRN             Cardiac:     - S/P mitral valve replacement and maze with Dr. Gilbert on 11/7/2022    - BP Goal: SBP <140, MAP 60-80    - Cleviprex/Cardene PRN    - Pressors:epi .05, vaso .04    - Anti-HTNs: Will start when appropriate    - Rhythm: NSR. Afib s/p MAZE    - Beta blocker: Will start when appropriate    - Statin: Atorvastatin 10 mg QD (home medication)       Pulmonary:     - Goal SpO2 >92%    - Will wean ventilator support as tolerated to extubate    - Chest Tubes x 4 (2 Meds & 2 Pleural)    - ABGs PRN      Renal:    - Trend BUN/Cr     - Maintain Coon, record strict Is/Os    Recent Labs   Lab 11/03/22  1413   BUN 15   CREATININE 1.1         FEN / GI:     - Daily CMP, PRN K/Mag/Phos per protocol     - Replace electrolytes as needed    - Nutrition: NPO pending extubation    - Bowel Regimen: Miralax, docusate      ID:     - Afebrile    - WBC stable    - Abx: Complete perioperative cefazolin 2g Q8H x 5 doses    Recent Labs   Lab 11/03/22  1413   WBC 5.36         Heme/Onc:     - Hgb 11.2 pre-operatively    - CBC daily    - ASA 325mg daily    Recent Labs   Lab 11/03/22  1413   HGB 11.2*      APTT 25.6   INR 1.0         Endocrine:     - CTS Goal -140    - HgbA1c: 5.2    - Endocrinology consulted for insulin management      PPx:   Feeding: NPO pending extubation   Analgesia/Sedation: propofol  Thromboembolic Prevention: SCDs  HOB >30: Yes  Stress Ulcer: pepcid while intubated   Glucose Control: Yes, insulin management per Endocrinology     Lines/Drains/Airway:   ETT   Left radial arterial line   RIJ CVC   Coon   Chest Tubes: 4 (2  Mediastinal, 2 Pleural)    Pacing Wires: Temporary ventricular pacing wires      Dispo/Code Status/Palliative:     - Continue SICU Care    - Full Code           Critical secondary to Patient has a condition that poses threat to life and bodily function     Critical care was time spent personally by me on the following activities: development of treatment plan with patient or surrogate and bedside caregivers, discussions with consultants, evaluation of patient's response to treatment, examination of patient, ordering and performing treatments and interventions, ordering and review of laboratory studies, ordering and review of radiographic studies, pulse oximetry, re-evaluation of patient's condition.  This critical care time did not overlap with that of any other provider or involve time for any procedures.     Stevie Poe, DO  Critical Care - Surgery  [facility] - [facility] Int[facility]

## 2022-11-07 NOTE — ASSESSMENT & PLAN NOTE
BG goal 110-140 (CTS protocol)     Restart IV insulin infusion protocol with 2 consecutive BG readings > 180  Requires intensive BG monitoring while on protocol (q hourly)     ** Please call Endocrine for any BG related issues **    ** Please notify Endocrine for any change and/or advance in diet**    Discharge planning: TBD

## 2022-11-07 NOTE — ANESTHESIA PROCEDURE NOTES
Intubation    Date/Time: 11/7/2022 7:14 AM  Performed by: Hue Rose MD  Authorized by: Hue Rose MD     Intubation:     Induction:  Intravenous    Intubated:  Postinduction    Mask Ventilation:  Easy mask    Attempts:  1    Attempted By:  Staff anesthesiologist    Method of Intubation:  Direct    Blade:  Renetta 3    Laryngeal View Grade: Grade I - full view of cords      Difficult Airway Encountered?: No      Complications:  None    Airway Device:  Oral endotracheal tube    Airway Device Size:  7.0    Style/Cuff Inflation:  Cuffed (inflated to minimal occlusive pressure)    Inflation Amount (mL):  6    Tube secured:  21    Secured at:  The lips    Placement Verified By:  Capnometry    Complicating Factors:  None    Findings Post-Intubation:  BS equal bilateral

## 2022-11-07 NOTE — RESPIRATORY THERAPY
Received patient from the OR intubated with 7.0 ETT secured at 21cm at the lips. Placed on ventilator with documented settings. ABG done. Ambu and mask at bedside. Will continue to monitor.

## 2022-11-07 NOTE — SUBJECTIVE & OBJECTIVE
Interval HPI:   Overnight events: Admitted to SICU s/p MVR. BG reasonably well controlled on IV intensive insulin protocol. Insulin infusion stopped on arrival to SICU for BG of 73. Diet NPO Except for: Sips with Medication    Eating:   NPO  Nausea: No  Hypoglycemia and intervention: No  Fever: No  TPN and/or TF: No  If yes, type of TF/TPN and rate: n/a    PMH, PSH, FH, SH reviewed     ROS:  Unable to obtain due to: Sedation,Intubation,Altered mental status,Critical illness,Reviewed ROS from note dated 11/7/22 by  Yvan Jack MD    Review of Systems    Current Medications and/or Treatments Impacting Glycemic Control  Immunotherapy:    Immunosuppressants       None          Steroids:   Hormones (From admission, onward)      None          Pressors:    Autonomic Drugs (From admission, onward)      Start     Stop Route Frequency Ordered    11/07/22 1500  EPINEPHrine 5 mg in dextrose 5% 250 mL infusion (premix)        Question Answer Comment   Begin at (in mcg/kg/min): 0.02    Titrate by: (in mcg/kg/min) 0.02    Titrate interval: (in minutes) 5    Titrate to maintain: (SBP or MAP or Cardiac Index) MAP    Greater than: (in mmHg) 65    Cardiac index greater than: (in L/min) 2.2    Maximum dose: (in mcg/kg/min) 2        -- IV Continuous 11/07/22 1446          Hyperglycemia/Diabetes Medications:   Antihyperglycemics (From admission, onward)      Start     Stop Route Frequency Ordered    11/07/22 1500  insulin regular in 0.9 % NaCl 100 unit/100 mL (1 unit/mL) infusion        Question Answer Comment   Insulin rate changes (DO NOT MODIFY ANSWER) [email or web address] [id number]    Enter initial dose (Units/hr): 1        -- IV Continuous 11/07/22 1446             PHYSICAL EXAMINATION:  Vitals:    11/07/22 0546   BP: 116/64   Pulse: 67   Resp: 17   Temp: 98.6 °F (37 °C)     Body mass index is 28.06 kg/m².    Physical Exam  Constitutional: Well developed, NAD.  ENT: External ears no masses  with nose patent  Neck: Supple; trachea midline  Cardiovascular: Normal heart sounds, no LE edema. DP +2 bilaterally.  Lungs: Normal effort; lungs anterior bilaterally clear to auscultation.  Intubated on a ventilator.  Abdomen: Soft, no masses, no hernias.  Hypoactive BS noted.  MS: No clubbing or cyanosis of nails noted; unable to assess gait.  Skin: No rashes, lesions, or ulcers; no nodules.  Mid-sternal incision with telfa island dressing, CDI.  CT x 2.  LLE wrapped with ACE wrap.  Psychiatric: JOHN  Neurological: JOHN  Foot: Nails in good condition, no amputations noted

## 2022-11-08 LAB
ALBUMIN SERPL BCP-MCNC: 3.9 G/DL (ref 3.5–5.2)
ALBUMIN SERPL BCP-MCNC: 4.1 G/DL (ref 3.5–5.2)
ALP SERPL-CCNC: 60 U/L (ref 55–135)
ALP SERPL-CCNC: 67 U/L (ref 55–135)
ALT SERPL W/O P-5'-P-CCNC: 29 U/L (ref 10–44)
ALT SERPL W/O P-5'-P-CCNC: 30 U/L (ref 10–44)
ANION GAP SERPL CALC-SCNC: 7 MMOL/L (ref 8–16)
ANION GAP SERPL CALC-SCNC: 8 MMOL/L (ref 8–16)
APTT BLDCRRT: 31.6 SEC (ref 21–32)
APTT BLDCRRT: 39.2 SEC (ref 21–32)
APTT BLDCRRT: 43.3 SEC (ref 21–32)
AST SERPL-CCNC: 164 U/L (ref 10–40)
AST SERPL-CCNC: 165 U/L (ref 10–40)
BASOPHILS # BLD AUTO: 0.01 K/UL (ref 0–0.2)
BASOPHILS NFR BLD: 0.1 % (ref 0–1.9)
BILIRUB SERPL-MCNC: 2.7 MG/DL (ref 0.1–1)
BILIRUB SERPL-MCNC: 2.7 MG/DL (ref 0.1–1)
BLD PROD TYP BPU: NORMAL
BLD PROD TYP BPU: NORMAL
BLOOD UNIT EXPIRATION DATE: NORMAL
BLOOD UNIT EXPIRATION DATE: NORMAL
BLOOD UNIT TYPE CODE: 5100
BLOOD UNIT TYPE CODE: 5100
BLOOD UNIT TYPE: NORMAL
BLOOD UNIT TYPE: NORMAL
BUN SERPL-MCNC: 13 MG/DL (ref 6–20)
BUN SERPL-MCNC: 13 MG/DL (ref 6–20)
CALCIUM SERPL-MCNC: 8.2 MG/DL (ref 8.7–10.5)
CALCIUM SERPL-MCNC: 8.6 MG/DL (ref 8.7–10.5)
CHLORIDE SERPL-SCNC: 107 MMOL/L (ref 95–110)
CHLORIDE SERPL-SCNC: 110 MMOL/L (ref 95–110)
CO2 SERPL-SCNC: 20 MMOL/L (ref 23–29)
CO2 SERPL-SCNC: 24 MMOL/L (ref 23–29)
CODING SYSTEM: NORMAL
CODING SYSTEM: NORMAL
CREAT SERPL-MCNC: 0.8 MG/DL (ref 0.5–1.4)
CREAT SERPL-MCNC: 0.8 MG/DL (ref 0.5–1.4)
DIFFERENTIAL METHOD: ABNORMAL
DISPENSE STATUS: NORMAL
DISPENSE STATUS: NORMAL
EOSINOPHIL # BLD AUTO: 0 K/UL (ref 0–0.5)
EOSINOPHIL NFR BLD: 0 % (ref 0–8)
ERYTHROCYTE [DISTWIDTH] IN BLOOD BY AUTOMATED COUNT: 14.5 % (ref 11.5–14.5)
EST. GFR  (NO RACE VARIABLE): >60 ML/MIN/1.73 M^2
EST. GFR  (NO RACE VARIABLE): >60 ML/MIN/1.73 M^2
GLUCOSE SERPL-MCNC: 158 MG/DL (ref 70–110)
GLUCOSE SERPL-MCNC: 160 MG/DL (ref 70–110)
GLUCOSE SERPL-MCNC: 163 MG/DL (ref 70–110)
GLUCOSE SERPL-MCNC: 163 MG/DL (ref 70–110)
HCO3 UR-SCNC: 23.9 MMOL/L (ref 24–28)
HCO3 UR-SCNC: 25.7 MMOL/L (ref 24–28)
HCT VFR BLD AUTO: 28.4 % (ref 37–48.5)
HCT VFR BLD CALC: 18 %PCV (ref 36–54)
HCT VFR BLD CALC: 28 %PCV (ref 36–54)
HGB BLD-MCNC: 9.4 G/DL (ref 12–16)
IMM GRANULOCYTES # BLD AUTO: 0.02 K/UL (ref 0–0.04)
IMM GRANULOCYTES NFR BLD AUTO: 0.2 % (ref 0–0.5)
INR PPP: 1.2 (ref 0.8–1.2)
LYMPHOCYTES # BLD AUTO: 0.6 K/UL (ref 1–4.8)
LYMPHOCYTES NFR BLD: 5.3 % (ref 18–48)
MAGNESIUM SERPL-MCNC: 1.8 MG/DL (ref 1.6–2.6)
MAGNESIUM SERPL-MCNC: 1.9 MG/DL (ref 1.6–2.6)
MCH RBC QN AUTO: 30.9 PG (ref 27–31)
MCHC RBC AUTO-ENTMCNC: 33.1 G/DL (ref 32–36)
MCV RBC AUTO: 93 FL (ref 82–98)
MONOCYTES # BLD AUTO: 1.3 K/UL (ref 0.3–1)
MONOCYTES NFR BLD: 11.3 % (ref 4–15)
NEUTROPHILS # BLD AUTO: 9.3 K/UL (ref 1.8–7.7)
NEUTROPHILS NFR BLD: 83.1 % (ref 38–73)
NRBC BLD-RTO: 0 /100 WBC
NUM UNITS TRANS FFP: NORMAL
NUM UNITS TRANS FFP: NORMAL
PCO2 BLDA: 45.9 MMHG (ref 35–45)
PCO2 BLDA: 47.8 MMHG (ref 35–45)
PH SMN: 7.32 [PH] (ref 7.35–7.45)
PH SMN: 7.34 [PH] (ref 7.35–7.45)
PHOSPHATE SERPL-MCNC: 4.8 MG/DL (ref 2.7–4.5)
PHOSPHATE SERPL-MCNC: 5 MG/DL (ref 2.7–4.5)
PLATELET # BLD AUTO: 105 K/UL (ref 150–450)
PMV BLD AUTO: 12.3 FL (ref 9.2–12.9)
PO2 BLDA: 463 MMHG (ref 80–100)
PO2 BLDA: 489 MMHG (ref 80–100)
POC BE: -2 MMOL/L
POC BE: 0 MMOL/L
POC IONIZED CALCIUM: 1.07 MMOL/L (ref 1.06–1.42)
POC IONIZED CALCIUM: 1.24 MMOL/L (ref 1.06–1.42)
POC SATURATED O2: 100 % (ref 95–100)
POC SATURATED O2: 100 % (ref 95–100)
POC TCO2: 25 MMOL/L (ref 23–27)
POC TCO2: 27 MMOL/L (ref 23–27)
POCT GLUCOSE: 121 MG/DL (ref 70–110)
POCT GLUCOSE: 121 MG/DL (ref 70–110)
POCT GLUCOSE: 122 MG/DL (ref 70–110)
POCT GLUCOSE: 125 MG/DL (ref 70–110)
POCT GLUCOSE: 144 MG/DL (ref 70–110)
POCT GLUCOSE: 145 MG/DL (ref 70–110)
POCT GLUCOSE: 149 MG/DL (ref 70–110)
POCT GLUCOSE: 152 MG/DL (ref 70–110)
POCT GLUCOSE: 159 MG/DL (ref 70–110)
POCT GLUCOSE: 160 MG/DL (ref 70–110)
POTASSIUM BLD-SCNC: 3.5 MMOL/L (ref 3.5–5.1)
POTASSIUM BLD-SCNC: 3.5 MMOL/L (ref 3.5–5.1)
POTASSIUM SERPL-SCNC: 3.8 MMOL/L (ref 3.5–5.1)
POTASSIUM SERPL-SCNC: 4.5 MMOL/L (ref 3.5–5.1)
POTASSIUM SERPL-SCNC: 4.6 MMOL/L (ref 3.5–5.1)
POTASSIUM SERPL-SCNC: 5.2 MMOL/L (ref 3.5–5.1)
PROT SERPL-MCNC: 5.9 G/DL (ref 6–8.4)
PROT SERPL-MCNC: 6.4 G/DL (ref 6–8.4)
PROTHROMBIN TIME: 12.7 SEC (ref 9–12.5)
RBC # BLD AUTO: 3.04 M/UL (ref 4–5.4)
SAMPLE: ABNORMAL
SAMPLE: ABNORMAL
SODIUM BLD-SCNC: 143 MMOL/L (ref 136–145)
SODIUM BLD-SCNC: 144 MMOL/L (ref 136–145)
SODIUM SERPL-SCNC: 137 MMOL/L (ref 136–145)
SODIUM SERPL-SCNC: 139 MMOL/L (ref 136–145)
WBC # BLD AUTO: 11.23 K/UL (ref 3.9–12.7)

## 2022-11-08 PROCEDURE — 85730 THROMBOPLASTIN TIME PARTIAL: CPT | Mod: 91 | Performed by: STUDENT IN AN ORGANIZED HEALTH CARE EDUCATION/TRAINING PROGRAM

## 2022-11-08 PROCEDURE — 20000000 HC ICU ROOM

## 2022-11-08 PROCEDURE — 63600175 PHARM REV CODE 636 W HCPCS

## 2022-11-08 PROCEDURE — 84132 ASSAY OF SERUM POTASSIUM: CPT | Mod: 91 | Performed by: PHYSICIAN ASSISTANT

## 2022-11-08 PROCEDURE — 99233 SBSQ HOSP IP/OBS HIGH 50: CPT | Mod: ,,, | Performed by: ANESTHESIOLOGY

## 2022-11-08 PROCEDURE — 94799 UNLISTED PULMONARY SVC/PX: CPT

## 2022-11-08 PROCEDURE — 97165 OT EVAL LOW COMPLEX 30 MIN: CPT

## 2022-11-08 PROCEDURE — 25000003 PHARM REV CODE 250: Performed by: STUDENT IN AN ORGANIZED HEALTH CARE EDUCATION/TRAINING PROGRAM

## 2022-11-08 PROCEDURE — 99232 SBSQ HOSP IP/OBS MODERATE 35: CPT | Mod: ,,, | Performed by: NURSE PRACTITIONER

## 2022-11-08 PROCEDURE — 63600175 PHARM REV CODE 636 W HCPCS: Performed by: PHYSICIAN ASSISTANT

## 2022-11-08 PROCEDURE — 85730 THROMBOPLASTIN TIME PARTIAL: CPT | Performed by: PHYSICIAN ASSISTANT

## 2022-11-08 PROCEDURE — 85610 PROTHROMBIN TIME: CPT | Performed by: PHYSICIAN ASSISTANT

## 2022-11-08 PROCEDURE — 80053 COMPREHEN METABOLIC PANEL: CPT | Performed by: STUDENT IN AN ORGANIZED HEALTH CARE EDUCATION/TRAINING PROGRAM

## 2022-11-08 PROCEDURE — 99232 PR SUBSEQUENT HOSPITAL CARE,LEVL II: ICD-10-PCS | Mod: ,,, | Performed by: NURSE PRACTITIONER

## 2022-11-08 PROCEDURE — 99233 PR SUBSEQUENT HOSPITAL CARE,LEVL III: ICD-10-PCS | Mod: ,,, | Performed by: ANESTHESIOLOGY

## 2022-11-08 PROCEDURE — 94761 N-INVAS EAR/PLS OXIMETRY MLT: CPT

## 2022-11-08 PROCEDURE — 25000003 PHARM REV CODE 250: Performed by: ANESTHESIOLOGY

## 2022-11-08 PROCEDURE — 85025 COMPLETE CBC W/AUTO DIFF WBC: CPT | Performed by: PHYSICIAN ASSISTANT

## 2022-11-08 PROCEDURE — 83735 ASSAY OF MAGNESIUM: CPT | Performed by: PHYSICIAN ASSISTANT

## 2022-11-08 PROCEDURE — 97110 THERAPEUTIC EXERCISES: CPT

## 2022-11-08 PROCEDURE — 84100 ASSAY OF PHOSPHORUS: CPT | Performed by: PHYSICIAN ASSISTANT

## 2022-11-08 PROCEDURE — 25000242 PHARM REV CODE 250 ALT 637 W/ HCPCS: Performed by: PHYSICIAN ASSISTANT

## 2022-11-08 PROCEDURE — 97163 PT EVAL HIGH COMPLEX 45 MIN: CPT

## 2022-11-08 PROCEDURE — 85730 THROMBOPLASTIN TIME PARTIAL: CPT | Mod: 91 | Performed by: THORACIC SURGERY (CARDIOTHORACIC VASCULAR SURGERY)

## 2022-11-08 PROCEDURE — 63600175 PHARM REV CODE 636 W HCPCS: Performed by: NURSE PRACTITIONER

## 2022-11-08 PROCEDURE — 27000221 HC OXYGEN, UP TO 24 HOURS

## 2022-11-08 PROCEDURE — 97116 GAIT TRAINING THERAPY: CPT

## 2022-11-08 PROCEDURE — 94640 AIRWAY INHALATION TREATMENT: CPT

## 2022-11-08 PROCEDURE — 25000003 PHARM REV CODE 250

## 2022-11-08 PROCEDURE — 97530 THERAPEUTIC ACTIVITIES: CPT

## 2022-11-08 PROCEDURE — 25000003 PHARM REV CODE 250: Performed by: PHYSICIAN ASSISTANT

## 2022-11-08 PROCEDURE — 99900035 HC TECH TIME PER 15 MIN (STAT)

## 2022-11-08 PROCEDURE — 63600175 PHARM REV CODE 636 W HCPCS: Performed by: STUDENT IN AN ORGANIZED HEALTH CARE EDUCATION/TRAINING PROGRAM

## 2022-11-08 RX ORDER — HYDROMORPHONE HYDROCHLORIDE 1 MG/ML
0.5 INJECTION, SOLUTION INTRAMUSCULAR; INTRAVENOUS; SUBCUTANEOUS EVERY 4 HOURS PRN
Status: DISCONTINUED | OUTPATIENT
Start: 2022-11-08 | End: 2022-11-09

## 2022-11-08 RX ORDER — METOPROLOL TARTRATE 25 MG/1
12.5 TABLET ORAL 2 TIMES DAILY
Status: DISCONTINUED | OUTPATIENT
Start: 2022-11-08 | End: 2022-11-13 | Stop reason: HOSPADM

## 2022-11-08 RX ORDER — FUROSEMIDE 10 MG/ML
40 INJECTION INTRAMUSCULAR; INTRAVENOUS
Status: DISCONTINUED | OUTPATIENT
Start: 2022-11-08 | End: 2022-11-08

## 2022-11-08 RX ORDER — HEPARIN SODIUM 10000 [USP'U]/100ML
1000 INJECTION, SOLUTION INTRAVENOUS CONTINUOUS
Status: DISCONTINUED | OUTPATIENT
Start: 2022-11-08 | End: 2022-11-11

## 2022-11-08 RX ORDER — IBUPROFEN 200 MG
16 TABLET ORAL
Status: DISCONTINUED | OUTPATIENT
Start: 2022-11-08 | End: 2022-11-09

## 2022-11-08 RX ORDER — GLUCAGON 1 MG
1 KIT INJECTION
Status: DISCONTINUED | OUTPATIENT
Start: 2022-11-08 | End: 2022-11-09

## 2022-11-08 RX ORDER — METOPROLOL TARTRATE 25 MG/1
12.5 TABLET ORAL 2 TIMES DAILY
Status: DISCONTINUED | OUTPATIENT
Start: 2022-11-08 | End: 2022-11-08

## 2022-11-08 RX ORDER — FUROSEMIDE 10 MG/ML
40 INJECTION INTRAMUSCULAR; INTRAVENOUS 2 TIMES DAILY
Status: DISCONTINUED | OUTPATIENT
Start: 2022-11-08 | End: 2022-11-13 | Stop reason: HOSPADM

## 2022-11-08 RX ORDER — INSULIN ASPART 100 [IU]/ML
1-10 INJECTION, SOLUTION INTRAVENOUS; SUBCUTANEOUS
Status: DISCONTINUED | OUTPATIENT
Start: 2022-11-08 | End: 2022-11-09

## 2022-11-08 RX ORDER — GABAPENTIN 300 MG/1
300 CAPSULE ORAL 3 TIMES DAILY
Status: DISCONTINUED | OUTPATIENT
Start: 2022-11-08 | End: 2022-11-08

## 2022-11-08 RX ORDER — WARFARIN SODIUM 5 MG/1
5 TABLET ORAL DAILY
Status: COMPLETED | OUTPATIENT
Start: 2022-11-08 | End: 2022-11-08

## 2022-11-08 RX ORDER — IBUPROFEN 200 MG
24 TABLET ORAL
Status: DISCONTINUED | OUTPATIENT
Start: 2022-11-08 | End: 2022-11-09

## 2022-11-08 RX ORDER — GABAPENTIN 300 MG/1
300 CAPSULE ORAL NIGHTLY
Status: DISCONTINUED | OUTPATIENT
Start: 2022-11-08 | End: 2022-11-13 | Stop reason: HOSPADM

## 2022-11-08 RX ADMIN — CITALOPRAM HYDROBROMIDE 10 MG: 10 TABLET ORAL at 08:11

## 2022-11-08 RX ADMIN — OXYCODONE 5 MG: 5 TABLET ORAL at 04:11

## 2022-11-08 RX ADMIN — GABAPENTIN 300 MG: 300 CAPSULE ORAL at 09:11

## 2022-11-08 RX ADMIN — OXYCODONE 5 MG: 5 TABLET ORAL at 10:11

## 2022-11-08 RX ADMIN — ACETAMINOPHEN 1000 MG: 500 TABLET ORAL at 01:11

## 2022-11-08 RX ADMIN — IPRATROPIUM BROMIDE AND ALBUTEROL SULFATE 3 ML: 2.5; .5 SOLUTION RESPIRATORY (INHALATION) at 08:11

## 2022-11-08 RX ADMIN — OXYCODONE 5 MG: 5 TABLET ORAL at 06:11

## 2022-11-08 RX ADMIN — DOCUSATE SODIUM 100 MG: 100 CAPSULE ORAL at 08:11

## 2022-11-08 RX ADMIN — DOCUSATE SODIUM 100 MG: 100 CAPSULE ORAL at 09:11

## 2022-11-08 RX ADMIN — TACROLIMUS 900 MG: 0.5 CAPSULE ORAL at 10:11

## 2022-11-08 RX ADMIN — ACETAMINOPHEN 1000 MG: 500 TABLET ORAL at 05:11

## 2022-11-08 RX ADMIN — ASPIRIN 325 MG ORAL TABLET 325 MG: 325 PILL ORAL at 08:11

## 2022-11-08 RX ADMIN — ACETAMINOPHEN 1000 MG: 500 TABLET ORAL at 10:11

## 2022-11-08 RX ADMIN — AMIODARONE HYDROCHLORIDE 1 MG/MIN: 1.8 INJECTION, SOLUTION INTRAVENOUS at 09:11

## 2022-11-08 RX ADMIN — POLYETHYLENE GLYCOL 3350 17 G: 17 POWDER, FOR SOLUTION ORAL at 08:11

## 2022-11-08 RX ADMIN — WARFARIN SODIUM 5 MG: 5 TABLET ORAL at 05:11

## 2022-11-08 RX ADMIN — MUPIROCIN 1 G: 20 OINTMENT TOPICAL at 08:11

## 2022-11-08 RX ADMIN — HEPARIN SODIUM 400 UNITS/HR: 10000 INJECTION, SOLUTION INTRAVENOUS at 09:11

## 2022-11-08 RX ADMIN — TACROLIMUS 900 MG: 0.5 CAPSULE ORAL at 01:11

## 2022-11-08 RX ADMIN — TACROLIMUS 900 MG: 0.5 CAPSULE ORAL at 05:11

## 2022-11-08 RX ADMIN — IPRATROPIUM BROMIDE AND ALBUTEROL SULFATE 3 ML: 2.5; .5 SOLUTION RESPIRATORY (INHALATION) at 04:11

## 2022-11-08 RX ADMIN — POTASSIUM CHLORIDE 20 MEQ: 14.9 INJECTION, SOLUTION INTRAVENOUS at 09:11

## 2022-11-08 RX ADMIN — INSULIN ASPART 2 UNITS: 100 INJECTION, SOLUTION INTRAVENOUS; SUBCUTANEOUS at 05:11

## 2022-11-08 RX ADMIN — MUPIROCIN 1 G: 20 OINTMENT TOPICAL at 09:11

## 2022-11-08 RX ADMIN — AMIODARONE HYDROCHLORIDE 150 MG: 1.5 INJECTION, SOLUTION INTRAVENOUS at 09:11

## 2022-11-08 RX ADMIN — AMIODARONE HYDROCHLORIDE 0.5 MG/MIN: 1.8 INJECTION, SOLUTION INTRAVENOUS at 02:11

## 2022-11-08 RX ADMIN — FUROSEMIDE 40 MG: 10 INJECTION, SOLUTION INTRAMUSCULAR; INTRAVENOUS at 02:11

## 2022-11-08 NOTE — PLAN OF CARE
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]  Paroxysmal atrial fibrillation [I48.0]  Atrial fibrillation, unspecified type [I48.91]    Admission Date: 11/7/2022  Expected Discharge Date:     Discharge Barriers Identified: Underinsured    Payor: [employer] BLUE MEDICARE ADVANTAGE / Plan: [employer] DUAL ADVANTAGE / Product Type: Medicare Advantage /     Extended Emergency Contact Information  Primary Emergency Contact: Cyndi Soto  Address: [address] [place] [address] [address] [place] [address] [facility]  Home Phone: [phone]  Relation: Mother  Secondary Emergency Contact: PHYLLISANDREY  Mobile Phone: [phone]  Relation: Significant other  Preferred language: English   needed? No    Discharge Plan A: Home, Home with family  Discharge Plan B: Home, Home with family      [id number] - [place], [place] - [address]  [address] [address]  Phone: [phone] Fax: [phone]      Initial Assessment (most recent)       Adult Discharge Assessment - 11/08/22 0857          Discharge Assessment    Assessment Type Discharge Planning Assessment     Confirmed/corrected address, phone number and insurance Yes     Confirmed Demographics Correct on Facesheet     Source of Information patient     When was your last doctors appointment? 10/27/22     Communicated ARMANDO with patient/caregiver Date not available/Unable to determine     Lives With child(amanda), adult;child(amanda), dependent;grandchild(amanda);significant other     Do you expect to return to your current living situation? Yes     Do you have help at home or someone to help you manage your care at home? Yes     Prior to hospitilization cognitive status: No Deficits     Current cognitive status: No Deficits     Walking or Climbing Stairs Difficulty none      Dressing/Bathing Difficulty none     Home Layout Able to live on 1st floor     Equipment Currently Used at Home none     Readmission within 30 days? No     Patient currently being followed by outpatient case management? No     Do you currently have service(s) that help you manage your care at home? No     Do you take prescription medications? Yes     Do you have prescription coverage? Yes     Do you have any problems affording any of your prescribed medications? No     Is the patient taking medications as prescribed? yes     Who is going to help you get home at discharge? S.O ANDREY FERGUSON      How do you get to doctors appointments? car, drives self;family or friend will provide     Are you on dialysis? No     Do you take coumadin? No   ELIQUIS    Discharge Plan A Home;Home with family     Discharge Plan B Home;Home with family     DME Needed Upon Discharge  none     Discharge Plan discussed with: Patient     Discharge Barriers Identified Underinsured                      Patient lives in a 1 story house with her 4 children 1 grandchild and her S.O she is not on dialysis and does not take coumadin (ELIQUIS) she has transportation home

## 2022-11-08 NOTE — ASSESSMENT & PLAN NOTE
Riya Soto is a 52 y.o. 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 2/2 rheumatic fever as child s/p MV repair 2011, severe tricuspid regurg, Afib on dronedarone, HLD, and HFrEF (40%) who is s/p repair of tricuspid valve, replacement of mitral valve and MAZE repair on 11-07-22.      Neuro/Psych:     - Sedation: n/a    - Pain:    - Scheduled Tylenol 1g q8h   - Oxy 5/10 for moderate and severe pain, restart home gabapentin              Cardiac:     - S/P mitral valve replacement and maze with Dr. Gilbert on 11/7/2022    - BP Goal: SBP <140, MAP 60-80    - Currently HDS off of pressors     - Anti-HTNs: On home losartan 25 mg. Consider adding CCB if remains HDs    - Beta blocker: On home metoprolol, will resume with hold parameter    - Rhythm: NSR. Afib s/p MAZE. Anticoagulation today    - Statin: Atorvastatin 10 mg QD (home medication)       Pulmonary:     - Goal SpO2 >92%, currently on 2L NC    - Chest Tubes x 4 (2 Meds & 2 Pleural)    - ABGs PRN      Renal:    - Trend BUN/Cr     - Maintain Coon, record strict Is/Os    Recent Labs   Lab 11/07/22  1514 11/07/22  2351 11/08/22  0319   BUN 15 13 13   CREATININE 0.9 0.8 0.8         FEN / GI:     - Daily CMP, PRN K/Mag/Phos per protocol     - Replace electrolytes as needed    - Nutrition: NPO pending extubation    - Bowel Regimen: Miralax, docusate      ID:     - Afebrile    - WBC stable    - Abx: Complete perioperative cefazolin 2g Q8H x 5 doses    Recent Labs   Lab 11/03/22  1413 11/07/22  1514 11/08/22  0319   WBC 5.36 18.74* 11.23         Heme/Onc:     - Hgb 10.3 pre-operatively    - CBC daily    - ASA 325mg daily   - Anticoagulation for mechanical valve     Recent Labs   Lab 11/03/22  1413 11/07/22  1514 11/08/22  0319   HGB 11.2* 10.3* 9.4*    121* 105*   APTT 25.6 33.2* 31.6   INR 1.0 1.3* 1.2         Endocrine:     - CTS Goal -140    - HgbA1c: 5.2    - Endocrinology consulted for insulin management      PPx:   Feeding: clears,  advance as tolerated  Analgesia/Sedation: multimodals   Thromboembolic Prevention: SCDs  HOB >30: Yes  Stress Ulcer: n/a, ambulatory   Glucose Control: Yes, insulin management per Endocrinology     Lines/Drains/Airway:   Left radial arterial line   RIJ CVC   Coon   Chest Tubes: 4 (2 Mediastinal, 2 Pleural)    Pacing Wires: Temporary ventricular pacing wires      Dispo/Code Status/Palliative:     - Continue SICU Care    - Full Code

## 2022-11-08 NOTE — PROGRESS NOTES
[facility]  Critical Care - Surgery  Progress Note    Patient Name: Riya Soto  MRN: [id number]  Admission Date: 11/7/2022  Hospital Length of Stay: 1 days  Code Status: Full Code  Attending Provider: Bobby Gilbert MD  Primary Care Provider: Pantera Yadav MD   Principal Problem: S/P MVR (mitral valve replacement)    Subjective:     Hospital/ICU Course:  No notes on file    Interval History/Significant Events: Extubated yesterday evening to NC, tolerating well. Overnight, pt in and out of Afib, remains in Afib rate controlled this AM.  Received 1 L albumin overnight. Start anticoagulation for mechanical valve today. Good urine output overnight. Advance diet as tolerated    Follow-up For: Procedure(s) (LRB):  REPLACEMENT, MITRAL VALVE (N/A)  REPAIR, TRICUSPID VALVE (N/A)  MAZE (N/A)  STERNOTOMY (N/A)    Post-Operative Day: 1 Day Post-Op    Objective:     Vital Signs (Most Recent):  Temp: 98.1 °F (36.7 °C) (11/08/22 0300)  Pulse: (!) 58 (11/08/22 0433)  Resp: (!) 22 (11/08/22 0435)  BP: 128/61 (11/08/22 0400)  SpO2: 100 % (11/08/22 0433)   Vital Signs (24h Range):  Temp:  [97.6 °F (36.4 °C)-98.4 °F (36.9 °C)] 98.1 °F (36.7 °C)  Pulse:  [53-81] 58  Resp:  [10-32] 22  SpO2:  [98 %-100 %] 100 %  BP: ()/(50-72) 128/61  Arterial Line BP: ()/(43-71) 117/51     Weight: 86.2 kg (190 lb)  Body mass index is 28.06 kg/m².      Intake/Output Summary (Last 24 hours) at 11/8/2022 0612  Last data filed at 11/8/2022 0400  Gross per 24 hour   Intake 5918.34 ml   Output 2897 ml   Net 3021.34 ml       Physical Exam  Vitals and nursing note reviewed.   Constitutional:       General: She is not in acute distress.  HENT:      Head: Normocephalic and atraumatic.      Mouth/Throat:      Pharynx: Oropharynx is clear.   Cardiovascular:      Rate and Rhythm: Normal rate and regular rhythm.      Comments: Midline incision c/d/I  Pacing wires  Chest tubes  Pulmonary:      Effort: Pulmonary effort  is normal. No respiratory distress.   Abdominal:      Palpations: Abdomen is soft.   Musculoskeletal:         General: No swelling.   Skin:     General: Skin is warm and dry.   Neurological:      Mental Status: She is alert and oriented to person, place, and time.       Vents: N.A    Lines/Drains/Airways       Central Venous Catheter Line  Duration              Introducer with Double Lumen 11/07/22 1521 right internal jugular <1 day              Drain  Duration                  Urethral Catheter 11/07/22 Temperature probe;Non-latex 14 Fr. 1 day         Y Chest Tube 1 and 2 11/07/22 1 Left Mediastinal 19 Fr. Right Mediastinal 19 Fr. 1 day         Y Chest Tube 3 and 4 11/07/22 Left Pleural 19 Fr. Right Pleural 19 Fr. 1 day              Arterial Line  Duration             Arterial Line 11/07/22 0830 Left Radial <1 day              Line  Duration                  Pacer Wires 11/07/22 1307 <1 day              Peripheral Intravenous Line  Duration                  Peripheral IV - Single Lumen 11/07/22 0558 18 G Left;Posterior Wrist 1 day                    Significant Labs:    CBC/Anemia Profile:  Recent Labs   Lab 11/07/22  1514 11/07/22  1514 11/07/22  2332 11/08/22  0319   WBC 18.74*  --   --  11.23   HGB 10.3*  --   --  9.4*   HCT 31.5* 29* 27* 28.4*   *  --   --  105*   MCV 93  --   --  93   RDW 14.1  --   --  14.5        Chemistries:  Recent Labs   Lab 11/07/22  1514 11/07/22  2201 11/07/22  2351 11/08/22  0319     --  137 139   K 3.4* 4.7 5.2* 4.6   *  --  110 107   CO2 20*  --  20* 24   BUN 15  --  13 13   CREATININE 0.9  --  0.8 0.8   CALCIUM 9.3  --  8.2* 8.6*   ALBUMIN 3.0*  --  3.9 4.1   PROT 5.3*  --  5.9* 6.4   BILITOT 1.8*  --  2.7* 2.7*   ALKPHOS 73  --  60 67   ALT 25  --  29 30   *  --  164* 165*   MG 2.1  2.1  --  1.8 1.9   PHOS 1.9*  1.9*  --  4.8* 5.0*       All pertinent labs within the past 24 hours have been reviewed.    Significant Imaging:  I have reviewed all  pertinent imaging results/findings within the past 24 hours.    Assessment/Plan:     * S/P MVR (mitral valve replacement)  Riya Soto is a 52 y.o. 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 2/2 rheumatic fever as child s/p MV repair 2011, severe tricuspid regurg, Afib on dronedarone, HLD, and HFrEF (40%) who is s/p repair of tricuspid valve, replacement of mitral valve and MAZE repair on 11-07-22.      Neuro/Psych:     - Sedation: n/a    - Pain:    - Scheduled Tylenol 1g q8h   - Oxy 5/10 for moderate and severe pain, restart home gabapentin              Cardiac:     - S/P mitral valve replacement and maze with Dr. Gilbert on 11/7/2022    - BP Goal: SBP <140, MAP 60-80    - Currently HDS off of pressors     - Anti-HTNs: On home losartan 25 mg. Consider adding CCB if remains HDs    - Beta blocker: On home metoprolol, will resume with hold parameter    - Rhythm: NSR. Afib s/p MAZE. Anticoagulation today    - Statin: Atorvastatin 10 mg QD (home medication)       Pulmonary:     - Goal SpO2 >92%, currently on 2L NC    - Chest Tubes x 4 (2 Meds & 2 Pleural)    - ABGs PRN      Renal:    - Trend BUN/Cr     - Maintain Coon, record strict Is/Os    Recent Labs   Lab 11/07/22  1514 11/07/22  2351 11/08/22  0319   BUN 15 13 13   CREATININE 0.9 0.8 0.8         FEN / GI:     - Daily CMP, PRN K/Mag/Phos per protocol     - Replace electrolytes as needed    - Nutrition: NPO pending extubation    - Bowel Regimen: Miralax, docusate      ID:     - Afebrile    - WBC stable    - Abx: Complete perioperative cefazolin 2g Q8H x 5 doses    Recent Labs   Lab 11/03/22  1413 11/07/22  1514 11/08/22  0319   WBC 5.36 18.74* 11.23         Heme/Onc:     - Hgb 10.3 pre-operatively    - CBC daily    - ASA 325mg daily   - Anticoagulation for mechanical valve, heparin gtt    Recent Labs   Lab 11/03/22  1413 11/07/22  1514 11/08/22  0319   HGB 11.2* 10.3* 9.4*    121* 105*   APTT 25.6 33.2* 31.6   INR 1.0 1.3* 1.2         Endocrine:      - CTS Goal -140    - HgbA1c: 5.2    - Endocrinology consulted for insulin management      PPx:   Feeding: clears, advance as tolerated  Analgesia/Sedation: multimodals   Thromboembolic Prevention: SCDs  HOB >30: Yes  Stress Ulcer: n/a, ambulatory   Glucose Control: Yes, insulin management per Endocrinology     Lines/Drains/Airway:   Left radial arterial line   RIJ CVC   Coon   Chest Tubes: 4 (2 Mediastinal, 2 Pleural)    Pacing Wires: Temporary ventricular pacing wires      Dispo/Code Status/Palliative:     - Continue SICU Care    - Full Code      Critical secondary to Patient has a condition that poses threat to life and bodily function.     Critical care was time spent personally by me on the following activities: development of treatment plan with patient or surrogate and bedside caregivers, discussions with consultants, evaluation of patient's response to treatment, examination of patient, ordering and performing treatments and interventions, ordering and review of laboratory studies, ordering and review of radiographic studies, pulse oximetry, re-evaluation of patient's condition.  This critical care time did not overlap with that of any other provider or involve time for any procedures.     Stevie Poe, DO  Critical Care - Surgery  [facility]

## 2022-11-08 NOTE — PT/OT/SLP EVAL
Occupational Therapy   Evaluation    Name: Riya Soto  MRN: [id number]  Admitting Diagnosis:  S/P MVR (mitral valve replacement)  Recent Surgery: Procedure(s) (LRB):  REPLACEMENT, MITRAL VALVE (N/A)  REPAIR, TRICUSPID VALVE (N/A)  MAZE (N/A)  STERNOTOMY (N/A) 1 Day Post-Op    Recommendations:     Discharge Recommendations: home  Discharge Equipment Recommendations:  none  Barriers to discharge:       Assessment:     Riya Soto is a 52 y.o. female with a medical diagnosis of S/P MVR (mitral valve replacement).  She presents s/p MVR / TVr on 11/07. Performance deficits affecting function: weakness, impaired endurance, impaired self care skills, impaired functional mobility, gait instability, impaired balance, decreased coordination.      Rehab Prognosis: Good; patient would benefit from acute skilled OT services to address these deficits and reach maximum level of function.       Plan:     Patient to be seen 5 x/week to address the above listed problems via therapeutic activities, self-care/home management, therapeutic exercises  Plan of Care Expires: 12/08/22  Plan of Care Reviewed with: patient    Subjective     Occupational Profile:  Living Environment: Pt lives with her kids and S/O in a H with 8 steps  / no HR and a walk-in shower.  Previous level of function: Independent / drives / retired  Equipment Used at Home:  none  Assistance upon Discharge: family    Pain/Comfort:  Pain Rating 1: 8/10  Location 1: chest    Patients cultural, spiritual, Religion conflicts given the current situation:      Objective:     Communicated with: rn prior to session.  Patient found up in chair with arterial line, central line, chest tube, external pacer, daniel catheter, oxygen, peripheral IV, pulse ox (continuous), telemetry upon OT entry to room.    General Precautions: Standard, fall, sternal   Orthopedic Precautions:    Braces:    Respiratory Status: Nasal cannula, flow 2 L/min    Occupational Performance:    Bed  Mobility:    Up in chair.    Functional Mobility/Transfers:  Patient completed Sit <> Stand Transfer with minimum assistance  with  no assistive device   Functional Mobility: Pt took 4 steps F<>B with CGA.    Activities of Daily Living:  Feeding:  independence  Grooming: supervision seated    Cognitive/Visual Perceptual:  Cognitive/Psychosocial Skills:     -       Oriented to: Person, Place, Time, and Situation   -       Safety awareness/insight to disability: intact     Physical Exam:  Upper Extremity Range of Motion:     -       Right Upper Extremity: WNL  -       Left Upper Extremity: WFL except reports old rotator cuff problems.  Upper Extremity Strength:    -       Right Upper Extremity: WFL  -       Left Upper Extremity: WFL    AMPAC 6 Click ADL:  AMPAC Total Score: 21    Treatment & Education:  UE ROM/MMT  Sternal precaution edu  Functional mobility assessment  Sit/standing balance assessment  Educated on importance of sitting OOB in bedside chair to promote increased strength, endurance & breathing.  Discussed OT POC / Post-acute plan    Patient left up in chair with all lines intact and call button in reach    GOALS:   Multidisciplinary Problems       Occupational Therapy Goals          Problem: Occupational Therapy    Goal Priority Disciplines Outcome Interventions   Occupational Therapy Goal     OT, PT/OT Ongoing, Progressing    Description: Goals to be met by: 11/15/22    Patient will increase functional independence with ADLs by performing:    Grooming while standing at sink with Set-up Assistance.  Toileting from toilet with Set-up Assistance for hygiene and clothing management.   Supine to sit with Supervision.  Step transfer with Supervision  Toilet transfer to toilet with Supervision.                         History:     Past Medical History:   Diagnosis Date    A-fib     Atrial fibrillation     Back pain     Hyperlipidemia     Mitral incompetence     Mitral valve stenosis     Neck pain           Past Surgical History:   Procedure Laterality Date    ABLATION N/A 11/7/2022    Procedure: MAZE;  Surgeon: Bobby Gilbert MD;  [address]  Service: Cardiothoracic;  Laterality: N/A;  Cryoablation    CARDIAC SURGERY      to repair mitral valve    CHOLECYSTECTOMY      COLONOSCOPY      COLONOSCOPY N/A 6/8/2022    Procedure: COLONOSCOPY;  Surgeon: Sanford Andres MD;  Location: [facility] ENDO;  Service: Endoscopy;  Laterality: N/A;    CORONARY ANGIOGRAPHY N/A 8/29/2022    Procedure: ANGIOGRAM, CORONARY ARTERY;  Surgeon: Lexa Harley MD;  Location: [facility] CATH LAB;  Service: Cardiology;  Laterality: N/A;    MITRAL VALVE REPLACEMENT N/A 11/7/2022    Procedure: REPLACEMENT, MITRAL VALVE;  Surgeon: Bobby Gilbert MD;  Location: [address];  Service: Cardiothoracic;  Laterality: N/A;  REDO STERNOTOMY    STERNOTOMY N/A 11/7/2022    Procedure: STERNOTOMY;  Surgeon: Bobby Gilbert MD;  Location: [address];  Service: Cardiothoracic;  Laterality: N/A;  Redo sternotomy    TRANSESOPHAGEAL ECHOCARDIOGRAPHY N/A 03/29/2022    Procedure: ECHOCARDIOGRAM, TRANSESOPHAGEAL;  Surgeon: Dominic Wallis MD;  Location: [facility] EP LAB;  Service: Cardiology;  Laterality: N/A;    TREATMENT OF CARDIAC ARRHYTHMIA N/A 03/29/2022    Procedure: Cardioversion or Defibrillation;  Surgeon: Elmo Jones MD;  Location: [facility] EP LAB;  Service: Cardiology;  Laterality: N/A;  afib, dccv, [place], [place], [place], 3prep, Provider to perform     TRICUSPID VALVULOPLASTY N/A 11/7/2022    Procedure: REPAIR, TRICUSPID VALVE;  Surgeon: Bobby Gilbert MD;  Location: [address];  Service: Cardiothoracic;  Laterality: N/A;       Time Tracking:     OT Date of Treatment: 11/08/22  OT Start Time: 0852  OT Stop Time: 0920  OT Total Time (min): 28 min    Billable Minutes:Evaluation 10  Therapeutic Activity 18    11/8/2022

## 2022-11-08 NOTE — PLAN OF CARE
Evaluation complete and POC established.    Problem: Physical Therapy  Goal: Physical Therapy Goal  Description: Goals to be met by: 2022     Patient will increase functional independence with mobility by performin. Supine to sit with Supervision.  2. Sit to supine with Supervision.  3. Sit to stand transfer with Stand-by Assistance  4. Gait  x 200 feet with Stand-by Assistance using No Assistive Device.   5. Ascend/descend 8 stairs with no Handrails Minimum Assistance using No Assistive Device.     Outcome: Ongoing, Progressing

## 2022-11-08 NOTE — PROGRESS NOTES
"[facility]  Endocrinology  Progress Note    Admit Date: 11/7/2022     Reason for Consult: Management of Hyperglycemia     Surgical Procedure and Date:   11/7/22  REPLACEMENT, MITRAL VALVE (N/A)  REPAIR, TRICUSPID VALVE (N/A)  MAZE (N/A)       HPI:   Patient is a 52 y.o. 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 Endocrinology consulted for management of hyperglycemia.      Lab Results   Component Value Date    HGBA1C 5.2 11/03/2022           Interval HPI:   Overnight events: Remains in SICU. POD 1. BG well controlled with no insulin requirements overnight. Diet clear liquid    Eating:    clear liquids   Nausea: No  Hypoglycemia and intervention: No  Fever: No  TPN and/or TF: No  If yes, type of TF/TPN and rate: n/a    /61 (BP Location: Right arm, Patient Position: Lying)   Pulse (!) 55   Temp 98.1 °F (36.7 °C) (Oral)   Resp (!) 23   Ht 5' 9" (1.753 m)   Wt 89 kg (196 lb 3.4 oz)   LMP 11/03/2016   SpO2 100%   Breastfeeding No   BMI 28.98 kg/m²     Labs Reviewed and Include    Recent Labs   Lab 11/08/22  0319   *   CALCIUM 8.6*   ALBUMIN 4.1   PROT 6.4      K 4.6   CO2 24      BUN 13   CREATININE 0.8   ALKPHOS 67   ALT 30   *   BILITOT 2.7*     Lab Results   Component Value Date    WBC 11.23 11/08/2022    HGB 9.4 (L) 11/08/2022    HCT 28.4 (L) 11/08/2022    MCV 93 11/08/2022     (L) 11/08/2022     No results for input(s): TSH, FREET4 in the last 168 hours.  Lab Results   Component Value Date    HGBA1C 5.2 11/03/2022       Nutritional status:   Body mass index is 28.98 kg/m².  Lab Results   Component Value Date    ALBUMIN 4.1 11/08/2022    ALBUMIN 3.9 11/07/2022    ALBUMIN 3.0 (L) 11/07/2022     No results found for: PREALBUMIN    Estimated Creatinine Clearance: 96.7 mL/min (based on SCr of 0.8 " mg/dL).    Accu-Checks  Recent Labs     11/07/22  2014 11/07/22  2107 11/07/22  2203 11/07/22  2329 11/08/22  0002 11/08/22  0203 11/08/22  0319 11/08/22  0415 11/08/22  0506 11/08/22  0610   POCTGLUCOSE 127* 148* 149* 137* 145* 144* 160* 122* 121* 121*       Current Medications and/or Treatments Impacting Glycemic Control  Immunotherapy:    Immunosuppressants       None          Steroids:   Hormones (From admission, onward)      None          Pressors:    Autonomic Drugs (From admission, onward)      Start     Stop Route Frequency Ordered    11/08/22 0900  metoprolol tartrate (LOPRESSOR) split tablet 12.5 mg         -- Oral 2 times daily 11/08/22 0702    11/07/22 1500  EPINEPHrine 5 mg in dextrose 5% 250 mL infusion (premix)        Question Answer Comment   Begin at (in mcg/kg/min): 0.02    Titrate by: (in mcg/kg/min) 0.02    Titrate interval: (in minutes) 5    Titrate to maintain: (SBP or MAP or Cardiac Index) MAP    Greater than: (in mmHg) 65    Cardiac index greater than: (in L/min) 2.2    Maximum dose: (in mcg/kg/min) 2        -- IV Continuous 11/07/22 1446          Hyperglycemia/Diabetes Medications:   Antihyperglycemics (From admission, onward)      Start     Stop Route Frequency Ordered    11/07/22 1500  insulin regular in 0.9 % NaCl 100 unit/100 mL (1 unit/mL) infusion        Question Answer Comment   Insulin rate changes (DO NOT MODIFY ANSWER) [email or web address]    Enter initial dose (Units/hr): 1        -- IV Continuous 11/07/22 1446            ASSESSMENT and PLAN    * S/P MVR (mitral valve replacement)  Managed per primary team  Optimize BG control        Stress hyperglycemia  BG goal 110-140 (CTS protocol)     Discontinue IV insulin infusion protocol (off overnight)  Start Moderate Dose Correction Scale  BG monitoring ac/hs     ** Please call Endocrine for any BG related issues **    ** Please notify Endocrine for any change and/or advance in  diet**    Discharge planning: TBD        Mitral valve insufficiency  Managed per primary team          Atrial fibrillation  May increase insulin resistance if uncontrolled             Loree Pinon NP  Endocrinology  [facility]

## 2022-11-08 NOTE — NURSING
Nurses Note -- 4 Eyes      11/7/2022   6:21 PM      Skin assessed during: Admit      [x] No Pressure Injuries Present    []Prevention Measures Documented      [] Yes- Altered Skin Integrity Present or Discovered   [] LDA Added if Not in Epic (Describe Wound)   [] New Altered Skin Integrity was Present on Admit and Documented in LDA   [] Wound Image Taken    Wound Care Consulted? No    Attending Nurse:  Caitlyn Sandhu RN     Second RN/Staff Member:  MARRY Mcbride RN

## 2022-11-08 NOTE — PT/OT/SLP EVAL
Physical Therapy Co-Evaluation  Co-evaluation for the anticipated need for 2 skilled therapists due to patient's ICU status and to maximize functional mobility and patient safety.  Patient Name:  Riya Soto   MRN:  [id number]    Recommendations:     Discharge Recommendations:  home health PT   Discharge Equipment Recommendations: none   Barriers to discharge: Inaccessible home    Assessment:     Riya Soto is a 52 y.o. female admitted with a medical diagnosis of S/P MVR (mitral valve replacement).  She presents with the following impairments/functional limitations:  weakness, impaired endurance, impaired self care skills, impaired functional mobility, gait instability, impaired balance, pain, decreased lower extremity function, decreased upper extremity function, orthopedic precautions, impaired cardiopulmonary response to activity. Pt tolerated evaluation well despite being in 8/10 pain. Pt had to be reminded of sternal precautions but able to perform 2 trials of STS with Patricia, no AD and ambulate 4 steps, no AD with Patricia, HHA. Pt would benefit from home health PT to increase strength, balance, and endurance in order to maximize return to prior level of function.    Rehab Prognosis: Good; patient would benefit from acute skilled PT services to address these deficits and reach maximum level of function.    Recent Surgery: Procedure(s) (LRB):  REPLACEMENT, MITRAL VALVE (N/A)  REPAIR, TRICUSPID VALVE (N/A)  MAZE (N/A)  STERNOTOMY (N/A) 1 Day Post-Op    Plan:     During this hospitalization, patient to be seen 5 x/week to address the identified rehab impairments via gait training, therapeutic activities, therapeutic exercises, neuromuscular re-education and progress toward the following goals:    Plan of Care Expires:  12/08/22    Subjective     Chief Complaint: Chest pain  Patient/Family Comments/goals: pt unable to use L shoulder from previous injury; return home  Pain/Comfort:  Pain Rating 1:  8/10  Location 1: chest  Pain Addressed 1: Distraction, Reposition, Cessation of Activity  Pain Rating Post-Intervention 1: other (see comments) (not rated)    Patients cultural, spiritual, Judaism conflicts given the current situation: no    Living Environment:  Patient lives with her kids (teenagers, 23) in a [facility] with 8STE, no HR and WIS.  Prior to admission, patients level of function was independent with functional mobility. Pt is retired but enjoys gambling and can drive self. Pt is R hand dominant.  Equipment used at home: none.  DME owned (not currently used): none.  Upon discharge, patient will have assistance from family, boyfriend.    Objective:     Communicated with RN prior to session.  Patient found up in chair with arterial line, central line, peripheral IV, daniel catheter, chest tube, pulse ox (continuous), telemetry, oxygen, external pacer  upon PT entry to room.    General Precautions: Standard, fall, sternal   Orthopedic Precautions:N/A   Braces: N/A  Respiratory Status: Nasal cannula, flow 2 L/min    Exams:  Cognitive Exam:  Patient is oriented to Person, Place, Time, and Situation  Gross Motor Coordination:  WFL  Postural Exam:  Patient presented with the following abnormalities:    Rounded shoulders  Forward head  Sensation: WFL for light touch BLE  RLE ROM: WFL  RLE Strength: hip flexion 4/5, knee flexion 4/5, knee extension 4/5, ankle DF 4/5  LLE ROM: WFL  LLE Strength: hip flexion 4/5, knee flexion 4/5, knee extension 4/5, ankle DF 4/5    Functional Mobility:  Bed Mobility:  JOHN 2/2 pt found up in chair  Transfers:     Sit to Stand: 2 trials of  minimum assistance with no AD  Gait: 4 steps fwd/bwd, Patricia, no AD, HHA  Decreased step length, decreased speed, decreased foot clearance  Balance:   Static Sitting: supervision  Dynamic Sitting: supervision  Static Standing: Patricia to CGA  Dynamic Standing: Patricia, no AD, HHA  Decreased step length, decreased speed, decreased foot  clearance      AM-PAC 6 CLICK MOBILITY  Total Score:17       Treatment & Education:  Pt educated on sternal precautions.   Pt educated on role of therapy, goals of session, and benefits of out of bed mobility  Discussed PT plan of care during hospitalization.  Pt educated on need to call for assistance.  Communication board up to date.  All questions were answered within PT scope of practice.     Patient left up in chair with all lines intact, call button in reach, and RN present.    GOALS:   Multidisciplinary Problems       Physical Therapy Goals          Problem: Physical Therapy    Goal Priority Disciplines Outcome Goal Variances Interventions   Physical Therapy Goal     PT, PT/OT Ongoing, Progressing     Description: Goals to be met by: 2022     Patient will increase functional independence with mobility by performin. Supine to sit with Supervision.  2. Sit to supine with Supervision.  3. Sit to stand transfer with Stand-by Assistance  4. Gait  x 200 feet with Stand-by Assistance using No Assistive Device.   5. Ascend/descend 8 stairs with no Handrails Minimum Assistance using No Assistive Device.                          History:     Past Medical History:   Diagnosis Date    A-fib     Atrial fibrillation     Back pain     Hyperlipidemia     Mitral incompetence     Mitral valve stenosis     Neck pain        Past Surgical History:   Procedure Laterality Date    ABLATION N/A 2022    Procedure: MAZE;  Surgeon: Bobby Gilbert MD;  Location: [facility] OR [address] [place] [address];  Service: Cardiothoracic;  Laterality: N/A;  Cryoablation    CARDIAC SURGERY      to repair mitral valve    CHOLECYSTECTOMY      COLONOSCOPY      COLONOSCOPY N/A 2022    Procedure: COLONOSCOPY;  Surgeon: Sanford Andres MD;  Location: [facility] ENDO;  Service: Endoscopy;  Laterality: N/A;    CORONARY ANGIOGRAPHY N/A 2022    Procedure: ANGIOGRAM, CORONARY ARTERY;  Surgeon: Lexa Harley MD;  Location: [facility] CATH LAB;  Service:  Cardiology;  Laterality: N/A;    MITRAL VALVE REPLACEMENT N/A 11/7/2022    Procedure: REPLACEMENT, MITRAL VALVE;  Surgeon: Bobby Gilbert MD;  [facility];  Service: Cardiothoracic;  Laterality: N/A;  REDO STERNOTOMY    STERNOTOMY N/A 11/7/2022    Procedure: STERNOTOMY;  Surgeon: Bobby Gilbert MD;  Location: [facility] OR [address] [place] [address];  Service: Cardiothoracic;  Laterality: N/A;  Redo sternotomy    TRANSESOPHAGEAL ECHOCARDIOGRAPHY N/A 03/29/2022    Procedure: ECHOCARDIOGRAM, TRANSESOPHAGEAL;  Surgeon: Dominic Wallis MD;  Location: [facility] EP LAB;  Service: Cardiology;  Laterality: N/A;    TREATMENT OF CARDIAC ARRHYTHMIA N/A 03/29/2022    Procedure: Cardioversion or Defibrillation;  Surgeon: Elmo Jones MD;  Location: [facility] EP LAB;  Service: Cardiology;  Laterality: N/A;  afib, dccv, stanley, anes, [place], 3prep, Provider to perform     TRICUSPID VALVULOPLASTY N/A 11/7/2022    Procedure: REPAIR, TRICUSPID VALVE;  Surgeon: Bobby Gilbert MD;  Location: [facility] OR [address] [place] [address];  Service: Cardiothoracic;  Laterality: N/A;       Time Tracking:     PT Received On: 11/08/22  PT Start Time: 0852     PT Stop Time: 0923  PT Total Time (min): 31 min     Billable Minutes: Evaluation 10 and Gait 15      11/08/2022

## 2022-11-08 NOTE — PLAN OF CARE
"      SICU PLAN OF CARE NOTE    Dx: S/P MVR (mitral valve replacement)    Shift Events: Pt's V/S stable at this time. Pt extubated @ 2043 over night. Pt given 1L of albumin over shift, pt has adequate UOP over night. Pt intermittently on Epinephrine over shift, off since 0330. Pt OOBTC this AM with RN assist x2, tolerating well. Pt able to rest intermittently over night, PRN and scheduled pain meds given over night, working well to control pain. Pt passed bedside swallow earlier this AM. Plan to advance diet and wean O2 as tolerated, monitor V/S and drain output closely, trend labs, further independence as tolerated with PT/OT. POC reviewed with pt and pt's fiance, all questions answered and encouraged.    Goals of Care: MAP 60-80    Neuro: AAO x4, Follows Commands, and Moves All Extremities    Vital Signs: BP (!) 93/50 (BP Location: Left arm, Patient Position: Lying)   Pulse 75   Temp 98.4 °F (36.9 °C) (Oral)   Resp 11   Ht 5' 9" (1.753 m)   Wt 86.2 kg (190 lb)   LMP 11/03/2016   SpO2 100%   Breastfeeding No   BMI 28.06 kg/m²     Cardiac: NSR, HR 70s, CVP 14/18    Respiratory: Nasal Cannula, 2L O2    Diet: NPO    Urine Output: Urinary Catheter 1175 cc/shift    Drains: CT meds/pleural, total output of 207 ml/shift     Labs: daily, 20 mmol of Phos given IVPB / Accuchecks: Q1H    Skin: No skin breakdown noted over night. Pt's incisions and pacer wire / CT dressings remain CDI over shift. Pt repositioned Q2H, pillow support provided. Skin foams in place for prevention. Chair cushion utilized. Lenora bed plugged in, inflated, and working properly.      "

## 2022-11-08 NOTE — NURSING
Pt arrived to SICU [address] from OR on portable telemetry and 100% FiO2 via ambu-bag. Upon arrival, pt connected to wall monitor and ventilator. All VSS at this time. Epi and levo weaned to off. All immediate needs of the pt met, all new orders acknowledged and implemented.

## 2022-11-08 NOTE — SUBJECTIVE & OBJECTIVE
Interval History/Significant Events: Extubated yesterday evening to NC, tolerating well. Overnight, pt had several brief intermittent PVCs, currently NSR with backup pacing at 40bpm.  Received 1 L albumin overnight. Start anticoagulation for mechanical valve today. Good urine output overnight. Advance diet as tolerated    Follow-up For: Procedure(s) (LRB):  REPLACEMENT, MITRAL VALVE (N/A)  REPAIR, TRICUSPID VALVE (N/A)  MAZE (N/A)  STERNOTOMY (N/A)    Post-Operative Day: 1 Day Post-Op    Objective:     Vital Signs (Most Recent):  Temp: 98.1 °F (36.7 °C) (11/08/22 0300)  Pulse: (!) 58 (11/08/22 0433)  Resp: (!) 22 (11/08/22 0435)  BP: 128/61 (11/08/22 0400)  SpO2: 100 % (11/08/22 0433)   Vital Signs (24h Range):  Temp:  [97.6 °F (36.4 °C)-98.4 °F (36.9 °C)] 98.1 °F (36.7 °C)  Pulse:  [53-81] 58  Resp:  [10-32] 22  SpO2:  [98 %-100 %] 100 %  BP: ()/(50-72) 128/61  Arterial Line BP: ()/(43-71) 117/51     Weight: 86.2 kg (190 lb)  Body mass index is 28.06 kg/m².      Intake/Output Summary (Last 24 hours) at 11/8/2022 0612  Last data filed at 11/8/2022 0400  Gross per 24 hour   Intake 5918.34 ml   Output 2897 ml   Net 3021.34 ml       Physical Exam  Vitals and nursing note reviewed.   Constitutional:       General: She is not in acute distress.  HENT:      Head: Normocephalic and atraumatic.      Mouth/Throat:      Pharynx: Oropharynx is clear.   Cardiovascular:      Rate and Rhythm: Normal rate and regular rhythm.      Comments: Midline incision c/d/I  Pacing wires  Chest tubes  Pulmonary:      Effort: Pulmonary effort is normal. No respiratory distress.   Abdominal:      Palpations: Abdomen is soft.   Musculoskeletal:         General: No swelling.   Skin:     General: Skin is warm and dry.   Neurological:      Mental Status: She is alert and oriented to person, place, and time.       Vents:  Vent Mode: Spont (11/07/22 2047)  Ventilator Initiated: Yes (11/07/22 1510)  Set Rate: 18 BPM (11/07/22 1548)  Vt  Set: 450 mL (11/07/22 1548)  Pressure Support: 5 cmH20 (11/07/22 2047)  PEEP/CPAP: 5 cmH20 (11/07/22 2047)  Oxygen Concentration (%): 28 (11/08/22 0433)  Peak Airway Pressure: 10 cmH2O (11/07/22 2047)  Plateau Pressure: 0 cmH20 (11/07/22 2047)  Total Ve: 5.15 L/m (11/07/22 2047)  Negative Inspiratory Force (cm H2O): -24 (11/07/22 2047)  F/VT Ratio<105 (RSBI): (!) 66.47 (11/07/22 2000)    Lines/Drains/Airways       Central Venous Catheter Line  Duration              Introducer with Double Lumen 11/07/22 1521 right internal jugular <1 day              Drain  Duration                  Urethral Catheter 11/07/22 Temperature probe;Non-latex 14 Fr. 1 day         Y Chest Tube 1 and 2 11/07/22 1 Left Mediastinal 19 Fr. Right Mediastinal 19 Fr. 1 day         Y Chest Tube 3 and 4 11/07/22 Left Pleural 19 Fr. Right Pleural 19 Fr. 1 day              Arterial Line  Duration             Arterial Line 11/07/22 0830 Left Radial <1 day              Line  Duration                  Pacer Wires 11/07/22 1307 <1 day              Peripheral Intravenous Line  Duration                  Peripheral IV - Single Lumen 11/07/22 0558 18 G Left;Posterior Wrist 1 day                    Significant Labs:    CBC/Anemia Profile:  Recent Labs   Lab 11/07/22  1514 11/07/22  1514 11/07/22  2332 11/08/22 0319   WBC 18.74*  --   --  11.23   HGB 10.3*  --   --  9.4*   HCT 31.5* 29* 27* 28.4*   *  --   --  105*   MCV 93  --   --  93   RDW 14.1  --   --  14.5        Chemistries:  Recent Labs   Lab 11/07/22  1514 11/07/22  2201 11/07/22  2351 11/08/22  0319     --  137 139   K 3.4* 4.7 5.2* 4.6   *  --  110 107   CO2 20*  --  20* 24   BUN 15  --  13 13   CREATININE 0.9  --  0.8 0.8   CALCIUM 9.3  --  8.2* 8.6*   ALBUMIN 3.0*  --  3.9 4.1   PROT 5.3*  --  5.9* 6.4   BILITOT 1.8*  --  2.7* 2.7*   ALKPHOS 73  --  60 67   ALT 25  --  29 30   *  --  164* 165*   MG 2.1  2.1  --  1.8 1.9   PHOS 1.9*  1.9*  --  4.8* 5.0*       All  pertinent labs within the past 24 hours have been reviewed.    Significant Imaging:  I have reviewed all pertinent imaging results/findings within the past 24 hours.

## 2022-11-08 NOTE — NURSING
Pt noted to be hypotensive (MAP in low 50s) and bradycardic (HR in low 50s with PVCs). Pt restarted on low dose Epinephrine (0.02 mcg/kg/min), MAPs now in 60s. MD Kumar at bedside to assess pt. ABG/lactic/lytes obtained, lactic decreasing, K noted to be 5.5. STAT EKG obtained, STAT CMP/Mg/Phos collected, results pending. Per MD Kumar, pt now being paced @ 80 bpm with V-wires, frequent PVCs continued to be noted.

## 2022-11-08 NOTE — SUBJECTIVE & OBJECTIVE
"Interval HPI:   Overnight events: Remains in SICU. POD 1. BG well controlled with no insulin requirements overnight. Diet clear liquid    Eating:    clear liquids   Nausea: No  Hypoglycemia and intervention: No  Fever: No  TPN and/or TF: No  If yes, type of TF/TPN and rate: n/a    /61 (BP Location: Right arm, Patient Position: Lying)   Pulse (!) 55   Temp 98.1 °F (36.7 °C) (Oral)   Resp (!) 23   Ht 5' 9" (1.753 m)   Wt 89 kg (196 lb 3.4 oz)   LMP 11/03/2016   SpO2 100%   Breastfeeding No   BMI 28.98 kg/m²     Labs Reviewed and Include    Recent Labs   Lab 11/08/22 0319   *   CALCIUM 8.6*   ALBUMIN 4.1   PROT 6.4      K 4.6   CO2 24      BUN 13   CREATININE 0.8   ALKPHOS 67   ALT 30   *   BILITOT 2.7*     Lab Results   Component Value Date    WBC 11.23 11/08/2022    HGB 9.4 (L) 11/08/2022    HCT 28.4 (L) 11/08/2022    MCV 93 11/08/2022     (L) 11/08/2022     No results for input(s): TSH, FREET4 in the last 168 hours.  Lab Results   Component Value Date    HGBA1C 5.2 11/03/2022       Nutritional status:   Body mass index is 28.98 kg/m².  Lab Results   Component Value Date    ALBUMIN 4.1 11/08/2022    ALBUMIN 3.9 11/07/2022    ALBUMIN 3.0 (L) 11/07/2022     No results found for: PREALBUMIN    Estimated Creatinine Clearance: 96.7 mL/min (based on SCr of 0.8 mg/dL).    Accu-Checks  Recent Labs     11/07/22 2014 11/07/22  2107 11/07/22  2203 11/07/22  2329 11/08/22  0002 11/08/22  0203 11/08/22  0319 11/08/22  0415 11/08/22  0506 11/08/22  0610   POCTGLUCOSE 127* 148* 149* 137* 145* 144* 160* 122* 121* 121*       Current Medications and/or Treatments Impacting Glycemic Control  Immunotherapy:    Immunosuppressants       None          Steroids:   Hormones (From admission, onward)      None          Pressors:    Autonomic Drugs (From admission, onward)      Start     Stop Route Frequency Ordered    11/08/22 0900  metoprolol tartrate (LOPRESSOR) split tablet 12.5 mg         " -- Oral 2 times daily 11/08/22 0702    11/07/22 1500  EPINEPHrine 5 mg in dextrose 5% 250 mL infusion (premix)        Question Answer Comment   Begin at (in mcg/kg/min): 0.02    Titrate by: (in mcg/kg/min) 0.02    Titrate interval: (in minutes) 5    Titrate to maintain: (SBP or MAP or Cardiac Index) MAP    Greater than: (in mmHg) 65    Cardiac index greater than: (in L/min) 2.2    Maximum dose: (in mcg/kg/min) 2        -- IV Continuous 11/07/22 1446          Hyperglycemia/Diabetes Medications:   Antihyperglycemics (From admission, onward)      Start     Stop Route Frequency Ordered    11/07/22 1500  insulin regular in 0.9 % NaCl 100 unit/100 mL (1 unit/mL) infusion        Question Answer Comment   Insulin rate changes (DO NOT MODIFY ANSWER) [email or web address] [id number]    Enter initial dose (Units/hr): 1        -- IV Continuous 11/07/22 1446

## 2022-11-08 NOTE — ANESTHESIA POSTPROCEDURE EVALUATION
Anesthesia Post Evaluation    Patient: Riya Soto    Procedure(s) Performed: Procedure(s) (LRB):  REPLACEMENT, MITRAL VALVE (N/A)  REPAIR, TRICUSPID VALVE (N/A)  MAZE (N/A)  STERNOTOMY (N/A)    Final Anesthesia Type: general      Patient location during evaluation: ICU  Patient participation: No - Unable to Participate, Sedation  Level of consciousness: sedated  Post-procedure vital signs: reviewed and stable  Pain management: adequate  Airway patency: patent    PONV status at discharge: No PONV  Anesthetic complications: no      Cardiovascular status: hemodynamically stable  Respiratory status: intubated            Vitals Value Taken Time   /61 11/08/22 0501   Temp 36.7 °C (98.1 °F) 11/08/22 0300   Pulse 78 11/08/22 0859   Resp 16 11/08/22 0859   SpO2 100 % 11/08/22 0859   Vitals shown include unvalidated device data.      No case tracking events are documented in the log.      Pain/Elias Score: Pain Rating Prior to Med Admin: 7 (11/8/2022  5:36 AM)  Pain Rating Post Med Admin: 2 (11/7/2022 11:25 PM)

## 2022-11-08 NOTE — ASSESSMENT & PLAN NOTE
BG goal 110-140 (CTS protocol)     Discontinue IV insulin infusion protocol (off overnight)  Start Moderate Dose Correction Scale  BG monitoring ac/hs     ** Please call Endocrine for any BG related issues **    ** Please notify Endocrine for any change and/or advance in diet**    Discharge planning: TBD

## 2022-11-09 ENCOUNTER — TELEPHONE (OUTPATIENT)
Dept: CARDIAC REHAB | Facility: CLINIC | Age: 52
End: 2022-11-09
Payer: COMMERCIAL

## 2022-11-09 LAB
ALBUMIN SERPL BCP-MCNC: 3.4 G/DL (ref 3.5–5.2)
ALP SERPL-CCNC: 84 U/L (ref 55–135)
ALT SERPL W/O P-5'-P-CCNC: 30 U/L (ref 10–44)
ANION GAP SERPL CALC-SCNC: 7 MMOL/L (ref 8–16)
APTT BLDCRRT: 37.7 SEC (ref 21–32)
APTT BLDCRRT: 37.7 SEC (ref 21–32)
APTT BLDCRRT: 40.1 SEC (ref 21–32)
AST SERPL-CCNC: 100 U/L (ref 10–40)
BASOPHILS # BLD AUTO: 0.02 K/UL (ref 0–0.2)
BASOPHILS NFR BLD: 0.1 % (ref 0–1.9)
BILIRUB SERPL-MCNC: 1.3 MG/DL (ref 0.1–1)
BLD PROD TYP BPU: NORMAL
BLOOD UNIT EXPIRATION DATE: NORMAL
BLOOD UNIT TYPE CODE: 5100
BLOOD UNIT TYPE: NORMAL
BUN SERPL-MCNC: 13 MG/DL (ref 6–20)
CALCIUM SERPL-MCNC: 8.6 MG/DL (ref 8.7–10.5)
CHLORIDE SERPL-SCNC: 100 MMOL/L (ref 95–110)
CO2 SERPL-SCNC: 26 MMOL/L (ref 23–29)
CODING SYSTEM: NORMAL
CREAT SERPL-MCNC: 0.8 MG/DL (ref 0.5–1.4)
DIFFERENTIAL METHOD: ABNORMAL
DISPENSE STATUS: NORMAL
EOSINOPHIL # BLD AUTO: 0 K/UL (ref 0–0.5)
EOSINOPHIL NFR BLD: 0 % (ref 0–8)
ERYTHROCYTE [DISTWIDTH] IN BLOOD BY AUTOMATED COUNT: 14.5 % (ref 11.5–14.5)
EST. GFR  (NO RACE VARIABLE): >60 ML/MIN/1.73 M^2
GLUCOSE SERPL-MCNC: 123 MG/DL (ref 70–110)
HCT VFR BLD AUTO: 26.9 % (ref 37–48.5)
HGB BLD-MCNC: 8.9 G/DL (ref 12–16)
IMM GRANULOCYTES # BLD AUTO: 0.11 K/UL (ref 0–0.04)
IMM GRANULOCYTES NFR BLD AUTO: 0.8 % (ref 0–0.5)
INR PPP: 1.2 (ref 0.8–1.2)
LYMPHOCYTES # BLD AUTO: 0.9 K/UL (ref 1–4.8)
LYMPHOCYTES NFR BLD: 6.2 % (ref 18–48)
MAGNESIUM SERPL-MCNC: 1.9 MG/DL (ref 1.6–2.6)
MCH RBC QN AUTO: 30.4 PG (ref 27–31)
MCHC RBC AUTO-ENTMCNC: 33.1 G/DL (ref 32–36)
MCV RBC AUTO: 92 FL (ref 82–98)
MONOCYTES # BLD AUTO: 1.7 K/UL (ref 0.3–1)
MONOCYTES NFR BLD: 12 % (ref 4–15)
NEUTROPHILS # BLD AUTO: 11.2 K/UL (ref 1.8–7.7)
NEUTROPHILS NFR BLD: 80.9 % (ref 38–73)
NRBC BLD-RTO: 0 /100 WBC
PHOSPHATE SERPL-MCNC: 2.5 MG/DL (ref 2.7–4.5)
PLATELET # BLD AUTO: 91 K/UL (ref 150–450)
PMV BLD AUTO: 12.1 FL (ref 9.2–12.9)
POCT GLUCOSE: 132 MG/DL (ref 70–110)
POTASSIUM SERPL-SCNC: 3.8 MMOL/L (ref 3.5–5.1)
POTASSIUM SERPL-SCNC: 4.2 MMOL/L (ref 3.5–5.1)
PROT SERPL-MCNC: 6.2 G/DL (ref 6–8.4)
PROTHROMBIN TIME: 12.4 SEC (ref 9–12.5)
RBC # BLD AUTO: 2.93 M/UL (ref 4–5.4)
SODIUM SERPL-SCNC: 133 MMOL/L (ref 136–145)
TRANS ERYTHROCYTES VOL PATIENT: NORMAL ML
WBC # BLD AUTO: 13.8 K/UL (ref 3.9–12.7)

## 2022-11-09 PROCEDURE — 25000003 PHARM REV CODE 250: Performed by: STUDENT IN AN ORGANIZED HEALTH CARE EDUCATION/TRAINING PROGRAM

## 2022-11-09 PROCEDURE — 80053 COMPREHEN METABOLIC PANEL: CPT | Performed by: STUDENT IN AN ORGANIZED HEALTH CARE EDUCATION/TRAINING PROGRAM

## 2022-11-09 PROCEDURE — 25000003 PHARM REV CODE 250: Performed by: PHYSICIAN ASSISTANT

## 2022-11-09 PROCEDURE — 85730 THROMBOPLASTIN TIME PARTIAL: CPT | Mod: 91 | Performed by: STUDENT IN AN ORGANIZED HEALTH CARE EDUCATION/TRAINING PROGRAM

## 2022-11-09 PROCEDURE — 94799 UNLISTED PULMONARY SVC/PX: CPT

## 2022-11-09 PROCEDURE — 63600175 PHARM REV CODE 636 W HCPCS: Performed by: STUDENT IN AN ORGANIZED HEALTH CARE EDUCATION/TRAINING PROGRAM

## 2022-11-09 PROCEDURE — 20600001 HC STEP DOWN PRIVATE ROOM

## 2022-11-09 PROCEDURE — 94761 N-INVAS EAR/PLS OXIMETRY MLT: CPT

## 2022-11-09 PROCEDURE — 25000003 PHARM REV CODE 250

## 2022-11-09 PROCEDURE — 97530 THERAPEUTIC ACTIVITIES: CPT

## 2022-11-09 PROCEDURE — 84100 ASSAY OF PHOSPHORUS: CPT | Performed by: PHYSICIAN ASSISTANT

## 2022-11-09 PROCEDURE — 63600175 PHARM REV CODE 636 W HCPCS

## 2022-11-09 PROCEDURE — 36415 COLL VENOUS BLD VENIPUNCTURE: CPT | Performed by: THORACIC SURGERY (CARDIOTHORACIC VASCULAR SURGERY)

## 2022-11-09 PROCEDURE — 27000221 HC OXYGEN, UP TO 24 HOURS

## 2022-11-09 PROCEDURE — 25000003 PHARM REV CODE 250: Performed by: ANESTHESIOLOGY

## 2022-11-09 PROCEDURE — 99233 SBSQ HOSP IP/OBS HIGH 50: CPT | Mod: ,,, | Performed by: ANESTHESIOLOGY

## 2022-11-09 PROCEDURE — P9045 ALBUMIN (HUMAN), 5%, 250 ML: HCPCS | Mod: JG | Performed by: STUDENT IN AN ORGANIZED HEALTH CARE EDUCATION/TRAINING PROGRAM

## 2022-11-09 PROCEDURE — 99900035 HC TECH TIME PER 15 MIN (STAT)

## 2022-11-09 PROCEDURE — 63600175 PHARM REV CODE 636 W HCPCS: Mod: JG | Performed by: STUDENT IN AN ORGANIZED HEALTH CARE EDUCATION/TRAINING PROGRAM

## 2022-11-09 PROCEDURE — 84132 ASSAY OF SERUM POTASSIUM: CPT | Performed by: STUDENT IN AN ORGANIZED HEALTH CARE EDUCATION/TRAINING PROGRAM

## 2022-11-09 PROCEDURE — 85025 COMPLETE CBC W/AUTO DIFF WBC: CPT | Performed by: PHYSICIAN ASSISTANT

## 2022-11-09 PROCEDURE — 83735 ASSAY OF MAGNESIUM: CPT | Performed by: PHYSICIAN ASSISTANT

## 2022-11-09 PROCEDURE — 63600175 PHARM REV CODE 636 W HCPCS: Performed by: PHYSICIAN ASSISTANT

## 2022-11-09 PROCEDURE — 85730 THROMBOPLASTIN TIME PARTIAL: CPT | Mod: 91 | Performed by: THORACIC SURGERY (CARDIOTHORACIC VASCULAR SURGERY)

## 2022-11-09 PROCEDURE — 85610 PROTHROMBIN TIME: CPT | Performed by: PHYSICIAN ASSISTANT

## 2022-11-09 PROCEDURE — 99233 PR SUBSEQUENT HOSPITAL CARE,LEVL III: ICD-10-PCS | Mod: ,,, | Performed by: ANESTHESIOLOGY

## 2022-11-09 RX ORDER — WARFARIN SODIUM 5 MG/1
5 TABLET ORAL DAILY
Status: DISCONTINUED | OUTPATIENT
Start: 2022-11-09 | End: 2022-11-11

## 2022-11-09 RX ORDER — POTASSIUM CHLORIDE 20 MEQ/1
20 TABLET, EXTENDED RELEASE ORAL EVERY 12 HOURS
Status: DISCONTINUED | OUTPATIENT
Start: 2022-11-09 | End: 2022-11-13 | Stop reason: HOSPADM

## 2022-11-09 RX ORDER — FAMOTIDINE 20 MG/1
20 TABLET, FILM COATED ORAL 2 TIMES DAILY
Status: DISCONTINUED | OUTPATIENT
Start: 2022-11-09 | End: 2022-11-13 | Stop reason: HOSPADM

## 2022-11-09 RX ORDER — ALBUMIN HUMAN 50 G/1000ML
25 SOLUTION INTRAVENOUS ONCE
Status: COMPLETED | OUTPATIENT
Start: 2022-11-09 | End: 2022-11-09

## 2022-11-09 RX ORDER — AMIODARONE HYDROCHLORIDE 200 MG/1
400 TABLET ORAL 2 TIMES DAILY
Status: DISCONTINUED | OUTPATIENT
Start: 2022-11-09 | End: 2022-11-09

## 2022-11-09 RX ADMIN — ALBUMIN (HUMAN) 25 G: 12.5 SOLUTION INTRAVENOUS at 07:11

## 2022-11-09 RX ADMIN — ATORVASTATIN CALCIUM 10 MG: 10 TABLET, FILM COATED ORAL at 08:11

## 2022-11-09 RX ADMIN — ACETAMINOPHEN 1000 MG: 500 TABLET ORAL at 05:11

## 2022-11-09 RX ADMIN — POTASSIUM CHLORIDE EXTENDED-RELEASE 20 MEQ: 1500 TABLET ORAL at 08:11

## 2022-11-09 RX ADMIN — MUPIROCIN 1 G: 20 OINTMENT TOPICAL at 08:11

## 2022-11-09 RX ADMIN — SODIUM PHOSPHATE, MONOBASIC, MONOHYDRATE AND SODIUM PHOSPHATE, DIBASIC, ANHYDROUS 15 MMOL: 142; 276 INJECTION, SOLUTION INTRAVENOUS at 08:11

## 2022-11-09 RX ADMIN — OXYCODONE HYDROCHLORIDE 10 MG: 10 TABLET ORAL at 08:11

## 2022-11-09 RX ADMIN — GABAPENTIN 300 MG: 300 CAPSULE ORAL at 08:11

## 2022-11-09 RX ADMIN — POLYETHYLENE GLYCOL 3350 17 G: 17 POWDER, FOR SOLUTION ORAL at 08:11

## 2022-11-09 RX ADMIN — FAMOTIDINE 20 MG: 20 TABLET ORAL at 08:11

## 2022-11-09 RX ADMIN — FUROSEMIDE 40 MG: 10 INJECTION, SOLUTION INTRAMUSCULAR; INTRAVENOUS at 05:11

## 2022-11-09 RX ADMIN — FUROSEMIDE 40 MG: 10 INJECTION, SOLUTION INTRAMUSCULAR; INTRAVENOUS at 08:11

## 2022-11-09 RX ADMIN — CITALOPRAM HYDROBROMIDE 10 MG: 10 TABLET ORAL at 08:11

## 2022-11-09 RX ADMIN — POTASSIUM CHLORIDE EXTENDED-RELEASE 20 MEQ: 1500 TABLET ORAL at 09:11

## 2022-11-09 RX ADMIN — WARFARIN SODIUM 5 MG: 5 TABLET ORAL at 05:11

## 2022-11-09 RX ADMIN — AMIODARONE HYDROCHLORIDE 0.5 MG/MIN: 1.8 INJECTION, SOLUTION INTRAVENOUS at 02:11

## 2022-11-09 RX ADMIN — ACETAMINOPHEN 1000 MG: 500 TABLET ORAL at 03:11

## 2022-11-09 RX ADMIN — FAMOTIDINE 20 MG: 20 TABLET ORAL at 09:11

## 2022-11-09 RX ADMIN — AMIODARONE HYDROCHLORIDE 400 MG: 200 TABLET ORAL at 08:11

## 2022-11-09 RX ADMIN — DOCUSATE SODIUM 100 MG: 100 CAPSULE ORAL at 08:11

## 2022-11-09 RX ADMIN — TACROLIMUS 900 MG: 0.5 CAPSULE ORAL at 06:11

## 2022-11-09 RX ADMIN — ASPIRIN 325 MG ORAL TABLET 325 MG: 325 PILL ORAL at 08:11

## 2022-11-09 RX ADMIN — OXYCODONE 5 MG: 5 TABLET ORAL at 05:11

## 2022-11-09 RX ADMIN — DRONEDARONE 400 MG: 400 TABLET, FILM COATED ORAL at 05:11

## 2022-11-09 RX ADMIN — MAGNESIUM SULFATE 2 G: 2 INJECTION INTRAVENOUS at 05:11

## 2022-11-09 RX ADMIN — METOPROLOL TARTRATE 12.5 MG: 25 TABLET, FILM COATED ORAL at 08:11

## 2022-11-09 NOTE — SUBJECTIVE & OBJECTIVE
Interval History/Significant Events: NAEO. Remains afebrile, HDS. Minimal chest tube output, will pull chest tubes today. Possible step down to floor today.    Follow-up For: Procedure(s) (LRB):  REPLACEMENT, MITRAL VALVE (N/A)  REPAIR, TRICUSPID VALVE (N/A)  MAZE (N/A)  STERNOTOMY (N/A)    Post-Operative Day: 2 Days Post-Op    Objective:     Vital Signs (Most Recent):  Temp: 98.8 °F (37.1 °C) (11/09/22 0301)  Pulse: 67 (11/09/22 0715)  Resp: (!) 45 (11/09/22 0715)  BP: (!) 97/55 (11/09/22 0700)  SpO2: 98 % (11/09/22 0715)   Vital Signs (24h Range):  Temp:  [98.5 °F (36.9 °C)-98.8 °F (37.1 °C)] 98.8 °F (37.1 °C)  Pulse:  [55-78] 67  Resp:  [12-45] 45  SpO2:  [90 %-100 %] 98 %  BP: ()/(55-66) 97/55  Arterial Line BP: ()/(40-63) 86/40     Weight: 92 kg (202 lb 13.2 oz)  Body mass index is 29.95 kg/m².      Intake/Output Summary (Last 24 hours) at 11/9/2022 0731  Last data filed at 11/9/2022 0600  Gross per 24 hour   Intake 1067.05 ml   Output 1750 ml   Net -682.95 ml       Physical Exam  Vitals and nursing note reviewed.   Constitutional:       General: She is not in acute distress.  HENT:      Head: Normocephalic and atraumatic.      Mouth/Throat:      Pharynx: Oropharynx is clear.   Cardiovascular:      Rate and Rhythm: Normal rate and regular rhythm.      Comments: Midline incision c/d/I  Pacing wires  Chest tubes  Pulmonary:      Effort: Pulmonary effort is normal. No respiratory distress.   Abdominal:      Palpations: Abdomen is soft.   Musculoskeletal:         General: No swelling.   Skin:     General: Skin is warm and dry.   Neurological:      Mental Status: She is alert and oriented to person, place, and time.       Vents:  Vent Mode: Spont (11/07/22 2047)  Ventilator Initiated: Yes (11/07/22 1510)  Set Rate: 18 BPM (11/07/22 1548)  Vt Set: 450 mL (11/07/22 1548)  Pressure Support: 5 cmH20 (11/07/22 2047)  PEEP/CPAP: 5 cmH20 (11/07/22 2047)  Oxygen Concentration (%): 28 (11/08/22 0433)  Peak Airway  Pressure: 10 cmH2O (11/07/22 2047)  Plateau Pressure: 0 cmH20 (11/07/22 2047)  Total Ve: 5.15 L/m (11/07/22 2047)  Negative Inspiratory Force (cm H2O): -24 (11/07/22 2047)  F/VT Ratio<105 (RSBI): (!) 66.47 (11/07/22 2000)    Lines/Drains/Airways       Drain  Duration                  Urethral Catheter 11/07/22 Temperature probe;Non-latex 14 Fr. 2 days         Y Chest Tube 1 and 2 11/07/22 1 Left Mediastinal 19 Fr. Right Mediastinal 19 Fr. 2 days         Y Chest Tube 3 and 4 11/07/22 Left Pleural 19 Fr. Right Pleural 19 Fr. 2 days              Arterial Line  Duration             Arterial Line 11/07/22 0830 Left Radial 1 day              Line  Duration                  Pacer Wires 11/07/22 1307 1 day              Peripheral Intravenous Line  Duration                  Peripheral IV - Single Lumen 11/07/22 0558 18 G Left;Posterior Wrist 2 days         Peripheral IV - Single Lumen 11/08/22 1824 20 G Right Antecubital <1 day                    Significant Labs:    CBC/Anemia Profile:  Recent Labs   Lab 11/07/22  1514 11/07/22  1514 11/07/22  2332 11/08/22  0319 11/09/22  0358   WBC 18.74*  --   --  11.23 13.80*   HGB 10.3*  --   --  9.4* 8.9*   HCT 31.5*   < > 27* 28.4* 26.9*   *  --   --  105* 91*   MCV 93  --   --  93 92   RDW 14.1  --   --  14.5 14.5    < > = values in this interval not displayed.        Chemistries:  Recent Labs   Lab 11/07/22  2351 11/08/22  0319 11/08/22  0913 11/08/22  1947 11/09/22  0358    139  --   --  133*   K 5.2* 4.6 4.5 3.8 4.2    107  --   --  100   CO2 20* 24  --   --  26   BUN 13 13  --   --  13   CREATININE 0.8 0.8  --   --  0.8   CALCIUM 8.2* 8.6*  --   --  8.6*   ALBUMIN 3.9 4.1  --   --  3.4*   PROT 5.9* 6.4  --   --  6.2   BILITOT 2.7* 2.7*  --   --  1.3*   ALKPHOS 60 67  --   --  84   ALT 29 30  --   --  30   * 165*  --   --  100*   MG 1.8 1.9  --   --  1.9   PHOS 4.8* 5.0*  --   --  2.5*       All pertinent labs within the past 24 hours have been  reviewed.    Significant Imaging:  I have reviewed all pertinent imaging results/findings within the past 24 hours.

## 2022-11-09 NOTE — PT/OT/SLP PROGRESS
Occupational Therapy   Treatment    Name: Riya Soto  MRN: [id number]  Admitting Diagnosis:  S/P MVR (mitral valve replacement)  2 Days Post-Op    Recommendations:     Discharge Recommendations: home  Discharge Equipment Recommendations:  none  Barriers to discharge:  None    Assessment:     Riya Soto is a 52 y.o. female with a medical diagnosis of S/P MVR (mitral valve replacement). Pt progressing this session walking a total of 70' with CGA.Performance deficits affecting function are weakness, impaired endurance, impaired self care skills, impaired functional mobility, gait instability, impaired balance, decreased coordination.     Rehab Prognosis:  Good; patient would benefit from acute skilled OT services to address these deficits and reach maximum level of function.       Plan:     Patient to be seen 5 x/week to address the above listed problems via self-care/home management, therapeutic activities, therapeutic exercises  Plan of Care Expires: 12/08/22  Plan of Care Reviewed with: patient    Subjective     Pain/Comfort:  Pain Rating 1: 0/10    Objective:     Communicated with: rn prior to session.  Patient found up in chair with oxygen, peripheral IV, telemetry upon OT entry to room.    General Precautions: Standard, fall, sternal   Orthopedic Precautions:    Braces:    Respiratory Status: Nasal cannula, flow 2 L/min     Occupational Performance:     Bed Mobility:    Up in chair.    Functional Mobility/Transfers:  Patient completed Sit <> Stand Transfer with minimum assistance  with  no assistive device   Functional Mobility: Pt walked walking a total of 70' with CGA / no AD.    Activities of Daily Living:  Pt declined.    [facility] 6 Click ADL: 20    Treatment & Education:  Reviewed sternal precautions.  Discussed OT POC.    Patient left up in chair with all lines intact and call button in reach    GOALS:   Multidisciplinary Problems       Occupational Therapy Goals          Problem: Occupational  Therapy    Goal Priority Disciplines Outcome Interventions   Occupational Therapy Goal     OT, PT/OT Ongoing, Progressing    Description: Goals to be met by: 11/15/22    Patient will increase functional independence with ADLs by performing:    Grooming while standing at sink with Set-up Assistance.  Toileting from toilet with Set-up Assistance for hygiene and clothing management.   Supine to sit with Supervision.  Step transfer with Supervision  Toilet transfer to toilet with Supervision.                         Time Tracking:     OT Date of Treatment: 11/09/22  OT Start Time: 1312  OT Stop Time: 1329  OT Total Time (min): 17 min    Billable Minutes:Therapeutic Activity 17    OT/MERCEDES: OT          11/9/2022

## 2022-11-09 NOTE — PLAN OF CARE
VSS. Pt remained afebrile. Pt c/o back pain associated with discomfort of bed. Glucose monitored. Lytes monitored and replaced. Heparin at 500 units/hr and amio @ 0.5 mg/min. Adequate UOP. Pain well controlled. Bed in locked and low position. Call light within reach. POC reviewed with patient and family at bedside. Questions/concerns addressed.

## 2022-11-09 NOTE — CARE UPDATE
BG goal 140-180    Stepped down from SICU to CSU. POD 2. BG well controlled with no prn SQ insulin requirements today. Diet Cardiac Fluid - 1500mL    Plan:  -Discontinue Moderate Dose Correction Scale and BG monitoring ac/hs  -Recommend continuing to monitor BG with am labs    Lab Results   Component Value Date    HGBA1C 5.2 11/03/2022         Endocrine to sign off at this time. Patient with no hx of DM and now tolerating diet with no prandial BG excursions noted. Please reconsult if needed.

## 2022-11-09 NOTE — PLAN OF CARE
Chest tubes removed by MD, arterial line removed, and Coon removed. Pt is due to void @1800. Pt AAOx4, all VSS on 2L nasal cannula. Telemetry monitoring in place. Pacer wires left in place and connected to pacer. Heparin gtt and IVPB infusing. All skin intact. Sternal incision intact with steri strips and cleansed with Iodine. Chest tube site dressings clean,dry, and intact. Foam applied to sacrum.     Report given to JUAN ALBERTO Juarez. Patient Stepped down to CSU. Pt transferred via wheelchair by RN x2 with all belongings and no acute distress noted. Upon arrival to the floor chart given to , RN notified. Pt up in chair with  at bedside and call light in reach.

## 2022-11-09 NOTE — ASSESSMENT & PLAN NOTE
Riya Soto is a 52 y.o. 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 2/2 rheumatic fever as child s/p MV repair 2011, severe tricuspid regurg, Afib on dronedarone, HLD, and HFrEF (40%) who is s/p repair of tricuspid valve, replacement of mitral valve and MAZE repair on 11-07-22.      Neuro/Psych:     - Sedation: n/a    - Pain:    - Scheduled Tylenol 1g q8h   - Oxy 5/10 for moderate and severe pain, cont home gabapentin              Cardiac:     - S/P mitral valve replacement and maze with Dr. Gilbert on 11/7/2022    - BP Goal: SBP <140, MAP 60-80    - Anti-HTNs: On home losartan 25 mg. Consider adding CCB if remains HDs    - Beta blocker: On home metoprolol,continue     - Rhythm: NSR. Afib s/p MAZE. Anticoagulation with hep transition to warfarin. PO amio    - Statin: Atorvastatin 10 mg QD (home medication)       Pulmonary:     - Goal SpO2 >92%, currently on 2L NC    - Chest Tubes x 4 (2 Meds & 2 Pleural). D/C today     - ABGs PRN      Renal:    - Trend BUN/Cr     - Maintain Coon, record strict Is/Os    Recent Labs   Lab 11/07/22  1514 11/07/22  2351 11/08/22  0319   BUN 15 13 13   CREATININE 0.9 0.8 0.8         FEN / GI:     - Daily CMP, PRN K/Mag/Phos per protocol     - Replace electrolytes as needed    - Nutrition: NPO pending extubation    - Bowel Regimen: Miralax, docusate      ID:     - Afebrile    - WBC stable    - Abx: Complete perioperative cefazolin 2g Q8H x 5 doses    Recent Labs   Lab 11/03/22  1413 11/07/22  1514 11/08/22  0319   WBC 5.36 18.74* 11.23         Heme/Onc:     - Hgb 10.3 pre-operatively    - CBC daily    - ASA 325mg daily   - Anticoagulation for mechanical valve     Recent Labs   Lab 11/03/22  1413 11/07/22  1514 11/08/22  0319   HGB 11.2* 10.3* 9.4*    121* 105*   APTT 25.6 33.2* 31.6   INR 1.0 1.3* 1.2         Endocrine:     - CTS Goal -140    - HgbA1c: 5.2    - Endocrinology consulted for insulin management      PPx:   Feeding: cardiac diet    Analgesia/Sedation: multimodals   Thromboembolic Prevention: SCDs  HOB >30: Yes  Stress Ulcer: n/a, ambulatory   Glucose Control: Yes, insulin management per Endocrinology     Lines/Drains/Airway:   Left radial arterial line d/c today    RIJ CVC d/c today    Coon d/c today    Chest Tubes: 4 (2 Mediastinal, 2 Pleural) d/c today    Pacing Wires: Temporary ventricular pacing wires      Dispo/Code Status/Palliative:     - Continue SICU Care, pending step down     - Full Code

## 2022-11-09 NOTE — CONSULTS
Letter regarding Phase II cardiac rehab was sent to patient, along with telephone # for [facility] cardiac rehab.  Antonieta Lopez RN  Cardiac Rehab Nurse

## 2022-11-09 NOTE — TELEPHONE ENCOUNTER
Letter regarding Phase II cardiac rehab was sent to patient, along with telephone # for [facility] cardiac rehab.  Antonieta Lopez RN  Cardiac Rehab Nurse

## 2022-11-09 NOTE — PROGRESS NOTES
[facility] - Surgical Intensive Care  Critical Care - Surgery  Progress Note    Patient Name: Riya Soto  MRN: [id number]  Admission Date: 11/7/2022  Hospital Length of Stay: 2 days  Code Status: Full Code  Attending Provider: Bobby Gilbert MD  Primary Care Provider: Pantera Yadav MD   Principal Problem: S/P MVR (mitral valve replacement)    Subjective:     Hospital/ICU Course:  No notes on file    Interval History/Significant Events: NAEO. Remains afebrile, HDS. Minimal chest tube output, will pull chest tubes today. Possible step down to floor today.    Follow-up For: Procedure(s) (LRB):  REPLACEMENT, MITRAL VALVE (N/A)  REPAIR, TRICUSPID VALVE (N/A)  MAZE (N/A)  STERNOTOMY (N/A)    Post-Operative Day: 2 Days Post-Op    Objective:     Vital Signs (Most Recent):  Temp: 98.8 °F (37.1 °C) (11/09/22 0301)  Pulse: 67 (11/09/22 0715)  Resp: (!) 45 (11/09/22 0715)  BP: (!) 97/55 (11/09/22 0700)  SpO2: 98 % (11/09/22 0715)   Vital Signs (24h Range):  Temp:  [98.5 °F (36.9 °C)-98.8 °F (37.1 °C)] 98.8 °F (37.1 °C)  Pulse:  [55-78] 67  Resp:  [12-45] 45  SpO2:  [90 %-100 %] 98 %  BP: ()/(55-66) 97/55  Arterial Line BP: ()/(40-63) 86/40     Weight: 92 kg (202 lb 13.2 oz)  Body mass index is 29.95 kg/m².      Intake/Output Summary (Last 24 hours) at 11/9/2022 0731  Last data filed at 11/9/2022 0600  Gross per 24 hour   Intake 1067.05 ml   Output 1750 ml   Net -682.95 ml       Physical Exam  Vitals and nursing note reviewed.   Constitutional:       General: She is not in acute distress.  HENT:      Head: Normocephalic and atraumatic.      Mouth/Throat:      Pharynx: Oropharynx is clear.   Cardiovascular:      Rate and Rhythm: Normal rate and regular rhythm.      Comments: Midline incision c/d/I  Pacing wires  Chest tubes  Pulmonary:      Effort: Pulmonary effort is normal. No respiratory distress.   Abdominal:      Palpations: Abdomen is soft.   Musculoskeletal:         General: No swelling.   Skin:      General: Skin is warm and dry.   Neurological:      Mental Status: She is alert and oriented to person, place, and time.       Vents: N/A    Lines/Drains/Airways       Drain  Duration                  Urethral Catheter 11/07/22 Temperature probe;Non-latex 14 Fr. 2 days         Y Chest Tube 1 and 2 11/07/22 1 Left Mediastinal 19 Fr. Right Mediastinal 19 Fr. 2 days         Y Chest Tube 3 and 4 11/07/22 Left Pleural 19 Fr. Right Pleural 19 Fr. 2 days              Arterial Line  Duration             Arterial Line 11/07/22 0830 Left Radial 1 day              Line  Duration                  Pacer Wires 11/07/22 1307 1 day              Peripheral Intravenous Line  Duration                  Peripheral IV - Single Lumen 11/07/22 0558 18 G Left;Posterior Wrist 2 days         Peripheral IV - Single Lumen 11/08/22 1824 20 G Right Antecubital <1 day                    Significant Labs:    CBC/Anemia Profile:  Recent Labs   Lab 11/07/22  1514 11/07/22  1514 11/07/22  2332 11/08/22  0319 11/09/22  0358   WBC 18.74*  --   --  11.23 13.80*   HGB 10.3*  --   --  9.4* 8.9*   HCT 31.5*   < > 27* 28.4* 26.9*   *  --   --  105* 91*   MCV 93  --   --  93 92   RDW 14.1  --   --  14.5 14.5    < > = values in this interval not displayed.        Chemistries:  Recent Labs   Lab 11/07/22  2351 11/08/22  0319 11/08/22  0913 11/08/22  1947 11/09/22  0358    139  --   --  133*   K 5.2* 4.6 4.5 3.8 4.2    107  --   --  100   CO2 20* 24  --   --  26   BUN 13 13  --   --  13   CREATININE 0.8 0.8  --   --  0.8   CALCIUM 8.2* 8.6*  --   --  8.6*   ALBUMIN 3.9 4.1  --   --  3.4*   PROT 5.9* 6.4  --   --  6.2   BILITOT 2.7* 2.7*  --   --  1.3*   ALKPHOS 60 67  --   --  84   ALT 29 30  --   --  30   * 165*  --   --  100*   MG 1.8 1.9  --   --  1.9   PHOS 4.8* 5.0*  --   --  2.5*       All pertinent labs within the past 24 hours have been reviewed.    Significant Imaging:  I have reviewed all pertinent imaging results/findings  within the past 24 hours.    Assessment/Plan:     * S/P MVR (mitral valve replacement)  Riya Soto is a 52 y.o. 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 2/2 rheumatic fever as child s/p MV repair 2011, severe tricuspid regurg, Afib on dronedarone, HLD, and HFrEF (40%) who is s/p repair of tricuspid valve, replacement of mitral valve and MAZE repair on 11-07-22.      Neuro/Psych:     - Sedation: n/a    - Pain:    - Scheduled Tylenol 1g q8h   - Oxy 5/10 for moderate and severe pain, cont home gabapentin              Cardiac:     - S/P mitral valve replacement and maze with Dr. Gilbert on 11/7/2022    - BP Goal: SBP <140, MAP 60-80    - Anti-HTNs: On home losartan 25 mg. Consider adding CCB if remains HDs    - Beta blocker: On home metoprolol,continue     - Rhythm: NSR. Afib s/p MAZE. Anticoagulation with hep transition to warfarin. PO amio    - Statin: Atorvastatin 10 mg QD (home medication)       Pulmonary:     - Goal SpO2 >92%, currently on 2L NC    - Chest Tubes x 4 (2 Meds & 2 Pleural). D/C today     - ABGs PRN      Renal:    - Trend BUN/Cr     - Maintain Coon, record strict Is/Os    Recent Labs   Lab 11/07/22  1514 11/07/22  2351 11/08/22  0319   BUN 15 13 13   CREATININE 0.9 0.8 0.8         FEN / GI:     - Daily CMP, PRN K/Mag/Phos per protocol     - Replace electrolytes as needed    - Nutrition: NPO pending extubation    - Bowel Regimen: Miralax, docusate      ID:     - Afebrile    - WBC stable    - Abx: Complete perioperative cefazolin 2g Q8H x 5 doses    Recent Labs   Lab 11/03/22  1413 11/07/22  1514 11/08/22  0319   WBC 5.36 18.74* 11.23         Heme/Onc:     - Hgb 10.3 pre-operatively    - CBC daily    - ASA 325mg daily   - Anticoagulation for mechanical valve     Recent Labs   Lab 11/03/22  1413 11/07/22  1514 11/08/22  0319   HGB 11.2* 10.3* 9.4*    121* 105*   APTT 25.6 33.2* 31.6   INR 1.0 1.3* 1.2         Endocrine:     - CTS Goal -140    - HgbA1c: 5.2    - Endocrinology  consulted for insulin management      PPx:   Feeding: cardiac diet   Analgesia/Sedation: multimodals   Thromboembolic Prevention: SCDs  HOB >30: Yes  Stress Ulcer: n/a, ambulatory   Glucose Control: Yes, insulin management per Endocrinology     Lines/Drains/Airway:   Left radial arterial line d/c today    RIJ CVC d/c today    Coon d/c today    Chest Tubes: 4 (2 Mediastinal, 2 Pleural) d/c today    Pacing Wires: Temporary ventricular pacing wires      Dispo/Code Status/Palliative:     - Continue SICU Care, pending step down     - Full Code        Critical secondary to Patient has a condition that poses threat to life and bodily function.     Critical care was time spent personally by me on the following activities: development of treatment plan with patient or surrogate and bedside caregivers, discussions with consultants, evaluation of patient's response to treatment, examination of patient, ordering and performing treatments and interventions, ordering and review of laboratory studies, ordering and review of radiographic studies, pulse oximetry, re-evaluation of patient's condition.  This critical care time did not overlap with that of any other provider or involve time for any procedures.     Stevie Poe, DO  Critical Care - Surgery  [facility] - Surgical Intensive [facility]

## 2022-11-10 DIAGNOSIS — Z00.6 EXAMINATION OF PARTICIPANT IN CLINICAL TRIAL: ICD-10-CM

## 2022-11-10 DIAGNOSIS — Z95.2 S/P MVR (MITRAL VALVE REPLACEMENT): Primary | ICD-10-CM

## 2022-11-10 PROBLEM — Z86.79 S/P MAZE OPERATION FOR ATRIAL FIBRILLATION: Status: ACTIVE | Noted: 2022-11-10

## 2022-11-10 PROBLEM — Z98.890 S/P TVR (TRICUSPID VALVE REPAIR): Status: ACTIVE | Noted: 2022-11-10

## 2022-11-10 PROBLEM — Z98.890 S/P MAZE OPERATION FOR ATRIAL FIBRILLATION: Status: ACTIVE | Noted: 2022-11-10

## 2022-11-10 PROBLEM — E83.39 HYPOPHOSPHATEMIA: Status: ACTIVE | Noted: 2022-11-10

## 2022-11-10 PROBLEM — D62 ACUTE BLOOD LOSS ANEMIA: Status: ACTIVE | Noted: 2022-11-10

## 2022-11-10 LAB
ALBUMIN SERPL BCP-MCNC: 3.4 G/DL (ref 3.5–5.2)
ALP SERPL-CCNC: 86 U/L (ref 55–135)
ALT SERPL W/O P-5'-P-CCNC: 26 U/L (ref 10–44)
ANION GAP SERPL CALC-SCNC: 8 MMOL/L (ref 8–16)
APTT BLDCRRT: 44.2 SEC (ref 21–32)
APTT BLDCRRT: 46.7 SEC (ref 21–32)
APTT BLDCRRT: 47.3 SEC (ref 21–32)
ASCENDING AORTA: 3.31 CM
AST SERPL-CCNC: 60 U/L (ref 10–40)
AV INDEX (PROSTH): 0.99
AV MEAN GRADIENT: 6 MMHG
AV PEAK GRADIENT: 11 MMHG
AV VALVE AREA: 2.77 CM2
AV VELOCITY RATIO: 0.87
BASOPHILS # BLD AUTO: 0.02 K/UL (ref 0–0.2)
BASOPHILS NFR BLD: 0.1 % (ref 0–1.9)
BILIRUB SERPL-MCNC: 1.5 MG/DL (ref 0.1–1)
BSA FOR ECHO PROCEDURE: 2.13 M2
BUN SERPL-MCNC: 18 MG/DL (ref 6–20)
CALCIUM SERPL-MCNC: 8.8 MG/DL (ref 8.7–10.5)
CHLORIDE SERPL-SCNC: 95 MMOL/L (ref 95–110)
CO2 SERPL-SCNC: 26 MMOL/L (ref 23–29)
CREAT SERPL-MCNC: 0.8 MG/DL (ref 0.5–1.4)
CV ECHO LV RWT: 0.42 CM
DIFFERENTIAL METHOD: ABNORMAL
DOP CALC AO PEAK VEL: 1.67 M/S
DOP CALC AO VTI: 28.42 CM
DOP CALC LVOT AREA: 2.8 CM2
DOP CALC LVOT DIAMETER: 1.89 CM
DOP CALC LVOT PEAK VEL: 1.46 M/S
DOP CALC LVOT STROKE VOLUME: 78.71 CM3
DOP CALCLVOT PEAK VEL VTI: 28.07 CM
ECHO LV POSTERIOR WALL: 1.1 CM (ref 0.6–1.1)
EJECTION FRACTION: 55 %
EOSINOPHIL # BLD AUTO: 0 K/UL (ref 0–0.5)
EOSINOPHIL NFR BLD: 0.2 % (ref 0–8)
ERYTHROCYTE [DISTWIDTH] IN BLOOD BY AUTOMATED COUNT: 14.4 % (ref 11.5–14.5)
EST. GFR  (NO RACE VARIABLE): >60 ML/MIN/1.73 M^2
FRACTIONAL SHORTENING: 31 % (ref 28–44)
GLUCOSE SERPL-MCNC: 113 MG/DL (ref 70–110)
HCT VFR BLD AUTO: 27.4 % (ref 37–48.5)
HGB BLD-MCNC: 9 G/DL (ref 12–16)
HR MV ECHO: 70 BPM
IMM GRANULOCYTES # BLD AUTO: 0.1 K/UL (ref 0–0.04)
IMM GRANULOCYTES NFR BLD AUTO: 0.7 % (ref 0–0.5)
INR PPP: 1.6 (ref 0.8–1.2)
INTERVENTRICULAR SEPTUM: 0.86 CM (ref 0.6–1.1)
IVRT: 57.09 MSEC
LA MAJOR: 6 CM
LA MINOR: 6 CM
LA WIDTH: 4.94 CM
LEFT ATRIUM SIZE: 4.69 CM
LEFT ATRIUM VOLUME INDEX MOD: 64.2 ML/M2
LEFT ATRIUM VOLUME INDEX: 56.5 ML/M2
LEFT ATRIUM VOLUME MOD: 134.16 CM3
LEFT ATRIUM VOLUME: 118.16 CM3
LEFT INTERNAL DIMENSION IN SYSTOLE: 3.64 CM (ref 2.1–4)
LEFT VENTRICLE DIASTOLIC VOLUME INDEX: 64.79 ML/M2
LEFT VENTRICLE DIASTOLIC VOLUME: 135.42 ML
LEFT VENTRICLE MASS INDEX: 93 G/M2
LEFT VENTRICLE SYSTOLIC VOLUME INDEX: 26.7 ML/M2
LEFT VENTRICLE SYSTOLIC VOLUME: 55.77 ML
LEFT VENTRICULAR INTERNAL DIMENSION IN DIASTOLE: 5.3 CM (ref 3.5–6)
LEFT VENTRICULAR MASS: 195.11 G
LYMPHOCYTES # BLD AUTO: 0.9 K/UL (ref 1–4.8)
LYMPHOCYTES NFR BLD: 6.1 % (ref 18–48)
MAGNESIUM SERPL-MCNC: 2.1 MG/DL (ref 1.6–2.6)
MCH RBC QN AUTO: 30.3 PG (ref 27–31)
MCHC RBC AUTO-ENTMCNC: 32.8 G/DL (ref 32–36)
MCV RBC AUTO: 92 FL (ref 82–98)
MONOCYTES # BLD AUTO: 1.6 K/UL (ref 0.3–1)
MONOCYTES NFR BLD: 11 % (ref 4–15)
MV MEAN GRADIENT: 3 MMHG
MV PEAK E VEL: 1.49 M/S
NEUTROPHILS # BLD AUTO: 11.8 K/UL (ref 1.8–7.7)
NEUTROPHILS NFR BLD: 81.9 % (ref 38–73)
NRBC BLD-RTO: 0 /100 WBC
PHOSPHATE SERPL-MCNC: 2.2 MG/DL (ref 2.7–4.5)
PISA TR MAX VEL: 2.07 M/S
PLATELET # BLD AUTO: 126 K/UL (ref 150–450)
PMV BLD AUTO: 12.5 FL (ref 9.2–12.9)
POTASSIUM SERPL-SCNC: 4.3 MMOL/L (ref 3.5–5.1)
PROT SERPL-MCNC: 6.9 G/DL (ref 6–8.4)
PROTHROMBIN TIME: 16.1 SEC (ref 9–12.5)
RA MAJOR: 4.8 CM
RA WIDTH: 4.6 CM
RBC # BLD AUTO: 2.97 M/UL (ref 4–5.4)
RIGHT VENTRICULAR END-DIASTOLIC DIMENSION: 4.13 CM
RV TISSUE DOPPLER FREE WALL SYSTOLIC VELOCITY 1 (APICAL 4 CHAMBER VIEW): 5.63 CM/S
SINUS: 3.22 CM
SODIUM SERPL-SCNC: 129 MMOL/L (ref 136–145)
STJ: 2.9 CM
TR MAX PG: 17 MMHG
TRICUSPID ANNULAR PLANE SYSTOLIC EXCURSION: 1.16 CM
WBC # BLD AUTO: 14.42 K/UL (ref 3.9–12.7)

## 2022-11-10 PROCEDURE — 25000003 PHARM REV CODE 250: Performed by: STUDENT IN AN ORGANIZED HEALTH CARE EDUCATION/TRAINING PROGRAM

## 2022-11-10 PROCEDURE — 83735 ASSAY OF MAGNESIUM: CPT | Performed by: STUDENT IN AN ORGANIZED HEALTH CARE EDUCATION/TRAINING PROGRAM

## 2022-11-10 PROCEDURE — 84100 ASSAY OF PHOSPHORUS: CPT | Performed by: STUDENT IN AN ORGANIZED HEALTH CARE EDUCATION/TRAINING PROGRAM

## 2022-11-10 PROCEDURE — 63600175 PHARM REV CODE 636 W HCPCS: Performed by: STUDENT IN AN ORGANIZED HEALTH CARE EDUCATION/TRAINING PROGRAM

## 2022-11-10 PROCEDURE — 80053 COMPREHEN METABOLIC PANEL: CPT | Performed by: STUDENT IN AN ORGANIZED HEALTH CARE EDUCATION/TRAINING PROGRAM

## 2022-11-10 PROCEDURE — 63600175 PHARM REV CODE 636 W HCPCS: Performed by: PHYSICIAN ASSISTANT

## 2022-11-10 PROCEDURE — 25000003 PHARM REV CODE 250: Performed by: PHYSICIAN ASSISTANT

## 2022-11-10 PROCEDURE — 97116 GAIT TRAINING THERAPY: CPT

## 2022-11-10 PROCEDURE — 20600001 HC STEP DOWN PRIVATE ROOM

## 2022-11-10 PROCEDURE — 85730 THROMBOPLASTIN TIME PARTIAL: CPT | Mod: 91 | Performed by: STUDENT IN AN ORGANIZED HEALTH CARE EDUCATION/TRAINING PROGRAM

## 2022-11-10 PROCEDURE — 85610 PROTHROMBIN TIME: CPT | Performed by: STUDENT IN AN ORGANIZED HEALTH CARE EDUCATION/TRAINING PROGRAM

## 2022-11-10 PROCEDURE — 36415 COLL VENOUS BLD VENIPUNCTURE: CPT | Performed by: STUDENT IN AN ORGANIZED HEALTH CARE EDUCATION/TRAINING PROGRAM

## 2022-11-10 PROCEDURE — 85025 COMPLETE CBC W/AUTO DIFF WBC: CPT | Performed by: STUDENT IN AN ORGANIZED HEALTH CARE EDUCATION/TRAINING PROGRAM

## 2022-11-10 RX ORDER — POLYETHYLENE GLYCOL 3350 17 G/17G
17 POWDER, FOR SOLUTION ORAL 2 TIMES DAILY
Status: DISCONTINUED | OUTPATIENT
Start: 2022-11-10 | End: 2022-11-11

## 2022-11-10 RX ORDER — SODIUM CHLORIDE 1 G/1
2000 TABLET ORAL ONCE
Status: COMPLETED | OUTPATIENT
Start: 2022-11-10 | End: 2022-11-10

## 2022-11-10 RX ORDER — DOCUSATE SODIUM 100 MG/1
200 CAPSULE, LIQUID FILLED ORAL 2 TIMES DAILY
Status: DISCONTINUED | OUTPATIENT
Start: 2022-11-10 | End: 2022-11-11

## 2022-11-10 RX ADMIN — OXYCODONE HYDROCHLORIDE 10 MG: 10 TABLET ORAL at 05:11

## 2022-11-10 RX ADMIN — DRONEDARONE 400 MG: 400 TABLET, FILM COATED ORAL at 05:11

## 2022-11-10 RX ADMIN — FUROSEMIDE 40 MG: 10 INJECTION, SOLUTION INTRAMUSCULAR; INTRAVENOUS at 05:11

## 2022-11-10 RX ADMIN — DOCUSATE SODIUM 200 MG: 100 CAPSULE ORAL at 08:11

## 2022-11-10 RX ADMIN — SODIUM CHLORIDE 2000 MG: 1 TABLET ORAL at 10:11

## 2022-11-10 RX ADMIN — ATORVASTATIN CALCIUM 10 MG: 10 TABLET, FILM COATED ORAL at 08:11

## 2022-11-10 RX ADMIN — FAMOTIDINE 20 MG: 20 TABLET ORAL at 08:11

## 2022-11-10 RX ADMIN — WARFARIN SODIUM 5 MG: 5 TABLET ORAL at 05:11

## 2022-11-10 RX ADMIN — OXYCODONE HYDROCHLORIDE 10 MG: 10 TABLET ORAL at 08:11

## 2022-11-10 RX ADMIN — METOPROLOL TARTRATE 12.5 MG: 25 TABLET, FILM COATED ORAL at 09:11

## 2022-11-10 RX ADMIN — MUPIROCIN 1 G: 20 OINTMENT TOPICAL at 08:11

## 2022-11-10 RX ADMIN — POLYETHYLENE GLYCOL 3350 17 G: 17 POWDER, FOR SOLUTION ORAL at 09:11

## 2022-11-10 RX ADMIN — POTASSIUM CHLORIDE EXTENDED-RELEASE 20 MEQ: 1500 TABLET ORAL at 08:11

## 2022-11-10 RX ADMIN — FAMOTIDINE 20 MG: 20 TABLET ORAL at 09:11

## 2022-11-10 RX ADMIN — DRONEDARONE 400 MG: 400 TABLET, FILM COATED ORAL at 10:11

## 2022-11-10 RX ADMIN — MUPIROCIN 1 G: 20 OINTMENT TOPICAL at 09:11

## 2022-11-10 RX ADMIN — DOCUSATE SODIUM 200 MG: 100 CAPSULE ORAL at 09:11

## 2022-11-10 RX ADMIN — POLYETHYLENE GLYCOL 3350 17 G: 17 POWDER, FOR SOLUTION ORAL at 08:11

## 2022-11-10 RX ADMIN — HEPARIN SODIUM 800 UNITS/HR: 10000 INJECTION, SOLUTION INTRAVENOUS at 09:11

## 2022-11-10 RX ADMIN — POTASSIUM CHLORIDE EXTENDED-RELEASE 20 MEQ: 1500 TABLET ORAL at 09:11

## 2022-11-10 RX ADMIN — FUROSEMIDE 40 MG: 10 INJECTION, SOLUTION INTRAMUSCULAR; INTRAVENOUS at 09:11

## 2022-11-10 RX ADMIN — DIBASIC SODIUM PHOSPHATE, MONOBASIC POTASSIUM PHOSPHATE AND MONOBASIC SODIUM PHOSPHATE 2 TABLET: 852; 155; 130 TABLET ORAL at 09:11

## 2022-11-10 RX ADMIN — METOPROLOL TARTRATE 12.5 MG: 25 TABLET, FILM COATED ORAL at 08:11

## 2022-11-10 RX ADMIN — ACETAMINOPHEN 1000 MG: 500 TABLET ORAL at 06:11

## 2022-11-10 RX ADMIN — GABAPENTIN 300 MG: 300 CAPSULE ORAL at 08:11

## 2022-11-10 RX ADMIN — ASPIRIN 325 MG ORAL TABLET 325 MG: 325 PILL ORAL at 09:11

## 2022-11-10 RX ADMIN — CITALOPRAM HYDROBROMIDE 10 MG: 10 TABLET ORAL at 09:11

## 2022-11-10 RX ADMIN — ACETAMINOPHEN 1000 MG: 500 TABLET ORAL at 11:11

## 2022-11-10 NOTE — PROGRESS NOTES
[facility] - Cardiology Stepdown  Cardiothoracic Surgery  Progress Note    Patient Name: Riya Soto  MRN: [id number]  Admission Date: 11/7/2022  Hospital Length of Stay: 3 days  Code Status: Full Code   Attending Physician: Bobby Gilbert MD   Referring Provider: Bobby Gilbert MD  Principal Problem:S/P MVR (mitral valve replacement)            Subjective:     Post-Op Info:  Procedure(s) (LRB):  REPLACEMENT, MITRAL VALVE (N/A)  REPAIR, TRICUSPID VALVE (N/A)  MAZE (N/A)  STERNOTOMY (N/A)   3 Days Post-Op     Interval History: NAEON. Pacer wires removed. Will give patient new incentive spirometer. Chest tube sites cleansed and re-dressed. Patient states after her first open heart surgery she did not have a BM for 3 months. Will increase bowel regimen and plan to give suppository tomorrow if needed. Continue heparin gtt with PTT goal 60-80 until INR therapeutic (2.5-3.5). Wean oxygen to off.     Review of Systems   Constitutional: Negative for malaise/fatigue.   Cardiovascular:  Negative for chest pain and leg swelling.   Respiratory:  Negative for shortness of breath.    Neurological:  Negative for weakness.   Medications:  Continuous Infusions:   heparin (porcine) in 5 % dex 600 Units/hr (11/09/22 1100)     Scheduled Meds:   acetaminophen  1,000 mg Oral Q8H    aspirin  325 mg Oral Daily    atorvastatin  10 mg Oral QHS    citalopram  10 mg Oral Daily    docusate sodium  200 mg Oral BID    dronedarone  400 mg Oral BID WM    famotidine  20 mg Oral BID    furosemide (LASIX) injection  40 mg Intravenous BID    gabapentin  300 mg Oral QHS    k phos di & mono-sod phos mono  2 tablet Oral Once    metoprolol tartrate  12.5 mg Oral BID    mupirocin  1 g Nasal BID    polyethylene glycol  17 g Oral BID    potassium chloride  20 mEq Oral Q12H    sodium chloride  2,000 mg Oral Once    warfarin  5 mg Oral Daily     PRN Meds:albumin human 5%, bisacodyL, dextrose 10%, dextrose 10%, metoclopramide HCl, ondansetron,  oxyCODONE, oxyCODONE, sodium chloride 0.9%     Objective:     Vital Signs (Most Recent):  Temp: 98.5 °F (36.9 °C) (11/10/22 0745)  Pulse: 72 (11/10/22 0830)  Resp: 16 (11/10/22 0745)  BP: (!) 112/53 (11/10/22 0745)  SpO2: 98 % (11/10/22 0745)   Vital Signs (24h Range):  Temp:  [97 °F (36.1 °C)-98.9 °F (37.2 °C)] 98.5 °F (36.9 °C)  Pulse:  [53-99] 72  Resp:  [11-22] 16  SpO2:  [97 %-100 %] 98 %  BP: (112-153)/(53-70) 112/53  Arterial Line BP: (101-115)/(45-50) 109/47     Weight: 93 kg (205 lb 0.4 oz)  Body mass index is 30.28 kg/m².    SpO2: 98 %  O2 Device (Oxygen Therapy): nasal cannula    Intake/Output - Last 3 Shifts         11/08 0700  11/09 0659 11/09 0700  11/10 0659 11/10 0700  11/11 0659    P.O.  400     I.V. (mL/kg) 754.6 (8.2) 439.4 (4.8)     Blood       IV Piggyback 312.5 205.5     Total Intake(mL/kg) 1067.1 (11.6) 1044.8 (11.4)     Urine (mL/kg/hr) 1700 (0.8) 1915 (0.9)     Chest Tube 50 50     Total Output 1750 1965     Net -683 920.2                    Lines/Drains/Airways       Line  Duration                  Pacer Wires 11/07/22 1307 2 days              Peripheral Intravenous Line  Duration                  Peripheral IV - Single Lumen 11/07/22 0558 18 G Left;Posterior Wrist 3 days         Peripheral IV - Single Lumen 11/08/22 1824 20 G Right Antecubital 1 day                    Physical Exam  Vitals reviewed.   Constitutional:       General: She is not in acute distress.     Appearance: She is well-developed. She is not diaphoretic.   HENT:      Head: Normocephalic and atraumatic.   Eyes:      Pupils: Pupils are equal, round, and reactive to light.   Neck:      Vascular: No JVD.   Cardiovascular:      Rate and Rhythm: Normal rate and regular rhythm.      Comments: Midline sternal incision c/d/i  Pulmonary:      Effort: Pulmonary effort is normal. No respiratory distress.      Comments: NC  Abdominal:      General: There is no distension.   Musculoskeletal:         General: No swelling. Normal range  of motion.      Cervical back: Normal range of motion.   Skin:     General: Skin is warm and dry.      Coloration: Skin is not pale.   Neurological:      Mental Status: She is alert and oriented to person, place, and time.   Psychiatric:         Speech: Speech normal.         Behavior: Behavior normal.         Thought Content: Thought content normal.         Judgment: Judgment normal.     Significant Labs:  BMP:   Recent Labs   Lab 11/10/22  0547   *   *   K 4.3   CL 95   CO2 26   BUN 18   CREATININE 0.8   CALCIUM 8.8   MG 2.1     CBC:   Recent Labs   Lab 11/10/22  0547   WBC 14.42*   RBC 2.97*   HGB 9.0*   HCT 27.4*   *   MCV 92   MCH 30.3   MCHC 32.8     CMP:   Recent Labs   Lab 11/10/22  0547   *   CALCIUM 8.8   ALBUMIN 3.4*   PROT 6.9   *   K 4.3   CO2 26   CL 95   BUN 18   CREATININE 0.8   ALKPHOS 86   ALT 26   AST 60*   BILITOT 1.5*     Coagulation:   Recent Labs   Lab 11/10/22  0547   INR 1.6*   APTT 46.7*       Significant Diagnostics:  I have reviewed all pertinent imaging results/findings within the past 24 hours.    Assessment/Plan:     * S/P MVR (mitral valve replacement)  - ASA  - Statin   - Tylenol, Gabapentin   - Bowel regimen increased   - PRN narcotics   - Home celexa   - Pepcid   - heparin gtt with PTT goal 60-80  - INR goal 2.5-3.5 (Has received coumadin 5,5)   - BB  - Pacer wires and chest tubes removed   - Lasix and K   - Wean oxygen to off. O2 goal >88%  - PT/OT      Acute blood loss anemia  - Expected post operatively   - CBC daily     Hypophosphatemia  - Replaced PO   - Daily labs     S/P Maze operation for atrial fibrillation  - See s/p MVR     S/P TVR (tricuspid valve repair)  - See s/p MVR    Stress hyperglycemia  - Endocrine following     Atrial fibrillation  S/P MAZE   NSR     Impaired functional mobility and activity tolerance  - PT/OT    Hyperlipidemia  - Statin     Overweight (BMI 25.0-29.9)  - PT/OT   - Cardiac diet       NYHA 2    Dispo: CSU. D/C to  home when INR therapeutic     Celsa Knight PA-C  Cardiothoracic Surgery  [facility] - [facility] Step[facility]

## 2022-11-10 NOTE — SUBJECTIVE & OBJECTIVE
Interval History: NAEON. Pacer wires removed. Will give patient new incentive spirometer. Chest tube sites cleansed and re-dressed. Patient states after her first open heart surgery she did not have a BM for 3 months. Will increase bowel regimen and plan to give suppository tomorrow if needed. Continue heparin gtt with PTT goal 60-80 until INR therapeutic (2.5-3.5). Wean oxygen to off.     Review of Systems   Constitutional: Negative for malaise/fatigue.   Cardiovascular:  Negative for chest pain and leg swelling.   Respiratory:  Negative for shortness of breath.    Neurological:  Negative for weakness.   Medications:  Continuous Infusions:   heparin (porcine) in 5 % dex 600 Units/hr (11/09/22 1100)     Scheduled Meds:   acetaminophen  1,000 mg Oral Q8H    aspirin  325 mg Oral Daily    atorvastatin  10 mg Oral QHS    citalopram  10 mg Oral Daily    docusate sodium  200 mg Oral BID    dronedarone  400 mg Oral BID WM    famotidine  20 mg Oral BID    furosemide (LASIX) injection  40 mg Intravenous BID    gabapentin  300 mg Oral QHS    k phos di & mono-sod phos mono  2 tablet Oral Once    metoprolol tartrate  12.5 mg Oral BID    mupirocin  1 g Nasal BID    polyethylene glycol  17 g Oral BID    potassium chloride  20 mEq Oral Q12H    sodium chloride  2,000 mg Oral Once    warfarin  5 mg Oral Daily     PRN Meds:albumin human 5%, bisacodyL, dextrose 10%, dextrose 10%, metoclopramide HCl, ondansetron, oxyCODONE, oxyCODONE, sodium chloride 0.9%     Objective:     Vital Signs (Most Recent):  Temp: 98.5 °F (36.9 °C) (11/10/22 0745)  Pulse: 72 (11/10/22 0830)  Resp: 16 (11/10/22 0745)  BP: (!) 112/53 (11/10/22 0745)  SpO2: 98 % (11/10/22 0745)   Vital Signs (24h Range):  Temp:  [97 °F (36.1 °C)-98.9 °F (37.2 °C)] 98.5 °F (36.9 °C)  Pulse:  [53-99] 72  Resp:  [11-22] 16  SpO2:  [97 %-100 %] 98 %  BP: (112-153)/(53-70) 112/53  Arterial Line BP: (101-115)/(45-50) 109/47     Weight: 93 kg (205 lb 0.4 oz)  Body mass index is 30.28  kg/m².    SpO2: 98 %  O2 Device (Oxygen Therapy): nasal cannula    Intake/Output - Last 3 Shifts         11/08 0700  11/09 0659 11/09 0700  11/10 0659 11/10 0700  11/11 0659    P.O.  400     I.V. (mL/kg) 754.6 (8.2) 439.4 (4.8)     Blood       IV Piggyback 312.5 205.5     Total Intake(mL/kg) 1067.1 (11.6) 1044.8 (11.4)     Urine (mL/kg/hr) 1700 (0.8) 1915 (0.9)     Chest Tube 50 50     Total Output 1750 1965     Net -803 920.2                    Lines/Drains/Airways       Line  Duration                  Pacer Wires 11/07/22 1307 2 days              Peripheral Intravenous Line  Duration                  Peripheral IV - Single Lumen 11/07/22 0558 18 G Left;Posterior Wrist 3 days         Peripheral IV - Single Lumen 11/08/22 1824 20 G Right Antecubital 1 day                    Physical Exam  Vitals reviewed.   Constitutional:       General: She is not in acute distress.     Appearance: She is well-developed. She is not diaphoretic.   HENT:      Head: Normocephalic and atraumatic.   Eyes:      Pupils: Pupils are equal, round, and reactive to light.   Neck:      Vascular: No JVD.   Cardiovascular:      Rate and Rhythm: Normal rate and regular rhythm.      Comments: Midline sternal incision c/d/i  Pulmonary:      Effort: Pulmonary effort is normal. No respiratory distress.      Comments: NC  Abdominal:      General: There is no distension.   Musculoskeletal:         General: No swelling. Normal range of motion.      Cervical back: Normal range of motion.   Skin:     General: Skin is warm and dry.      Coloration: Skin is not pale.   Neurological:      Mental Status: She is alert and oriented to person, place, and time.   Psychiatric:         Speech: Speech normal.         Behavior: Behavior normal.         Thought Content: Thought content normal.         Judgment: Judgment normal.     Significant Labs:  BMP:   Recent Labs   Lab 11/10/22  0547   *   *   K 4.3   CL 95   CO2 26   BUN 18   CREATININE 0.8    CALCIUM 8.8   MG 2.1     CBC:   Recent Labs   Lab 11/10/22  0547   WBC 14.42*   RBC 2.97*   HGB 9.0*   HCT 27.4*   *   MCV 92   MCH 30.3   MCHC 32.8     CMP:   Recent Labs   Lab 11/10/22  0547   *   CALCIUM 8.8   ALBUMIN 3.4*   PROT 6.9   *   K 4.3   CO2 26   CL 95   BUN 18   CREATININE 0.8   ALKPHOS 86   ALT 26   AST 60*   BILITOT 1.5*     Coagulation:   Recent Labs   Lab 11/10/22  0547   INR 1.6*   APTT 46.7*       Significant Diagnostics:  I have reviewed all pertinent imaging results/findings within the past 24 hours.

## 2022-11-10 NOTE — PLAN OF CARE
Problem: Adult Inpatient Plan of Care  Goal: Plan of Care Review  Outcome: Ongoing, Progressing  Goal: Patient-Specific Goal (Individualized)  Outcome: Ongoing, Progressing  Goal: Absence of Hospital-Acquired Illness or Injury  Outcome: Ongoing, Progressing  Goal: Optimal Comfort and Wellbeing  Outcome: Ongoing, Progressing  Goal: Readiness for Transition of Care  Outcome: Ongoing, Progressing     Problem: Activity Intolerance (Cardiovascular Surgery)  Goal: Improved Activity Tolerance  Outcome: Ongoing, Progressing     Problem: Adjustment to Surgery (Cardiovascular Surgery)  Goal: Optimal Coping with Heart Surgery  Outcome: Ongoing, Progressing     Problem: Bleeding (Cardiovascular Surgery)  Goal: Bleeding (Cardiovascular Surgery)  Outcome: Ongoing, Progressing     Problem: Bowel Motility Impaired (Cardiovascular Surgery)  Goal: Effective Bowel Elimination (Cardiovascular Surgery)  Outcome: Ongoing, Progressing     Problem: Cardiac Function Impaired (Cardiovascular Surgery)  Goal: Effective Cardiac Function  Outcome: Ongoing, Progressing     Problem: Cerebral Tissue Perfusion (Cardiovascular Surgery)  Goal: Optimal Cerebral Tissue Perfusion (Cardiovascular Surgery)  Outcome: Ongoing, Progressing     Problem: Fluid and Electrolyte Imbalance (Cardiovascular Surgery)  Goal: Fluid and Electrolyte Balance (Cardiovascular Surgery)  Outcome: Ongoing, Progressing     Problem: Glycemic Control Impaired (Cardiovascular Surgery)  Goal: Blood Glucose Level Within Targeted Range (Cardiovascular Surgery)  Outcome: Ongoing, Progressing     Problem: Infection (Cardiovascular Surgery)  Goal: Absence of Infection Signs and Symptoms  Outcome: Ongoing, Progressing     Problem: Ongoing Anesthesia Effects (Cardiovascular Surgery)  Goal: Anesthesia/Sedation Recovery  Outcome: Ongoing, Progressing     Problem: Pain (Cardiovascular Surgery)  Goal: Acceptable Pain Control  Outcome: Ongoing, Progressing     Problem: Postoperative Nausea  and Vomiting (Cardiovascular Surgery)  Goal: Nausea and Vomiting Relief (Cardiovascular Surgery)  Outcome: Ongoing, Progressing     Problem: Postoperative Urinary Retention (Cardiovascular Surgery)  Goal: Effective Urinary Elimination (Cardiovascular Surgery)  Outcome: Ongoing, Progressing     Problem: Respiratory Compromise (Cardiovascular Surgery)  Goal: Effective Oxygenation and Ventilation (Cardiovascular Surgery)  Outcome: Ongoing, Progressing     Problem: Infection  Goal: Absence of Infection Signs and Symptoms  Outcome: Ongoing, Progressing     Problem: Communication Impairment (Mechanical Ventilation, Invasive)  Goal: Effective Communication  Outcome: Ongoing, Progressing     Problem: Device-Related Complication Risk (Mechanical Ventilation, Invasive)  Goal: Optimal Device Function  Outcome: Ongoing, Progressing     Problem: Inability to Wean (Mechanical Ventilation, Invasive)  Goal: Mechanical Ventilation Liberation  Outcome: Ongoing, Progressing     Problem: Nutrition Impairment (Mechanical Ventilation, Invasive)  Goal: Optimal Nutrition Delivery  Outcome: Ongoing, Progressing     Problem: Skin and Tissue Injury (Mechanical Ventilation, Invasive)  Goal: Absence of Device-Related Skin and Tissue Injury  Outcome: Ongoing, Progressing     Problem: Ventilator-Induced Lung Injury (Mechanical Ventilation, Invasive)  Goal: Absence of Ventilator-Induced Lung Injury  Outcome: Ongoing, Progressing     Problem: Communication Impairment (Artificial Airway)  Goal: Effective Communication  Outcome: Ongoing, Progressing     Problem: Device-Related Complication Risk (Artificial Airway)  Goal: Optimal Device Function  Outcome: Ongoing, Progressing     Problem: Skin and Tissue Injury (Artificial Airway)  Goal: Absence of Device-Related Skin or Tissue Injury  Outcome: Ongoing, Progressing     Problem: Fall Injury Risk  Goal: Absence of Fall and Fall-Related Injury  Outcome: Ongoing, Progressing     Problem: Skin Injury Risk  Increased  Goal: Skin Health and Integrity  Outcome: Ongoing, Progressing     Problem: Restraint, Nonbehavioral (Nonviolent)  Goal: Absence of Harm or Injury  Outcome: Ongoing, Progressing

## 2022-11-10 NOTE — ASSESSMENT & PLAN NOTE
- ASA  - Statin   - Tylenol, Gabapentin   - Bowel regimen increased   - PRN narcotics   - Home celexa   - Pepcid   - heparin gtt with PTT goal 60-80  - INR goal 2.5-3.5 (Has received coumadin 5,5)   - BB  - Pacer wires and chest tubes removed   - Lasix and K   - Wean oxygen to off. O2 goal >88%  - PT/OT

## 2022-11-10 NOTE — CONSULTS
Food & Nutrition  Education    Diet Education: Cardiac Diet Education   Time Spent: 6 minutes   Learners: Pt       Nutrition Education provided with handouts:   Handouts discussed limiting foods high in saturated fats such as fatty meat, poultry, skin, hays, sausage, whole milk, cream and butter. Handouts additionally discussed incorporating more fruits, vegetables, whole grains, and dried beans in pt's diet. Foods recommended/not recommended discussed, sample menus provided.     Comments: Spoke w/ pt at bedside, reports a good appetite currently. Reports consuming 2-3 meals PTA. Pt denies any issues w/ chewing/swallowing at this time. Pt reports UBW of 190-192 lbs. CBW: 205 lbs. RD reiterated following a heart healthy diet at home- pt v/u understanding, handouts provided. NFPE completed 11/10/22, pt w/ no s/s of malnutrition at this time. LBM noted- 11/6/22      All questions and concerns answered. Dietitian's contact information provided.       Follow-Up: Yes     Please Re-consult as needed        Thanks!

## 2022-11-10 NOTE — PT/OT/SLP PROGRESS
Physical Therapy Treatment    Patient Name:  Riya Soto   MRN:  [id number]  Admit Date: 2022  Admitting Diagnosis:  S/P MVR (mitral valve replacement)   Length of Stay: 3 days  Recent Surgery: Procedure(s) (LRB):  REPLACEMENT, MITRAL VALVE (N/A)  REPAIR, TRICUSPID VALVE (N/A)  MAZE (N/A)  STERNOTOMY (N/A) 3 Days Post-Op    Recommendations:     Discharge Recommendations:  other (see comments) (HHPT)   Discharge Equipment Recommendations: none   Barriers to discharge: None    Plan:     During this hospitalization, patient to be seen 5 x/week to address the listed problems via gait training, therapeutic activities, therapeutic exercises, neuromuscular re-education  Plan of Care Expires:  22  Plan of Care Reviewed with: patient    Assessment:     Riya Soto is a 52 y.o. female admitted with a medical diagnosis of S/P MVR (mitral valve replacement). Pt progressing towards goals, but not at PLOF. Pt tolerated session well.  Pt is improving with therapy evidenced by increased gait distance with decreased need for rest breaks. Pt would benefit from continued PT services to improve overall functional mobility. Recommend d/c to  HHPT  to maximize functional independence.        Problem List: weakness, impaired endurance, impaired functional mobility, gait instability, impaired balance, decreased upper extremity function, impaired cardiopulmonary response to activity.  Rehab Prognosis: Good     GOALS:   Multidisciplinary Problems       Physical Therapy Goals          Problem: Physical Therapy    Goal Priority Disciplines Outcome Goal Variances Interventions   Physical Therapy Goal     PT, PT/OT Ongoing, Progressing     Description: Goals to be met by: 2022     Patient will increase functional independence with mobility by performin. Supine to sit with Supervision.  2. Sit to supine with Supervision.  3. Sit to stand transfer with Stand-by Assistance  4. Gait  x 200 feet with Stand-by  "Assistance using No Assistive Device.   5. Ascend/descend 8 stairs with no Handrails Minimum Assistance using No Assistive Device.                          Subjective   Communicated with RN prior to session.  Patient found up in chair upon PT entry to room, agreeable to evaluation. Riya Soto's RN present during session.    Chief Complaint: none reported   Patient/Family Comments/goals: to get better  Pain/Comfort:  Pain Rating 1: 0/10  Pain Rating Post-Intervention 1: 0/10    Objective:   Patient found with: telemetry, oxygen, peripheral IV   General Precautions: Standard, Cardiac fall, sternal   Orthopedic Precautions:N/A   Braces: N/A   Oxygen Device: Nasal Cannula   Vitals: /60 (BP Location: Left arm, Patient Position: Sitting)   Pulse 71   Temp 97.7 °F (36.5 °C) (Oral)   Resp 16   Ht 5' 9" (1.753 m)   Wt 93 kg (205 lb 0.4 oz)   LMP 11/03/2016   SpO2 (!) 90%   Breastfeeding No   BMI 30.28 kg/m²     Outcome Measures:  AM-PAC 6 CLICK MOBILITY  Turning over in bed (including adjusting bedclothes, sheets and blankets)?: 3  Sitting down on and standing up from a chair with arms (e.g., wheelchair, bedside commode, etc.): 3  Moving from lying on back to sitting on the side of the bed?: 3  Moving to and from a bed to a chair (including a wheelchair)?: 3  Need to walk in hospital room?: 3  Climbing 3-5 steps with a railing?: 2  Basic Mobility Total Score: 17    Functional Mobility:  Additional staff present: N/A  Bed Mobility:   Not performed 2nd to pt found in chair     Transfers:   Sit <> Stand Transfer: contact guard assistance with no assistive device from chair       Gait:  Patient ambulated: 340ft    Patient required: standy by assistance  Patient used: no assistive device  Gait Pattern observed: reciprocal gait  Gait Deviation(s): wide base of support and decreased deirdre  Impairments due to: impaired balance and decreased endurance  Comments:   Mask donned  Pt demo'd steady gait with a wide " AMAN and mild SOB  Verbal cuing for pacing      Therapeutic Activities, Exercises, and Education:   Educated pt on PT role/POC  Educated pt on importance of OOB activity and daily ambulation   Educated pt on sternal precautions  Pt verbalized understanding     Patient left up in chair with all lines intact, call button in reach, and RN notified..    Time Tracking:     PT Received On: 11/10/22  PT Start Time: 0955     PT Stop Time: 1013  PT Total Time (min): 18 min       Billable Minutes:   Gait Training 18    Treatment Type: Treatment  PT/PTA: PT

## 2022-11-11 ENCOUNTER — DOCUMENTATION ONLY (OUTPATIENT)
Dept: CARDIOTHORACIC SURGERY | Facility: CLINIC | Age: 52
End: 2022-11-11
Payer: COMMERCIAL

## 2022-11-11 LAB
ANION GAP SERPL CALC-SCNC: 11 MMOL/L (ref 8–16)
APTT BLDCRRT: 49.1 SEC (ref 21–32)
APTT BLDCRRT: 52.8 SEC (ref 21–32)
BASOPHILS # BLD AUTO: 0.02 K/UL (ref 0–0.2)
BASOPHILS NFR BLD: 0.2 % (ref 0–1.9)
BUN SERPL-MCNC: 19 MG/DL (ref 6–20)
CALCIUM SERPL-MCNC: 8.8 MG/DL (ref 8.7–10.5)
CHLORIDE SERPL-SCNC: 96 MMOL/L (ref 95–110)
CO2 SERPL-SCNC: 26 MMOL/L (ref 23–29)
CREAT SERPL-MCNC: 0.7 MG/DL (ref 0.5–1.4)
DIFFERENTIAL METHOD: ABNORMAL
EOSINOPHIL # BLD AUTO: 0.1 K/UL (ref 0–0.5)
EOSINOPHIL NFR BLD: 1.1 % (ref 0–8)
ERYTHROCYTE [DISTWIDTH] IN BLOOD BY AUTOMATED COUNT: 14.1 % (ref 11.5–14.5)
EST. GFR  (NO RACE VARIABLE): >60 ML/MIN/1.73 M^2
GLUCOSE SERPL-MCNC: 97 MG/DL (ref 70–110)
HCT VFR BLD AUTO: 30.1 % (ref 37–48.5)
HGB BLD-MCNC: 9.6 G/DL (ref 12–16)
IMM GRANULOCYTES # BLD AUTO: 0.04 K/UL (ref 0–0.04)
IMM GRANULOCYTES NFR BLD AUTO: 0.4 % (ref 0–0.5)
INR PPP: 2.5 (ref 0.8–1.2)
LYMPHOCYTES # BLD AUTO: 0.9 K/UL (ref 1–4.8)
LYMPHOCYTES NFR BLD: 8.5 % (ref 18–48)
MAGNESIUM SERPL-MCNC: 2.1 MG/DL (ref 1.6–2.6)
MCH RBC QN AUTO: 30.2 PG (ref 27–31)
MCHC RBC AUTO-ENTMCNC: 31.9 G/DL (ref 32–36)
MCV RBC AUTO: 95 FL (ref 82–98)
MONOCYTES # BLD AUTO: 1.2 K/UL (ref 0.3–1)
MONOCYTES NFR BLD: 11.2 % (ref 4–15)
NEUTROPHILS # BLD AUTO: 8.1 K/UL (ref 1.8–7.7)
NEUTROPHILS NFR BLD: 78.6 % (ref 38–73)
NRBC BLD-RTO: 0 /100 WBC
PHOSPHATE SERPL-MCNC: 2.4 MG/DL (ref 2.7–4.5)
PLATELET # BLD AUTO: 194 K/UL (ref 150–450)
PMV BLD AUTO: 11.8 FL (ref 9.2–12.9)
POTASSIUM SERPL-SCNC: 3.9 MMOL/L (ref 3.5–5.1)
PROTHROMBIN TIME: 24.3 SEC (ref 9–12.5)
RBC # BLD AUTO: 3.18 M/UL (ref 4–5.4)
SODIUM SERPL-SCNC: 133 MMOL/L (ref 136–145)
WBC # BLD AUTO: 10.27 K/UL (ref 3.9–12.7)

## 2022-11-11 PROCEDURE — 85730 THROMBOPLASTIN TIME PARTIAL: CPT | Performed by: STUDENT IN AN ORGANIZED HEALTH CARE EDUCATION/TRAINING PROGRAM

## 2022-11-11 PROCEDURE — 94799 UNLISTED PULMONARY SVC/PX: CPT

## 2022-11-11 PROCEDURE — 93010 EKG 12-LEAD: ICD-10-PCS | Mod: ,,, | Performed by: INTERNAL MEDICINE

## 2022-11-11 PROCEDURE — 20600001 HC STEP DOWN PRIVATE ROOM

## 2022-11-11 PROCEDURE — 85025 COMPLETE CBC W/AUTO DIFF WBC: CPT | Performed by: PHYSICIAN ASSISTANT

## 2022-11-11 PROCEDURE — 25000003 PHARM REV CODE 250: Performed by: STUDENT IN AN ORGANIZED HEALTH CARE EDUCATION/TRAINING PROGRAM

## 2022-11-11 PROCEDURE — 85730 THROMBOPLASTIN TIME PARTIAL: CPT | Mod: 91 | Performed by: STUDENT IN AN ORGANIZED HEALTH CARE EDUCATION/TRAINING PROGRAM

## 2022-11-11 PROCEDURE — 36415 COLL VENOUS BLD VENIPUNCTURE: CPT | Performed by: STUDENT IN AN ORGANIZED HEALTH CARE EDUCATION/TRAINING PROGRAM

## 2022-11-11 PROCEDURE — 80048 BASIC METABOLIC PNL TOTAL CA: CPT | Performed by: PHYSICIAN ASSISTANT

## 2022-11-11 PROCEDURE — 63600175 PHARM REV CODE 636 W HCPCS: Performed by: PHYSICIAN ASSISTANT

## 2022-11-11 PROCEDURE — 36415 COLL VENOUS BLD VENIPUNCTURE: CPT | Performed by: PHYSICIAN ASSISTANT

## 2022-11-11 PROCEDURE — 25000003 PHARM REV CODE 250: Performed by: PHYSICIAN ASSISTANT

## 2022-11-11 PROCEDURE — 63600175 PHARM REV CODE 636 W HCPCS: Performed by: STUDENT IN AN ORGANIZED HEALTH CARE EDUCATION/TRAINING PROGRAM

## 2022-11-11 PROCEDURE — 94761 N-INVAS EAR/PLS OXIMETRY MLT: CPT

## 2022-11-11 PROCEDURE — 84100 ASSAY OF PHOSPHORUS: CPT | Performed by: PHYSICIAN ASSISTANT

## 2022-11-11 PROCEDURE — 83735 ASSAY OF MAGNESIUM: CPT | Performed by: PHYSICIAN ASSISTANT

## 2022-11-11 PROCEDURE — 93005 ELECTROCARDIOGRAM TRACING: CPT

## 2022-11-11 PROCEDURE — 85610 PROTHROMBIN TIME: CPT | Performed by: STUDENT IN AN ORGANIZED HEALTH CARE EDUCATION/TRAINING PROGRAM

## 2022-11-11 PROCEDURE — 97530 THERAPEUTIC ACTIVITIES: CPT

## 2022-11-11 PROCEDURE — 97110 THERAPEUTIC EXERCISES: CPT | Mod: CQ

## 2022-11-11 PROCEDURE — 93010 ELECTROCARDIOGRAM REPORT: CPT | Mod: ,,, | Performed by: INTERNAL MEDICINE

## 2022-11-11 RX ORDER — DOCUSATE SODIUM 100 MG/1
200 CAPSULE, LIQUID FILLED ORAL DAILY
Status: DISCONTINUED | OUTPATIENT
Start: 2022-11-12 | End: 2022-11-13 | Stop reason: HOSPADM

## 2022-11-11 RX ORDER — POLYETHYLENE GLYCOL 3350 17 G/17G
17 POWDER, FOR SOLUTION ORAL DAILY
Status: DISCONTINUED | OUTPATIENT
Start: 2022-11-12 | End: 2022-11-13 | Stop reason: HOSPADM

## 2022-11-11 RX ORDER — FUROSEMIDE 10 MG/ML
20 INJECTION INTRAMUSCULAR; INTRAVENOUS ONCE
Status: COMPLETED | OUTPATIENT
Start: 2022-11-11 | End: 2022-11-11

## 2022-11-11 RX ORDER — ATORVASTATIN CALCIUM 10 MG/1
10 TABLET, FILM COATED ORAL NIGHTLY
Qty: 30 TABLET | Refills: 11 | Status: SHIPPED | OUTPATIENT
Start: 2022-11-11 | End: 2023-12-12 | Stop reason: SDUPTHER

## 2022-11-11 RX ORDER — FUROSEMIDE 20 MG/1
TABLET ORAL
Qty: 30 TABLET | Refills: 3 | Status: SHIPPED | OUTPATIENT
Start: 2022-11-11 | End: 2022-12-08

## 2022-11-11 RX ORDER — DOCUSATE SODIUM 100 MG/1
200 CAPSULE, LIQUID FILLED ORAL 2 TIMES DAILY PRN
Qty: 30 CAPSULE | Refills: 0 | Status: SHIPPED | OUTPATIENT
Start: 2022-11-11 | End: 2022-12-08

## 2022-11-11 RX ORDER — ASPIRIN 81 MG/1
81 TABLET ORAL DAILY
Qty: 30 TABLET | Refills: 11 | Status: SHIPPED | OUTPATIENT
Start: 2022-11-11 | End: 2023-11-01

## 2022-11-11 RX ORDER — ASPIRIN 325 MG
325 TABLET ORAL DAILY
Qty: 30 TABLET | Refills: 11 | Status: CANCELLED | OUTPATIENT
Start: 2022-11-11 | End: 2023-11-11

## 2022-11-11 RX ORDER — POTASSIUM CHLORIDE 20 MEQ/1
TABLET, EXTENDED RELEASE ORAL
Qty: 30 TABLET | Refills: 3 | Status: SHIPPED | OUTPATIENT
Start: 2022-11-11 | End: 2022-12-08

## 2022-11-11 RX ORDER — WARFARIN 2 MG/1
2 TABLET ORAL DAILY
Qty: 30 TABLET | Refills: 11 | Status: SHIPPED | OUTPATIENT
Start: 2022-11-11 | End: 2022-11-13 | Stop reason: HOSPADM

## 2022-11-11 RX ORDER — OXYCODONE HYDROCHLORIDE 5 MG/1
5 TABLET ORAL EVERY 4 HOURS PRN
Qty: 42 TABLET | Refills: 0 | Status: SHIPPED | OUTPATIENT
Start: 2022-11-11 | End: 2022-11-18

## 2022-11-11 RX ORDER — WARFARIN 2 MG/1
2 TABLET ORAL DAILY
Status: DISCONTINUED | OUTPATIENT
Start: 2022-11-11 | End: 2022-11-12

## 2022-11-11 RX ORDER — ACETAMINOPHEN 500 MG
1000 TABLET ORAL EVERY 6 HOURS PRN
Qty: 30 TABLET | Refills: 0 | Status: SHIPPED | OUTPATIENT
Start: 2022-11-11 | End: 2022-12-08

## 2022-11-11 RX ORDER — POLYETHYLENE GLYCOL 3350 17 G/17G
17 POWDER, FOR SOLUTION ORAL 2 TIMES DAILY PRN
Qty: 5 EACH | Refills: 0 | Status: SHIPPED | OUTPATIENT
Start: 2022-11-11 | End: 2022-12-08

## 2022-11-11 RX ORDER — FAMOTIDINE 20 MG/1
20 TABLET, FILM COATED ORAL 2 TIMES DAILY
Qty: 60 TABLET | Refills: 11 | Status: SHIPPED | OUTPATIENT
Start: 2022-11-11 | End: 2022-12-08

## 2022-11-11 RX ORDER — METOPROLOL TARTRATE 25 MG/1
12.5 TABLET, FILM COATED ORAL 2 TIMES DAILY
Qty: 30 TABLET | Refills: 11 | Status: SHIPPED | OUTPATIENT
Start: 2022-11-11 | End: 2023-06-01 | Stop reason: SDUPTHER

## 2022-11-11 RX ORDER — GLYCERIN 1 G/1
1 SUPPOSITORY RECTAL ONCE
Status: DISCONTINUED | OUTPATIENT
Start: 2022-11-11 | End: 2022-11-11

## 2022-11-11 RX ORDER — GABAPENTIN 300 MG/1
300 CAPSULE ORAL NIGHTLY
Qty: 30 CAPSULE | Refills: 1 | Status: SHIPPED | OUTPATIENT
Start: 2022-11-11 | End: 2022-12-08

## 2022-11-11 RX ADMIN — FUROSEMIDE 40 MG: 10 INJECTION, SOLUTION INTRAMUSCULAR; INTRAVENOUS at 05:11

## 2022-11-11 RX ADMIN — POTASSIUM CHLORIDE EXTENDED-RELEASE 20 MEQ: 1500 TABLET ORAL at 08:11

## 2022-11-11 RX ADMIN — DRONEDARONE 400 MG: 400 TABLET, FILM COATED ORAL at 08:11

## 2022-11-11 RX ADMIN — FAMOTIDINE 20 MG: 20 TABLET ORAL at 08:11

## 2022-11-11 RX ADMIN — ATORVASTATIN CALCIUM 10 MG: 10 TABLET, FILM COATED ORAL at 08:11

## 2022-11-11 RX ADMIN — DRONEDARONE 400 MG: 400 TABLET, FILM COATED ORAL at 05:11

## 2022-11-11 RX ADMIN — CITALOPRAM HYDROBROMIDE 10 MG: 10 TABLET ORAL at 08:11

## 2022-11-11 RX ADMIN — MUPIROCIN 1 G: 20 OINTMENT TOPICAL at 09:11

## 2022-11-11 RX ADMIN — ACETAMINOPHEN 1000 MG: 500 TABLET ORAL at 06:11

## 2022-11-11 RX ADMIN — OXYCODONE HYDROCHLORIDE 10 MG: 10 TABLET ORAL at 08:11

## 2022-11-11 RX ADMIN — METOPROLOL TARTRATE 12.5 MG: 25 TABLET, FILM COATED ORAL at 08:11

## 2022-11-11 RX ADMIN — FUROSEMIDE 40 MG: 10 INJECTION, SOLUTION INTRAMUSCULAR; INTRAVENOUS at 09:11

## 2022-11-11 RX ADMIN — DIBASIC SODIUM PHOSPHATE, MONOBASIC POTASSIUM PHOSPHATE AND MONOBASIC SODIUM PHOSPHATE 2 TABLET: 852; 155; 130 TABLET ORAL at 08:11

## 2022-11-11 RX ADMIN — FUROSEMIDE 20 MG: 10 INJECTION, SOLUTION INTRAMUSCULAR; INTRAVENOUS at 09:11

## 2022-11-11 RX ADMIN — MUPIROCIN 1 G: 20 OINTMENT TOPICAL at 10:11

## 2022-11-11 RX ADMIN — ASPIRIN 325 MG ORAL TABLET 325 MG: 325 PILL ORAL at 08:11

## 2022-11-11 RX ADMIN — OXYCODONE HYDROCHLORIDE 10 MG: 10 TABLET ORAL at 11:11

## 2022-11-11 RX ADMIN — WARFARIN SODIUM 2 MG: 2 TABLET ORAL at 05:11

## 2022-11-11 RX ADMIN — GABAPENTIN 300 MG: 300 CAPSULE ORAL at 08:11

## 2022-11-11 RX ADMIN — ACETAMINOPHEN 1000 MG: 500 TABLET ORAL at 04:11

## 2022-11-11 NOTE — PROGRESS NOTES
scene on morning rounds with team. She is doing well. We discussed discharge either today or tomorrow.

## 2022-11-11 NOTE — SUBJECTIVE & OBJECTIVE
Interval History: NAEON. INR therapeutic. Will decrease dose today. Give extra push of lasix today for fluid on CXR. On room air. Ambulating well with therapy. CBC pending. Likely d/c tomorrow. Had bowel movement.     Review of Systems   Constitutional: Negative for malaise/fatigue.   Cardiovascular:  Negative for chest pain and palpitations.   Respiratory:  Negative for shortness of breath.    Neurological:  Negative for weakness.   Medications:  Continuous Infusions:  Scheduled Meds:   acetaminophen  1,000 mg Oral Q8H    aspirin  325 mg Oral Daily    atorvastatin  10 mg Oral QHS    citalopram  10 mg Oral Daily    docusate sodium  200 mg Oral BID    dronedarone  400 mg Oral BID WM    famotidine  20 mg Oral BID    furosemide (LASIX) injection  40 mg Intravenous BID    gabapentin  300 mg Oral QHS    glycerin adult  1 suppository Rectal Once    metoprolol tartrate  12.5 mg Oral BID    mupirocin  1 g Nasal BID    polyethylene glycol  17 g Oral BID    potassium chloride  20 mEq Oral Q12H    warfarin  5 mg Oral Daily     PRN Meds:albumin human 5%, bisacodyL, dextrose 10%, dextrose 10%, metoclopramide HCl, ondansetron, oxyCODONE, oxyCODONE, sodium chloride 0.9%     Objective:     Vital Signs (Most Recent):  Temp: 98.7 °F (37.1 °C) (11/11/22 0727)  Pulse: 77 (11/11/22 0807)  Resp: 18 (11/11/22 0727)  BP: 116/62 (11/11/22 0948)  SpO2: 96 % (11/11/22 0727) Vital Signs (24h Range):  Temp:  [97.1 °F (36.2 °C)-99.6 °F (37.6 °C)] 98.7 °F (37.1 °C)  Pulse:  [64-88] 77  Resp:  [16-22] 18  SpO2:  [90 %-98 %] 96 %  BP: (102-131)/(53-62) 116/62     Weight: 93 kg (205 lb)  Body mass index is 30.27 kg/m².    SpO2: 96 %  O2 Device (Oxygen Therapy): nasal cannula    Intake/Output - Last 3 Shifts         11/09 0700  11/10 0659 11/10 0700  11/11 0659 11/11 0700  11/12 0659    P.O. 400      I.V. (mL/kg) 439.4 (4.8)      IV Piggyback 205.5      Total Intake(mL/kg) 1044.8 (11.4)      Urine (mL/kg/hr) 1915 (0.9) 700 (0.3)     Chest Tube 50       Total Output 1965 700     Net -920.2 -700            Stool Occurrence  0 x             Lines/Drains/Airways       Peripheral Intravenous Line  Duration                  Peripheral IV - Single Lumen 11/07/22 0558 18 G Left;Posterior Wrist 4 days         Peripheral IV - Single Lumen 11/08/22 1824 20 G Right Antecubital 2 days                    Physical Exam  Vitals reviewed.   Constitutional:       General: She is not in acute distress.     Appearance: She is well-developed. She is not diaphoretic.   HENT:      Head: Normocephalic and atraumatic.   Eyes:      Pupils: Pupils are equal, round, and reactive to light.   Neck:      Vascular: No JVD.   Cardiovascular:      Rate and Rhythm: Normal rate and regular rhythm.      Comments: Midline sternal incision c/d/i  Pulmonary:      Effort: Pulmonary effort is normal. No respiratory distress.   Musculoskeletal:         General: No swelling. Normal range of motion.      Cervical back: Normal range of motion.   Skin:     Coloration: Skin is not pale.   Neurological:      Mental Status: She is alert and oriented to person, place, and time.   Psychiatric:         Speech: Speech normal.         Behavior: Behavior normal.         Thought Content: Thought content normal.         Judgment: Judgment normal.       Significant Labs:  BMP:   Recent Labs   Lab 11/11/22  0630   GLU 97   *   K 3.9   CL 96   CO2 26   BUN 19   CREATININE 0.7   CALCIUM 8.8   MG 2.1     Cardiac markers: No results for input(s): CKMB, CPKMB, TROPONINT, TROPONINI, MYOGLOBIN in the last 48 hours.  CBC:   Recent Labs   Lab 11/10/22  0547   WBC 14.42*   RBC 2.97*   HGB 9.0*   HCT 27.4*   *   MCV 92   MCH 30.3   MCHC 32.8     CMP:   Recent Labs   Lab 11/10/22  0547 11/11/22  0630   * 97   CALCIUM 8.8 8.8   ALBUMIN 3.4*  --    PROT 6.9  --    * 133*   K 4.3 3.9   CO2 26 26   CL 95 96   BUN 18 19   CREATININE 0.8 0.7   ALKPHOS 86  --    ALT 26  --    AST 60*  --    BILITOT 1.5*  --       Coagulation:   Recent Labs   Lab 11/11/22  0630   INR 2.5*   APTT 49.1*       Significant Diagnostics:  I have reviewed all pertinent imaging results/findings within the past 24 hours.

## 2022-11-11 NOTE — BRIEF OP NOTE
[facility] - Cardiology Stepdown  Cardiothoracic Surgery  Operative Note    SUMMARY     Date of Procedure: 11/7/2022     Procedure: Procedure(s) (LRB):  1)  REPLACEMENT, MITRAL VALVE with a 29 mm [employer] Standard mechanical prosthesis  07461    2)  REPAIR, TRICUSPID VALVE with a 26 mm Medtronic Contour annuloplasty band  72600    3)  MAZE with complete left and right atrial lesion sets WITHOUT resection of CARLOS  89435    4)  REDO STERNOTOMY 40643    Surgeon(s) and Role:     * Bobby Gilbert MD - Primary     * Jason Roca MD - Assisting     * Yvan Jack MD - Fellow        Pre-Operative Diagnosis: Rheumatic mitral regurgitation [I05.1]  Paroxysmal atrial fibrillation [I48.0]    Post-Operative Diagnosis: Post-Op Diagnosis Codes:     * Rheumatic mitral regurgitation [I05.1]     * Paroxysmal atrial fibrillation [I48.0]    Anesthesia: General    Operative Findings (including complications, if any):  Posterior leaflet thickened and relatively immobile.  Anterior leaflet also thickened.      Estimated Blood Loss (EBL): 100 mL           Implants:   Implant Name Type Inv. Item Serial No.  Lot No. LRB No. Used Action   VALVE STANDARD MITRAL SIZE 29 - [id number]  VALVE STANDARD MITRAL SIZE 29 [id number]  N/A 1 Implanted   RING ANNULOPLASTY TRICUSPID - [id number]  RING ANNULOPLASTY TRICUSPID [id number] [employer]  N/A 1 Implanted   PUTTY HEMASORB HEMOSTAT 4GM - [id number]  PUTTY HEMASORB HEMOSTAT 4GM  [id number] N/A 1 Implanted       Specimens:   Specimen (24h ago, onward)      None           Attestation:  I was present and scrubbed for the procedure.

## 2022-11-11 NOTE — HOSPITAL COURSE
On 11/7/22, the patient was taken to the Operating Room for the above stated procedure. Please see the previously dictated operative report for complete details. Postoperatively, the patient was taken from the  Operating Room to the ICU where the vital signs were monitored and pain was kept under control. The patient was weaned from the drips and extubated in the ICU per protocol. Once hemodynamically stable, the patient was transferred to the Cardiac Step-Down floor for continued strengthening and ambulation. On postoperative day 6, the patient was ready for discharge to home. At the time of discharge, the patient was ambulating unassisted. Pain was well controlled with oral analgesics and the patient was tolerating the diet.     MOBILITY AND ACTIVITY: As tolerated. Patient may shower. No heavy lifting of greater than 5 pounds and no driving.     DIET: An 1800-calorie ADA with a 1500 mL fluid restriction.     WOUND CARE INSTRUCTIONS: Check for redness, swelling and drainage around the  incision or wound. Patient is to call for any obvious bleeding, drainage, pus from the wound, unusual problems or difficulties or temperature of greater than 101   degrees.     FOLLOWUP: Follow up with Dr. Gilbert in approximately 3 weeks. Prior to this  appointment, the patient will have a chest x-ray and EKG.     Patient not placed on Ace-Inhibitor at the time of discharge due to potential for hypotension     Coumadin to be followed by the coumadin clinic with Dr. Chelsea Sears as cardiologist. INR goal 2.5-3.5    DISCHARGE CONDITION: At the time of discharge, the patient was in sinus rhythm and afebrile with stable vital signs.      NYHA 2

## 2022-11-11 NOTE — PT/OT/SLP PROGRESS
"Occupational Therapy   Treatment    Name: Riya Soto  MRN: [id number]  Admitting Diagnosis:  S/P MVR (mitral valve replacement)  4 Days Post-Op    Recommendations:     Discharge Recommendations: home  Discharge Equipment Recommendations:  none  Barriers to discharge:  None    Assessment:     Riya Soto is a 52 y.o. female with a medical diagnosis of S/P MVR (mitral valve replacement).  Pt tolerated session well and without incident, ambulating a functional household distance in the hallway with SBA with no AD.  However, she requires assistance to perform self-care tasks.   She presents with the following. Performance deficits affecting function are weakness, impaired endurance, impaired self care skills, impaired functional mobility, gait instability, impaired balance, pain, decreased upper extremity function, decreased ROM, impaired skin, other (comment) (sternal precautions).     Rehab Prognosis:  Good; patient would benefit from acute skilled OT services to address these deficits and reach maximum level of function.       Plan:     Patient to be seen 5 x/week to address the above listed problems via self-care/home management, therapeutic activities, therapeutic exercises  Plan of Care Expires: 12/08/22  Plan of Care Reviewed with: patient, significant other    Subjective   Pt was pleasant and agreeable to therapy.  Pain/Comfort:  Pain Rating 1: other (see comments) (not rated but said was "okay")  Location 1: chest (at incision site)  Pain Addressed 1: Distraction  Pain Rating Post-Intervention 1: other (see comments) (unchanged)    Objective:     Communicated with: nursing prior to session.  Patient found up in chair with telemetry with her significant other present upon OT entry to room.    General Precautions: Standard, fall, sternal   Orthopedic Precautions:N/A   Braces: N/A  Respiratory Status: Room air     Occupational Performance:     Bed Mobility:    N/A due to pt sitting in bedside chair at " beginning and end of session    Functional Mobility/Transfers:  Patient completed Sit <> Stand Transfer from bedside chair x 1 trial with contact guard assistance with no assistive device, requiring cueing to adhere to her sternal precautions   Functional Mobility: Pt ambulated a functional household distance in her room and in the hallway with SBA with no AD.  She had no LOB or reports of dizziness.      Activities of Daily Living:  Upper Body Dressing: minimum assistance to kirstie back gown as a robe while sitting in chair with (A) to thread her LUE     AMPAC 6 Click ADL: 19    Treatment & Education:  Pt edu on role of OT, POC, her sternal precautions, safety when performing self care tasks, benefit of performing OOB activity, and safety when performing functional transfers and mobility.    - Self care tasks completed-- as noted above      Patient left up in chair with all lines intact, call button in reach, and her significant other present    GOALS:   Multidisciplinary Problems       Occupational Therapy Goals          Problem: Occupational Therapy    Goal Priority Disciplines Outcome Interventions   Occupational Therapy Goal     OT, PT/OT Ongoing, Progressing    Description: Goals to be met by: 11/15/22    Patient will increase functional independence with ADLs by performing:    Grooming while standing at sink with Set-up Assistance.  Toileting from toilet with Set-up Assistance for hygiene and clothing management.   Supine to sit with Supervision.  Step transfer with Supervision  Toilet transfer to toilet with Supervision.                         Time Tracking:     OT Date of Treatment: 11/11/22  OT Start Time: 1518  OT Stop Time: 1529  OT Total Time (min): 11 min    Billable Minutes:Therapeutic Activity 11 min    OT/MERCEDES: OT          11/11/2022

## 2022-11-11 NOTE — OP NOTE
DATE OF PROCEDURE:  11/7/2022     ATTENDING SURGEON:  Bobby Gilbert M.D.    ASSISTANT:  Yvan Jack M.D., (Cardiothoracic Resident)     PREOPERATIVE DIAGNOSES:  1.  Mitral stenosis and mitral regurgitation  2.  Previous mitral valve repair  3.  Severe tricuspid regurgitation  4.  Atrial fibrillation  5.  Chronic systolic heart failure  6.  Pulmonary hypertension    POSTOPERATIVE DIAGNOSES:  1.  Mitral stenosis and mitral regurgitation  2.  Previous mitral valve repair  3.  Severe tricuspid regurgitation  4.  Atrial fibrillation  5.  Chronic systolic heart failure  6.  Pulmonary hypertension    OPERATION PERFORMED:      1)  Mitral valve replacement with a 29 mm [employer]omed[employer] standard   mechanical prosthesis    2)  Tricuspid valve repair with a 26 mm Medtronic contour annuloplasty band    3)  Maze procedure with complete left and right atrial lesion sets without resection of left atrial appendage    4)  Redo sternotomy     ANESTHESIA:  General endotracheal.     ESTIMATED BLOOD LOSS:  100 mL.     BRIEF HISTORY:  Ms. Soto is a very pleasant 52-year-old woman who underwent mitral valve repair in 2011.  It is unclear what the etiology of her mitral regurgitation was.  She did well until February of this year when she developed palpitations and fatigue.  She was found to be in atrial fibrillation.  This prompted a thorough evaluation which demonstrated mixed mitral stenosis and mitral regurgitation as well as moderate to severe tricuspid regurgitation.  She now presents for surgical correction.      PROCEDURE IN DETAIL:  After obtaining informed and written consent, the patient   was brought to the Operating Room and placed on the operating table in supine   position.  After induction of adequate general endotracheal anesthesia, the   patient's chest, abdomen, pelvis and bilateral lower extremities were prepped   and draped in the usual sterile fashion. A wire was placed in the right common femoral artery using  Seldinger technique. Its position in the descending aorta was confirmed using JUAN. We then turned our attention to the chest, where the patient's previous upper midline   skin incision was reopened.  Dissection was carried down to the level of the   sternum.  The wires were divided, but not removed.  The oscillating saw was then   used to divide the anterior table of the sternum.  The wires were removed, and   the posterior table of the sternum was divided using the straight Lucas scissors.    Once the sternum had been safely traversed, the mammary retractor was placed   and the retrosternal adhesions were taken down first on the left and then on the   right using the electrocautery device.  The sternal retractor was then placed,   and we began the intrapericardial portion of the dissection.  Not surprisingly, the   adhesions were at least moderate.  We began at the diaphragmatic surface of the   heart, and carefully worked our way down to the inferior vena cava.  We then   proceeded along the right atrial free wall.  The right atrial free wall was   fairly densely adherent to the pericardium.  Eventually, we were able to work our way to  the superior vena cava.  We   were able to expose the anterior surface of the superior vena cava.  We then   turned our attention to the aorta.  We were able to dissect distally   until we reached the origin of the innominate artery.  At this time, we had   adequate exposure for cannulation.  A wire was placed in the right common femoral vein using Seldinger technique.  The patient was systemically heparinized.      Cannulation sutures were placed in   the aorta and in the superior vena cava.  The aortic cannula was inserted,   followed by the venous.  An IVC cannula was also placed percutaneously through the right common femoral vein.  Antegrade and   retrograde cardioplegia catheters were placed.  The patient was then put on   cardiopulmonary bypass.  Once on bypass, we were able  to complete the intrapericardial dissection.  The adhesions were quite dense, and as result we elected not to mobilize the left side of the heart.  Circumferential control was obtained over the superior vena cava and the inferior vena cava.  The aortic crossclamp was applied and   the heart was arrested using cold blood enhanced antegrade cardioplegia.  A   prompt electromechanical arrest was achieved.  The right atrium was opened.  A 4-0 Prolene suture was placed circumferentially around the ostium of the coronary sinus.  The coronary sinus catheter was placed under direct vision.  The fossa ovalis was identified and incised.  The incision was carried out onto the dome of the left atrium.  The Castillo retractor was placed for exposure.    Through the left atriotomy, connecting lesions were made from the atriotomy   first to the left superior pulmonary vein, and secondly from the atriotomy to   the left inferior pulmonary vein.  These lesions were created using the AtriCure cryoprobe.  The probe was positioned to exclude the pulmonary veins.  Finally, a connecting lesion was made that   was directed from the atriotomy towards the junction of the P2 and P3 scallops   of the posterior leaflet of the mitral valve.  The coronary sinus lesion was then created using the Atricure cryoprobe.  The left atrial appendage was evaluated.  There was significant tension at the orifice, and I felt that suture ligation would fail and also possibly result in a torn atrium with associated bleeding.  As result, we left the left atrial appendage open.  Additionally, as the patient had elected to receive a mechanical prosthesis she would be committed to a anti coagulation irregardless.    The mitral valve was evaluated.  The mitral valve was evaluated.  There was an annuloplasty band present.  Both leaflets were thickened and relatively immobile.  The posterior leaflet was somewhat retracted.  The subvalvular apparatus was unremarkable.   The anterior leaflet was partially resected and used for exposure as   multiple pledgeted 2-0 Ethibond sutures were placed along the anterior annulus.    The anterior leaflet was eventually resected in its entirety.      We were able   to preserve a portion of the posterior leaflet as we completed circumferential   coverage of the mitral orifice using pledgeted 2-0 Ethibond sutures.  Once the   sutures were all in, the annulus was sized and a 29 mm valve was selected.  The   valve was brought up, the sutures were passed through the sewing ring and it was   slid into position.  It seated nicely.  The sutures were tied and then cut.    The interatrial septum was then closed in a single layer using running 4-0 Prolene   suture.  The dome of the left atrium was closed using a running 4-0 Prolene suture.  Prior to closing the left atrium, de-airing maneuvers were performed.      Silk sutures were placed for exposure.  The tricuspid valve was evaluated.  The annulus was severely dilated.  The leaflets were structurally normal.  Multiple non pledgetted 2-0 Ethibond sutures were placed along the tricuspid annulus from the commissure between the septal and anterior leaflets to midway along the septal leaflet.  The annulus was sized, and a 26 mm rigid band was selected.  The hotshot was given retrograde.  De-airing maneuvers were performed, and the aortic cross-clamp was removed.  The band was brought up, the sutures were passed through it, and it was slid into position.  It seated nicely.  The sutures were tied and then cut.  We attempted to test the valve by occluding the pulmonary artery.  The coaptation appeared acceptable. The right atrial appendage isolation lesion was not performed as it was densely adherent to the aorta. Lesions were created first into the superior vena cava and then secondly into the inferior vena cava, both using the Atricure cryoprobe.  The right atriotomy was then closed in a single layer using  running 4-0 Prolene suture.     The patient was subsequently weaned from cardiopulmonary bypass.  The patient did   separate easily from bypass.  Once off bypass, all surgical sites were   inspected.  There was good hemostasis.  The valves were evaluated using JUAN and   appeared to be functioning properly.  The test dose of protamine was   administered and this was well tolerated.  The total dose was then given.    Phillips through the total dose, all cannulas were removed.  All surgical sites   were again inspected, again there was good hemostasis.  Ventricular pacing wires   were placed.  Drains were placed.  After again confirming adequate hemostasis,   the patient's chest was closed using #6 stainless steel wires to reapproximate   the sternum.  The overlying soft tissues were reapproximated using absorbable   suture material.  The patient's chest was washed and dried and a dry dressing   was applied.  The patient tolerated the procedure well, there were no   complications.  At the conclusion of the case, sponge and instrument counts were   correct.

## 2022-11-11 NOTE — PLAN OF CARE
11/11/22 1345   Post-Acute Status   Post-Acute Authorization Home Health   Home Health Status Set-up Complete/Auth obtained     Pt accepted by [facility].    Marianela Arora LMSW  [facility]  [id number]

## 2022-11-11 NOTE — HPI
Very pleasant 52-year-old woman with mixed mitral stenosis and mitral regurgitation as well as severe tricuspid regurgitation and atrial fibrillation.  We plan redo sternotomy, mitral valve replacement with a mechanical prosthesis, tricuspid valve repair, and Maze procedure.  After discussion of the advantages and disadvantages of tissue and mechanical prostheses she indicated she desires a mechanical valve.  Her questions have been answered, and she wishes to proceed.

## 2022-11-11 NOTE — PLAN OF CARE
[facility] - Cardiology Stepdown  Discharge Reassessment    Primary Care Provider: Pantera Yadav MD    Expected Discharge Date: 11/12/2022    Reassessment (most recent)       Discharge Reassessment - 11/11/22 1326          Discharge Reassessment    Discharge Plan discussed with: Patient     Communicated ARMANDO with patient/caregiver Yes     Discharge Plan A Home Health     Discharge Plan B Home     DME Needed Upon Discharge  shower chair     Discharge Barriers Identified None     Why the patient remains in the hospital Requires continued medical care        Post-Acute Status    Post-Acute Authorization Home Health     Home Health Status Referrals Sent                 SW met with pt at bedside who voiced agreement with plan for HH with no preference of provider.  SW also informed pt that shower chair is not covered by insurance.  Referral sent to [facility] via [email or web address].    Marianela Arora LMSW  [facility]  [id number]

## 2022-11-11 NOTE — PROGRESS NOTES
Met with pt at bedside and reviewed wound care instructions for pt's midsternal and chest tube site incisions, s/p MVR, TVr, Maze.  Reviewed daily inspection and cleaning of surgical incisions and instructed pt to call the clinic with any questions or concerns.  Pt provided with a hand-out (see below) which contains detailed instructions on daily wound care inspection and care.  Pt reminded of her 5 pound lifting, pushing, and pulling restrictions for the first 6 weeks following her surgery and to refrain from driving until released by Dr. Gilbert.  Pt informed of her post-op appts on 12/8 and was provided with a copy of her appts for that day.  Pt verbalized understanding of all instructions.        Showering     Shower daily with normal bar soap, not perfumed soap or body wash. During your recovery, do not try a new brand of soap. Only use soap and water to cleanse the sites.     Shower with your back to the shower spray.  It is ok for your incision to get wet, but the shower spray should not directly hit your chest.    Place soapy water on your hand or on a clean washcloth and gently wash your incisions using an up-and-down motion.     The water temperature should be warm -- not too hot or cold. Extreme water temperatures can cause you to feel faint.    Do not apply ointments, oils, or salves to your incisions.     Pat the skin gently to dry using a clean towel.    Do not soak in a tub.     Caring for your Incision    Wash your hands before and after caring for or touching your incisions.    Look in the mirror daily. Inspect your incisions for redness, drainage and warmth. Your incisions should be healing; there should NOT be an increase in opening.    Gently wash your incisions every day following the instructions listed above.     Remove the steri-strips very gently (while in the shower) after you have been home for 1 week or when the edges of the strips start to curl up.     You do not need to cover your  incisions unless they are draining.  If you are experiencing drainage, it is important to call your doctor.  Monday - Friday, from 8:00am - 5pm, call [phone].      When to Call    Increased drainage or oozing from your incision(s).    Increased opening of the incision line(s) - there should not be gaps or openings of the incision.    Redness along the incision(s) - if the incisions were pink and red when you left the hospital, they should be improving and not becoming more red.     Warmth along the incision line(s).    Increased body temperature/fever of 100.4 degrees Fahrenheit or higher.    If you have diabetes and your blood sugar levels begin to vary.

## 2022-11-11 NOTE — PROGRESS NOTES
[facility] - Cardiology Stepdown  Cardiothoracic Surgery  Progress Note    Patient Name: Riya Soto  MRN: [id number]  Admission Date: 11/7/2022  Hospital Length of Stay: 4 days  Code Status: Full Code   Attending Physician: Bobby Gilbert MD   Referring Provider: Bobby Gilbert MD  Principal Problem:S/P MVR (mitral valve replacement)      Subjective:     Post-Op Info:  Procedure(s) (LRB):  REPLACEMENT, MITRAL VALVE (N/A)  REPAIR, TRICUSPID VALVE (N/A)  MAZE (N/A)  STERNOTOMY (N/A)   4 Days Post-Op     Interval History: NAEON. INR therapeutic. Will decrease dose today. Give extra push of lasix today for fluid on CXR. On room air. Ambulating well with therapy. CBC pending. Likely d/c tomorrow. Had bowel movement.     Review of Systems   Constitutional: Negative for malaise/fatigue.   Cardiovascular:  Negative for chest pain and palpitations.   Respiratory:  Negative for shortness of breath.    Neurological:  Negative for weakness.   Medications:  Continuous Infusions:  Scheduled Meds:   acetaminophen  1,000 mg Oral Q8H    aspirin  325 mg Oral Daily    atorvastatin  10 mg Oral QHS    citalopram  10 mg Oral Daily    docusate sodium  200 mg Oral BID    dronedarone  400 mg Oral BID WM    famotidine  20 mg Oral BID    furosemide (LASIX) injection  40 mg Intravenous BID    gabapentin  300 mg Oral QHS    glycerin adult  1 suppository Rectal Once    metoprolol tartrate  12.5 mg Oral BID    mupirocin  1 g Nasal BID    polyethylene glycol  17 g Oral BID    potassium chloride  20 mEq Oral Q12H    warfarin  5 mg Oral Daily     PRN Meds:albumin human 5%, bisacodyL, dextrose 10%, dextrose 10%, metoclopramide HCl, ondansetron, oxyCODONE, oxyCODONE, sodium chloride 0.9%     Objective:     Vital Signs (Most Recent):  Temp: 98.7 °F (37.1 °C) (11/11/22 0727)  Pulse: 77 (11/11/22 0807)  Resp: 18 (11/11/22 0727)  BP: 116/62 (11/11/22 0948)  SpO2: 96 % (11/11/22 0727) Vital Signs (24h Range):  Temp:  [97.1 °F  (36.2 °C)-99.6 °F (37.6 °C)] 98.7 °F (37.1 °C)  Pulse:  [64-88] 77  Resp:  [16-22] 18  SpO2:  [90 %-98 %] 96 %  BP: (102-131)/(53-62) 116/62     Weight: 93 kg (205 lb)  Body mass index is 30.27 kg/m².    SpO2: 96 %  O2 Device (Oxygen Therapy): nasal cannula    Intake/Output - Last 3 Shifts         11/09 0700  11/10 0659 11/10 0700  11/11 0659 11/11 0700  11/12 0659    P.O. 400      I.V. (mL/kg) 439.4 (4.8)      IV Piggyback 205.5      Total Intake(mL/kg) 1044.8 (11.4)      Urine (mL/kg/hr) 1915 (0.9) 700 (0.3)     Chest Tube 50      Total Output 1965 700     Net -920.2 -700            Stool Occurrence  0 x             Lines/Drains/Airways       Peripheral Intravenous Line  Duration                  Peripheral IV - Single Lumen 11/07/22 0558 18 G Left;Posterior Wrist 4 days         Peripheral IV - Single Lumen 11/08/22 1824 20 G Right Antecubital 2 days                    Physical Exam  Vitals reviewed.   Constitutional:       General: She is not in acute distress.     Appearance: She is well-developed. She is not diaphoretic.   HENT:      Head: Normocephalic and atraumatic.   Eyes:      Pupils: Pupils are equal, round, and reactive to light.   Neck:      Vascular: No JVD.   Cardiovascular:      Rate and Rhythm: Normal rate and regular rhythm.      Comments: Midline sternal incision c/d/i  Pulmonary:      Effort: Pulmonary effort is normal. No respiratory distress.   Musculoskeletal:         General: No swelling. Normal range of motion.      Cervical back: Normal range of motion.   Skin:     Coloration: Skin is not pale.   Neurological:      Mental Status: She is alert and oriented to person, place, and time.   Psychiatric:         Speech: Speech normal.         Behavior: Behavior normal.         Thought Content: Thought content normal.         Judgment: Judgment normal.       Significant Labs:  BMP:   Recent Labs   Lab 11/11/22  0630   GLU 97   *   K 3.9   CL 96   CO2 26   BUN 19   CREATININE 0.7   CALCIUM 8.8    MG 2.1     Cardiac markers: No results for input(s): CKMB, CPKMB, TROPONINT, TROPONINI, MYOGLOBIN in the last 48 hours.  CBC:   Recent Labs   Lab 11/10/22  0547   WBC 14.42*   RBC 2.97*   HGB 9.0*   HCT 27.4*   *   MCV 92   MCH 30.3   MCHC 32.8     CMP:   Recent Labs   Lab 11/10/22  0547 11/11/22  0630   * 97   CALCIUM 8.8 8.8   ALBUMIN 3.4*  --    PROT 6.9  --    * 133*   K 4.3 3.9   CO2 26 26   CL 95 96   BUN 18 19   CREATININE 0.8 0.7   ALKPHOS 86  --    ALT 26  --    AST 60*  --    BILITOT 1.5*  --      Coagulation:   Recent Labs   Lab 11/11/22  0630   INR 2.5*   APTT 49.1*       Significant Diagnostics:  I have reviewed all pertinent imaging results/findings within the past 24 hours.    Assessment/Plan:     * S/P MVR (mitral valve replacement)  - ASA  - Statin   - Tylenol, Gabapentin   - Bowel regimen increased   - PRN narcotics   - Home celexa   - Pepcid   - heparin gtt off   - INR goal 2.5-3.5 (Has received coumadin 5,5,5) Will Give lose dose tonight for big jump 2.5 from 1.6  - BB  - Pacer wires and chest tubes removed   - Lasix and K   - Oxygen off   - PT/OT  - Give extra push of lasix for edema on CXR       Acute blood loss anemia  - Expected post operatively   - CBC daily     Hypophosphatemia  - Replaced PO   - Daily labs     S/P Maze operation for atrial fibrillation  - See s/p MVR     S/P TVR (tricuspid valve repair)  - See s/p MVR    Stress hyperglycemia  - Endocrine following     Atrial fibrillation  S/P MAZE   NSR     Impaired functional mobility and activity tolerance  - PT/OT  - Ambulating well   - Recs for HH     Hyperlipidemia  - Statin     Overweight (BMI 25.0-29.9)  - PT/OT   - Cardiac diet       Dispo: CSU. Likely d/c tomorrow     Celsa Knight PAPaulinoC  Cardiothoracic Surgery  [facility] - Cardiology Stepdown

## 2022-11-11 NOTE — ASSESSMENT & PLAN NOTE
- ASA  - Statin   - Tylenol, Gabapentin   - Bowel regimen increased   - PRN narcotics   - Home celexa   - Pepcid   - heparin gtt off   - INR goal 2.5-3.5 (Has received coumadin 5,5,5) Will Give lose dose tonight for big jump 2.5 from 1.6  - BB  - Pacer wires and chest tubes removed   - Lasix and K   - Oxygen off   - PT/OT  - Give extra push of lasix for edema on CXR

## 2022-11-11 NOTE — PT/OT/SLP PROGRESS
"Physical Therapy Treatment    Patient Name:  Riya Soto   MRN:  [id number]    Recommendations:     Discharge Recommendations:  other (see comments) (HHPT)   Discharge Equipment Recommendations: none   Barriers to discharge: None    Assessment:     Riya Soto is a 52 y.o. female admitted with a medical diagnosis of S/P MVR (mitral valve replacement).  She presents with the following impairments/functional limitations:  weakness, impaired endurance, impaired functional mobility, gait instability, decreased coordination, pain, orthopedic precautions.  Pt was agreeable to therex today. Pt reports fatigued from ambulating to the bathroom and back. Pt completed 3 seated therex and 1 standing therex with occasional rest breaks. Pt continues to benefit from therapy to achieve highest level of independence prior to discharge.    Rehab Prognosis: Good; patient would benefit from acute skilled PT services to address these deficits and reach maximum level of function.    Recent Surgery: Procedure(s) (LRB):  REPLACEMENT, MITRAL VALVE (N/A)  REPAIR, TRICUSPID VALVE (N/A)  MAZE (N/A)  STERNOTOMY (N/A) 4 Days Post-Op    Plan:     During this hospitalization, patient to be seen 5 x/week to address the identified rehab impairments via gait training, therapeutic activities, therapeutic exercises, neuromuscular re-education and progress toward the following goals:    Plan of Care Expires:  12/03/22    Subjective     Chief Complaint: fatigued   Patient/Family Comments/goals: "I just got back from the bathroom"  Pain/Comfort:  Pain Rating 1: 8/10  Location 1: chest ("incision site")  Pain Addressed 1: Reposition, Distraction, Nurse notified  Pain Rating Post-Intervention 1: 8/10      Objective:     Communicated with RN prior to session.  Patient found up in chair with telemetry upon PT entry to room.     General Precautions: Standard, fall, sternal   Orthopedic Precautions:N/A   Braces: N/A  Respiratory Status: Room air   "   Functional Mobility:  Bed mobility: not assessed, pt found and returned to bedside chair  Transfers:     Sit to Stand:  contact guard assistance with hugging pillow  Gait/Stair training: pt refused at this time d/t pain. Agreeable to seated and standing therex.   Educated pt about using handrail when ascending and descending steps for balance only to maintain sternal precautions. Pt reports will have assistance available when going up and down steps    AM-PAC 6 CLICK MOBILITY  Turning over in bed (including adjusting bedclothes, sheets and blankets)?: 3  Sitting down on and standing up from a chair with arms (e.g., wheelchair, bedside commode, etc.): 3  Moving from lying on back to sitting on the side of the bed?: 3  Moving to and from a bed to a chair (including a wheelchair)?: 3  Need to walk in hospital room?: 3  Climbing 3-5 steps with a railing?: 2  Basic Mobility Total Score: 17     Treatment & Education:  Pt able to recall 2/3 of sternal precaution at beginning of session. Pt educated on no pushing. Pt able to recall 3/3 sternal precaution at end of session with increase time. Sternal precaution maintained.  Seated BLE therex x15 reps: heel/toe raises, LAQ, and hip abd/add  Standing BLE therex x10 reps: marching   Patricia-CGA balance while hugging pillow  Patient educated on role of therapy, goals of session, and benefits of out of bed mobility.   Instructed on use of call button and importance of calling nursing staff for assistance with mobility   Questions/concerns addressed within PTA scope of practice  Pt verbalized understanding.  Whiteboard Updated    Patient left up in chair with all lines intact, call button in reach, RN notified, and spouse present..    GOALS:   Multidisciplinary Problems       Physical Therapy Goals          Problem: Physical Therapy    Goal Priority Disciplines Outcome Goal Variances Interventions   Physical Therapy Goal     PT, PT/OT Ongoing, Progressing     Description: Goals to  be met by: 2022     Patient will increase functional independence with mobility by performin. Supine to sit with Supervision.  2. Sit to supine with Supervision.  3. Sit to stand transfer with Stand-by Assistance  4. Gait  x 200 feet with Stand-by Assistance using No Assistive Device.   5. Ascend/descend 8 stairs with no Handrails Minimum Assistance using No Assistive Device.                          Time Tracking:     PT Received On: 22  PT Start Time: 1055     PT Stop Time: 1110  PT Total Time (min): 15 min     Billable Minutes: Therapeutic Exercise 15    Treatment Type: Treatment  PT/PTA: PTA     PTA Visit Number: 1     2022

## 2022-11-12 LAB
ANION GAP SERPL CALC-SCNC: 13 MMOL/L (ref 8–16)
BASOPHILS # BLD AUTO: 0.02 K/UL (ref 0–0.2)
BASOPHILS NFR BLD: 0.2 % (ref 0–1.9)
BUN SERPL-MCNC: 20 MG/DL (ref 6–20)
CALCIUM SERPL-MCNC: 9.3 MG/DL (ref 8.7–10.5)
CHLORIDE SERPL-SCNC: 96 MMOL/L (ref 95–110)
CO2 SERPL-SCNC: 26 MMOL/L (ref 23–29)
CREAT SERPL-MCNC: 0.8 MG/DL (ref 0.5–1.4)
DIFFERENTIAL METHOD: ABNORMAL
EOSINOPHIL # BLD AUTO: 0.1 K/UL (ref 0–0.5)
EOSINOPHIL NFR BLD: 1.7 % (ref 0–8)
ERYTHROCYTE [DISTWIDTH] IN BLOOD BY AUTOMATED COUNT: 14.1 % (ref 11.5–14.5)
EST. GFR  (NO RACE VARIABLE): >60 ML/MIN/1.73 M^2
GLUCOSE SERPL-MCNC: 91 MG/DL (ref 70–110)
HCT VFR BLD AUTO: 27.7 % (ref 37–48.5)
HGB BLD-MCNC: 9.2 G/DL (ref 12–16)
IMM GRANULOCYTES # BLD AUTO: 0.03 K/UL (ref 0–0.04)
IMM GRANULOCYTES NFR BLD AUTO: 0.4 % (ref 0–0.5)
INR PPP: 2.4 (ref 0.8–1.2)
LYMPHOCYTES # BLD AUTO: 1.3 K/UL (ref 1–4.8)
LYMPHOCYTES NFR BLD: 15.5 % (ref 18–48)
MAGNESIUM SERPL-MCNC: 2.1 MG/DL (ref 1.6–2.6)
MCH RBC QN AUTO: 30.5 PG (ref 27–31)
MCHC RBC AUTO-ENTMCNC: 33.2 G/DL (ref 32–36)
MCV RBC AUTO: 92 FL (ref 82–98)
MONOCYTES # BLD AUTO: 1.3 K/UL (ref 0.3–1)
MONOCYTES NFR BLD: 16 % (ref 4–15)
NEUTROPHILS # BLD AUTO: 5.5 K/UL (ref 1.8–7.7)
NEUTROPHILS NFR BLD: 66.2 % (ref 38–73)
NRBC BLD-RTO: 0 /100 WBC
PHOSPHATE SERPL-MCNC: 3 MG/DL (ref 2.7–4.5)
PLATELET # BLD AUTO: 206 K/UL (ref 150–450)
PMV BLD AUTO: 10.6 FL (ref 9.2–12.9)
POTASSIUM SERPL-SCNC: 3.9 MMOL/L (ref 3.5–5.1)
PROTHROMBIN TIME: 24.2 SEC (ref 9–12.5)
RBC # BLD AUTO: 3.02 M/UL (ref 4–5.4)
SODIUM SERPL-SCNC: 135 MMOL/L (ref 136–145)
WBC # BLD AUTO: 8.31 K/UL (ref 3.9–12.7)

## 2022-11-12 PROCEDURE — 80048 BASIC METABOLIC PNL TOTAL CA: CPT | Performed by: PHYSICIAN ASSISTANT

## 2022-11-12 PROCEDURE — 25000003 PHARM REV CODE 250: Performed by: PHYSICIAN ASSISTANT

## 2022-11-12 PROCEDURE — 94761 N-INVAS EAR/PLS OXIMETRY MLT: CPT

## 2022-11-12 PROCEDURE — 25000003 PHARM REV CODE 250: Performed by: STUDENT IN AN ORGANIZED HEALTH CARE EDUCATION/TRAINING PROGRAM

## 2022-11-12 PROCEDURE — 94799 UNLISTED PULMONARY SVC/PX: CPT

## 2022-11-12 PROCEDURE — 83735 ASSAY OF MAGNESIUM: CPT | Performed by: PHYSICIAN ASSISTANT

## 2022-11-12 PROCEDURE — 84100 ASSAY OF PHOSPHORUS: CPT | Performed by: PHYSICIAN ASSISTANT

## 2022-11-12 PROCEDURE — 63600175 PHARM REV CODE 636 W HCPCS: Performed by: STUDENT IN AN ORGANIZED HEALTH CARE EDUCATION/TRAINING PROGRAM

## 2022-11-12 PROCEDURE — 36415 COLL VENOUS BLD VENIPUNCTURE: CPT | Performed by: STUDENT IN AN ORGANIZED HEALTH CARE EDUCATION/TRAINING PROGRAM

## 2022-11-12 PROCEDURE — 85025 COMPLETE CBC W/AUTO DIFF WBC: CPT | Performed by: PHYSICIAN ASSISTANT

## 2022-11-12 PROCEDURE — 85610 PROTHROMBIN TIME: CPT | Performed by: STUDENT IN AN ORGANIZED HEALTH CARE EDUCATION/TRAINING PROGRAM

## 2022-11-12 PROCEDURE — 99900035 HC TECH TIME PER 15 MIN (STAT)

## 2022-11-12 PROCEDURE — 20600001 HC STEP DOWN PRIVATE ROOM

## 2022-11-12 RX ORDER — WARFARIN 2 MG/1
4 TABLET ORAL DAILY
Status: DISCONTINUED | OUTPATIENT
Start: 2022-11-12 | End: 2022-11-13 | Stop reason: HOSPADM

## 2022-11-12 RX ORDER — WARFARIN 3 MG/1
3 TABLET ORAL DAILY
Status: DISCONTINUED | OUTPATIENT
Start: 2022-11-12 | End: 2022-11-12

## 2022-11-12 RX ADMIN — OXYCODONE HYDROCHLORIDE 10 MG: 10 TABLET ORAL at 05:11

## 2022-11-12 RX ADMIN — WARFARIN SODIUM 4 MG: 2 TABLET ORAL at 05:11

## 2022-11-12 RX ADMIN — MUPIROCIN 1 G: 20 OINTMENT TOPICAL at 08:11

## 2022-11-12 RX ADMIN — POTASSIUM CHLORIDE EXTENDED-RELEASE 20 MEQ: 1500 TABLET ORAL at 08:11

## 2022-11-12 RX ADMIN — FAMOTIDINE 20 MG: 20 TABLET ORAL at 08:11

## 2022-11-12 RX ADMIN — CITALOPRAM HYDROBROMIDE 10 MG: 10 TABLET ORAL at 08:11

## 2022-11-12 RX ADMIN — FUROSEMIDE 40 MG: 10 INJECTION, SOLUTION INTRAMUSCULAR; INTRAVENOUS at 05:11

## 2022-11-12 RX ADMIN — ATORVASTATIN CALCIUM 10 MG: 10 TABLET, FILM COATED ORAL at 08:11

## 2022-11-12 RX ADMIN — DRONEDARONE 400 MG: 400 TABLET, FILM COATED ORAL at 08:11

## 2022-11-12 RX ADMIN — FUROSEMIDE 40 MG: 10 INJECTION, SOLUTION INTRAMUSCULAR; INTRAVENOUS at 08:11

## 2022-11-12 RX ADMIN — ACETAMINOPHEN 1000 MG: 500 TABLET ORAL at 12:11

## 2022-11-12 RX ADMIN — METOPROLOL TARTRATE 12.5 MG: 25 TABLET, FILM COATED ORAL at 08:11

## 2022-11-12 RX ADMIN — DRONEDARONE 400 MG: 400 TABLET, FILM COATED ORAL at 05:11

## 2022-11-12 RX ADMIN — ACETAMINOPHEN 1000 MG: 500 TABLET ORAL at 08:11

## 2022-11-12 RX ADMIN — DOCUSATE SODIUM 200 MG: 100 CAPSULE ORAL at 08:11

## 2022-11-12 RX ADMIN — ACETAMINOPHEN 1000 MG: 500 TABLET ORAL at 09:11

## 2022-11-12 RX ADMIN — GABAPENTIN 300 MG: 300 CAPSULE ORAL at 08:11

## 2022-11-12 RX ADMIN — ASPIRIN 325 MG ORAL TABLET 325 MG: 325 PILL ORAL at 08:11

## 2022-11-12 NOTE — PROGRESS NOTES
Cardiac Surgery Progress Note     BISI overnight     Gtts  -     A/P  5 Days Post-Op s/p Mechanical MVR     INR 2.4, giving 4mg of coumadin tonight     * S/P MVR (mitral valve replacement)  - ASA  - Statin   - Tylenol, Gabapentin   - Bowel regimen increased   - PRN narcotics   - Home celexa   - Pepcid   - heparin gtt off   - INR goal 2.5-3.5   - BB  - Pacer wires and chest tubes removed   - Lasix and K   - Oxygen off   - PT/OT     Acute blood loss anemia  - Expected post operatively   - CBC daily      Hypophosphatemia  - Replaced PO   - Daily labs      S/P Maze operation for atrial fibrillation  - See s/p MVR      S/P TVR (tricuspid valve repair)  - See s/p MVR     Stress hyperglycemia  - Endocrine following      Atrial fibrillation  S/P MAZE   NSR     Dispo: likely home tomorrow if INR therapeutic    Jazlyn Victor MD, PGY-7  Cardiothoracic Surgery   [phone]

## 2022-11-12 NOTE — PLAN OF CARE
Pt cooperative with routine care and procedures. Pt VSS. Pt up with assist and reminded to call for assistance if increased weakness. Bed locked and in lowest position. Call bell within reach. Pt remained free from s/s of distress. Pain controlled with prn meds.     Problem: Adult Inpatient Plan of Care  Goal: Plan of Care Review  Outcome: Ongoing, Progressing  Flowsheets (Taken 11/12/2022 0754)  Plan of Care Reviewed With:   patient   spouse  Goal: Patient-Specific Goal (Individualized)  Outcome: Ongoing, Progressing  Flowsheets (Taken 11/12/2022 0754)  Anxieties, Fears or Concerns: none mentioned  Individualized Care Needs: Monitor dressing, s/s, and vs  Patient-Specific Goals (Include Timeframe): To regain independence before going home  Goal: Absence of Hospital-Acquired Illness or Injury  Outcome: Ongoing, Progressing  Goal: Optimal Comfort and Wellbeing  Outcome: Ongoing, Progressing  Goal: Readiness for Transition of Care  Outcome: Ongoing, Progressing     Problem: Activity Intolerance (Cardiovascular Surgery)  Goal: Improved Activity Tolerance  Outcome: Ongoing, Progressing     Problem: Adjustment to Surgery (Cardiovascular Surgery)  Goal: Optimal Coping with Heart Surgery  Outcome: Ongoing, Progressing     Problem: Bleeding (Cardiovascular Surgery)  Goal: Bleeding (Cardiovascular Surgery)  Outcome: Ongoing, Progressing     Problem: Bowel Motility Impaired (Cardiovascular Surgery)  Goal: Effective Bowel Elimination (Cardiovascular Surgery)  Outcome: Ongoing, Progressing     Problem: Cerebral Tissue Perfusion (Cardiovascular Surgery)  Goal: Optimal Cerebral Tissue Perfusion (Cardiovascular Surgery)  Outcome: Ongoing, Progressing     Problem: Fluid and Electrolyte Imbalance (Cardiovascular Surgery)  Goal: Fluid and Electrolyte Balance (Cardiovascular Surgery)  Outcome: Ongoing, Progressing     Problem: Glycemic Control Impaired (Cardiovascular Surgery)  Goal: Blood Glucose Level Within Targeted Range  (Cardiovascular Surgery)  Outcome: Ongoing, Progressing     Problem: Pain (Cardiovascular Surgery)  Goal: Acceptable Pain Control  Outcome: Ongoing, Progressing     Problem: Infection  Goal: Absence of Infection Signs and Symptoms  Outcome: Ongoing, Progressing     Problem: Device-Related Complication Risk (Mechanical Ventilation, Invasive)  Goal: Optimal Device Function  Outcome: Ongoing, Progressing     Problem: Fall Injury Risk  Goal: Absence of Fall and Fall-Related Injury  Outcome: Ongoing, Progressing

## 2022-11-13 VITALS
WEIGHT: 205 LBS | HEIGHT: 69 IN | RESPIRATION RATE: 16 BRPM | TEMPERATURE: 97 F | SYSTOLIC BLOOD PRESSURE: 106 MMHG | HEART RATE: 70 BPM | DIASTOLIC BLOOD PRESSURE: 59 MMHG | BODY MASS INDEX: 30.36 KG/M2 | OXYGEN SATURATION: 96 %

## 2022-11-13 LAB
ANION GAP SERPL CALC-SCNC: 13 MMOL/L (ref 8–16)
BASOPHILS # BLD AUTO: 0.04 K/UL (ref 0–0.2)
BASOPHILS NFR BLD: 0.5 % (ref 0–1.9)
BUN SERPL-MCNC: 20 MG/DL (ref 6–20)
CALCIUM SERPL-MCNC: 9.5 MG/DL (ref 8.7–10.5)
CHLORIDE SERPL-SCNC: 95 MMOL/L (ref 95–110)
CO2 SERPL-SCNC: 26 MMOL/L (ref 23–29)
CREAT SERPL-MCNC: 0.8 MG/DL (ref 0.5–1.4)
DIFFERENTIAL METHOD: ABNORMAL
EOSINOPHIL # BLD AUTO: 0.2 K/UL (ref 0–0.5)
EOSINOPHIL NFR BLD: 2 % (ref 0–8)
ERYTHROCYTE [DISTWIDTH] IN BLOOD BY AUTOMATED COUNT: 14 % (ref 11.5–14.5)
EST. GFR  (NO RACE VARIABLE): >60 ML/MIN/1.73 M^2
GLUCOSE SERPL-MCNC: 97 MG/DL (ref 70–110)
HCT VFR BLD AUTO: 31 % (ref 37–48.5)
HGB BLD-MCNC: 10 G/DL (ref 12–16)
IMM GRANULOCYTES # BLD AUTO: 0.08 K/UL (ref 0–0.04)
IMM GRANULOCYTES NFR BLD AUTO: 1 % (ref 0–0.5)
INR PPP: 2.4 (ref 0.8–1.2)
INR PPP: 2.6 (ref 0.8–1.2)
LYMPHOCYTES # BLD AUTO: 1.5 K/UL (ref 1–4.8)
LYMPHOCYTES NFR BLD: 18.6 % (ref 18–48)
MAGNESIUM SERPL-MCNC: 2.1 MG/DL (ref 1.6–2.6)
MCH RBC QN AUTO: 29.7 PG (ref 27–31)
MCHC RBC AUTO-ENTMCNC: 32.3 G/DL (ref 32–36)
MCV RBC AUTO: 92 FL (ref 82–98)
MONOCYTES # BLD AUTO: 1.5 K/UL (ref 0.3–1)
MONOCYTES NFR BLD: 18.4 % (ref 4–15)
NEUTROPHILS # BLD AUTO: 4.7 K/UL (ref 1.8–7.7)
NEUTROPHILS NFR BLD: 59.5 % (ref 38–73)
NRBC BLD-RTO: 0 /100 WBC
PHOSPHATE SERPL-MCNC: 3.6 MG/DL (ref 2.7–4.5)
PLATELET # BLD AUTO: 292 K/UL (ref 150–450)
PMV BLD AUTO: 10.5 FL (ref 9.2–12.9)
POTASSIUM SERPL-SCNC: 3.9 MMOL/L (ref 3.5–5.1)
PROTHROMBIN TIME: 24.1 SEC (ref 9–12.5)
PROTHROMBIN TIME: 25.2 SEC (ref 9–12.5)
RBC # BLD AUTO: 3.37 M/UL (ref 4–5.4)
SODIUM SERPL-SCNC: 134 MMOL/L (ref 136–145)
WBC # BLD AUTO: 7.9 K/UL (ref 3.9–12.7)

## 2022-11-13 PROCEDURE — 25000003 PHARM REV CODE 250: Performed by: STUDENT IN AN ORGANIZED HEALTH CARE EDUCATION/TRAINING PROGRAM

## 2022-11-13 PROCEDURE — 85610 PROTHROMBIN TIME: CPT | Mod: 91 | Performed by: STUDENT IN AN ORGANIZED HEALTH CARE EDUCATION/TRAINING PROGRAM

## 2022-11-13 PROCEDURE — 83735 ASSAY OF MAGNESIUM: CPT | Performed by: PHYSICIAN ASSISTANT

## 2022-11-13 PROCEDURE — 80048 BASIC METABOLIC PNL TOTAL CA: CPT | Performed by: PHYSICIAN ASSISTANT

## 2022-11-13 PROCEDURE — 36415 COLL VENOUS BLD VENIPUNCTURE: CPT | Performed by: STUDENT IN AN ORGANIZED HEALTH CARE EDUCATION/TRAINING PROGRAM

## 2022-11-13 PROCEDURE — 85610 PROTHROMBIN TIME: CPT | Performed by: STUDENT IN AN ORGANIZED HEALTH CARE EDUCATION/TRAINING PROGRAM

## 2022-11-13 PROCEDURE — 85025 COMPLETE CBC W/AUTO DIFF WBC: CPT | Performed by: PHYSICIAN ASSISTANT

## 2022-11-13 PROCEDURE — 94799 UNLISTED PULMONARY SVC/PX: CPT

## 2022-11-13 PROCEDURE — 84100 ASSAY OF PHOSPHORUS: CPT | Performed by: PHYSICIAN ASSISTANT

## 2022-11-13 PROCEDURE — 63600175 PHARM REV CODE 636 W HCPCS: Performed by: STUDENT IN AN ORGANIZED HEALTH CARE EDUCATION/TRAINING PROGRAM

## 2022-11-13 PROCEDURE — 25000003 PHARM REV CODE 250: Performed by: PHYSICIAN ASSISTANT

## 2022-11-13 PROCEDURE — 99900035 HC TECH TIME PER 15 MIN (STAT)

## 2022-11-13 RX ORDER — WARFARIN SODIUM 5 MG/1
5 TABLET ORAL DAILY
Qty: 30 TABLET | Refills: 11 | Status: SHIPPED | OUTPATIENT
Start: 2022-11-13 | End: 2023-04-03 | Stop reason: SDUPTHER

## 2022-11-13 RX ORDER — WARFARIN 4 MG/1
4 TABLET ORAL DAILY
Qty: 30 TABLET | Refills: 11 | Status: SHIPPED | OUTPATIENT
Start: 2022-11-13 | End: 2022-11-13 | Stop reason: SDUPTHER

## 2022-11-13 RX ORDER — WARFARIN 2 MG/1
2 TABLET ORAL ONCE
Status: COMPLETED | OUTPATIENT
Start: 2022-11-13 | End: 2022-11-13

## 2022-11-13 RX ADMIN — POTASSIUM CHLORIDE EXTENDED-RELEASE 20 MEQ: 1500 TABLET ORAL at 09:11

## 2022-11-13 RX ADMIN — FAMOTIDINE 20 MG: 20 TABLET ORAL at 09:11

## 2022-11-13 RX ADMIN — FUROSEMIDE 40 MG: 10 INJECTION, SOLUTION INTRAMUSCULAR; INTRAVENOUS at 09:11

## 2022-11-13 RX ADMIN — WARFARIN SODIUM 2 MG: 2 TABLET ORAL at 09:11

## 2022-11-13 RX ADMIN — METOPROLOL TARTRATE 12.5 MG: 25 TABLET, FILM COATED ORAL at 09:11

## 2022-11-13 RX ADMIN — CITALOPRAM HYDROBROMIDE 10 MG: 10 TABLET ORAL at 09:11

## 2022-11-13 RX ADMIN — ASPIRIN 325 MG ORAL TABLET 325 MG: 325 PILL ORAL at 09:11

## 2022-11-13 RX ADMIN — DRONEDARONE 400 MG: 400 TABLET, FILM COATED ORAL at 10:11

## 2022-11-13 RX ADMIN — DOCUSATE SODIUM 200 MG: 100 CAPSULE ORAL at 09:11

## 2022-11-13 RX ADMIN — OXYCODONE HYDROCHLORIDE 10 MG: 10 TABLET ORAL at 09:11

## 2022-11-13 NOTE — DISCHARGE SUMMARY
[facility] - Cardiology Stepdown  Cardiothoracic Surgery  Discharge Summary      Patient Name: Riya Soto  MRN: [id number]  Admission Date: 11/7/2022  Hospital Length of Stay: 6 days  Discharge Date and Time:  11/13/2022 2:28 PM  Attending Physician: Bobby Gilbert MD   Discharging Provider: Jazlyn Victor MD  Primary Care Provider: Pantera Yadav MD    HPI:   Very pleasant 52-year-old woman with mixed mitral stenosis and mitral regurgitation as well as severe tricuspid regurgitation and atrial fibrillation.  We plan redo sternotomy, mitral valve replacement with a mechanical prosthesis, tricuspid valve repair, and Maze procedure.  After discussion of the advantages and disadvantages of tissue and mechanical prostheses she indicated she desires a mechanical valve.  Her questions have been answered, and she wishes to proceed.      Procedure(s) (LRB):  REPLACEMENT, MITRAL VALVE (N/A)  REPAIR, TRICUSPID VALVE (N/A)  MAZE (N/A)  STERNOTOMY (N/A)      Indwelling Lines/Drains at time of discharge:   Lines/Drains/Airways     None               Hospital Course: On 11/7/22, the patient was taken to the Operating Room for the above stated procedure. Please see the previously dictated operative report for complete details. Postoperatively, the patient was taken from the  Operating Room to the ICU where the vital signs were monitored and pain was kept under control. The patient was weaned from the drips and extubated in the ICU per protocol. Once hemodynamically stable, the patient was transferred to the Cardiac Step-Down floor for continued strengthening and ambulation. On postoperative day 6, the patient was ready for discharge to home. At the time of discharge, the patient was ambulating unassisted. Pain was well controlled with oral analgesics and the patient was tolerating the diet.     MOBILITY AND ACTIVITY: As tolerated. Patient may shower. No heavy lifting of greater than 5 pounds and no driving.     DIET:  An 1800-calorie ADA with a 1500 mL fluid restriction.     WOUND CARE INSTRUCTIONS: Check for redness, swelling and drainage around the  incision or wound. Patient is to call for any obvious bleeding, drainage, pus from the wound, unusual problems or difficulties or temperature of greater than 101   degrees.     FOLLOWUP: Follow up with Dr. Gilbert in approximately 3 weeks. Prior to this  appointment, the patient will have a chest x-ray and EKG.     Patient not placed on Ace-Inhibitor at the time of discharge due to potential for hypotension     Coumadin to be followed by the coumadin clinic with Dr. Chelsea Sears as cardiologist. INR goal 2.5-3.5    DISCHARGE CONDITION: At the time of discharge, the patient was in sinus rhythm and afebrile with stable vital signs.      NYHA 2      Goals of Care Treatment Preferences:  Code Status: Full Code      Consults (From admission, onward)        Status Ordering Provider     Inpatient consult to Cardiac Rehab  Once        Provider:  (Not yet assigned)    Completed MICAH YOUNG     Inpatient consult to Registered Dietitian/Nutritionist  Once        Provider:  (Not yet assigned)    Completed MICAH YOUNG     Consult to Endocrinology  Once        Provider:  (Not yet assigned)    Completed MICAH YOUNG     Consult Case Management/Social Work  Once        Provider:  (Not yet assigned)    Acknowledged HALLIE MEHTA          Significant Diagnostic Studies:     Pending Diagnostic Studies:     Procedure Component Value Units Date/Time    Specimen to Pathology, Surgery Cardiovascular [[id number]] Collected: 11/07/22 0100    Order Status: Sent Lab Status: In process Updated: 11/07/22 9926    Specimen: Tissue           * S/P MVR (mitral valve replacement)  - ASA  - Statin   - Tylenol, Gabapentin   - Bowel regimen increased   - PRN narcotics   - Home celexa   - Pepcid   - heparin gtt off   - INR goal 2.5-3.5   - BB  - Pacer wires and chest tubes removed   -  Lasix and K   - Oxygen off   - PT/OT        Hyperlipidemia  - Statin     Overweight (BMI 25.0-29.9)  - PT/OT   - Cardiac diet       Final Active Diagnoses:    Diagnosis Date Noted POA    PRINCIPAL PROBLEM:  S/P MVR (mitral valve replacement) [Z95.2] 07/26/2019 Not Applicable    S/P TVR (tricuspid valve repair) [Z98.890] 11/10/2022 Not Applicable    S/P Maze operation for atrial fibrillation [Z98.890, Z86.79] 11/10/2022 Not Applicable    Hypophosphatemia [E83.39] 11/10/2022 Unknown    Acute blood loss anemia [D62] 11/10/2022 Unknown    Stress hyperglycemia [R73.9] 11/07/2022 No    Mitral valve insufficiency [I34.0] 10/13/2022 Yes    Atrial fibrillation [I48.91] 03/04/2022 Yes    Impaired functional mobility and activity tolerance [Z74.09] 02/17/2022 Yes    Hyperlipidemia [E78.5] 07/26/2019 Yes    Overweight (BMI 25.0-29.9) [E66.3] 07/26/2019 Yes      Problems Resolved During this Admission:      Discharged Condition: good    Disposition: Home or Self Care    Follow Up:   Follow-up Information     [facility] Follow up.    Specialties: Home Health Services, Home Therapy Services, Home Living Aide Services  Why: They will contact you to schedule date and time to begin home health services.  Contact information:  [address] [place] [address]  [phone]           Bobby Gilbert MD Follow up in 5 week(s).    Specialties: Cardiothoracic Surgery, Transplant  Contact information:  [address] [facility] [address]  [phone]                       Patient Instructions:      Ambulatory referral/consult to Anticoagulation Monitoring   Standing Status: Future   Referral Priority: Routine Referral Type: Consultation   Referral Reason: Specialty Services Required   Requested Specialty: Cardiology   Number of Visits Requested: 1     Ambulatory referral/consult to Home Health   Standing Status: Future   Referral Priority: Routine Referral Type: Home Health    Referral Reason: Specialty Services Required   Requested Specialty: Home Health Services   Number of Visits Requested: 1     Diet Cardiac     No driving until:   Order Comments: 4 weeks     Lifting restrictions   Order Comments: No lifting more than 5lbs for 6 weeks     Notify your health care provider if you experience any of the following:  temperature >100.4     Notify your health care provider if you experience any of the following:  persistent nausea and vomiting or diarrhea     Notify your health care provider if you experience any of the following:  severe uncontrolled pain     Notify your health care provider if you experience any of the following:  redness, tenderness, or signs of infection (pain, swelling, redness, odor or green/yellow discharge around incision site)     Notify your health care provider if you experience any of the following:  difficulty breathing or increased cough     No dressing needed     Medications:  Reconciled Home Medications:      Medication List      START taking these medications    acetaminophen 500 MG tablet  Commonly known as: TYLENOL  Take 2 tablets (1,000 mg total) by mouth every 6 (six) hours as needed for Pain.     aspirin 81 MG EC tablet  Commonly known as: ECOTRIN  Take 1 tablet (81 mg total) by mouth once daily.     docusate sodium 100 MG capsule  Commonly known as: COLACE  Take 2 capsules (200 mg total) by mouth 2 (two) times daily as needed for Constipation.     famotidine 20 MG tablet  Commonly known as: PEPCID  Take 1 tablet (20 mg total) by mouth 2 (two) times daily.     furosemide 20 MG tablet  Commonly known as: LASIX  Take one tablet by mouth twice daily for 7 days then decrease to once daily     gabapentin 300 MG capsule  Commonly known as: NEURONTIN  Take 1 capsule (300 mg total) by mouth every evening.     metoprolol tartrate 25 MG tablet  Commonly known as: LOPRESSOR  Take 0.5 tablets (12.5 mg total) by mouth 2 (two) times daily.     oxyCODONE 5 MG  immediate release tablet  Commonly known as: ROXICODONE  Take 1 tablet (5 mg total) by mouth every 4 (four) hours as needed for Pain.     polyethylene glycol 17 gram Pwpk  Commonly known as: GLYCOLAX  Take 17 g by mouth 2 (two) times daily as needed (constipation).     potassium chloride SA 20 MEQ tablet  Commonly known as: K-DUR,KLOR-CON  Take one tablet by mouth twice daily for 7 days then decrease to once daily     warfarin 5 MG tablet  Commonly known as: COUMADIN  Take 1 tablet (5 mg total) by mouth Daily.        CHANGE how you take these medications    atorvastatin 10 MG tablet  Commonly known as: LIPITOR  Take 1 tablet (10 mg total) by mouth every evening.  What changed: when to take this        CONTINUE taking these medications    cyproheptadine 4 mg tablet  Commonly known as: PERIACTIN  Half p.o. b.i.d.--not too sleepy 1 p.o. b.i.d. to increase appetite     dronedarone 400 mg Tab  Commonly known as: MULTAQ  Take 1 tablet (400 mg total) by mouth 2 (two) times daily with meals.        STOP taking these medications    ELIQUIS 5 mg Tab  Generic drug: apixaban     losartan 25 MG tablet  Commonly known as: COZAAR     metoprolol succinate 25 MG 24 hr tablet  Commonly known as: TOPROL-XL          Time spent on the discharge of patient: 30 minutes    Jazlyn Victor MD  Cardiothoracic Surgery  [facility]

## 2022-11-13 NOTE — ASSESSMENT & PLAN NOTE
- ASA  - Statin   - Tylenol, Gabapentin   - Bowel regimen increased   - PRN narcotics   - Home celexa   - Pepcid   - heparin gtt off   - INR goal 2.5-3.5   - BB  - Pacer wires and chest tubes removed   - Lasix and K   - Oxygen off   - PT/OT

## 2022-11-13 NOTE — DISCHARGE INSTRUCTIONS
Discharge Instructions    The hospital and staff members extend their best wishes to you as you are discharged. Because we are most concerned with your health, we suggest you carefully read the following instructions.    Activity Instructions  Ambulate.  No strenuous Exercise   May shower with soap and water.  Do not let shower hit incision directly.  Dry well.       Restrictions:   Sternal precautions.  No heavy lifting more than 5lbs for 6 weeks.  No driving for 4 weeks.     Diet:   Low Salt/Chol/Fat diet    Wound Care Instruction  Keep incision/wound dry-no tub baths.  Clean with soap and water daily. Do not apply ointments or powder to incision sites.   Remove old chest tube dressing tomorrow and leave open to air unless drainage noted then change daily.      When to call the doctors:  For any obvious bleeding, Fever over 101 degrees, Redness or swelling around the incision, Drainage(pus) from the wound, Unusual problems or difficulties, Persistent pain not relieved by the pain medication.    Call [phone] to have the operators page the doctor on call for Cardiothoracic surgery after hours with questions or concerns      Current pain management regimen    IF PAIN INTENSIFIES OR PAIN IS UNRELIEVED WITH MEDICATIONS AS ORDERED, CALL YOUR DOCTOR

## 2022-11-14 ENCOUNTER — PATIENT MESSAGE (OUTPATIENT)
Dept: CARDIOLOGY | Facility: CLINIC | Age: 52
End: 2022-11-14

## 2022-11-14 ENCOUNTER — TELEPHONE (OUTPATIENT)
Dept: CARDIOTHORACIC SURGERY | Facility: CLINIC | Age: 52
End: 2022-11-14
Payer: COMMERCIAL

## 2022-11-14 ENCOUNTER — ANTI-COAG VISIT (OUTPATIENT)
Dept: CARDIOLOGY | Facility: CLINIC | Age: 52
End: 2022-11-14
Payer: COMMERCIAL

## 2022-11-14 DIAGNOSIS — Z79.01 LONG TERM (CURRENT) USE OF ANTICOAGULANTS: ICD-10-CM

## 2022-11-14 DIAGNOSIS — Z95.2 S/P MVR (MITRAL VALVE REPLACEMENT): Primary | ICD-10-CM

## 2022-11-14 DIAGNOSIS — I48.91 ATRIAL FIBRILLATION, UNSPECIFIED TYPE: ICD-10-CM

## 2022-11-14 LAB
INR PPP: 4.5
INR PPP: 4.5

## 2022-11-14 PROCEDURE — 93793 ANTICOAG MGMT PT WARFARIN: CPT | Mod: S$GLB,,,

## 2022-11-14 PROCEDURE — 93793 PR ANTICOAGULANT MGMT FOR PT TAKING WARFARIN: ICD-10-PCS | Mod: S$GLB,,,

## 2022-11-14 PROCEDURE — G0180 PR HOME HEALTH MD CERTIFICATION: ICD-10-PCS | Mod: ,,, | Performed by: THORACIC SURGERY (CARDIOTHORACIC VASCULAR SURGERY)

## 2022-11-14 PROCEDURE — G0180 MD CERTIFICATION HHA PATIENT: HCPCS | Mod: ,,, | Performed by: THORACIC SURGERY (CARDIOTHORACIC VASCULAR SURGERY)

## 2022-11-14 NOTE — PROGRESS NOTES
New Enrollment: Coumadin Clinic    52-year-old woman with recent hospitalization 11/7/22 - 11/13/22 for mechanical mitral valve replacement. Her PMH is significant for mixed mitral stenosis, mitral regurgitation, severe tricuspid regurgitation, atrial fibrillation (on apixaban prior to admission) and HLD. She was started on warfarin and heparin 11/8/22. Heparin discontinued 11/11/22. She was discharged on warfarin 5 mg daily on 11/13/22. INR goal is 2.5-3.5. Recheck INR 11/14/22.

## 2022-11-14 NOTE — PROGRESS NOTES
Update: Patient received warfarin 2 mg 11/13/22 prior to discharge. Patient took an additional 5 mg at home therefore a total of 7 mg given on 11/13/22. Patient already took 5 mg dose today - 11/14/22. Will advise patient to have an extra serving of high vitamin K foods today and to hold warfarin dose tomorrow, 11/15/22. Recheck INR with HH visit on 11/16/22. Patient to go to ED if any s/sx of bleeding.

## 2022-11-14 NOTE — PROGRESS NOTES
Spoke to patient regarding enrollment into the Coumadin Clinic. Patient verified that she has a 5mg warfarin tablet, and a warfarin regimen of 5mg (1 tablet) qPM. INR today, 11.14.22, with [facility] ([facility]).    Diet and when to call reviewed. Patient's questions and concerns addressed at this time, and she expressed understanding. Education sheets sent via patient portal to reinforce teaching.    Patient will eat foods with vitamin K 1x/week , and avoid EtOH. The risks associated with consuming EtOH, while taking warfarin, and the importance of a consistent diet discussed.

## 2022-11-14 NOTE — PROGRESS NOTES
Patient took 5 mg coumadin on Sun 11/13 and Mon 11/14, states that she was not aware that she was suppose to take coumadin  in the evening. Home health visits are on Mon, Wed and Friday. Please advise.

## 2022-11-14 NOTE — TELEPHONE ENCOUNTER
Called pt following hospital discharge.  Reiterated the need for pt to clean her incision everyday with soap and water, and to walk as much as she can.  Reminded pt not to drive for the first 4 weeks after surgery, and to refrain from lifting, pushing, and pulling anything greater than 5 pounds for the first 6 weeks following her surgery. Pt instructed to call the clinic with any questions or concerns.  Pt verbalized understanding.

## 2022-11-14 NOTE — PROGRESS NOTES
INR not at goal. Medications, chart, and patient findings reviewed. See calendar for adjustments to dose and follow up plan. Large increase in INR from 11/13 to 11/14. Hold warfarin today and recheck INR 11/15. Patient to report to ED for any s/sx of bleeding.

## 2022-11-14 NOTE — PLAN OF CARE
[facility] - Cardiology Stepdown  Discharge Final Note    Primary Care Provider: Pantera Yadav MD    Expected Discharge Date: 11/13/2022    Final Discharge Note (most recent)       Final Note - 11/14/22 0827          Final Note    Assessment Type Final Discharge Note     Anticipated Discharge Disposition Home or Self Care     Hospital Resources/Appts/Education Provided Provided patient/caregiver with written discharge plan information;Appointments scheduled by Navigator/Coordinator;Appointments scheduled and added to AVS                     Important Message from Medicare             Contact Info       [facility]   Specialty: Home Health Services, Home Therapy Services, Home Living Aide Services    [address] [place] [address]   Phone: [phone]       Next Steps: Follow up    Instructions: They will contact you to schedule date and time to begin home health services.    Bobby Gilbert MD   Specialty: Cardiothoracic Surgery, Transplant   Relationship: Consulting Physician    [facility]  [facility] [address]   Phone: [phone]       Next Steps: Follow up in 5 week(s)          OREN Story, RN    [phone]

## 2022-11-15 ENCOUNTER — TELEPHONE (OUTPATIENT)
Dept: ADMINISTRATIVE | Facility: CLINIC | Age: 52
End: 2022-11-15
Payer: COMMERCIAL

## 2022-11-15 ENCOUNTER — PATIENT OUTREACH (OUTPATIENT)
Dept: ADMINISTRATIVE | Facility: CLINIC | Age: 52
End: 2022-11-15
Payer: COMMERCIAL

## 2022-11-15 ENCOUNTER — PES CALL (OUTPATIENT)
Dept: ADMINISTRATIVE | Facility: CLINIC | Age: 52
End: 2022-11-15
Payer: COMMERCIAL

## 2022-11-15 DIAGNOSIS — Z95.2 S/P MITRAL VALVE REPLACEMENT: Primary | ICD-10-CM

## 2022-11-15 LAB
FINAL PATHOLOGIC DIAGNOSIS: NORMAL
GROSS: NORMAL
Lab: NORMAL

## 2022-11-15 NOTE — PROGRESS NOTES
C3 nurse spoke with Riya Soto  for a TCC post hospital discharge follow up call. The patient does not have a scheduled HOSFU appointment with Pantera Yadav MD  within 5-7 days post hospital discharge date 11/13/2022. C3 nurse was unable to schedule HOSFU appointment in [facility].  Unable to schedule NP appointment.  NP referral placed for Kait Gomez NP    Message sent to PCP staff requesting they contact patient and schedule follow up appointment.

## 2022-11-15 NOTE — PROGRESS NOTES
"Phoned patient in response to reply of "2" to post-discharge texting tracker. Patient states she had no medication or supply issues as well as no questions or concerns at this time. She has the OOC number in discharge paperwork and I encouraged her to reach out if any symptoms should arise; or questions, concerns should present later.      "

## 2022-11-16 ENCOUNTER — ANTI-COAG VISIT (OUTPATIENT)
Dept: CARDIOLOGY | Facility: CLINIC | Age: 52
End: 2022-11-16
Payer: COMMERCIAL

## 2022-11-16 DIAGNOSIS — Z79.01 LONG TERM (CURRENT) USE OF ANTICOAGULANTS: ICD-10-CM

## 2022-11-16 DIAGNOSIS — I48.91 ATRIAL FIBRILLATION, UNSPECIFIED TYPE: ICD-10-CM

## 2022-11-16 DIAGNOSIS — Z95.2 S/P MVR (MITRAL VALVE REPLACEMENT): Primary | ICD-10-CM

## 2022-11-16 LAB — INR PPP: 3.8

## 2022-11-17 ENCOUNTER — OFFICE VISIT (OUTPATIENT)
Dept: HOME HEALTH SERVICES | Facility: CLINIC | Age: 52
End: 2022-11-17
Payer: COMMERCIAL

## 2022-11-17 VITALS
SYSTOLIC BLOOD PRESSURE: 140 MMHG | RESPIRATION RATE: 16 BRPM | WEIGHT: 194 LBS | DIASTOLIC BLOOD PRESSURE: 70 MMHG | TEMPERATURE: 98 F | OXYGEN SATURATION: 98 % | HEART RATE: 83 BPM | BODY MASS INDEX: 28.73 KG/M2 | HEIGHT: 69 IN

## 2022-11-17 DIAGNOSIS — I34.0 MITRAL VALVE INSUFFICIENCY, UNSPECIFIED ETIOLOGY: ICD-10-CM

## 2022-11-17 DIAGNOSIS — Z98.890 S/P TVR (TRICUSPID VALVE REPAIR): ICD-10-CM

## 2022-11-17 DIAGNOSIS — Z79.01 ANTICOAGULANT LONG-TERM USE: ICD-10-CM

## 2022-11-17 DIAGNOSIS — Z95.2 S/P MITRAL VALVE REPLACEMENT: ICD-10-CM

## 2022-11-17 DIAGNOSIS — Z09 HOSPITAL DISCHARGE FOLLOW-UP: ICD-10-CM

## 2022-11-17 DIAGNOSIS — I48.91 ATRIAL FIBRILLATION, UNSPECIFIED TYPE: ICD-10-CM

## 2022-11-17 DIAGNOSIS — Z95.2 S/P MVR (MITRAL VALVE REPLACEMENT): Primary | ICD-10-CM

## 2022-11-17 DIAGNOSIS — Z86.79 S/P MAZE OPERATION FOR ATRIAL FIBRILLATION: ICD-10-CM

## 2022-11-17 DIAGNOSIS — Z98.890 S/P MAZE OPERATION FOR ATRIAL FIBRILLATION: ICD-10-CM

## 2022-11-17 PROCEDURE — 3078F DIAST BP <80 MM HG: CPT | Mod: CPTII,S$GLB,, | Performed by: NURSE PRACTITIONER

## 2022-11-17 PROCEDURE — 1159F PR MEDICATION LIST DOCUMENTED IN MEDICAL RECORD: ICD-10-PCS | Mod: CPTII,S$GLB,, | Performed by: NURSE PRACTITIONER

## 2022-11-17 PROCEDURE — 3077F PR MOST RECENT SYSTOLIC BLOOD PRESSURE >= 140 MM HG: ICD-10-PCS | Mod: CPTII,S$GLB,, | Performed by: NURSE PRACTITIONER

## 2022-11-17 PROCEDURE — 99496 TRANSJ CARE MGMT HIGH F2F 7D: CPT | Mod: S$GLB,,, | Performed by: NURSE PRACTITIONER

## 2022-11-17 PROCEDURE — 3078F PR MOST RECENT DIASTOLIC BLOOD PRESSURE < 80 MM HG: ICD-10-PCS | Mod: CPTII,S$GLB,, | Performed by: NURSE PRACTITIONER

## 2022-11-17 PROCEDURE — 3044F PR MOST RECENT HEMOGLOBIN A1C LEVEL <7.0%: ICD-10-PCS | Mod: CPTII,S$GLB,, | Performed by: NURSE PRACTITIONER

## 2022-11-17 PROCEDURE — 3008F BODY MASS INDEX DOCD: CPT | Mod: CPTII,S$GLB,, | Performed by: NURSE PRACTITIONER

## 2022-11-17 PROCEDURE — 3044F HG A1C LEVEL LT 7.0%: CPT | Mod: CPTII,S$GLB,, | Performed by: NURSE PRACTITIONER

## 2022-11-17 PROCEDURE — 1159F MED LIST DOCD IN RCRD: CPT | Mod: CPTII,S$GLB,, | Performed by: NURSE PRACTITIONER

## 2022-11-17 PROCEDURE — 4010F ACE/ARB THERAPY RXD/TAKEN: CPT | Mod: CPTII,S$GLB,, | Performed by: NURSE PRACTITIONER

## 2022-11-17 PROCEDURE — 3077F SYST BP >= 140 MM HG: CPT | Mod: CPTII,S$GLB,, | Performed by: NURSE PRACTITIONER

## 2022-11-17 PROCEDURE — 3008F PR BODY MASS INDEX (BMI) DOCUMENTED: ICD-10-PCS | Mod: CPTII,S$GLB,, | Performed by: NURSE PRACTITIONER

## 2022-11-17 PROCEDURE — 4010F PR ACE/ARB THEARPY RXD/TAKEN: ICD-10-PCS | Mod: CPTII,S$GLB,, | Performed by: NURSE PRACTITIONER

## 2022-11-17 PROCEDURE — 99496 TRANSITIONAL CARE MANAGE SERVICE 7 DAY DISCHARGE: ICD-10-PCS | Mod: S$GLB,,, | Performed by: NURSE PRACTITIONER

## 2022-11-17 PROCEDURE — 1160F PR REVIEW ALL MEDS BY PRESCRIBER/CLIN PHARMACIST DOCUMENTED: ICD-10-PCS | Mod: CPTII,S$GLB,, | Performed by: NURSE PRACTITIONER

## 2022-11-17 PROCEDURE — 1160F RVW MEDS BY RX/DR IN RCRD: CPT | Mod: CPTII,S$GLB,, | Performed by: NURSE PRACTITIONER

## 2022-11-17 NOTE — PROGRESS NOTES
[facility]  Transition of Care Home Visit    Visit Date: 11/17/2022  Encounter Provider: Kait Gomez   PCP:  Pantera Yadav MD    PRESENTING HISTORY      Patient ID: Riya Soto is a 52 y.o. female.    Consult Requested By:  Dr. Pantera Yadav  Reason for Consult:  Hospital Follow Up.    Riya is being seen at home due to being seen at home due to physical debility that presents a taxing effort to leave the home, to mitigate high risk of hospital readmission and/or due to the limited availability of reliable or safe options for transportation to the point of access to the provider. Prior to treatment on this visit the chart was reviewed and patient verbal consent was obtained.      Chief Complaint: Transitional Care        History of Present Illness: Ms. Riya Soto is a 52 y.o. female who was recently admitted to the hospital.    Admit: 11/7/22    Discharge: 11/13/22  HPI:   Very pleasant 52-year-old woman with mixed mitral stenosis and mitral regurgitation as well as severe tricuspid regurgitation and atrial fibrillation.  We plan redo sternotomy, mitral valve replacement with a mechanical prosthesis, tricuspid valve repair, and Maze procedure.  After discussion of the advantages and disadvantages of tissue and mechanical prostheses she indicated she desires a mechanical valve.  Her questions have been answered, and she wishes to proceed.        Procedure(s) (LRB):  REPLACEMENT, MITRAL VALVE (N/A)  REPAIR, TRICUSPID VALVE (N/A)  MAZE (N/A)  STERNOTOMY (N/A)           Hospital Course: On 11/7/22, the patient was taken to the Operating Room for the above stated procedure. Please see the previously dictated operative report for complete details. Postoperatively, the patient was taken from the  Operating Room to the ICU where the vital signs were monitored and pain was kept under control. The patient was weaned from the drips and extubated in the ICU per protocol. Once hemodynamically  "stable, the patient was transferred to the Cardiac Step-Down floor for continued strengthening and ambulation. On postoperative day 6, the patient was ready for discharge to home. At the time of discharge, the patient was ambulating unassisted. Pain was well controlled with oral analgesics and the patient was tolerating the diet.  ___________________________________________________________________    Today:    HPI:    Riya is being seen and examined at home today for transitional care visit in the home environment post-discharge from inpatient hospitalization encounter described above. Patient presents at baseline state of health as reported by patient and caregiver.     Riya is found at home with spouse present. She reports she is doing "ok", still has chest soreness near incision when working with PT. Using heart pillow to splint chest with coughing. Reports taking coumadin as prescribed, Home Health to draw labs today. To see CT surgeon 12/8/22.     VSS. Denies fever, chest pain, shortness of breath, nausea, vomiting, diarrhea. Denies any acute issues, concerns or complaints to address on today's visit. Reports taking all medications as prescribed. No other needs identified at this time.       Review of Systems   Constitutional:  Positive for activity change. Negative for fatigue.  HENT:  Negative for nosebleeds.    Eyes:  Negative for visual disturbance.   Respiratory:  Negative for shortness of breath.    Cardiovascular:  Negative for chest pain. Incision site soreness with exertion.   Gastrointestinal:  Negative for nausea.   Musculoskeletal:  Negative for gait problem.   Skin:  Negative for color change.   Neurological:  Negative for dizziness and seizures.   Hematological:  Does not bruise/bleed easily.   Psychiatric/Behavioral:  Negative for sleep disturbance.        Assessments:  Environmental: lives in elevated 2 story home with steps leading into entrance. Lives with spouse, home is clean with adequate " "lighting and temperature  Functional Status: independent  Safety: no issues identified  Nutritional: adequate access, reports "good" appetite  Home Health/DME/Supplies: [facility]. DME: none    PAST HISTORY:     Past Medical History:   Diagnosis Date    A-fib     Atrial fibrillation     Back pain     Hyperlipidemia     Mitral incompetence     Mitral valve stenosis     Neck pain        Past Surgical History:   Procedure Laterality Date    ABLATION N/A 11/7/2022    Procedure: MAZE;  Surgeon: Bobby Gilbert MD;  Location: [address];  Service: Cardiothoracic;  Laterality: N/A;  Cryoablation    CARDIAC SURGERY      to repair mitral valve    CHOLECYSTECTOMY      COLONOSCOPY      COLONOSCOPY N/A 6/8/2022    Procedure: COLONOSCOPY;  Surgeon: Sanford Andres MD;  Location: [facility] ENDO;  Service: Endoscopy;  Laterality: N/A;    CORONARY ANGIOGRAPHY N/A 8/29/2022    Procedure: ANGIOGRAM, CORONARY ARTERY;  Surgeon: Lexa Harley MD;  Location: [facility] CATH LAB;  Service: Cardiology;  Laterality: N/A;    MITRAL VALVE REPLACEMENT N/A 11/7/2022    Procedure: REPLACEMENT, MITRAL VALVE;  Surgeon: Bobby Gilbert MD;  Location: [address];  Service: Cardiothoracic;  Laterality: N/A;  REDO STERNOTOMY    STERNOTOMY N/A 11/7/2022    Procedure: STERNOTOMY;  Surgeon: Bobby Gilbert MD;  Location: [address];  Service: Cardiothoracic;  Laterality: N/A;  Redo sternotomy    TRANSESOPHAGEAL ECHOCARDIOGRAPHY N/A 03/29/2022    Procedure: ECHOCARDIOGRAM, TRANSESOPHAGEAL;  Surgeon: Dominic Wallis MD;  Location: [facility] EP LAB;  Service: Cardiology;  Laterality: N/A;    TREATMENT OF CARDIAC ARRHYTHMIA N/A 03/29/2022    Procedure: Cardioversion or Defibrillation;  Surgeon: Elmo Jones MD;  Location: [facility] EP LAB;  Service: Cardiology;  Laterality: N/A;  afib, dccv, [place], [place], [place], 3prep, Provider to perform     TRICUSPID VALVULOPLASTY N/A 11/7/2022    Procedure: REPAIR, TRICUSPID VALVE;  Surgeon: Bobby Gilbert, " MD;  Location: [facility] OR [address] [place] [address];  Service: Cardiothoracic;  Laterality: N/A;       Family History   Problem Relation Age of Onset    Breast cancer Sister     Colon cancer Maternal Aunt     Ovarian cancer Neg Hx        Social History     Socioeconomic History    Marital status: Single   Tobacco Use    Smoking status: Never    Smokeless tobacco: Never   Substance and Sexual Activity    Alcohol use: Yes     Comment: socially    Drug use: No    Sexual activity: Yes     Partners: Male     Social Determinants of Health     Financial Resource Strain: Low Risk     Difficulty of Paying Living Expenses: Not hard at all   Food Insecurity: No Food Insecurity    Worried About Running Out of Food in the Last Year: Never true    Ran Out of Food in the Last Year: Never true   Transportation Needs: No Transportation Needs    Lack of Transportation (Medical): No    Lack of Transportation (Non-Medical): No   Stress: No Stress Concern Present    Feeling of Stress : Not at all   Social Connections: Unknown    Frequency of Communication with Friends and Family: Three times a week    Frequency of Social Gatherings with Friends and Family: Three times a week    Marital Status: Never    Housing Stability: Unknown    Unable to Pay for Housing in the Last Year: No    Unstable Housing in the Last Year: No       MEDICATIONS & ALLERGIES:     Current Outpatient Medications on File Prior to Visit   Medication Sig Dispense Refill    acetaminophen (TYLENOL) 500 MG tablet Take 2 tablets (1,000 mg total) by mouth every 6 (six) hours as needed for Pain. 30 tablet 0    aspirin (ECOTRIN) 81 MG EC tablet Take 1 tablet (81 mg total) by mouth once daily. 30 tablet 11    atorvastatin (LIPITOR) 10 MG tablet Take 1 tablet (10 mg total) by mouth every evening. 30 tablet 11    docusate sodium (COLACE) 100 MG capsule Take 2 capsules (200 mg total) by mouth 2 (two) times daily as needed for Constipation. 30 capsule 0    dronedarone (MULTAQ) 400 mg Tab Take 1  tablet (400 mg total) by mouth 2 (two) times daily with meals. 60 tablet 11    famotidine (PEPCID) 20 MG tablet Take 1 tablet (20 mg total) by mouth 2 (two) times daily. 60 tablet 11    furosemide (LASIX) 20 MG tablet Take one tablet by mouth twice daily for 7 days then decrease to once daily 30 tablet 3    gabapentin (NEURONTIN) 300 MG capsule Take 1 capsule (300 mg total) by mouth every evening. 30 capsule 1    metoprolol tartrate (LOPRESSOR) 25 MG tablet Take 0.5 tablets (12.5 mg total) by mouth 2 (two) times daily. 30 tablet 11    polyethylene glycol (GLYCOLAX) 17 gram PwPk Take 17 g by mouth 2 (two) times daily as needed (constipation). 5 each 0    potassium chloride SA (K-DUR,KLOR-CON) 20 MEQ tablet Take one tablet by mouth twice daily for 7 days then decrease to once daily 30 tablet 3    warfarin (COUMADIN) 5 MG tablet Take 1 tablet (5 mg total) by mouth Daily. 30 tablet 11    cyproheptadine (PERIACTIN) 4 mg tablet Half p.o. b.i.d.--not too sleepy 1 p.o. b.i.d. to increase appetite (Patient not taking: Reported on 11/26/2022) 60 tablet 5    [DISCONTINUED] citalopram (CELEXA) 10 MG tablet Take 1 tablet (10 mg total) by mouth once daily. 30 tablet 11     Current Facility-Administered Medications on File Prior to Visit   Medication Dose Route Frequency Provider Last Rate Last Admin    sodium chloride 0.9% bolus 1,000 mL  1,000 mL Intravenous Once Alix Pederson NP            Review of patient's allergies indicates:   Allergen Reactions    Amoxicillin Hives and Other (See Comments)       OBJECTIVE:     Vital Signs:  Vitals:    11/17/22 0900   BP: (!) 140/70   Pulse: 83   Resp: 16   Temp: 98.4 °F (36.9 °C)     Body mass index is 28.65 kg/m².     Physical Exam:  Constitutional:       General: She is not in acute distress.  HENT:      Head: Normocephalic and atraumatic.   Eyes:      Pupils: Pupils are equal, round, and reactive to light.   Cardiovascular:      Rate and Rhythm: Normal rate. Midsternal chest incision  well-approximated, D&I, no drainage, + ecchymosis periwound, some steri strips still in place.  Pulmonary:      Effort: Pulmonary effort is normal. No respiratory distress.   Abdominal:      General: There is no distension.   Musculoskeletal:         General: Normal range of motion.      Cervical back: Normal range of motion.   Skin:     Coloration: Skin is not pale.   Neurological:      General: No focal deficit present.      Mental Status: She is alert.   Psychiatric:         Mood and Affect: Mood normal.         Behavior: Behavior normal.        Laboratory  Lab Results   Component Value Date    WBC 7.90 11/13/2022    HGB 10.0 (L) 11/13/2022    HCT 31.0 (L) 11/13/2022    MCV 92 11/13/2022     11/13/2022     Lab Results   Component Value Date    INR 2.5 11/21/2022    INR 3.8 11/16/2022    INR 4.5 11/14/2022    INR 4.5 11/14/2022     Lab Results   Component Value Date    HGBA1C 5.2 11/03/2022     No results for input(s): POCTGLUCOSE in the last 72 hours.    Diagnostic Results:  reviewed    TRANSITION OF CARE:     [facility] Contact Note: 11/15/22    Family and/or Caretaker present at visit?  Yes.  Diagnostic tests reviewed/disposition: No diagnosic tests pending after this hospitalization.  Disease/illness education: MVR  Home health/community services discussion/referrals: Patient has home health established at [facility]. .   Establishment or re-establishment of referral orders for community resources: No other necessary community resources.   Discussion with other health care providers: No discussion with other health care providers necessary.     Transition of Care Visit:  I have reviewed and updated the history and problem list.  I have reconciled the medication list.  I have discussed the hospitalization and current medical issues, prognosis and plans with the patient/family.  I  spent more than 50% of time discussing the care with the patient/family.  Total Face-to-Face Encounter: 60  minutes.    Medications Reconciliation:   I have reconciled the patient's home medications and discharge medications with the patient/family. I have updated all changes.  Refer to After-Visit Medication List.    ASSESSMENT & PLAN:     HIGH RISK CONDITION(S):  S/p MVR, on coumadin    1. S/P MVR (mitral valve replacement)  Overview:  S/p MV replacement 11/7/22-Dr. Chandler.    Assessment & Plan:  Stable on coumadin.  Labs due today.  Followed by Cardiology.      2. S/P mitral valve replacement  -     Ambulatory referral/consult to [facility]    3. S/P TVR (tricuspid valve repair)  Overview:  S/p TV repair 11/7/22-Dr. Chandler    Assessment & Plan:  Stable.  Followed by Cardiology.       4. S/P Maze operation for atrial fibrillation  Overview:  S/p Maze procedure 11/7/22-Dr. Chandler.    Assessment & Plan:  Stable.  On coumadin.  Followed by Cardiology.      5. Mitral valve insufficiency, unspecified etiology  Overview:  S/p MV replacement 11/7/22-Dr. Chandler.    Assessment & Plan:  Stable, on Coumadin.  Followed by Cardiology.        6. Anticoagulant long-term use  Assessment & Plan:  On Coumadin.  PT/INR due today-Home Health to draw.  Followed by Cardiology.  Bleeding precautions and safety advised.  Discussed Vitamin K and dietary recommendations/avoidance.      7. Atrial fibrillation, unspecified type  Overview:  S/p Maze procedure 11/7/22.    Assessment & Plan:  Chronic.  On multaq and coumadin.  Followed by Cardiology.          8. Hospital discharge follow-up  Assessment & Plan:  Stable condition.  Medication Reconciliation performed today.          Were controlled substances prescribed?  No        Scheduled Follow-up :  Future Appointments   Date Time Provider Department Center   12/8/2022  1:00 PM ECHO, [facility] ECHOSTR [facility]   12/8/2022  2:15 PM EKG, APPT [facility] EKG [facility]   12/8/2022  2:30 PM [facility] [id number] LB LIMIT [facility] XRAY OP [facility]   12/8/2022  3:00 PM Bobby Gilbert MD [facility]  CARDINDERJIT [facility]   1/12/2023  7:00 AM Ralf Figueroa MD [facility] CARDIO [facility]   1/24/2023  8:20 AM Elmo Jonse MD [facility] ARRHYTH [facility]   4/12/2023  9:40 AM Pantera Yadav MD [facility] PRCAR [facility]       After Visit Medication List :     Medication List            Accurate as of November 17, 2022 11:59 PM. If you have any questions, ask your nurse or doctor.                CONTINUE taking these medications      acetaminophen 500 MG tablet  Commonly known as: TYLENOL  Take 2 tablets (1,000 mg total) by mouth every 6 (six) hours as needed for Pain.     aspirin 81 MG EC tablet  Commonly known as: ECOTRIN  Take 1 tablet (81 mg total) by mouth once daily.     atorvastatin 10 MG tablet  Commonly known as: LIPITOR  Take 1 tablet (10 mg total) by mouth every evening.     cyproheptadine 4 mg tablet  Commonly known as: PERIACTIN  Half p.o. b.i.d.--not too sleepy 1 p.o. b.i.d. to increase appetite     docusate sodium 100 MG capsule  Commonly known as: COLACE  Take 2 capsules (200 mg total) by mouth 2 (two) times daily as needed for Constipation.     dronedarone 400 mg Tab  Commonly known as: MULTAQ  Take 1 tablet (400 mg total) by mouth 2 (two) times daily with meals.     famotidine 20 MG tablet  Commonly known as: PEPCID  Take 1 tablet (20 mg total) by mouth 2 (two) times daily.     furosemide 20 MG tablet  Commonly known as: LASIX  Take one tablet by mouth twice daily for 7 days then decrease to once daily     gabapentin 300 MG capsule  Commonly known as: NEURONTIN  Take 1 capsule (300 mg total) by mouth every evening.     metoprolol tartrate 25 MG tablet  Commonly known as: LOPRESSOR  Take 0.5 tablets (12.5 mg total) by mouth 2 (two) times daily.     oxyCODONE 5 MG immediate release tablet  Commonly known as: ROXICODONE  Take 1 tablet (5 mg total) by mouth every 4 (four) hours as needed for Pain.     polyethylene glycol 17 gram Pwpk  Commonly known as: GLYCOLAX  Take 17 g by mouth 2 (two) times daily as  needed (constipation).     potassium chloride SA 20 MEQ tablet  Commonly known as: K-DUR,KLOR-CON  Take one tablet by mouth twice daily for 7 days then decrease to once daily     warfarin 5 MG tablet  Commonly known as: COUMADIN  Take 1 tablet (5 mg total) by mouth Daily.              Signature:  Kait Gomez NP

## 2022-11-21 ENCOUNTER — ANTI-COAG VISIT (OUTPATIENT)
Dept: CARDIOLOGY | Facility: CLINIC | Age: 52
End: 2022-11-21
Payer: COMMERCIAL

## 2022-11-21 DIAGNOSIS — I48.91 ATRIAL FIBRILLATION, UNSPECIFIED TYPE: ICD-10-CM

## 2022-11-21 DIAGNOSIS — Z79.01 LONG TERM (CURRENT) USE OF ANTICOAGULANTS: ICD-10-CM

## 2022-11-21 DIAGNOSIS — Z95.2 S/P MVR (MITRAL VALVE REPLACEMENT): Primary | ICD-10-CM

## 2022-11-21 LAB — INR PPP: 2.5

## 2022-11-21 PROCEDURE — 93793 ANTICOAG MGMT PT WARFARIN: CPT | Mod: S$GLB,,,

## 2022-11-21 PROCEDURE — 93793 PR ANTICOAGULANT MGMT FOR PT TAKING WARFARIN: ICD-10-PCS | Mod: S$GLB,,,

## 2022-11-26 PROBLEM — Z09 HOSPITAL DISCHARGE FOLLOW-UP: Status: ACTIVE | Noted: 2022-11-26

## 2022-11-26 PROBLEM — Z95.2 S/P MITRAL VALVE REPLACEMENT: Status: ACTIVE | Noted: 2022-11-26

## 2022-11-26 NOTE — ASSESSMENT & PLAN NOTE
On Coumadin.  PT/INR due today-Home Health to draw.  Followed by Cardiology.  Bleeding precautions and safety advised.  Discussed Vitamin K and dietary recommendations/avoidance.

## 2022-11-28 ENCOUNTER — EXTERNAL HOME HEALTH (OUTPATIENT)
Dept: HOME HEALTH SERVICES | Facility: HOSPITAL | Age: 52
End: 2022-11-28
Payer: COMMERCIAL

## 2022-11-28 ENCOUNTER — TELEPHONE (OUTPATIENT)
Dept: CARDIOTHORACIC SURGERY | Facility: CLINIC | Age: 52
End: 2022-11-28
Payer: COMMERCIAL

## 2022-11-28 DIAGNOSIS — Z95.2 S/P MVR (MITRAL VALVE REPLACEMENT): Primary | ICD-10-CM

## 2022-11-28 NOTE — PROGRESS NOTES
11/28/22 Spoke with Yuriy at  and patient was discharged due to orders only being for 2 weeks up to 3 times a week.  Spoke with patient to inform her of this and she still declined to schedule an lab appointment states that she is going out of town on 11/28/22 and has no one to take her to the lab. Also recommended that patient call doctor office to see if orders for  can be re-established.

## 2022-11-28 NOTE — PROGRESS NOTES
11/28/22  INR is due today, Yuriy with [facility] called to inquire as to when INR is due--informed Yuriy INR is due today, she reported no order was received and that Patient was discharged from  service today, informed Yuriy order was faxed on 11/21, patient needs to be called and scheduled in clinic

## 2022-11-28 NOTE — TELEPHONE ENCOUNTER
Received call from pt stating she has not had her INR level checked yet today. Pt states someone was supposed to come this morning to her home to check her INR level. Pt states she is frustrated with having to have her blood checked and her diet restrictions. Offered to reach out to the coumadin clinic to see if they have any information on when someone will be coming to check her INR. Pt ended the phone call at that time without warning. I reached out to Tom Cheng with the coumadin clinic who advised HH services ended today. Discussed with Jenna Medrano NP who agreed to extend HH services for another 2 weeks. Contacted [facility] who advised pt has already been discharged and a new order would have to be entered. Notified SKYLER Medrano who will place new referral to home health. Notified pt who verbalized understanding that home health would be extended.

## 2022-11-28 NOTE — PROGRESS NOTES
11/28/22 patient declined to schedule lab appointment, states transportation is an issue and that  was suppose to come for visit today 11/28/22. She is very upset that she will have to go to the lab for routine INR check and states that this was not explained to her. Called Yuriy at  and left a message for her to call me back to check statues of visits.

## 2022-11-28 NOTE — PROGRESS NOTES
Update 11/28/22 - Spoke to JUAN ALBERTO Ayala, she will follow up to see if home health services can be extended

## 2022-11-29 NOTE — PROGRESS NOTES
11/29/22 Patient called inquiring as to when the  nurse will be going to draw lab, informed Patient that she had been discharged from  service, she stated her  service had been extended, I called [facility] [place] and spoke with Yuriy and she stated Patient will be re instated to  service tomorrow 11/30 and states and INR will be drawn 11/30

## 2022-11-30 ENCOUNTER — ANTI-COAG VISIT (OUTPATIENT)
Dept: CARDIOLOGY | Facility: CLINIC | Age: 52
End: 2022-11-30
Payer: COMMERCIAL

## 2022-11-30 DIAGNOSIS — Z95.2 S/P MVR (MITRAL VALVE REPLACEMENT): Primary | ICD-10-CM

## 2022-11-30 DIAGNOSIS — Z79.01 LONG TERM (CURRENT) USE OF ANTICOAGULANTS: ICD-10-CM

## 2022-11-30 DIAGNOSIS — I48.91 ATRIAL FIBRILLATION, UNSPECIFIED TYPE: ICD-10-CM

## 2022-11-30 LAB — INR PPP: 2.4

## 2022-11-30 PROCEDURE — 93793 PR ANTICOAGULANT MGMT FOR PT TAKING WARFARIN: ICD-10-PCS | Mod: S$GLB,,,

## 2022-11-30 PROCEDURE — 93793 ANTICOAG MGMT PT WARFARIN: CPT | Mod: S$GLB,,,

## 2022-11-30 NOTE — PROGRESS NOTES
INR slightly below target goal. Medications and chart reviewed. No changes noted to necessitate adjustment of warfarin or follow-up plan. See calendar.

## 2022-12-05 ENCOUNTER — LAB VISIT (OUTPATIENT)
Dept: LAB | Facility: HOSPITAL | Age: 52
End: 2022-12-05
Attending: INTERNAL MEDICINE
Payer: COMMERCIAL

## 2022-12-05 DIAGNOSIS — Z48.812 AFTERCARE FOLLOWING SURGERY OF THE CIRCULATORY SYSTEM, NEC: Primary | ICD-10-CM

## 2022-12-05 LAB
INR PPP: 2.4 (ref 0.8–1.2)
PROTHROMBIN TIME: 24 SEC (ref 9–12.5)

## 2022-12-05 PROCEDURE — 85610 PROTHROMBIN TIME: CPT | Performed by: INTERNAL MEDICINE

## 2022-12-05 PROCEDURE — 36415 COLL VENOUS BLD VENIPUNCTURE: CPT | Performed by: INTERNAL MEDICINE

## 2022-12-06 ENCOUNTER — ANTI-COAG VISIT (OUTPATIENT)
Dept: CARDIOLOGY | Facility: CLINIC | Age: 52
End: 2022-12-06
Payer: COMMERCIAL

## 2022-12-06 DIAGNOSIS — Z79.01 LONG TERM (CURRENT) USE OF ANTICOAGULANTS: ICD-10-CM

## 2022-12-06 DIAGNOSIS — I48.91 ATRIAL FIBRILLATION, UNSPECIFIED TYPE: ICD-10-CM

## 2022-12-06 DIAGNOSIS — Z95.2 S/P MVR (MITRAL VALVE REPLACEMENT): Primary | ICD-10-CM

## 2022-12-06 PROCEDURE — 93793 PR ANTICOAGULANT MGMT FOR PT TAKING WARFARIN: ICD-10-PCS | Mod: S$GLB,,,

## 2022-12-06 PROCEDURE — 93793 ANTICOAG MGMT PT WARFARIN: CPT | Mod: S$GLB,,,

## 2022-12-06 NOTE — PROGRESS NOTES
Patient called and was given lab result, she verified correct warfarin dose, reports some weight loss, but no other changes

## 2022-12-08 ENCOUNTER — HOSPITAL ENCOUNTER (OUTPATIENT)
Dept: RADIOLOGY | Facility: HOSPITAL | Age: 52
Discharge: HOME | End: 2022-12-08
Attending: THORACIC SURGERY (CARDIOTHORACIC VASCULAR SURGERY)
Payer: COMMERCIAL

## 2022-12-08 ENCOUNTER — HOSPITAL ENCOUNTER (OUTPATIENT)
Dept: CARDIOLOGY | Facility: HOSPITAL | Age: 52
Discharge: HOME | End: 2022-12-08
Attending: THORACIC SURGERY (CARDIOTHORACIC VASCULAR SURGERY)
Payer: COMMERCIAL

## 2022-12-08 ENCOUNTER — OFFICE VISIT (OUTPATIENT)
Dept: CARDIOTHORACIC SURGERY | Facility: CLINIC | Age: 52
End: 2022-12-08
Payer: COMMERCIAL

## 2022-12-08 ENCOUNTER — RESEARCH ENCOUNTER (OUTPATIENT)
Dept: RESEARCH | Facility: HOSPITAL | Age: 52
End: 2022-12-08
Payer: COMMERCIAL

## 2022-12-08 ENCOUNTER — HOSPITAL ENCOUNTER (OUTPATIENT)
Dept: CARDIOLOGY | Facility: CLINIC | Age: 52
Discharge: HOME | End: 2022-12-08
Payer: COMMERCIAL

## 2022-12-08 VITALS
DIASTOLIC BLOOD PRESSURE: 62 MMHG | HEIGHT: 69 IN | SYSTOLIC BLOOD PRESSURE: 118 MMHG | OXYGEN SATURATION: 100 % | HEART RATE: 73 BPM | RESPIRATION RATE: 18 BRPM | BODY MASS INDEX: 28.69 KG/M2 | WEIGHT: 193.69 LBS

## 2022-12-08 VITALS
BODY MASS INDEX: 28.73 KG/M2 | WEIGHT: 194 LBS | HEIGHT: 69 IN | HEART RATE: 66 BPM | DIASTOLIC BLOOD PRESSURE: 62 MMHG | SYSTOLIC BLOOD PRESSURE: 112 MMHG

## 2022-12-08 DIAGNOSIS — Z95.2 S/P MVR (MITRAL VALVE REPLACEMENT): ICD-10-CM

## 2022-12-08 DIAGNOSIS — Z98.890 S/P MAZE OPERATION FOR ATRIAL FIBRILLATION: ICD-10-CM

## 2022-12-08 DIAGNOSIS — Z86.79 S/P MAZE OPERATION FOR ATRIAL FIBRILLATION: ICD-10-CM

## 2022-12-08 DIAGNOSIS — Z98.890 S/P TVR (TRICUSPID VALVE REPAIR): ICD-10-CM

## 2022-12-08 DIAGNOSIS — Z98.890 S/P MVR (MITRAL VALVE REPAIR): ICD-10-CM

## 2022-12-08 DIAGNOSIS — Z00.6 EXAMINATION OF PARTICIPANT IN CLINICAL TRIAL: ICD-10-CM

## 2022-12-08 DIAGNOSIS — Z95.2 S/P MVR (MITRAL VALVE REPLACEMENT): Primary | ICD-10-CM

## 2022-12-08 LAB
ASCENDING AORTA: 2.81 CM
AV INDEX (PROSTH): 0.55
AV MEAN GRADIENT: 5 MMHG
AV PEAK GRADIENT: 10 MMHG
AV VALVE AREA: 1.69 CM2
AV VELOCITY RATIO: 0.54
BSA FOR ECHO PROCEDURE: 2.07 M2
CV ECHO LV RWT: 0.36 CM
DOP CALC AO PEAK VEL: 1.58 M/S
DOP CALC AO VTI: 32.48 CM
DOP CALC LVOT AREA: 3 CM2
DOP CALC LVOT DIAMETER: 1.97 CM
DOP CALC LVOT PEAK VEL: 0.86 M/S
DOP CALC LVOT STROKE VOLUME: 54.84 CM3
DOP CALC MV VTI: 13.2 CM
DOP CALCLVOT PEAK VEL VTI: 18 CM
E WAVE DECELERATION TIME: 216.81 MSEC
E/A RATIO: 4.13
ECHO LV POSTERIOR WALL: 0.94 CM (ref 0.6–1.1)
EJECTION FRACTION: 55 %
FRACTIONAL SHORTENING: 27 % (ref 28–44)
INTERVENTRICULAR SEPTUM: 0.44 CM (ref 0.6–1.1)
IVRT: 68.51 MSEC
LA MAJOR: 5.78 CM
LA MINOR: 5.82 CM
LA WIDTH: 4.46 CM
LEFT ATRIUM SIZE: 4.6 CM
LEFT ATRIUM VOLUME INDEX MOD: 29.6 ML/M2
LEFT ATRIUM VOLUME INDEX: 49.6 ML/M2
LEFT ATRIUM VOLUME MOD: 60.46 CM3
LEFT ATRIUM VOLUME: 101.14 CM3
LEFT INTERNAL DIMENSION IN SYSTOLE: 3.76 CM (ref 2.1–4)
LEFT VENTRICLE DIASTOLIC VOLUME INDEX: 62.66 ML/M2
LEFT VENTRICLE DIASTOLIC VOLUME: 127.83 ML
LEFT VENTRICLE MASS INDEX: 59 G/M2
LEFT VENTRICLE SYSTOLIC VOLUME INDEX: 29.7 ML/M2
LEFT VENTRICLE SYSTOLIC VOLUME: 60.5 ML
LEFT VENTRICULAR INTERNAL DIMENSION IN DIASTOLE: 5.17 CM (ref 3.5–6)
LEFT VENTRICULAR MASS: 119.43 G
MV A" WAVE DURATION": 8.37 MSEC
MV MEAN GRADIENT: 4 MMHG
MV PEAK A VEL: 0.47 M/S
MV PEAK E VEL: 1.94 M/S
MV PEAK GRADIENT: 18 MMHG
MV STENOSIS PRESSURE HALF TIME: 41.4 MS
MV VALVE AREA BY CONTINUITY EQUATION: 4.15 CM2
MV VALVE AREA P 1/2 METHOD: 5.31 CM2
PISA TR MAX VEL: 2.31 M/S
PULM VEIN S/D RATIO: 0.75
PV PEAK D VEL: 0.59 M/S
PV PEAK S VEL: 0.44 M/S
RA MAJOR: 4.71 CM
RA PRESSURE: 8 MMHG
RA WIDTH: 4.31 CM
RIGHT VENTRICULAR END-DIASTOLIC DIMENSION: 4 CM
RV TISSUE DOPPLER FREE WALL SYSTOLIC VELOCITY 1 (APICAL 4 CHAMBER VIEW): 5.35 CM/S
SINUS: 2.75 CM
STJ: 2.4 CM
TR MAX PG: 21 MMHG
TRICUSPID ANNULAR PLANE SYSTOLIC EXCURSION: 1.06 CM
TV REST PULMONARY ARTERY PRESSURE: 29 MMHG

## 2022-12-08 PROCEDURE — 3044F HG A1C LEVEL LT 7.0%: CPT | Mod: CPTII,S$GLB,, | Performed by: THORACIC SURGERY (CARDIOTHORACIC VASCULAR SURGERY)

## 2022-12-08 PROCEDURE — 93010 ELECTROCARDIOGRAM REPORT: CPT | Mod: S$GLB,,, | Performed by: INTERNAL MEDICINE

## 2022-12-08 PROCEDURE — 3074F SYST BP LT 130 MM HG: CPT | Mod: CPTII,S$GLB,, | Performed by: THORACIC SURGERY (CARDIOTHORACIC VASCULAR SURGERY)

## 2022-12-08 PROCEDURE — 71046 X-RAY EXAM CHEST 2 VIEWS: CPT | Mod: TC

## 2022-12-08 PROCEDURE — 1159F PR MEDICATION LIST DOCUMENTED IN MEDICAL RECORD: ICD-10-PCS | Mod: CPTII,S$GLB,, | Performed by: THORACIC SURGERY (CARDIOTHORACIC VASCULAR SURGERY)

## 2022-12-08 PROCEDURE — 4010F PR ACE/ARB THEARPY RXD/TAKEN: ICD-10-PCS | Mod: CPTII,S$GLB,, | Performed by: THORACIC SURGERY (CARDIOTHORACIC VASCULAR SURGERY)

## 2022-12-08 PROCEDURE — 3078F DIAST BP <80 MM HG: CPT | Mod: CPTII,S$GLB,, | Performed by: THORACIC SURGERY (CARDIOTHORACIC VASCULAR SURGERY)

## 2022-12-08 PROCEDURE — 99999 PR PBB SHADOW E&M-EST. PATIENT-LVL III: CPT | Mod: PBBFAC,,, | Performed by: THORACIC SURGERY (CARDIOTHORACIC VASCULAR SURGERY)

## 2022-12-08 PROCEDURE — 3078F PR MOST RECENT DIASTOLIC BLOOD PRESSURE < 80 MM HG: ICD-10-PCS | Mod: CPTII,S$GLB,, | Performed by: THORACIC SURGERY (CARDIOTHORACIC VASCULAR SURGERY)

## 2022-12-08 PROCEDURE — 93306 TTE W/DOPPLER COMPLETE: CPT | Mod: 26,,, | Performed by: INTERNAL MEDICINE

## 2022-12-08 PROCEDURE — 93005 EKG 12-LEAD: ICD-10-PCS | Mod: S$GLB,,, | Performed by: THORACIC SURGERY (CARDIOTHORACIC VASCULAR SURGERY)

## 2022-12-08 PROCEDURE — 4010F ACE/ARB THERAPY RXD/TAKEN: CPT | Mod: CPTII,S$GLB,, | Performed by: THORACIC SURGERY (CARDIOTHORACIC VASCULAR SURGERY)

## 2022-12-08 PROCEDURE — 99024 PR POST-OP FOLLOW-UP VISIT: ICD-10-PCS | Mod: S$GLB,,, | Performed by: THORACIC SURGERY (CARDIOTHORACIC VASCULAR SURGERY)

## 2022-12-08 PROCEDURE — 93306 ECHO (CUPID ONLY): ICD-10-PCS | Mod: 26,,, | Performed by: INTERNAL MEDICINE

## 2022-12-08 PROCEDURE — 71046 XR CHEST PA AND LATERAL: ICD-10-PCS | Mod: 26,,, | Performed by: RADIOLOGY

## 2022-12-08 PROCEDURE — 99999 PR PBB SHADOW E&M-EST. PATIENT-LVL III: ICD-10-PCS | Mod: PBBFAC,,, | Performed by: THORACIC SURGERY (CARDIOTHORACIC VASCULAR SURGERY)

## 2022-12-08 PROCEDURE — 3044F PR MOST RECENT HEMOGLOBIN A1C LEVEL <7.0%: ICD-10-PCS | Mod: CPTII,S$GLB,, | Performed by: THORACIC SURGERY (CARDIOTHORACIC VASCULAR SURGERY)

## 2022-12-08 PROCEDURE — 99024 POSTOP FOLLOW-UP VISIT: CPT | Mod: S$GLB,,, | Performed by: THORACIC SURGERY (CARDIOTHORACIC VASCULAR SURGERY)

## 2022-12-08 PROCEDURE — 93010 EKG 12-LEAD: ICD-10-PCS | Mod: S$GLB,,, | Performed by: INTERNAL MEDICINE

## 2022-12-08 PROCEDURE — 3008F PR BODY MASS INDEX (BMI) DOCUMENTED: ICD-10-PCS | Mod: CPTII,S$GLB,, | Performed by: THORACIC SURGERY (CARDIOTHORACIC VASCULAR SURGERY)

## 2022-12-08 PROCEDURE — 1160F PR REVIEW ALL MEDS BY PRESCRIBER/CLIN PHARMACIST DOCUMENTED: ICD-10-PCS | Mod: CPTII,S$GLB,, | Performed by: THORACIC SURGERY (CARDIOTHORACIC VASCULAR SURGERY)

## 2022-12-08 PROCEDURE — 3008F BODY MASS INDEX DOCD: CPT | Mod: CPTII,S$GLB,, | Performed by: THORACIC SURGERY (CARDIOTHORACIC VASCULAR SURGERY)

## 2022-12-08 PROCEDURE — 93005 ELECTROCARDIOGRAM TRACING: CPT | Mod: S$GLB,,, | Performed by: THORACIC SURGERY (CARDIOTHORACIC VASCULAR SURGERY)

## 2022-12-08 PROCEDURE — 93306 TTE W/DOPPLER COMPLETE: CPT

## 2022-12-08 PROCEDURE — 71046 X-RAY EXAM CHEST 2 VIEWS: CPT | Mod: 26,,, | Performed by: RADIOLOGY

## 2022-12-08 PROCEDURE — 1160F RVW MEDS BY RX/DR IN RCRD: CPT | Mod: CPTII,S$GLB,, | Performed by: THORACIC SURGERY (CARDIOTHORACIC VASCULAR SURGERY)

## 2022-12-08 PROCEDURE — 3074F PR MOST RECENT SYSTOLIC BLOOD PRESSURE < 130 MM HG: ICD-10-PCS | Mod: CPTII,S$GLB,, | Performed by: THORACIC SURGERY (CARDIOTHORACIC VASCULAR SURGERY)

## 2022-12-08 PROCEDURE — 1159F MED LIST DOCD IN RCRD: CPT | Mod: CPTII,S$GLB,, | Performed by: THORACIC SURGERY (CARDIOTHORACIC VASCULAR SURGERY)

## 2022-12-08 NOTE — PATIENT INSTRUCTIONS
Please make appointments to check in with your PCP and Cardiologist in the next 2 to 6 weeks.    Continue walking during cooler parts of the day or early evening to build up your endurance.     You may drive, if you have power steering.    Your lifting restriction is still in place until you are 12 weeks out from your surgery:     ** 5 pounds first 6 weeks after surgery **   **20 pounds for weeks 7 - 12 after surgery **    COVID Precautions:    Continue to take precautions against COVID. Mask up when around unknown people, wash / sanitize hands frequently. Avoid large groups of people until at least 12 weeks post op.

## 2022-12-08 NOTE — LETTER
December 11, 2022        Chelsea Sears MD  [address]  [address]  [place] [address]             [facility] - Cardiovasc Surg 2nd Fl  [address]  [facility] [address]  Phone: [phone]   Patient: Riya Soto   MR Number: [id number]   YOB: 1970   Date of Visit: 12/8/2022       Dear Dr. Sears:    Thank you for referring Riya Soto to me for evaluation. Below are the relevant portions of my assessment and plan of care.            If you have questions, please do not hesitate to call me. I look forward to following Riya along with you.    Sincerely,      Bobby Gilbert MD             MD Elmo Cervantes MD

## 2022-12-08 NOTE — PROGRESS NOTES
Patient seen and examined. Patient is progressively increasing activity. No significant complaints.     Sternum: stable, incision CDI  Chest xray: Acceptable post op chest  EKG: NSR     Assessment:  1)  Mitral valve replacement with a 29 mm [employer] standard   mechanical prosthesis  2)  Tricuspid valve repair with a 26 mm Medtronic contour annuloplasty band  3)  Maze procedure with complete left and right atrial lesion sets without resection of left atrial appendage  4)  Redo sternotomy     Plan:  Can begin driving   Can begin cardiac rehab 6 weeks after operation   We will refer to cardiology to assume care   DC lasix, potassium        No scheduled appointment, RTC prn    CTS Attending Note:    I was unable to see the patient as I was detained in the operating room.

## 2022-12-08 NOTE — PROGRESS NOTES
Trial: Corjazm[employer] MANTRA (2021.298)  PI: Dr. Gilbert  Date: 12/08/2022  Visit: 30 Day Follow-Up    Participant ID: [id number]    12/08/2022: Spoke with the patient for the 30-day follow-up for the Corbethm MANTRA Study. MANTRA is a post-market study is to monitor ongoing safety and performance of the Corcym devices and accessories used for aortic, mitral, and tricuspid valvular diseases after implant.       Current list of medications obtained and verified with patient: Reviewed list with the patient.  She is not happy with the Coumadin lab draws at home.     Patient did not have any recent hospitalizations, ED visits, or other adverse events since discharge.     Patient answered questionnaire for KCCQ-12..     NYHA class determined be Class 1, by CARMEN Gregory NP.     EKG performed on today, with a heart rate of 70, and rhythm noted to be sinus.     Research staff contact information reviewed again with patient, should any questions or concerns arise. Their next follow up visit is at the 12 month point.      ECHO completed on today, and results pending at this time.     Blood testing is not standard of care for the post-operative visits.

## 2022-12-12 ENCOUNTER — ANTI-COAG VISIT (OUTPATIENT)
Dept: CARDIOLOGY | Facility: CLINIC | Age: 52
End: 2022-12-12
Payer: COMMERCIAL

## 2022-12-12 DIAGNOSIS — Z79.01 LONG TERM (CURRENT) USE OF ANTICOAGULANTS: Primary | ICD-10-CM

## 2022-12-12 DIAGNOSIS — Z95.2 S/P MVR (MITRAL VALVE REPLACEMENT): ICD-10-CM

## 2022-12-12 DIAGNOSIS — I48.91 ATRIAL FIBRILLATION, UNSPECIFIED TYPE: ICD-10-CM

## 2022-12-12 LAB — INR PPP: 2.1

## 2022-12-12 PROCEDURE — 93793 PR ANTICOAGULANT MGMT FOR PT TAKING WARFARIN: ICD-10-PCS | Mod: S$GLB,,,

## 2022-12-12 PROCEDURE — 93793 ANTICOAG MGMT PT WARFARIN: CPT | Mod: S$GLB,,,

## 2022-12-12 NOTE — PROGRESS NOTES
INR not at goal. Medications, chart, and patient findings reviewed. 5mg 12/12 and continue per calendar. Pt reported cabbage intake which may explain sub therapeutic INR. Encourage consistent green intake each week. See calendar for adjustments to dose and follow up plan.

## 2022-12-19 ENCOUNTER — LAB VISIT (OUTPATIENT)
Dept: LAB | Facility: HOSPITAL | Age: 52
End: 2022-12-19
Attending: INTERNAL MEDICINE
Payer: COMMERCIAL

## 2022-12-19 DIAGNOSIS — Z95.2 S/P MVR (MITRAL VALVE REPLACEMENT): ICD-10-CM

## 2022-12-19 DIAGNOSIS — I48.91 ATRIAL FIBRILLATION, UNSPECIFIED TYPE: ICD-10-CM

## 2022-12-19 DIAGNOSIS — Z79.01 LONG TERM (CURRENT) USE OF ANTICOAGULANTS: ICD-10-CM

## 2022-12-19 LAB
INR PPP: 1.8 (ref 0.8–1.2)
PROTHROMBIN TIME: 18.6 SEC (ref 9–12.5)

## 2022-12-19 PROCEDURE — 85610 PROTHROMBIN TIME: CPT | Performed by: INTERNAL MEDICINE

## 2022-12-19 PROCEDURE — 36415 COLL VENOUS BLD VENIPUNCTURE: CPT | Performed by: INTERNAL MEDICINE

## 2022-12-20 ENCOUNTER — ANTI-COAG VISIT (OUTPATIENT)
Dept: CARDIOLOGY | Facility: CLINIC | Age: 52
End: 2022-12-20
Payer: COMMERCIAL

## 2022-12-20 DIAGNOSIS — Z95.2 S/P MVR (MITRAL VALVE REPLACEMENT): Primary | ICD-10-CM

## 2022-12-20 DIAGNOSIS — Z79.01 LONG TERM (CURRENT) USE OF ANTICOAGULANTS: ICD-10-CM

## 2022-12-20 PROCEDURE — 93793 PR ANTICOAGULANT MGMT FOR PT TAKING WARFARIN: ICD-10-PCS | Mod: S$GLB,,,

## 2022-12-20 PROCEDURE — 93793 ANTICOAG MGMT PT WARFARIN: CPT | Mod: S$GLB,,,

## 2022-12-22 ENCOUNTER — DOCUMENT SCAN (OUTPATIENT)
Dept: HOME HEALTH SERVICES | Facility: HOSPITAL | Age: 52
End: 2022-12-22
Payer: COMMERCIAL

## 2022-12-27 ENCOUNTER — LAB VISIT (OUTPATIENT)
Dept: LAB | Facility: HOSPITAL | Age: 52
End: 2022-12-27
Attending: INTERNAL MEDICINE
Payer: COMMERCIAL

## 2022-12-27 ENCOUNTER — ANTI-COAG VISIT (OUTPATIENT)
Dept: CARDIOLOGY | Facility: CLINIC | Age: 52
End: 2022-12-27
Payer: COMMERCIAL

## 2022-12-27 DIAGNOSIS — Z95.2 S/P MVR (MITRAL VALVE REPLACEMENT): Primary | ICD-10-CM

## 2022-12-27 DIAGNOSIS — I48.91 ATRIAL FIBRILLATION, UNSPECIFIED TYPE: ICD-10-CM

## 2022-12-27 DIAGNOSIS — Z79.01 LONG TERM (CURRENT) USE OF ANTICOAGULANTS: ICD-10-CM

## 2022-12-27 DIAGNOSIS — Z95.2 S/P MVR (MITRAL VALVE REPLACEMENT): ICD-10-CM

## 2022-12-27 LAB
INR PPP: 2 (ref 0.8–1.2)
PROTHROMBIN TIME: 20.3 SEC (ref 9–12.5)

## 2022-12-27 PROCEDURE — 36415 COLL VENOUS BLD VENIPUNCTURE: CPT | Performed by: INTERNAL MEDICINE

## 2022-12-27 PROCEDURE — 93793 PR ANTICOAGULANT MGMT FOR PT TAKING WARFARIN: ICD-10-PCS | Mod: S$GLB,,,

## 2022-12-27 PROCEDURE — 93793 ANTICOAG MGMT PT WARFARIN: CPT | Mod: S$GLB,,,

## 2022-12-27 PROCEDURE — 85610 PROTHROMBIN TIME: CPT | Performed by: INTERNAL MEDICINE

## 2023-01-03 ENCOUNTER — ANTI-COAG VISIT (OUTPATIENT)
Dept: CARDIOLOGY | Facility: CLINIC | Age: 53
End: 2023-01-03
Payer: COMMERCIAL

## 2023-01-03 ENCOUNTER — LAB VISIT (OUTPATIENT)
Dept: LAB | Facility: HOSPITAL | Age: 53
End: 2023-01-03
Attending: INTERNAL MEDICINE
Payer: COMMERCIAL

## 2023-01-03 DIAGNOSIS — Z79.01 LONG TERM (CURRENT) USE OF ANTICOAGULANTS: ICD-10-CM

## 2023-01-03 DIAGNOSIS — Z95.2 S/P MVR (MITRAL VALVE REPLACEMENT): Primary | ICD-10-CM

## 2023-01-03 DIAGNOSIS — I48.91 ATRIAL FIBRILLATION, UNSPECIFIED TYPE: ICD-10-CM

## 2023-01-03 DIAGNOSIS — Z95.2 S/P MVR (MITRAL VALVE REPLACEMENT): ICD-10-CM

## 2023-01-03 LAB
INR PPP: 1.5 (ref 0.8–1.2)
PROTHROMBIN TIME: 15.4 SEC (ref 9–12.5)

## 2023-01-03 PROCEDURE — 93793 PR ANTICOAGULANT MGMT FOR PT TAKING WARFARIN: ICD-10-PCS | Mod: S$GLB,,,

## 2023-01-03 PROCEDURE — 36415 COLL VENOUS BLD VENIPUNCTURE: CPT | Performed by: INTERNAL MEDICINE

## 2023-01-03 PROCEDURE — 93793 ANTICOAG MGMT PT WARFARIN: CPT | Mod: S$GLB,,,

## 2023-01-03 PROCEDURE — 85610 PROTHROMBIN TIME: CPT | Performed by: INTERNAL MEDICINE

## 2023-01-10 ENCOUNTER — LAB VISIT (OUTPATIENT)
Dept: LAB | Facility: HOSPITAL | Age: 53
End: 2023-01-10
Attending: INTERNAL MEDICINE
Payer: COMMERCIAL

## 2023-01-10 ENCOUNTER — ANTI-COAG VISIT (OUTPATIENT)
Dept: CARDIOLOGY | Facility: CLINIC | Age: 53
End: 2023-01-10
Payer: COMMERCIAL

## 2023-01-10 DIAGNOSIS — Z79.01 LONG TERM (CURRENT) USE OF ANTICOAGULANTS: ICD-10-CM

## 2023-01-10 DIAGNOSIS — Z95.2 S/P MVR (MITRAL VALVE REPLACEMENT): Primary | ICD-10-CM

## 2023-01-10 DIAGNOSIS — Z95.2 S/P MVR (MITRAL VALVE REPLACEMENT): ICD-10-CM

## 2023-01-10 DIAGNOSIS — I48.91 ATRIAL FIBRILLATION, UNSPECIFIED TYPE: ICD-10-CM

## 2023-01-10 LAB
INR PPP: 1.7 (ref 0.8–1.2)
PROTHROMBIN TIME: 18 SEC (ref 9–12.5)

## 2023-01-10 PROCEDURE — 85610 PROTHROMBIN TIME: CPT | Performed by: INTERNAL MEDICINE

## 2023-01-10 PROCEDURE — 93793 PR ANTICOAGULANT MGMT FOR PT TAKING WARFARIN: ICD-10-PCS | Mod: S$GLB,,,

## 2023-01-10 PROCEDURE — 36415 COLL VENOUS BLD VENIPUNCTURE: CPT | Performed by: INTERNAL MEDICINE

## 2023-01-10 PROCEDURE — 93793 ANTICOAG MGMT PT WARFARIN: CPT | Mod: S$GLB,,,

## 2023-01-12 ENCOUNTER — TELEPHONE (OUTPATIENT)
Dept: CARDIOLOGY | Facility: CLINIC | Age: 53
End: 2023-01-12
Payer: COMMERCIAL

## 2023-01-12 NOTE — TELEPHONE ENCOUNTER
Returned call to patient. Rescheduled appointment with Dr Figueroa.    ----- Message from Skylar Solorzano sent at 1/12/2023  6:25 AM CST -----  Regarding: later Time  Contact: pt  Type:  Pt  Advice    Who Called: pt  Would the patient rather a call back or a response via [facility]?    Best Call Back Number: [phone]  Additional Information:  pt would like to get a later time for appointment on today , Possible 10am, Please call to advise

## 2023-01-17 ENCOUNTER — ANTI-COAG VISIT (OUTPATIENT)
Dept: CARDIOLOGY | Facility: CLINIC | Age: 53
End: 2023-01-17
Payer: COMMERCIAL

## 2023-01-17 ENCOUNTER — LAB VISIT (OUTPATIENT)
Dept: LAB | Facility: HOSPITAL | Age: 53
End: 2023-01-17
Attending: INTERNAL MEDICINE
Payer: COMMERCIAL

## 2023-01-17 DIAGNOSIS — Z95.2 S/P MVR (MITRAL VALVE REPLACEMENT): Primary | ICD-10-CM

## 2023-01-17 DIAGNOSIS — I48.91 ATRIAL FIBRILLATION, UNSPECIFIED TYPE: ICD-10-CM

## 2023-01-17 DIAGNOSIS — Z95.2 S/P MVR (MITRAL VALVE REPLACEMENT): ICD-10-CM

## 2023-01-17 DIAGNOSIS — Z79.01 LONG TERM (CURRENT) USE OF ANTICOAGULANTS: ICD-10-CM

## 2023-01-17 LAB
INR PPP: 1.9 (ref 0.8–1.2)
PROTHROMBIN TIME: 19.7 SEC (ref 9–12.5)

## 2023-01-17 PROCEDURE — 93793 ANTICOAG MGMT PT WARFARIN: CPT | Mod: S$GLB,,,

## 2023-01-17 PROCEDURE — 93793 PR ANTICOAGULANT MGMT FOR PT TAKING WARFARIN: ICD-10-PCS | Mod: S$GLB,,,

## 2023-01-17 PROCEDURE — 36415 COLL VENOUS BLD VENIPUNCTURE: CPT | Performed by: INTERNAL MEDICINE

## 2023-01-17 PROCEDURE — 85610 PROTHROMBIN TIME: CPT | Performed by: INTERNAL MEDICINE

## 2023-01-17 NOTE — PROGRESS NOTES
INR not at goal. Medications, chart, and patient findings reviewed. Boost 1/17 and maintenance dose increased per calendar. See calendar for adjustments to dose and follow up plan.

## 2023-01-18 ENCOUNTER — HOSPITAL ENCOUNTER (OUTPATIENT)
Dept: RADIOLOGY | Facility: HOSPITAL | Age: 53
Discharge: HOME | End: 2023-01-18
Attending: FAMILY MEDICINE
Payer: COMMERCIAL

## 2023-01-18 DIAGNOSIS — Z12.31 ENCOUNTER FOR SCREENING MAMMOGRAM FOR MALIGNANT NEOPLASM OF BREAST: ICD-10-CM

## 2023-01-18 PROCEDURE — 77067 SCR MAMMO BI INCL CAD: CPT | Mod: TC,PO

## 2023-01-19 DIAGNOSIS — N63.10 MASS OF RIGHT BREAST, UNSPECIFIED QUADRANT: Primary | ICD-10-CM

## 2023-01-23 ENCOUNTER — TELEPHONE (OUTPATIENT)
Dept: ELECTROPHYSIOLOGY | Facility: CLINIC | Age: 53
End: 2023-01-23
Payer: COMMERCIAL

## 2023-01-23 DIAGNOSIS — I48.0 PAF (PAROXYSMAL ATRIAL FIBRILLATION): Primary | ICD-10-CM

## 2023-01-24 ENCOUNTER — LAB VISIT (OUTPATIENT)
Dept: LAB | Facility: HOSPITAL | Age: 53
End: 2023-01-24
Attending: INTERNAL MEDICINE
Payer: COMMERCIAL

## 2023-01-24 ENCOUNTER — ANTI-COAG VISIT (OUTPATIENT)
Dept: CARDIOLOGY | Facility: CLINIC | Age: 53
End: 2023-01-24
Payer: COMMERCIAL

## 2023-01-24 DIAGNOSIS — Z95.2 S/P MVR (MITRAL VALVE REPLACEMENT): ICD-10-CM

## 2023-01-24 DIAGNOSIS — Z79.01 LONG TERM (CURRENT) USE OF ANTICOAGULANTS: ICD-10-CM

## 2023-01-24 DIAGNOSIS — I48.91 ATRIAL FIBRILLATION, UNSPECIFIED TYPE: ICD-10-CM

## 2023-01-24 DIAGNOSIS — Z95.2 S/P MVR (MITRAL VALVE REPLACEMENT): Primary | ICD-10-CM

## 2023-01-24 LAB
INR PPP: 3.9 (ref 0.8–1.2)
PROTHROMBIN TIME: 38.5 SEC (ref 9–12.5)

## 2023-01-24 PROCEDURE — 93793 ANTICOAG MGMT PT WARFARIN: CPT | Mod: S$GLB,,,

## 2023-01-24 PROCEDURE — 85610 PROTHROMBIN TIME: CPT | Performed by: INTERNAL MEDICINE

## 2023-01-24 PROCEDURE — 36415 COLL VENOUS BLD VENIPUNCTURE: CPT | Performed by: INTERNAL MEDICINE

## 2023-01-24 PROCEDURE — 93793 PR ANTICOAGULANT MGMT FOR PT TAKING WARFARIN: ICD-10-PCS | Mod: S$GLB,,,

## 2023-01-30 ENCOUNTER — ANTI-COAG VISIT (OUTPATIENT)
Dept: CARDIOLOGY | Facility: CLINIC | Age: 53
End: 2023-01-30
Payer: COMMERCIAL

## 2023-01-30 ENCOUNTER — LAB VISIT (OUTPATIENT)
Dept: LAB | Facility: HOSPITAL | Age: 53
End: 2023-01-30
Attending: INTERNAL MEDICINE
Payer: COMMERCIAL

## 2023-01-30 DIAGNOSIS — Z95.2 S/P MVR (MITRAL VALVE REPLACEMENT): ICD-10-CM

## 2023-01-30 DIAGNOSIS — I48.91 ATRIAL FIBRILLATION, UNSPECIFIED TYPE: ICD-10-CM

## 2023-01-30 DIAGNOSIS — Z95.2 S/P MVR (MITRAL VALVE REPLACEMENT): Primary | ICD-10-CM

## 2023-01-30 DIAGNOSIS — Z79.01 LONG TERM (CURRENT) USE OF ANTICOAGULANTS: ICD-10-CM

## 2023-01-30 LAB
INR PPP: 2.8 (ref 0.8–1.2)
PROTHROMBIN TIME: 28.7 SEC (ref 9–12.5)

## 2023-01-30 PROCEDURE — 93793 PR ANTICOAGULANT MGMT FOR PT TAKING WARFARIN: ICD-10-PCS | Mod: S$GLB,,, | Performed by: PHARMACIST

## 2023-01-30 PROCEDURE — 36415 COLL VENOUS BLD VENIPUNCTURE: CPT | Performed by: INTERNAL MEDICINE

## 2023-01-30 PROCEDURE — 93793 ANTICOAG MGMT PT WARFARIN: CPT | Mod: S$GLB,,, | Performed by: PHARMACIST

## 2023-01-30 PROCEDURE — 85610 PROTHROMBIN TIME: CPT | Performed by: INTERNAL MEDICINE

## 2023-02-01 ENCOUNTER — TELEPHONE (OUTPATIENT)
Dept: ELECTROPHYSIOLOGY | Facility: CLINIC | Age: 53
End: 2023-02-01
Payer: COMMERCIAL

## 2023-02-01 ENCOUNTER — OFFICE VISIT (OUTPATIENT)
Dept: CARDIOLOGY | Facility: CLINIC | Age: 53
End: 2023-02-01
Payer: COMMERCIAL

## 2023-02-01 VITALS
WEIGHT: 199.19 LBS | OXYGEN SATURATION: 98 % | HEART RATE: 95 BPM | DIASTOLIC BLOOD PRESSURE: 72 MMHG | BODY MASS INDEX: 29.41 KG/M2 | SYSTOLIC BLOOD PRESSURE: 123 MMHG

## 2023-02-01 DIAGNOSIS — Z92.89 HISTORY OF CARDIOVERSION: ICD-10-CM

## 2023-02-01 DIAGNOSIS — Z79.01 ANTICOAGULANT LONG-TERM USE: ICD-10-CM

## 2023-02-01 DIAGNOSIS — Z98.890 S/P MAZE OPERATION FOR ATRIAL FIBRILLATION: ICD-10-CM

## 2023-02-01 DIAGNOSIS — Z95.2 S/P MVR (MITRAL VALVE REPLACEMENT): Primary | ICD-10-CM

## 2023-02-01 DIAGNOSIS — E78.5 HYPERLIPIDEMIA, UNSPECIFIED HYPERLIPIDEMIA TYPE: ICD-10-CM

## 2023-02-01 DIAGNOSIS — Z86.79 S/P MAZE OPERATION FOR ATRIAL FIBRILLATION: ICD-10-CM

## 2023-02-01 DIAGNOSIS — Z98.890 S/P TVR (TRICUSPID VALVE REPAIR): ICD-10-CM

## 2023-02-01 DIAGNOSIS — I48.0 PAROXYSMAL ATRIAL FIBRILLATION: ICD-10-CM

## 2023-02-01 PROCEDURE — 3074F SYST BP LT 130 MM HG: CPT | Mod: CPTII,S$GLB,, | Performed by: INTERNAL MEDICINE

## 2023-02-01 PROCEDURE — 99214 PR OFFICE/OUTPT VISIT, EST, LEVL IV, 30-39 MIN: ICD-10-PCS | Mod: S$GLB,,, | Performed by: INTERNAL MEDICINE

## 2023-02-01 PROCEDURE — 3078F DIAST BP <80 MM HG: CPT | Mod: CPTII,S$GLB,, | Performed by: INTERNAL MEDICINE

## 2023-02-01 PROCEDURE — 99214 OFFICE O/P EST MOD 30 MIN: CPT | Mod: S$GLB,,, | Performed by: INTERNAL MEDICINE

## 2023-02-01 PROCEDURE — 99999 PR PBB SHADOW E&M-EST. PATIENT-LVL III: CPT | Mod: PBBFAC,,, | Performed by: INTERNAL MEDICINE

## 2023-02-01 PROCEDURE — 3074F PR MOST RECENT SYSTOLIC BLOOD PRESSURE < 130 MM HG: ICD-10-PCS | Mod: CPTII,S$GLB,, | Performed by: INTERNAL MEDICINE

## 2023-02-01 PROCEDURE — 1159F PR MEDICATION LIST DOCUMENTED IN MEDICAL RECORD: ICD-10-PCS | Mod: CPTII,S$GLB,, | Performed by: INTERNAL MEDICINE

## 2023-02-01 PROCEDURE — 4010F ACE/ARB THERAPY RXD/TAKEN: CPT | Mod: CPTII,S$GLB,, | Performed by: INTERNAL MEDICINE

## 2023-02-01 PROCEDURE — 3008F PR BODY MASS INDEX (BMI) DOCUMENTED: ICD-10-PCS | Mod: CPTII,S$GLB,, | Performed by: INTERNAL MEDICINE

## 2023-02-01 PROCEDURE — 99999 PR PBB SHADOW E&M-EST. PATIENT-LVL III: ICD-10-PCS | Mod: PBBFAC,,, | Performed by: INTERNAL MEDICINE

## 2023-02-01 PROCEDURE — 3078F PR MOST RECENT DIASTOLIC BLOOD PRESSURE < 80 MM HG: ICD-10-PCS | Mod: CPTII,S$GLB,, | Performed by: INTERNAL MEDICINE

## 2023-02-01 PROCEDURE — 3008F BODY MASS INDEX DOCD: CPT | Mod: CPTII,S$GLB,, | Performed by: INTERNAL MEDICINE

## 2023-02-01 PROCEDURE — 1160F RVW MEDS BY RX/DR IN RCRD: CPT | Mod: CPTII,S$GLB,, | Performed by: INTERNAL MEDICINE

## 2023-02-01 PROCEDURE — 1160F PR REVIEW ALL MEDS BY PRESCRIBER/CLIN PHARMACIST DOCUMENTED: ICD-10-PCS | Mod: CPTII,S$GLB,, | Performed by: INTERNAL MEDICINE

## 2023-02-01 PROCEDURE — 1159F MED LIST DOCD IN RCRD: CPT | Mod: CPTII,S$GLB,, | Performed by: INTERNAL MEDICINE

## 2023-02-01 PROCEDURE — 4010F PR ACE/ARB THEARPY RXD/TAKEN: ICD-10-PCS | Mod: CPTII,S$GLB,, | Performed by: INTERNAL MEDICINE

## 2023-02-01 RX ORDER — LOSARTAN POTASSIUM 25 MG/1
TABLET ORAL DAILY
COMMUNITY
Start: 2023-01-30 | End: 2023-06-01 | Stop reason: SDUPTHER

## 2023-02-01 NOTE — PROGRESS NOTES
Outpatient Physical Therapy Lymphedema Treatment Note  [facility]     Patient Name: Brianna Urrutia  : 1953  MRN: [id number]  Today's Date: 2018        Visit Date: 2018    Visit Dx:    ICD-10-CM ICD-9-CM   1. Scar conditions and fibrosis of skin L90.5 709.2   2. Lymphedema of right upper extremity I89.0 457.1   3. Right arm pain M79.601 729.5       Patient Active Problem List   Diagnosis   • Malignant neoplasm of overlapping sites of right female breast   • Malignant neoplasm of upper-outer quadrant of right female breast              Lymphedema       18 1100             Subjective Comments Pt reports feeling ok; had difficulty using LT hand this weekend. Rt hand/ arm continues to have pain, numbness and tingling.   -MW       Able to rate subjective pain? yes  -MW    Pre-Treatment Pain Level 5  -MW    Post-Treatment Pain Level 5  -MW    Subjective Pain Comment Lt pinky, wrist, forearm elbow. Chest better   -MW       Ptting Edema Category By severity  -MW    Pitting Edema Moderate;Mild;Severe  -MW    Edema Assessment Comment Mild to moderate fullness Rt lateral trunk/ axilla; moderate packed edema around elbow and proximal forearm; mild in hand   -MW       Location/Assessment Upper Quadrant  -MW    Upper Extremity Conditions right:;intact;clean  -MW    Upper Quadrant Conditions right:;other (comment);intact;clean  -MW    Upper Quadrant Color/Pigment right:;hyperpigmented;other (comment);red   scar tight; red/ pink   -MW       Measurement Type(s) --  -MW    Quick Girth Areas --  -MW       Manual Lymphatic Drainage initial sequence;opened regional lymph nodes;opened anastamoses;extremity treatment;astym  -MW    Initial Sequence supraclavicular;shoulder collectors  -MW    Supraclavicular right;left  -MW    Shoulder Collectors right;left  -MW    Opened Regional Lymph Nodes inguinal  -MW    Axillary left  -MW    Inguinal right  -MW    Opened Anastamoses axillo-inguinal;anterior  Ms. Soto is a patient of Dr. Jones and was last seen in clinic 7/19/2022.      Subjective:   Patient ID:  Riya Soto is a 52 y.o. female who presents for follow-up of Atrial Fibrillation  .     HPI:    Ms. Soto is a 52 y.o. female with mitral valve disease (repair 2011, mechanical valve replacement 11/7/2022), AF (MAZE 11/7/2022 with no CARLOS ligation), TV repair (11/7/2022) here for follow up.     Background:    Referring Cardiology Practitioner: Suzie Berrios DNP  Primary Care Physician: Pantera Yadav MD    Mrs. Soto has a hx of prior MV disease? (unclear etiology/pathology, reports possible rheumatic fever as a child, prior mitral valve repair in 2011) presenting with symptomatic persistent AF also with systolic dysfunction (LVEF of 40%) with mild-mod MR/MS. She was in her usual state of health until the past few months when she began having palpitations and fatigue. She then presented to urgent care on 2/17/2022 with feeling unwell (body aches, fatigue, wanted to get checked for COVID) and was discovered to be in atrial fibrillation. She was sent to the ER. She was discharged. She then saw Suzie Berrios in cardiology clinic who initiated eliquis and referred for evaluation. An echocardiogram was recently performed noting a LVEF of 40% with mild-mod MR and mild-mod MS with reduced anterior mitral leaflet mobility. She was also initiated on metoprolol, which has been limited to low dosing due to hypotension. A 24 hour holter monitor noted persistent AF with an average ventricular rate of 89 bpm with frequent PVCs (2% burden).     Available electrocardiograms in Epic which show sinus rhythm in 2019 and then AF on 2/17/2022 and persistent since.    3/2022: DCCV and initiated multaq.    5/2022: seen by Marianela Malave. Felt well.     7/19/2022: Mrs. Soto returns for evaluation. She feels well in sinus rhythm. Repeat ECHO notes LVEF remains 40% with mod-severe MR with moderate mitral  axillo-axillary;posterior axillo-axillary  -MW    Anterior Axillo-Axillary moving Rt to left   -MW    Posterior Axillo-Axillary moving Rt to left   -MW    Axillo-Inguinal right  -MW    Extremity Treatment MLD to full limb;extremity treatment focus on  -MW    MLD to Full Limb RUE   -MW    Extremity Treatment Focus On elbow, forearm, hand   -MW    Astym UE sequence;anterior-lateral chest;upper traps;other astym  -MW    Anterior-Lateral Chest right  -MW    Anterior-Lateral Chest Comment In supine: Evaluator and localizer to superior and lateral Rt chest wall. Min fine soft tissue disruptions in superior chest. Continued ASTYM into lateral trunk ribs/ adjacent to breast with localizer and isolator. Moderate fine soft tissue disruptions. Continued ASTYM over sternum with localizer. Also extra strokes around scar; star burst at tumor scar.   -MW    Upper Traps right  -MW    Upper Traps Comment Initiated tx in sitting for RT UT: Evaluator and localizer to UT / scapular region wrapping into posterior-lateral trunk; minimal fine soft tissue disruptions noted through out. Extra strokes at teres mm area/ posterior axilla. Also worked deltoid and triceps area using Evaluator and localizer. Min fine soft tissue disruptions throughout triceps.  Tranisitioned to LT SL for additional strokes posterior -lateral trunk.   -MW    UE Sequence right  -MW    Manual Lymphatic Drainage Comments MLD post ASTYM / STM   -MW       Compression/Skin Care wrapping location;bandaging  -MW    Wrapping Location upper extremity  -MW    Wrapping Location UE right:;hand to axilla  -MW    Wrapping Comments MLD post ASTYM / STM   -MW    Compression/Skin Care Comments Cast padding at top and bottom of sleeve   -MW      User Key  (r) = Recorded By, (t) = Taken By, (c) = Cosigned By    Initials Name Provider Type    MW Na Bergeron, PT Physical Therapist                              PT Assessment/Plan       02/12/18 1100       PT Assessment    Functional  stenosis.  Mrs. Soto is a pleasant 51 year-old woman with prior MV disease? (unclear etiology/pathology, reports possible rheumatic fever as a child, prior mitral valve repair in 2011) presenting with symptomatic persistent AF also with systolic dysfunction (LVEF of 40%) with mild-mod MR/MS s/p DCCV and has maintained sinus rhythm with multaq. However her LVEF has not improved with sinus rhythm and ECHO in sinus rhythm indicates mod-severe MR and mild-mod MS. Continue anticoagulation and multaq. I would like to refer her to Dr. Sears and Dr. Gilbert for evaluation. She will likely need a redo operation at some point.    Update (02/02/2023):    11/7/2022:  Assessment:  1)  Mitral valve replacement with a 29 mm [employer] standard   mechanical prosthesis  2)  Tricuspid valve repair with a 26 mm [employer] contour annuloplasty band  3)  Maze procedure with complete left and right atrial lesion sets without resection of left atrial appendage  4)  Redo sternotomy    Today she says she is feeling well. Breathing improved since procedure. No palpitations. No CP, LH, syncope.    She is currently taking warfarin for stroke prophylaxis/mechanical valve and denies significant bleeding episodes. She is currently being treated with multaq 400mg BID for rhythm control and lopressor 12.5mg BID for HR control.  Kidney function is stable, with a creatinine of 0.8 on 11/13/2022.    I have personally reviewed the patient's EKG today, which shows likely AFL at 74bpm. QRS is 90. QTc is 488.    Relevant Cardiac Test Results:    2D Echo (12/8/2022):  The left ventricle is normal in size with normal systolic function. The estimated ejection fraction is 55%.  Normal right ventricular size with mildly reduced right ventricular systolic function.  Indeterminate left ventricular diastolic function with elevated left atrial pressure.  Severe left atrial enlargement.  There is a normally functioning 29mm Liva[employer]ova mechanical mitral valve  "prosthesis. Mean gradient is 4mm Hg at a heart rate in the 60s.  The estimated PA systolic pressure is 29 mmHg.  Intermediate central venous pressure (8 mmHg).    Current Outpatient Medications   Medication Sig    aspirin (ECOTRIN) 81 MG EC tablet Take 1 tablet (81 mg total) by mouth once daily.    atorvastatin (LIPITOR) 10 MG tablet Take 1 tablet (10 mg total) by mouth every evening.    cyproheptadine (PERIACTIN) 4 mg tablet Half p.o. b.i.d.--not too sleepy 1 p.o. b.i.d. to increase appetite (Patient not taking: Reported on 11/26/2022)    dronedarone (MULTAQ) 400 mg Tab Take 1 tablet (400 mg total) by mouth 2 (two) times daily with meals.    metoprolol tartrate (LOPRESSOR) 25 MG tablet Take 0.5 tablets (12.5 mg total) by mouth 2 (two) times daily.    warfarin (COUMADIN) 5 MG tablet Take 1 tablet (5 mg total) by mouth Daily.     No current facility-administered medications for this visit.       Review of Systems   Constitutional: Negative for malaise/fatigue.   Cardiovascular:  Negative for chest pain, dyspnea on exertion, irregular heartbeat, leg swelling and palpitations.   Respiratory:  Negative for shortness of breath.    Hematologic/Lymphatic: Negative for bleeding problem.   Skin:  Negative for rash.   Musculoskeletal:  Negative for myalgias.   Gastrointestinal:  Negative for hematemesis, hematochezia and nausea.   Genitourinary:  Negative for hematuria.   Neurological:  Negative for light-headedness.   Psychiatric/Behavioral:  Negative for altered mental status.    Allergic/Immunologic: Negative for persistent infections.     Objective:          /75   Pulse 74   Ht 5' 9" (1.753 m)   Wt 91 kg (200 lb 9.9 oz)   LMP 11/03/2016   BMI 29.63 kg/m²     Physical Exam  Vitals and nursing note reviewed.   Constitutional:       Appearance: Normal appearance. She is well-developed.   HENT:      Head: Normocephalic.      Nose: Nose normal.   Eyes:      Pupils: Pupils are equal, round, and reactive to light. " Limitations Performance in self-care ADL;Limitation in home management  -MW     Impairments Pain;Impaired lymphatic circulation;Edema;Joint mobility;Muscle strength;Impaired flexibility  -     Assessment Comments Pt with persistent pain, N/T in RUE with reports of increasing issues in LT hand as well. OT discussed hand limitations with PT; both are in agreement that pt needs to be seen for further workup of neurologic symptoms that are likely outside of side effects from her BrCA treatments. Cont ASTYM, STM, MLD for RUE pain and functional recovery. Recommend pt be seen by PCP and / or spine specialist.   -MW     Rehab Potential Fair  -MW     Patient/caregiver participated in establishment of treatment plan and goals Yes  -MW     Patient would benefit from skilled therapy intervention Yes  -MW     PT Plan    PT Frequency 2x/week  -MW     Physical Therapy Interventions (Optional Details) manual lymphatic drainage;manual therapy techniques;ROM (Range of Motion);strengthening;stretching;taping;patient/family education;home exercise program  -     PT Plan Comments Cont ASTYM/ STM, MLD; cont nerve glides / slides for Median and ulnar nerves; progress HEP as coleen.   -MW       User Key  (r) = Recorded By, (t) = Taken By, (c) = Cosigned By    Initials Name Provider Type    FRAN Bergeron, PT Physical Therapist                     Exercises       02/12/18 1100          Subjective Comments    Subjective Comments Pt reports feeling ok; had difficulty using LT hand this weekend. Rt hand/ arm continues to have pain, numbness and tingling.   -MW      Subjective Pain    Able to rate subjective pain? yes  -MW      Pre-Treatment Pain Level 5  -MW      Post-Treatment Pain Level 5  -MW      Subjective Pain Comment Lt pinky, wrist, forearm elbow. Chest better   -MW      Exercise 1    Exercise Name 1 Rt ulnar nerve flossing   -MW      Cueing 1 Tactile;Verbal  -MW      Reps 1 2  -MW      Additional Comments moving through wrist    Cardiovascular:      Rate and Rhythm: Normal rate. Rhythm irregularly irregular.   Pulmonary:      Effort: No respiratory distress.      Breath sounds: Normal breath sounds.   Musculoskeletal:         General: Normal range of motion.   Skin:     General: Skin is warm and dry.      Findings: No erythema.   Neurological:      Mental Status: She is alert and oriented to person, place, and time.   Psychiatric:         Speech: Speech normal.         Behavior: Behavior normal.         Lab Results   Component Value Date     (L) 11/13/2022     10/27/2017    K 3.9 11/13/2022    MG 2.1 11/13/2022    BUN 20 11/13/2022    CREATININE 0.8 11/13/2022    CREATININE 0.77 10/27/2017    ALT 26 11/10/2022    AST 60 (H) 11/10/2022    HGB 10.0 (L) 11/13/2022    HCT 31.0 (L) 11/13/2022    HCT 27 (L) 11/07/2022    TSH 1.975 05/31/2022    LDLCALC 77.4 05/31/2022       Recent Labs   Lab 01/10/23  0928 01/17/23  1116 01/24/23  0851 01/30/23  1303   INR 1.7 H 1.9 H 3.9 H 2.8 H         Assessment:     1. Persistent atrial fibrillation    2. HFrEF (heart failure with reduced ejection fraction)    3. Anticoagulant long-term use    4. History of cardioversion    5. S/P Maze operation for atrial fibrillation      Plan:     In summary, Ms. Soto is a 52 y.o. female with mitral valve disease (repair 2011, mechanical valve replacement 11/7/2022), AF (MAZE 11/7/2022 with no CARLOS ligation), TV repair (11/7/2022) here for follow up.   She is 3 mo s/p MAZE as part of mechanical MV replacement and TV repair surgery. She reports improved breathing since procedure and no palpitations but appears to be in AFL today despite Multaq. Will discuss DCCV with Dr. Jones.    JUAN/DCCV? (UPDATE: Will proceed)  Continue current meds  RTC as scheduled    *A copy of this note has been sent to Dr. Jones*    Follow up as scheduled.    ------------------------------------------------------------------    EMILY Clifford, NP-C  Cardiac  flex/ ext  and pronation / supination   -MW      Exercise 2    Exercise Name 2 RT median nerve flossing   -MW      Cueing 2 Tactile;Verbal  -MW      Reps 2 2  -MW        User Key  (r) = Recorded By, (t) = Taken By, (c) = Cosigned By    Initials Name Provider Type    FRAN Bergeron PT Physical Therapist                              PT OP Goals       02/12/18 1256       Time Calculation    PT Goal Re-Cert Due Date 05/03/18  -MW       User Key  (r) = Recorded By, (t) = Taken By, (c) = Cosigned By    Initials Name Provider Type    FRAN Bergeron PT Physical Therapist          Therapy Education  Education Details: Cont pump and sleeve use; need to see PCP for further workup of neck due to increased symptoms in Bilat hands. Discussed with OTR/L.   Given: Pain management, Symptoms/condition management  Program: Reinforced  How Provided: Verbal  Provided to: Patient  Level of Understanding: Verbalized              Time Calculation:   Start Time: 1100  Total Timed Code Minutes- PT: 55 minute(s)     Therapy Charges for Today     Code Description Service Date Service Provider Modifiers Qty    [id number] HC PT MANUAL THERAPY EA 15 MIN 2/12/2018 Na Bergeron, PT GP 4                    Na Bergeron PT  2/12/2018      Electrophysiology

## 2023-02-01 NOTE — TELEPHONE ENCOUNTER
Called pt to confirm appointment with SKYLER Malave. Pt verbally confirmed appointment date, time, and location, and thanked me for calling.

## 2023-02-01 NOTE — PROGRESS NOTES
Subjective:      Patient ID: Riya Soto is a 52 y.o. female.    Chief Complaint: Follow-up    HPI:  Pt is S/P mechanical mitral valve replacement 11/11/22 by Dr Gilbert.  Cor angio pre-op was normal.  Pt had been cardioverted pre-op for a fib.    Pt had previously undergone a mitral valve repair June 2011 at [facility] by Dr Garcia.    Pt is active and feels good.    No longer short of breath like she was prior to mitral valve replacement    Review of Systems   Cardiovascular:  Negative for chest pain, claudication, dyspnea on exertion, irregular heartbeat, leg swelling, near-syncope, orthopnea, palpitations and syncope.        No bleeding on warfarin      Past Medical History:   Diagnosis Date    A-fib     Atrial fibrillation     Back pain     Hyperlipidemia     Mitral incompetence     Mitral valve stenosis     Neck pain         Past Surgical History:   Procedure Laterality Date    ABLATION N/A 11/7/2022    Procedure: MAZE;  Surgeon: Bobby Gilbert MD;  Location: [facility] OR [address] [place] [address];  Service: Cardiothoracic;  Laterality: N/A;  Cryoablation    CARDIAC SURGERY      to repair mitral valve    CHOLECYSTECTOMY      COLONOSCOPY      COLONOSCOPY N/A 6/8/2022    Procedure: COLONOSCOPY;  Surgeon: Sanford Andres MD;  Location: [facility] ENDO;  Service: Endoscopy;  Laterality: N/A;    CORONARY ANGIOGRAPHY N/A 8/29/2022    Procedure: ANGIOGRAM, CORONARY ARTERY;  Surgeon: Lexa Harley MD;  Location: [facility] CATH LAB;  Service: Cardiology;  Laterality: N/A;    MITRAL VALVE REPLACEMENT N/A 11/7/2022    Procedure: REPLACEMENT, MITRAL VALVE;  Surgeon: Bobby Gilbert MD;  Location: [address];  Service: Cardiothoracic;  Laterality: N/A;  REDO STERNOTOMY    STERNOTOMY N/A 11/7/2022    Procedure: STERNOTOMY;  Surgeon: Bobby Gilbert MD;  Location: [address];  Service: Cardiothoracic;  Laterality: N/A;  Redo sternotomy    TRANSESOPHAGEAL ECHOCARDIOGRAPHY N/A 03/29/2022    Procedure:  ECHOCARDIOGRAM, TRANSESOPHAGEAL;  Surgeon: Dominic Wallis MD;  Location: [facility] EP LAB;  Service: Cardiology;  Laterality: N/A;    TREATMENT OF CARDIAC ARRHYTHMIA N/A 03/29/2022    Procedure: Cardioversion or Defibrillation;  Surgeon: Elmo Jones MD;  Location: [facility] EP LAB;  Service: Cardiology;  Laterality: N/A;  afib, dccv, stanley, anes, PA, 3prep, Provider to perform     TRICUSPID VALVULOPLASTY N/A 11/7/2022    Procedure: REPAIR, TRICUSPID VALVE;  Surgeon: Bobby Gilbert MD;  Location: [facility] OR [address] [place] [address];  Service: Cardiothoracic;  Laterality: N/A;       Family History   Problem Relation Age of Onset    Breast cancer Sister     Colon cancer Maternal Aunt     Ovarian cancer Neg Hx        Social History     Socioeconomic History    Marital status: Single   Tobacco Use    Smoking status: Never    Smokeless tobacco: Never   Substance and Sexual Activity    Alcohol use: Yes     Comment: socially    Drug use: No    Sexual activity: Yes     Partners: Male       Current Outpatient Medications on File Prior to Visit   Medication Sig Dispense Refill    aspirin (ECOTRIN) 81 MG EC tablet Take 1 tablet (81 mg total) by mouth once daily. 30 tablet 11    atorvastatin (LIPITOR) 10 MG tablet Take 1 tablet (10 mg total) by mouth every evening. 30 tablet 11    cyproheptadine (PERIACTIN) 4 mg tablet Half p.o. b.i.d.--not too sleepy 1 p.o. b.i.d. to increase appetite 60 tablet 5    dronedarone (MULTAQ) 400 mg Tab Take 1 tablet (400 mg total) by mouth 2 (two) times daily with meals. 60 tablet 11    losartan (COZAAR) 25 MG tablet       metoprolol tartrate (LOPRESSOR) 25 MG tablet Take 0.5 tablets (12.5 mg total) by mouth 2 (two) times daily. 30 tablet 11    warfarin (COUMADIN) 5 MG tablet Take 1 tablet (5 mg total) by mouth Daily. 30 tablet 11    [DISCONTINUED] citalopram (CELEXA) 10 MG tablet Take 1 tablet (10 mg total) by mouth once daily. 30 tablet 11     No current facility-administered medications on file prior to visit.        Review of patient's allergies indicates:   Allergen Reactions    Amoxicillin Hives and Other (See Comments)     Objective:     Vitals:    02/01/23 0836   BP: 123/72   BP Location: Right arm   Patient Position: Sitting   BP Method: Medium (Automatic)   Pulse: 95   SpO2: 98%   Weight: 90.4 kg (199 lb 3 oz)        Physical Exam  Vitals reviewed.   Constitutional:       Appearance: She is well-developed.   Eyes:      General: No scleral icterus.  Neck:      Vascular: No carotid bruit or JVD.   Cardiovascular:      Rate and Rhythm: Normal rate and regular rhythm.      Heart sounds: Murmur (II/VI systolic) heard.     No gallop.      Comments: Crisp metallic mitral valve open and close noise  Pulmonary:      Breath sounds: Normal breath sounds.   Musculoskeletal:      Right lower leg: No edema.      Left lower leg: No edema.   Skin:     General: Skin is warm and dry.   Neurological:      Mental Status: She is alert and oriented to person, place, and time.   Psychiatric:         Behavior: Behavior normal.         Thought Content: Thought content normal.         Judgment: Judgment normal.      Lab Visit on 01/30/2023   Component Date Value Ref Range Status    Prothrombin Time 01/30/2023 28.7 (H)  9.0 - 12.5 sec Final    INR 01/30/2023 2.8 (H)  0.8 - 1.2 Final   Lab Visit on 01/24/2023   Component Date Value Ref Range Status    Prothrombin Time 01/24/2023 38.5 (H)  9.0 - 12.5 sec Final    INR 01/24/2023 3.9 (H)  0.8 - 1.2 Final   Lab Visit on 01/17/2023   Component Date Value Ref Range Status    Prothrombin Time 01/17/2023 19.7 (H)  9.0 - 12.5 sec Final    INR 01/17/2023 1.9 (H)  0.8 - 1.2 Final   Lab Visit on 01/10/2023   Component Date Value Ref Range Status    Prothrombin Time 01/10/2023 18.0 (H)  9.0 - 12.5 sec Final    INR 01/10/2023 1.7 (H)  0.8 - 1.2 Final   Lab Visit on 01/03/2023   Component Date Value Ref Range Status    Prothrombin Time 01/03/2023 15.4 (H)  9.0 - 12.5 sec Final    INR 01/03/2023 1.5 (H)  0.8  - 1.2 Final   Lab Visit on 12/27/2022   Component Date Value Ref Range Status    Prothrombin Time 12/27/2022 20.3 (H)  9.0 - 12.5 sec Final    INR 12/27/2022 2.0 (H)  0.8 - 1.2 Final   Lab Visit on 12/19/2022   Component Date Value Ref Range Status    Prothrombin Time 12/19/2022 18.6 (H)  9.0 - 12.5 sec Final    INR 12/19/2022 1.8 (H)  0.8 - 1.2 Final   Anti-coag visit on 12/12/2022   Component Date Value Ref Range Status    INR 12/12/2022 2.1   Final   Hospital Outpatient Visit on 12/08/2022   Component Date Value Ref Range Status    Ascending aorta 12/08/2022 2.81  cm Final    STJ 12/08/2022 2.40  cm Final    AV mean gradient 12/08/2022 5  mmHg Final    Ao peak satish 12/08/2022 1.58  m/s Final    Ao VTI 12/08/2022 32.48  cm Final    IVRT 12/08/2022 68.51  msec Final    IVS 12/08/2022 0.44 (A)  0.6 - 1.1 cm Final    LA size 12/08/2022 4.60  cm Final    Left Atrium Major Axis 12/08/2022 5.78  cm Final    Left Atrium Minor Axis 12/08/2022 5.82  cm Final    LVIDd 12/08/2022 5.17  3.5 - 6.0 cm Final    LVIDs 12/08/2022 3.76  2.1 - 4.0 cm Final    LVOT diameter 12/08/2022 1.97  cm Final    LVOT peak VTI 12/08/2022 18.00  cm Final    Posterior Wall 12/08/2022 0.94  0.6 - 1.1 cm Final    MV Peak A Satish 12/08/2022 0.47  m/s Final    E wave deceleration time 12/08/2022 216.81  msec Final    MV Peak E Satish 12/08/2022 1.94  m/s Final    PV Peak D Satish 12/08/2022 0.59  m/s Final    PV Peak S Satish 12/08/2022 0.44  m/s Final    RA Major Axis 12/08/2022 4.71  cm Final    RA Width 12/08/2022 4.31  cm Final    RVDD 12/08/2022 4.00  cm Final    Sinus 12/08/2022 2.75  cm Final    TAPSE 12/08/2022 1.06  cm Final    TR Max Satish 12/08/2022 2.31  m/s Final    LA WIDTH 12/08/2022 4.46  cm Final    MV stenosis pressure 1/2 time 12/08/2022 41.40  ms Final    LV Diastolic Volume 12/08/2022 127.83  mL Final    LV Systolic Volume 12/08/2022 60.50  mL Final    RV S' 12/08/2022 5.35  cm/s Final    LVOT peak satish 12/08/2022 0.86  m/s Final    LA volume  "(mod) 12/08/2022 60.46  cm3 Final    MV "A" wave duration 12/08/2022 8.37  msec Final    MV mean gradient 12/08/2022 4  mmHg Final    MV peak gradient 12/08/2022 18  mmHg Final    MV VTI 12/08/2022 13.20  cm Final    FS 12/08/2022 27  % Final    LA volume 12/08/2022 101.14  cm3 Final    LV mass 12/08/2022 119.43  g Final    Left Ventricle Relative Wall Thick* 12/08/2022 0.36  cm Final    AV valve area 12/08/2022 1.69  cm2 Final    AV Velocity Ratio 12/08/2022 0.54   Final    AV index (prosthetic) 12/08/2022 0.55   Final    MV valve area p 1/2 method 12/08/2022 5.31  cm2 Final    MV valve area by continuity eq 12/08/2022 4.15  cm2 Final    E/A ratio 12/08/2022 4.13   Final    Pulm vein S/D ratio 12/08/2022 0.75   Final    LVOT area 12/08/2022 3.0  cm2 Final    LVOT stroke volume 12/08/2022 54.84  cm3 Final    AV peak gradient 12/08/2022 10  mmHg Final    Triscuspid Valve Regurgitation Pea* 12/08/2022 21  mmHg Final    BSA 12/08/2022 2.07  m2 Final    LV Systolic Volume Index 12/08/2022 29.7  mL/m2 Final    LV Diastolic Volume Index 12/08/2022 62.66  mL/m2 Final    LA Volume Index 12/08/2022 49.6  mL/m2 Final    LV Mass Index 12/08/2022 59  g/m2 Final    LA Volume Index (Mod) 12/08/2022 29.6  mL/m2 Final    Right Atrial Pressure (from IVC) 12/08/2022 8  mmHg Final    EF 12/08/2022 55  % Final    TV rest pulmonary artery pressure 12/08/2022 29  mmHg Final   Lab Visit on 12/05/2022   Component Date Value Ref Range Status    Prothrombin Time 12/05/2022 24.0 (H)  9.0 - 12.5 sec Final    INR 12/05/2022 2.4 (H)  0.8 - 1.2 Final   There may be more visits with results that are not included.   (   Bobby Gilbert MD (Physician)                    DATE OF PROCEDURE:  11/7/2022     ATTENDING SURGEON:  Bobby Gilbert M.D.     ASSISTANT:  Yvan Jack M.D., (Cardiothoracic Resident)     PREOPERATIVE DIAGNOSES:  1.  Mitral stenosis and mitral regurgitation  2.  Previous mitral valve repair  3.  Severe tricuspid " regurgitation  4.  Atrial fibrillation  5.  Chronic systolic heart failure  6.  Pulmonary hypertension     POSTOPERATIVE DIAGNOSES:  1.  Mitral stenosis and mitral regurgitation  2.  Previous mitral valve repair  3.  Severe tricuspid regurgitation  4.  Atrial fibrillation  5.  Chronic systolic heart failure  6.  Pulmonary hypertension     OPERATION PERFORMED:       1)  Mitral valve replacement with a 29 mm [employer] standard   mechanical prosthesis     2)  Tricuspid valve repair with a 26 mm Medtronic contour annuloplasty band     3)  Maze procedure with complete left and right atrial lesion sets without resection of left atrial appendage     4)  Redo sternotomy         Accession #: [id number]  Riya Thompsonph  Cardiac catheterization  Order# [id number]  Reading physician: Lexa Harley MD Ordering physician: Lexa Harley MD Study date: 8/29/22     Patient Information    Name MRN Description   Riya Thompsonph [id number] y.o. female     Physicians    Panel Physicians Referring Physician Case Authorizing Physician   Lexa Harley MD (Primary)  MD Jose Armando Buchanan MD (Fellow)     Pablo Powers MD (Fellow)       Indications    Mitral valve insufficiency, unspecified etiology [I34.0 (ICD-10-CM)]   HFrEF (heart failure with reduced ejection fraction) [I50.20 (ICD-10-CM)]     Summary         Normal coronary arteries     The procedure log was documented by Documenter: Felisa Winters and verified by Lexa Villarreal MD.     Date: 8/29/2022  Time: 10:53 AM     Accession #: [id number]  Transthoracic echo (TTE) complete  Order# [id number]  Reading physician: Bennett Galvez MD Ordering physician: Bobby Gilbert MD Study date: 12/8/22     Reason for Exam  Priority: Routine  Dx: S/P MVR (mitral valve replacement) [Z95.2 (ICD-10-CM)]; Examination of participant in clinical trial [Z00.6 (ICD-10-CM)]     View Images Vital Vitrea     Show images for Echo  Summary    The left ventricle  is normal in size with normal systolic function. The estimated ejection fraction is 55%.  Normal right ventricular size with mildly reduced right ventricular systolic function.  Indeterminate left ventricular diastolic function with elevated left atrial pressure.  Severe left atrial enlargement.  There is a normally functioning 29mm Liva[employer]ova mechanical mitral valve prosthesis. Mean gradient is 4mm Hg at a heart rate in the 60s.  The estimated PA systolic pressure is 29 mmHg.  Intermediate central venous pressure (8 mmHg).     Vitals    Height Weight     /9/2022 10:30     EKG 12-lead  Order: [id number]  Status: Final result     Visible to patient: Yes (not seen)     Next appt: 02/02/2023 at 08:00 AM in Cardiology (EKG, APPT)     Dx: S/P MVR (mitral valve repair)     0 Result Notes     Narrative  Performed by: GEMUSE  Test Reason : Z98.890,     Vent. Rate : 070 BPM     Atrial Rate : 178 BPM      P-R Int : 000 ms          QRS Dur : 082 ms       QT Int : 450 ms       P-R-T Axes : 000 093 061 degrees      QTc Int : 486 ms     Normal sinus rhythm with First degree AV block with Premature atrial   complexes   Rightward axis   Nonspecific ST and T wave abnormality   Prolonged QT   Abnormal ECG   When compared with ECG of 11-NOV-2022 15:23,   Premature atrial complexes Now present   Confirmed by Dominic Wallis MD ([id number]) on 12/9/2022 9:18:43 AM     Referred By: ROBERT FRANKLIN           Confirmed By:Dominic Wallis MD      Specimen Collected: 12/08/22 14:05 Last Resulted: 12/09/22 09:18              Assessment:     1. S/P MVR (mitral valve replacement)    2. S/P Maze operation for atrial fibrillation    3. Hyperlipidemia, unspecified hyperlipidemia type    4. Paroxysmal atrial fibrillation    5. History of cardioversion    6. S/P TVR (tricuspid valve repair)    7. Anticoagulant long-term use      Plan:   Riya was seen today for follow-up.    Diagnoses and all orders for this visit:    S/P MVR (mitral valve replacement)  -      BNP; Future  -     CBC Auto Differential; Future  -     Comprehensive Metabolic Panel; Future  -     Lipid Panel; Future  -     TSH; Future    S/P Maze operation for atrial fibrillation  -     BNP; Future  -     CBC Auto Differential; Future  -     Comprehensive Metabolic Panel; Future  -     Lipid Panel; Future  -     TSH; Future    Hyperlipidemia, unspecified hyperlipidemia type  -     BNP; Future  -     CBC Auto Differential; Future  -     Comprehensive Metabolic Panel; Future  -     Lipid Panel; Future  -     TSH; Future    Paroxysmal atrial fibrillation  -     BNP; Future  -     CBC Auto Differential; Future  -     Comprehensive Metabolic Panel; Future  -     Lipid Panel; Future  -     TSH; Future    History of cardioversion  -     BNP; Future  -     CBC Auto Differential; Future  -     Comprehensive Metabolic Panel; Future  -     Lipid Panel; Future  -     TSH; Future    S/P TVR (tricuspid valve repair)  -     BNP; Future  -     CBC Auto Differential; Future  -     Comprehensive Metabolic Panel; Future  -     Lipid Panel; Future  -     TSH; Future    Anticoagulant long-term use  -     BNP; Future  -     CBC Auto Differential; Future  -     Comprehensive Metabolic Panel; Future  -     Lipid Panel; Future  -     TSH; Future     Long discussion about keeping vitamin K level constant in diet from one week to the next    Pt is doing well    Same meds    F/u with Dr Jones.    F/u with coumadin clinic      RTC 4 months with lab    Follow up in about 4 months (around 6/1/2023).

## 2023-02-02 ENCOUNTER — OFFICE VISIT (OUTPATIENT)
Dept: ELECTROPHYSIOLOGY | Facility: CLINIC | Age: 53
End: 2023-02-02
Payer: COMMERCIAL

## 2023-02-02 ENCOUNTER — HOSPITAL ENCOUNTER (OUTPATIENT)
Dept: CARDIOLOGY | Facility: CLINIC | Age: 53
Discharge: HOME | End: 2023-02-02
Payer: COMMERCIAL

## 2023-02-02 VITALS
HEIGHT: 69 IN | DIASTOLIC BLOOD PRESSURE: 75 MMHG | WEIGHT: 200.63 LBS | SYSTOLIC BLOOD PRESSURE: 110 MMHG | BODY MASS INDEX: 29.71 KG/M2 | HEART RATE: 74 BPM

## 2023-02-02 DIAGNOSIS — Z92.89 HISTORY OF CARDIOVERSION: ICD-10-CM

## 2023-02-02 DIAGNOSIS — Z79.01 ANTICOAGULANT LONG-TERM USE: ICD-10-CM

## 2023-02-02 DIAGNOSIS — I50.20 HFREF (HEART FAILURE WITH REDUCED EJECTION FRACTION): ICD-10-CM

## 2023-02-02 DIAGNOSIS — Z98.890 S/P MAZE OPERATION FOR ATRIAL FIBRILLATION: ICD-10-CM

## 2023-02-02 DIAGNOSIS — I48.0 PAF (PAROXYSMAL ATRIAL FIBRILLATION): ICD-10-CM

## 2023-02-02 DIAGNOSIS — I48.19 PERSISTENT ATRIAL FIBRILLATION: Primary | ICD-10-CM

## 2023-02-02 DIAGNOSIS — Z86.79 S/P MAZE OPERATION FOR ATRIAL FIBRILLATION: ICD-10-CM

## 2023-02-02 PROCEDURE — 3078F PR MOST RECENT DIASTOLIC BLOOD PRESSURE < 80 MM HG: ICD-10-PCS | Mod: CPTII,S$GLB,, | Performed by: NURSE PRACTITIONER

## 2023-02-02 PROCEDURE — 3074F PR MOST RECENT SYSTOLIC BLOOD PRESSURE < 130 MM HG: ICD-10-PCS | Mod: CPTII,S$GLB,, | Performed by: NURSE PRACTITIONER

## 2023-02-02 PROCEDURE — 3008F PR BODY MASS INDEX (BMI) DOCUMENTED: ICD-10-PCS | Mod: CPTII,S$GLB,, | Performed by: NURSE PRACTITIONER

## 2023-02-02 PROCEDURE — 3074F SYST BP LT 130 MM HG: CPT | Mod: CPTII,S$GLB,, | Performed by: NURSE PRACTITIONER

## 2023-02-02 PROCEDURE — 99999 PR PBB SHADOW E&M-EST. PATIENT-LVL III: CPT | Mod: PBBFAC,,, | Performed by: NURSE PRACTITIONER

## 2023-02-02 PROCEDURE — 3078F DIAST BP <80 MM HG: CPT | Mod: CPTII,S$GLB,, | Performed by: NURSE PRACTITIONER

## 2023-02-02 PROCEDURE — 4010F ACE/ARB THERAPY RXD/TAKEN: CPT | Mod: CPTII,S$GLB,, | Performed by: NURSE PRACTITIONER

## 2023-02-02 PROCEDURE — 1159F MED LIST DOCD IN RCRD: CPT | Mod: CPTII,S$GLB,, | Performed by: NURSE PRACTITIONER

## 2023-02-02 PROCEDURE — 99214 PR OFFICE/OUTPT VISIT, EST, LEVL IV, 30-39 MIN: ICD-10-PCS | Mod: S$GLB,,, | Performed by: NURSE PRACTITIONER

## 2023-02-02 PROCEDURE — 99999 PR PBB SHADOW E&M-EST. PATIENT-LVL III: ICD-10-PCS | Mod: PBBFAC,,, | Performed by: NURSE PRACTITIONER

## 2023-02-02 PROCEDURE — 3008F BODY MASS INDEX DOCD: CPT | Mod: CPTII,S$GLB,, | Performed by: NURSE PRACTITIONER

## 2023-02-02 PROCEDURE — 1160F PR REVIEW ALL MEDS BY PRESCRIBER/CLIN PHARMACIST DOCUMENTED: ICD-10-PCS | Mod: CPTII,S$GLB,, | Performed by: NURSE PRACTITIONER

## 2023-02-02 PROCEDURE — 99214 OFFICE O/P EST MOD 30 MIN: CPT | Mod: S$GLB,,, | Performed by: NURSE PRACTITIONER

## 2023-02-02 PROCEDURE — 1159F PR MEDICATION LIST DOCUMENTED IN MEDICAL RECORD: ICD-10-PCS | Mod: CPTII,S$GLB,, | Performed by: NURSE PRACTITIONER

## 2023-02-02 PROCEDURE — 4010F PR ACE/ARB THEARPY RXD/TAKEN: ICD-10-PCS | Mod: CPTII,S$GLB,, | Performed by: NURSE PRACTITIONER

## 2023-02-02 PROCEDURE — 1160F RVW MEDS BY RX/DR IN RCRD: CPT | Mod: CPTII,S$GLB,, | Performed by: NURSE PRACTITIONER

## 2023-02-02 PROCEDURE — 93000 RHYTHM STRIP: ICD-10-PCS | Mod: S$GLB,,, | Performed by: INTERNAL MEDICINE

## 2023-02-02 PROCEDURE — 93000 ELECTROCARDIOGRAM COMPLETE: CPT | Mod: S$GLB,,, | Performed by: INTERNAL MEDICINE

## 2023-02-02 NOTE — Clinical Note
She is 3 mo s/p MV replacement, TV repair, and MAZE. Looks like AFL today despite multaq? Do you want to plan for DCCV? thx

## 2023-02-03 ENCOUNTER — TELEPHONE (OUTPATIENT)
Dept: ELECTROPHYSIOLOGY | Facility: CLINIC | Age: 53
End: 2023-02-03
Payer: COMMERCIAL

## 2023-02-03 DIAGNOSIS — Z95.2 S/P MITRAL VALVE REPLACEMENT: ICD-10-CM

## 2023-02-03 DIAGNOSIS — I48.3 TYPICAL ATRIAL FLUTTER: ICD-10-CM

## 2023-02-03 DIAGNOSIS — I50.20 HFREF (HEART FAILURE WITH REDUCED EJECTION FRACTION): ICD-10-CM

## 2023-02-03 DIAGNOSIS — I48.19 PERSISTENT ATRIAL FIBRILLATION: Primary | ICD-10-CM

## 2023-02-03 DIAGNOSIS — Z01.818 PRE-OP TESTING: ICD-10-CM

## 2023-02-03 DIAGNOSIS — Z79.01 LONG TERM (CURRENT) USE OF ANTICOAGULANTS: ICD-10-CM

## 2023-02-03 NOTE — TELEPHONE ENCOUNTER
Spoke with Patient scheduled for JUAN/DCCV  procedure on 2/13/2023 ( date per patient request)  with Dr Jones. Procedure details reviewed and advised that pre procedure patient instructions will be sent via email as requested. Advised to call the office for any questions or concerns prior to scheduled procedure. Understanding verbalized.

## 2023-02-06 ENCOUNTER — ANTI-COAG VISIT (OUTPATIENT)
Dept: CARDIOLOGY | Facility: CLINIC | Age: 53
End: 2023-02-06
Payer: COMMERCIAL

## 2023-02-06 ENCOUNTER — LAB VISIT (OUTPATIENT)
Dept: LAB | Facility: HOSPITAL | Age: 53
End: 2023-02-06
Attending: INTERNAL MEDICINE
Payer: COMMERCIAL

## 2023-02-06 DIAGNOSIS — Z01.818 PRE-OP TESTING: ICD-10-CM

## 2023-02-06 DIAGNOSIS — Z95.2 S/P MITRAL VALVE REPLACEMENT: ICD-10-CM

## 2023-02-06 DIAGNOSIS — I50.20 HFREF (HEART FAILURE WITH REDUCED EJECTION FRACTION): ICD-10-CM

## 2023-02-06 DIAGNOSIS — I48.19 PERSISTENT ATRIAL FIBRILLATION: ICD-10-CM

## 2023-02-06 DIAGNOSIS — Z79.01 LONG TERM (CURRENT) USE OF ANTICOAGULANTS: ICD-10-CM

## 2023-02-06 DIAGNOSIS — I48.0 PAROXYSMAL ATRIAL FIBRILLATION: ICD-10-CM

## 2023-02-06 DIAGNOSIS — I48.3 TYPICAL ATRIAL FLUTTER: ICD-10-CM

## 2023-02-06 DIAGNOSIS — Z95.2 S/P MVR (MITRAL VALVE REPLACEMENT): Primary | ICD-10-CM

## 2023-02-06 DIAGNOSIS — Z95.2 S/P MVR (MITRAL VALVE REPLACEMENT): ICD-10-CM

## 2023-02-06 DIAGNOSIS — I48.91 ATRIAL FIBRILLATION, UNSPECIFIED TYPE: ICD-10-CM

## 2023-02-06 LAB
ANION GAP SERPL CALC-SCNC: 5 MMOL/L (ref 8–16)
BASOPHILS # BLD AUTO: 0.01 K/UL (ref 0–0.2)
BASOPHILS NFR BLD: 0.2 % (ref 0–1.9)
BUN SERPL-MCNC: 17 MG/DL (ref 6–20)
CALCIUM SERPL-MCNC: 9.1 MG/DL (ref 8.7–10.5)
CHLORIDE SERPL-SCNC: 104 MMOL/L (ref 95–110)
CO2 SERPL-SCNC: 27 MMOL/L (ref 23–29)
CREAT SERPL-MCNC: 1 MG/DL (ref 0.5–1.4)
DIFFERENTIAL METHOD: ABNORMAL
EOSINOPHIL # BLD AUTO: 0.1 K/UL (ref 0–0.5)
EOSINOPHIL NFR BLD: 1.9 % (ref 0–8)
ERYTHROCYTE [DISTWIDTH] IN BLOOD BY AUTOMATED COUNT: 14.4 % (ref 11.5–14.5)
EST. GFR  (NO RACE VARIABLE): >60 ML/MIN/1.73 M^2
GLUCOSE SERPL-MCNC: 92 MG/DL (ref 70–110)
HCT VFR BLD AUTO: 34.5 % (ref 37–48.5)
HGB BLD-MCNC: 11 G/DL (ref 12–16)
IMM GRANULOCYTES # BLD AUTO: 0.01 K/UL (ref 0–0.04)
IMM GRANULOCYTES NFR BLD AUTO: 0.2 % (ref 0–0.5)
INR PPP: 2.8 (ref 0.8–1.2)
LYMPHOCYTES # BLD AUTO: 1.7 K/UL (ref 1–4.8)
LYMPHOCYTES NFR BLD: 34.8 % (ref 18–48)
MCH RBC QN AUTO: 28.6 PG (ref 27–31)
MCHC RBC AUTO-ENTMCNC: 31.9 G/DL (ref 32–36)
MCV RBC AUTO: 90 FL (ref 82–98)
MONOCYTES # BLD AUTO: 0.7 K/UL (ref 0.3–1)
MONOCYTES NFR BLD: 15.3 % (ref 4–15)
NEUTROPHILS # BLD AUTO: 2.3 K/UL (ref 1.8–7.7)
NEUTROPHILS NFR BLD: 47.6 % (ref 38–73)
NRBC BLD-RTO: 0 /100 WBC
PLATELET # BLD AUTO: 257 K/UL (ref 150–450)
PMV BLD AUTO: 10.4 FL (ref 9.2–12.9)
POTASSIUM SERPL-SCNC: 4 MMOL/L (ref 3.5–5.1)
PROTHROMBIN TIME: 29.3 SEC (ref 9–12.5)
RBC # BLD AUTO: 3.85 M/UL (ref 4–5.4)
SODIUM SERPL-SCNC: 136 MMOL/L (ref 136–145)
WBC # BLD AUTO: 4.77 K/UL (ref 3.9–12.7)

## 2023-02-06 PROCEDURE — 36415 COLL VENOUS BLD VENIPUNCTURE: CPT | Performed by: INTERNAL MEDICINE

## 2023-02-06 PROCEDURE — 93793 PR ANTICOAGULANT MGMT FOR PT TAKING WARFARIN: ICD-10-PCS | Mod: S$GLB,,, | Performed by: PHARMACIST

## 2023-02-06 PROCEDURE — 85025 COMPLETE CBC W/AUTO DIFF WBC: CPT | Performed by: INTERNAL MEDICINE

## 2023-02-06 PROCEDURE — 93793 ANTICOAG MGMT PT WARFARIN: CPT | Mod: S$GLB,,, | Performed by: PHARMACIST

## 2023-02-06 PROCEDURE — 85610 PROTHROMBIN TIME: CPT | Performed by: INTERNAL MEDICINE

## 2023-02-06 PROCEDURE — 80048 BASIC METABOLIC PNL TOTAL CA: CPT | Performed by: INTERNAL MEDICINE

## 2023-02-06 NOTE — PROGRESS NOTES
INR at goal. Medications and chart reviewed. No changes noted to necessitate adjustment of warfarin or follow-up plan. See calendar.    DCCV 2/13/23. Per previous note, prelabs 2/10/23

## 2023-02-06 NOTE — PROGRESS NOTES
"We received the following message:    "Patient is scheduled for a Cardioversion procedure on 2/13/2023. Patient is scheduled for INR check on 2/6/2023 and has been scheduled for a repeat INR check on 2/10/23 to ensure patient is at INR goal. Please follow up with patient to provide instruction, Thank you. "        "

## 2023-02-10 ENCOUNTER — TELEPHONE (OUTPATIENT)
Dept: ELECTROPHYSIOLOGY | Facility: CLINIC | Age: 53
End: 2023-02-10
Payer: COMMERCIAL

## 2023-02-10 ENCOUNTER — ANTI-COAG VISIT (OUTPATIENT)
Dept: CARDIOLOGY | Facility: CLINIC | Age: 53
End: 2023-02-10
Payer: COMMERCIAL

## 2023-02-10 ENCOUNTER — LAB VISIT (OUTPATIENT)
Dept: LAB | Facility: HOSPITAL | Age: 53
End: 2023-02-10
Attending: INTERNAL MEDICINE
Payer: COMMERCIAL

## 2023-02-10 DIAGNOSIS — I48.19 PERSISTENT ATRIAL FIBRILLATION: ICD-10-CM

## 2023-02-10 DIAGNOSIS — I50.20 HFREF (HEART FAILURE WITH REDUCED EJECTION FRACTION): ICD-10-CM

## 2023-02-10 DIAGNOSIS — Z79.01 LONG TERM (CURRENT) USE OF ANTICOAGULANTS: ICD-10-CM

## 2023-02-10 DIAGNOSIS — Z95.2 S/P MVR (MITRAL VALVE REPLACEMENT): Primary | ICD-10-CM

## 2023-02-10 DIAGNOSIS — Z95.2 S/P MITRAL VALVE REPLACEMENT: ICD-10-CM

## 2023-02-10 DIAGNOSIS — I48.3 TYPICAL ATRIAL FLUTTER: ICD-10-CM

## 2023-02-10 DIAGNOSIS — Z01.818 PRE-OP TESTING: ICD-10-CM

## 2023-02-10 LAB
INR PPP: 3.2 (ref 0.8–1.2)
PROTHROMBIN TIME: 32.4 SEC (ref 9–12.5)

## 2023-02-10 PROCEDURE — 93793 PR ANTICOAGULANT MGMT FOR PT TAKING WARFARIN: ICD-10-PCS | Mod: S$GLB,,,

## 2023-02-10 PROCEDURE — 85610 PROTHROMBIN TIME: CPT | Performed by: INTERNAL MEDICINE

## 2023-02-10 PROCEDURE — 93793 ANTICOAG MGMT PT WARFARIN: CPT | Mod: S$GLB,,,

## 2023-02-10 PROCEDURE — 36415 COLL VENOUS BLD VENIPUNCTURE: CPT | Performed by: INTERNAL MEDICINE

## 2023-02-10 NOTE — TELEPHONE ENCOUNTER
Spoke to patient    CONFIRMED procedure arrival time of 7am on 2/13/2023 JUAN/DCCV    Reiterated instructions including:  -Directions to check in desk  -NPO after midnight night prior to procedure  -High importance of HOLDING Losartan the morning of the procedure.   -Confirmed compliance of warfarin with no missed doses.     -Pre-procedure LABS reviewed with no alerts noted.  -Confirmed absence or presence of implanted device/stimulator N/A    -Confirmed no fever, cough, or shortness of breath in the past 30 days.    -Confirmed no redness, rash, irritation, or yeast infection to chest area area.     -Bathe night prior and morning prior to procedure with Hibiclens solution or an antibacterial soap.    -Reviewed current visitor policy    Patient verbalized understanding of above , denied any further questions and appreciated the call.

## 2023-02-10 NOTE — TELEPHONE ENCOUNTER
Called patient to let her know that her insurance has not approved her DCCV for Monday 2/13/23. Patient agreed to cancel the case. We will call her back on Monday to reschedule.

## 2023-02-10 NOTE — TELEPHONE ENCOUNTER
Attempted to contact patient to confirm JUAN/DCCV procedure scheduled on 2/13/2023, but no answer received. Voicemail left with brief review of patient instructions including arrival time, fasting and medication instructions. Return call requested to confirm procedure and understanding of patient instructions provided. INR today noted to be at goal at 3.2.

## 2023-02-13 ENCOUNTER — LAB VISIT (OUTPATIENT)
Dept: LAB | Facility: HOSPITAL | Age: 53
End: 2023-02-13
Attending: INTERNAL MEDICINE
Payer: COMMERCIAL

## 2023-02-13 ENCOUNTER — ANTI-COAG VISIT (OUTPATIENT)
Dept: CARDIOLOGY | Facility: CLINIC | Age: 53
End: 2023-02-13
Payer: COMMERCIAL

## 2023-02-13 DIAGNOSIS — Z95.2 S/P MVR (MITRAL VALVE REPLACEMENT): ICD-10-CM

## 2023-02-13 DIAGNOSIS — Z79.01 LONG TERM (CURRENT) USE OF ANTICOAGULANTS: ICD-10-CM

## 2023-02-13 DIAGNOSIS — Z95.2 S/P MVR (MITRAL VALVE REPLACEMENT): Primary | ICD-10-CM

## 2023-02-13 DIAGNOSIS — I48.91 ATRIAL FIBRILLATION, UNSPECIFIED TYPE: ICD-10-CM

## 2023-02-13 LAB
INR PPP: 3.3 (ref 0.8–1.2)
PROTHROMBIN TIME: 32.8 SEC (ref 9–12.5)

## 2023-02-13 PROCEDURE — 93793 PR ANTICOAGULANT MGMT FOR PT TAKING WARFARIN: ICD-10-PCS | Mod: S$GLB,,,

## 2023-02-13 PROCEDURE — 93793 ANTICOAG MGMT PT WARFARIN: CPT | Mod: S$GLB,,,

## 2023-02-13 PROCEDURE — 36415 COLL VENOUS BLD VENIPUNCTURE: CPT | Performed by: INTERNAL MEDICINE

## 2023-02-13 PROCEDURE — 85610 PROTHROMBIN TIME: CPT | Performed by: INTERNAL MEDICINE

## 2023-02-15 ENCOUNTER — HOSPITAL ENCOUNTER (OUTPATIENT)
Dept: RADIOLOGY | Facility: HOSPITAL | Age: 53
Discharge: HOME | End: 2023-02-15
Attending: FAMILY MEDICINE
Payer: COMMERCIAL

## 2023-02-15 DIAGNOSIS — N63.10 MASS OF RIGHT BREAST, UNSPECIFIED QUADRANT: ICD-10-CM

## 2023-02-15 PROCEDURE — 76642 ULTRASOUND BREAST LIMITED: CPT | Mod: TC,PO,RT

## 2023-02-20 ENCOUNTER — ANTI-COAG VISIT (OUTPATIENT)
Dept: CARDIOLOGY | Facility: CLINIC | Age: 53
End: 2023-02-20
Payer: COMMERCIAL

## 2023-02-20 ENCOUNTER — LAB VISIT (OUTPATIENT)
Dept: LAB | Facility: HOSPITAL | Age: 53
End: 2023-02-20
Attending: FAMILY MEDICINE
Payer: COMMERCIAL

## 2023-02-20 DIAGNOSIS — Z79.01 LONG TERM (CURRENT) USE OF ANTICOAGULANTS: ICD-10-CM

## 2023-02-20 DIAGNOSIS — I48.0 PAROXYSMAL ATRIAL FIBRILLATION: ICD-10-CM

## 2023-02-20 DIAGNOSIS — I48.91 ATRIAL FIBRILLATION, UNSPECIFIED TYPE: ICD-10-CM

## 2023-02-20 DIAGNOSIS — Z95.2 S/P MVR (MITRAL VALVE REPLACEMENT): ICD-10-CM

## 2023-02-20 DIAGNOSIS — Z95.2 S/P MVR (MITRAL VALVE REPLACEMENT): Primary | ICD-10-CM

## 2023-02-20 LAB
INR PPP: 3.3 (ref 0.8–1.2)
PROTHROMBIN TIME: 33 SEC (ref 9–12.5)

## 2023-02-20 PROCEDURE — 36415 COLL VENOUS BLD VENIPUNCTURE: CPT | Performed by: INTERNAL MEDICINE

## 2023-02-20 PROCEDURE — 93793 ANTICOAG MGMT PT WARFARIN: CPT | Mod: S$GLB,,,

## 2023-02-20 PROCEDURE — 93793 PR ANTICOAGULANT MGMT FOR PT TAKING WARFARIN: ICD-10-PCS | Mod: S$GLB,,,

## 2023-02-20 PROCEDURE — 85610 PROTHROMBIN TIME: CPT | Performed by: INTERNAL MEDICINE

## 2023-02-22 ENCOUNTER — HOSPITAL ENCOUNTER (OUTPATIENT)
Dept: CARDIOLOGY | Facility: HOSPITAL | Age: 53
Discharge: HOME | End: 2023-02-22
Attending: FAMILY MEDICINE
Payer: COMMERCIAL

## 2023-02-22 DIAGNOSIS — Z01.818 PRE-OP TESTING: ICD-10-CM

## 2023-02-22 DIAGNOSIS — Z95.2 S/P MITRAL VALVE REPLACEMENT: ICD-10-CM

## 2023-02-22 DIAGNOSIS — I48.3 TYPICAL ATRIAL FLUTTER: ICD-10-CM

## 2023-02-22 DIAGNOSIS — Z79.01 LONG TERM (CURRENT) USE OF ANTICOAGULANTS: ICD-10-CM

## 2023-02-22 DIAGNOSIS — I50.20 HFREF (HEART FAILURE WITH REDUCED EJECTION FRACTION): ICD-10-CM

## 2023-02-22 DIAGNOSIS — I48.19 PERSISTENT ATRIAL FIBRILLATION: ICD-10-CM

## 2023-02-22 PROCEDURE — 93005 ELECTROCARDIOGRAM TRACING: CPT

## 2023-02-22 PROCEDURE — 93010 ELECTROCARDIOGRAM REPORT: CPT | Mod: ,,, | Performed by: SPECIALIST

## 2023-02-22 PROCEDURE — 93010 EKG 12-LEAD: ICD-10-PCS | Mod: ,,, | Performed by: SPECIALIST

## 2023-02-24 ENCOUNTER — TELEPHONE (OUTPATIENT)
Dept: ELECTROPHYSIOLOGY | Facility: CLINIC | Age: 53
End: 2023-02-24
Payer: COMMERCIAL

## 2023-02-25 NOTE — TELEPHONE ENCOUNTER
Spoke with patient regarding JUAN/DCCV previously scheduled. EKG repeated on 2/22/2023 showing patient back in sinus rhythm. Patient advised that I would notify Marianela HOLLAND, and call her back if any further recommendations received. Understanding verbalized.

## 2023-02-27 ENCOUNTER — LAB VISIT (OUTPATIENT)
Dept: LAB | Facility: HOSPITAL | Age: 53
End: 2023-02-27
Attending: INTERNAL MEDICINE
Payer: COMMERCIAL

## 2023-02-27 ENCOUNTER — ANTI-COAG VISIT (OUTPATIENT)
Dept: CARDIOLOGY | Facility: CLINIC | Age: 53
End: 2023-02-27
Payer: COMMERCIAL

## 2023-02-27 DIAGNOSIS — Z95.2 S/P MVR (MITRAL VALVE REPLACEMENT): ICD-10-CM

## 2023-02-27 DIAGNOSIS — Z95.2 S/P MVR (MITRAL VALVE REPLACEMENT): Primary | ICD-10-CM

## 2023-02-27 DIAGNOSIS — Z79.01 LONG TERM (CURRENT) USE OF ANTICOAGULANTS: ICD-10-CM

## 2023-02-27 DIAGNOSIS — I48.91 ATRIAL FIBRILLATION, UNSPECIFIED TYPE: ICD-10-CM

## 2023-02-27 PROBLEM — Z09 HOSPITAL DISCHARGE FOLLOW-UP: Status: RESOLVED | Noted: 2022-11-26 | Resolved: 2023-02-27

## 2023-02-27 LAB
INR PPP: 2.7 (ref 0.8–1.2)
PROTHROMBIN TIME: 27.2 SEC (ref 9–12.5)

## 2023-02-27 PROCEDURE — 36415 COLL VENOUS BLD VENIPUNCTURE: CPT | Performed by: INTERNAL MEDICINE

## 2023-02-27 PROCEDURE — 85610 PROTHROMBIN TIME: CPT | Performed by: INTERNAL MEDICINE

## 2023-02-27 PROCEDURE — 93793 PR ANTICOAGULANT MGMT FOR PT TAKING WARFARIN: ICD-10-PCS | Mod: S$GLB,,,

## 2023-02-27 PROCEDURE — 93793 ANTICOAG MGMT PT WARFARIN: CPT | Mod: S$GLB,,,

## 2023-03-06 ENCOUNTER — ANTI-COAG VISIT (OUTPATIENT)
Dept: CARDIOLOGY | Facility: CLINIC | Age: 53
End: 2023-03-06
Payer: COMMERCIAL

## 2023-03-06 ENCOUNTER — LAB VISIT (OUTPATIENT)
Dept: LAB | Facility: HOSPITAL | Age: 53
End: 2023-03-06
Attending: INTERNAL MEDICINE
Payer: COMMERCIAL

## 2023-03-06 DIAGNOSIS — Z95.2 S/P MVR (MITRAL VALVE REPLACEMENT): ICD-10-CM

## 2023-03-06 DIAGNOSIS — Z79.01 LONG TERM (CURRENT) USE OF ANTICOAGULANTS: ICD-10-CM

## 2023-03-06 DIAGNOSIS — Z95.2 S/P MVR (MITRAL VALVE REPLACEMENT): Primary | ICD-10-CM

## 2023-03-06 DIAGNOSIS — I48.91 ATRIAL FIBRILLATION, UNSPECIFIED TYPE: ICD-10-CM

## 2023-03-06 LAB
INR PPP: 2.5 (ref 0.8–1.2)
PROTHROMBIN TIME: 27 SEC (ref 9–12.5)

## 2023-03-06 PROCEDURE — 93793 ANTICOAG MGMT PT WARFARIN: CPT | Mod: S$GLB,,,

## 2023-03-06 PROCEDURE — 93793 PR ANTICOAGULANT MGMT FOR PT TAKING WARFARIN: ICD-10-PCS | Mod: S$GLB,,,

## 2023-03-06 PROCEDURE — 36415 COLL VENOUS BLD VENIPUNCTURE: CPT | Performed by: INTERNAL MEDICINE

## 2023-03-06 PROCEDURE — 85610 PROTHROMBIN TIME: CPT | Performed by: INTERNAL MEDICINE

## 2023-03-13 ENCOUNTER — ANTI-COAG VISIT (OUTPATIENT)
Dept: CARDIOLOGY | Facility: CLINIC | Age: 53
End: 2023-03-13
Payer: COMMERCIAL

## 2023-03-13 ENCOUNTER — LAB VISIT (OUTPATIENT)
Dept: LAB | Facility: HOSPITAL | Age: 53
End: 2023-03-13
Attending: INTERNAL MEDICINE
Payer: COMMERCIAL

## 2023-03-13 DIAGNOSIS — Z95.2 S/P MVR (MITRAL VALVE REPLACEMENT): Primary | ICD-10-CM

## 2023-03-13 DIAGNOSIS — I48.91 ATRIAL FIBRILLATION, UNSPECIFIED TYPE: ICD-10-CM

## 2023-03-13 DIAGNOSIS — Z79.01 LONG TERM (CURRENT) USE OF ANTICOAGULANTS: ICD-10-CM

## 2023-03-13 DIAGNOSIS — Z95.2 S/P MVR (MITRAL VALVE REPLACEMENT): ICD-10-CM

## 2023-03-13 LAB
INR PPP: 2.3 (ref 0.8–1.2)
PROTHROMBIN TIME: 24.5 SEC (ref 9–12.5)

## 2023-03-13 PROCEDURE — 36415 COLL VENOUS BLD VENIPUNCTURE: CPT | Performed by: INTERNAL MEDICINE

## 2023-03-13 PROCEDURE — 85610 PROTHROMBIN TIME: CPT | Performed by: INTERNAL MEDICINE

## 2023-03-13 PROCEDURE — 93793 PR ANTICOAGULANT MGMT FOR PT TAKING WARFARIN: ICD-10-PCS | Mod: S$GLB,,,

## 2023-03-13 PROCEDURE — 93793 ANTICOAG MGMT PT WARFARIN: CPT | Mod: S$GLB,,,

## 2023-03-20 ENCOUNTER — LAB VISIT (OUTPATIENT)
Dept: LAB | Facility: HOSPITAL | Age: 53
End: 2023-03-20
Attending: INTERNAL MEDICINE
Payer: COMMERCIAL

## 2023-03-20 ENCOUNTER — ANTI-COAG VISIT (OUTPATIENT)
Dept: CARDIOLOGY | Facility: CLINIC | Age: 53
End: 2023-03-20
Payer: COMMERCIAL

## 2023-03-20 DIAGNOSIS — Z79.01 LONG TERM (CURRENT) USE OF ANTICOAGULANTS: ICD-10-CM

## 2023-03-20 DIAGNOSIS — Z95.2 S/P MVR (MITRAL VALVE REPLACEMENT): ICD-10-CM

## 2023-03-20 DIAGNOSIS — Z95.2 S/P MVR (MITRAL VALVE REPLACEMENT): Primary | ICD-10-CM

## 2023-03-20 DIAGNOSIS — I48.91 ATRIAL FIBRILLATION, UNSPECIFIED TYPE: ICD-10-CM

## 2023-03-20 LAB
INR PPP: 2.5 (ref 0.8–1.2)
PROTHROMBIN TIME: 25.5 SEC (ref 9–12.5)

## 2023-03-20 PROCEDURE — 93793 ANTICOAG MGMT PT WARFARIN: CPT | Mod: S$GLB,,,

## 2023-03-20 PROCEDURE — 36415 COLL VENOUS BLD VENIPUNCTURE: CPT | Performed by: INTERNAL MEDICINE

## 2023-03-20 PROCEDURE — 93793 PR ANTICOAGULANT MGMT FOR PT TAKING WARFARIN: ICD-10-PCS | Mod: S$GLB,,,

## 2023-03-20 PROCEDURE — 85610 PROTHROMBIN TIME: CPT | Performed by: INTERNAL MEDICINE

## 2023-03-27 ENCOUNTER — LAB VISIT (OUTPATIENT)
Dept: LAB | Facility: HOSPITAL | Age: 53
End: 2023-03-27
Attending: INTERNAL MEDICINE
Payer: COMMERCIAL

## 2023-03-27 DIAGNOSIS — Z95.2 S/P MVR (MITRAL VALVE REPLACEMENT): ICD-10-CM

## 2023-03-27 DIAGNOSIS — Z79.01 LONG TERM (CURRENT) USE OF ANTICOAGULANTS: ICD-10-CM

## 2023-03-27 DIAGNOSIS — I48.91 ATRIAL FIBRILLATION, UNSPECIFIED TYPE: ICD-10-CM

## 2023-03-27 LAB
INR PPP: 2.4 (ref 0.8–1.2)
PROTHROMBIN TIME: 24.9 SEC (ref 9–12.5)

## 2023-03-27 PROCEDURE — 85610 PROTHROMBIN TIME: CPT | Performed by: INTERNAL MEDICINE

## 2023-03-27 PROCEDURE — 36415 COLL VENOUS BLD VENIPUNCTURE: CPT | Performed by: INTERNAL MEDICINE

## 2023-04-03 ENCOUNTER — ANTI-COAG VISIT (OUTPATIENT)
Dept: CARDIOLOGY | Facility: CLINIC | Age: 53
End: 2023-04-03
Payer: COMMERCIAL

## 2023-04-03 ENCOUNTER — LAB VISIT (OUTPATIENT)
Dept: LAB | Facility: HOSPITAL | Age: 53
End: 2023-04-03
Attending: INTERNAL MEDICINE
Payer: COMMERCIAL

## 2023-04-03 DIAGNOSIS — I48.0 PAROXYSMAL ATRIAL FIBRILLATION: ICD-10-CM

## 2023-04-03 DIAGNOSIS — Z95.2 S/P MVR (MITRAL VALVE REPLACEMENT): Primary | ICD-10-CM

## 2023-04-03 DIAGNOSIS — Z79.01 LONG TERM (CURRENT) USE OF ANTICOAGULANTS: ICD-10-CM

## 2023-04-03 DIAGNOSIS — Z95.2 S/P MVR (MITRAL VALVE REPLACEMENT): ICD-10-CM

## 2023-04-03 DIAGNOSIS — I48.91 ATRIAL FIBRILLATION, UNSPECIFIED TYPE: ICD-10-CM

## 2023-04-03 LAB
INR PPP: 3.1 (ref 0.8–1.2)
PROTHROMBIN TIME: 31.1 SEC (ref 9–12.5)

## 2023-04-03 PROCEDURE — 85610 PROTHROMBIN TIME: CPT | Performed by: INTERNAL MEDICINE

## 2023-04-03 PROCEDURE — 36415 COLL VENOUS BLD VENIPUNCTURE: CPT | Performed by: INTERNAL MEDICINE

## 2023-04-03 PROCEDURE — 93793 PR ANTICOAGULANT MGMT FOR PT TAKING WARFARIN: ICD-10-PCS | Mod: S$GLB,,,

## 2023-04-03 PROCEDURE — 93793 ANTICOAG MGMT PT WARFARIN: CPT | Mod: S$GLB,,,

## 2023-04-03 RX ORDER — WARFARIN SODIUM 5 MG/1
TABLET ORAL
Qty: 50 TABLET | Refills: 11 | Status: SHIPPED | OUTPATIENT
Start: 2023-04-03 | End: 2023-12-12 | Stop reason: SDUPTHER

## 2023-04-10 ENCOUNTER — ANTI-COAG VISIT (OUTPATIENT)
Dept: CARDIOLOGY | Facility: CLINIC | Age: 53
End: 2023-04-10
Payer: COMMERCIAL

## 2023-04-10 ENCOUNTER — LAB VISIT (OUTPATIENT)
Dept: LAB | Facility: HOSPITAL | Age: 53
End: 2023-04-10
Attending: INTERNAL MEDICINE
Payer: COMMERCIAL

## 2023-04-10 DIAGNOSIS — Z79.01 LONG TERM (CURRENT) USE OF ANTICOAGULANTS: ICD-10-CM

## 2023-04-10 DIAGNOSIS — I48.91 ATRIAL FIBRILLATION, UNSPECIFIED TYPE: ICD-10-CM

## 2023-04-10 DIAGNOSIS — Z95.2 S/P MVR (MITRAL VALVE REPLACEMENT): Primary | ICD-10-CM

## 2023-04-10 DIAGNOSIS — Z95.2 S/P MVR (MITRAL VALVE REPLACEMENT): ICD-10-CM

## 2023-04-10 LAB
INR PPP: 2.5 (ref 0.8–1.2)
PROTHROMBIN TIME: 26.8 SEC (ref 9–12.5)

## 2023-04-10 PROCEDURE — 93793 ANTICOAG MGMT PT WARFARIN: CPT | Mod: S$GLB,,,

## 2023-04-10 PROCEDURE — 36415 COLL VENOUS BLD VENIPUNCTURE: CPT | Performed by: INTERNAL MEDICINE

## 2023-04-10 PROCEDURE — 85610 PROTHROMBIN TIME: CPT | Performed by: INTERNAL MEDICINE

## 2023-04-10 PROCEDURE — 93793 PR ANTICOAGULANT MGMT FOR PT TAKING WARFARIN: ICD-10-PCS | Mod: S$GLB,,,

## 2023-04-12 ENCOUNTER — OFFICE VISIT (OUTPATIENT)
Dept: PRIMARY CARE CLINIC | Facility: CLINIC | Age: 53
End: 2023-04-12
Payer: COMMERCIAL

## 2023-04-12 VITALS
BODY MASS INDEX: 29.95 KG/M2 | HEART RATE: 87 BPM | WEIGHT: 202.19 LBS | HEIGHT: 69 IN | OXYGEN SATURATION: 98 % | TEMPERATURE: 97 F | SYSTOLIC BLOOD PRESSURE: 118 MMHG | RESPIRATION RATE: 18 BRPM | DIASTOLIC BLOOD PRESSURE: 76 MMHG

## 2023-04-12 DIAGNOSIS — G89.4 CHRONIC PAIN SYNDROME: ICD-10-CM

## 2023-04-12 DIAGNOSIS — K57.90 DIVERTICULOSIS: ICD-10-CM

## 2023-04-12 DIAGNOSIS — I48.0 PAROXYSMAL ATRIAL FIBRILLATION: ICD-10-CM

## 2023-04-12 DIAGNOSIS — Z98.890 S/P TVR (TRICUSPID VALVE REPAIR): ICD-10-CM

## 2023-04-12 DIAGNOSIS — Z92.89 HISTORY OF CARDIOVERSION: ICD-10-CM

## 2023-04-12 DIAGNOSIS — I50.20 HFREF (HEART FAILURE WITH REDUCED EJECTION FRACTION): ICD-10-CM

## 2023-04-12 DIAGNOSIS — Z90.49 HISTORY OF CHOLECYSTECTOMY: ICD-10-CM

## 2023-04-12 DIAGNOSIS — Z95.2 S/P MVR (MITRAL VALVE REPLACEMENT): Primary | ICD-10-CM

## 2023-04-12 DIAGNOSIS — E66.3 OVERWEIGHT (BMI 25.0-29.9): ICD-10-CM

## 2023-04-12 PROCEDURE — 99214 PR OFFICE/OUTPT VISIT, EST, LEVL IV, 30-39 MIN: ICD-10-PCS | Mod: S$GLB,,, | Performed by: FAMILY MEDICINE

## 2023-04-12 PROCEDURE — 4010F PR ACE/ARB THEARPY RXD/TAKEN: ICD-10-PCS | Mod: CPTII,S$GLB,, | Performed by: FAMILY MEDICINE

## 2023-04-12 PROCEDURE — 3074F PR MOST RECENT SYSTOLIC BLOOD PRESSURE < 130 MM HG: ICD-10-PCS | Mod: CPTII,S$GLB,, | Performed by: FAMILY MEDICINE

## 2023-04-12 PROCEDURE — 99214 OFFICE O/P EST MOD 30 MIN: CPT | Mod: S$GLB,,, | Performed by: FAMILY MEDICINE

## 2023-04-12 PROCEDURE — 1159F MED LIST DOCD IN RCRD: CPT | Mod: CPTII,S$GLB,, | Performed by: FAMILY MEDICINE

## 2023-04-12 PROCEDURE — 3074F SYST BP LT 130 MM HG: CPT | Mod: CPTII,S$GLB,, | Performed by: FAMILY MEDICINE

## 2023-04-12 PROCEDURE — 3078F PR MOST RECENT DIASTOLIC BLOOD PRESSURE < 80 MM HG: ICD-10-PCS | Mod: CPTII,S$GLB,, | Performed by: FAMILY MEDICINE

## 2023-04-12 PROCEDURE — 3078F DIAST BP <80 MM HG: CPT | Mod: CPTII,S$GLB,, | Performed by: FAMILY MEDICINE

## 2023-04-12 PROCEDURE — 3008F BODY MASS INDEX DOCD: CPT | Mod: CPTII,S$GLB,, | Performed by: FAMILY MEDICINE

## 2023-04-12 PROCEDURE — 99999 PR PBB SHADOW E&M-EST. PATIENT-LVL III: ICD-10-PCS | Mod: PBBFAC,,, | Performed by: FAMILY MEDICINE

## 2023-04-12 PROCEDURE — 99999 PR PBB SHADOW E&M-EST. PATIENT-LVL III: CPT | Mod: PBBFAC,,, | Performed by: FAMILY MEDICINE

## 2023-04-12 PROCEDURE — 1159F PR MEDICATION LIST DOCUMENTED IN MEDICAL RECORD: ICD-10-PCS | Mod: CPTII,S$GLB,, | Performed by: FAMILY MEDICINE

## 2023-04-12 PROCEDURE — 4010F ACE/ARB THERAPY RXD/TAKEN: CPT | Mod: CPTII,S$GLB,, | Performed by: FAMILY MEDICINE

## 2023-04-12 PROCEDURE — 3008F PR BODY MASS INDEX (BMI) DOCUMENTED: ICD-10-PCS | Mod: CPTII,S$GLB,, | Performed by: FAMILY MEDICINE

## 2023-04-12 NOTE — PROGRESS NOTES
Subjective:       Patient ID: Riya Soto is a 52 y.o. female.    Chief Complaint: Follow-up      HPI:  53 yo F --in for checkup--eating well--+BM--ambulating well  History of atrial fibrillation--had heart surgery November 11th--had valvular heart surgery 2 valves --no chest pain ankle edema palpitation no hypertension +hyperlipidemia--no stent or bypass. 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  Just buried son hit and run --son was 28 yo multiple times --ran out gas . Just buried him one week ago.     ROS  Skin--no psoriasis eczema skin cancer  HEENT---no headaches ocular pain blurred vision diplopia epistaxis hoarseness change in voice thyroid trouble  Lung--no pneumonia asthma TB no smoking  Heart--history of mitral valve surgery-11 yrs ago--November had valve replacements x2 mitral valve and possible tricuspid valve--+ hyperlipidemia--atrial fib with history of cardioversion--CHF--no chest pain ankle edema palpitations MI--no stent bypass arrhythmia  Abdomen--no nausea vomiting diarrhea constipation ulcers hepatitis + diverticulosis +history cholecystectomy  -no UTI renal disease stones  GYN last menstrual period age 48--due for mammogram November for  Musculoskeletal cervical strain lumbosacral strain weakness left upper arm problems all stable   Neurologic no dizziness passing out seizures  No diabetes  Anemia--no anxiety or depression  Single--7 children--SSI due to heart---lives with 7 children         General: Well nourished, well developed, no acute distress +obesity appears depressed  Skin: No lesions   HEENT: Eyes PERRLA, EOM intact, nose patent, throat non-erythematous ears TMs clear  NECK: Supple, no bruits, No JVD, no nodes  Lungs: Clear, no rales, rhonchi, wheezing  Heart: Regular rate and rhythm, no murmurs, gallops, or rubs--clicking of heart valve is heard  Abdomen: flat, bowel sounds positive, no tenderness, or organomegaly  MS:  No significant change Some deformity of  the left ankle had 3 surgery still has screw present --main problem left shoulder --sore with palpation--AC joint and triceps and supraspinatus area ., pain with raising arm overhead especially painful with trying to reach back to undo bra  Neuro: Alert, CN intact, oriented X 3  Extremities: No cyanosis, clubbing, or edema         Assessment:       1. S/P MVR (mitral valve replacement)    2. S/P TVR (tricuspid valve repair)    3. History of cardioversion    4. HFrEF (heart failure with reduced ejection fraction)    5. Paroxysmal atrial fibrillation    6. Chronic pain syndrome    7. Overweight (BMI 25.0-29.9)    8. History of cholecystectomy    9. Diverticulosis          Plan:       S/P MVR (mitral valve replacement)  -     CBC Auto Differential; Future; Expected date: 04/12/2023  -     Comprehensive Metabolic Panel; Future; Expected date: 04/12/2023  -     Lipid Panel; Future; Expected date: 04/12/2023  -     Hemoglobin A1C; Future; Expected date: 04/12/2023  -     BNP; Future; Expected date: 04/12/2023  -     TSH; Future; Expected date: 04/12/2023    S/P TVR (tricuspid valve repair)    History of cardioversion    HFrEF (heart failure with reduced ejection fraction)    Paroxysmal atrial fibrillation    Chronic pain syndrome    Overweight (BMI 25.0-29.9)    History of cholecystectomy    Diverticulosis          Main-Reason for Visit-  Heart surgery 11-7-2022 mitral and tricuspid valve replaced--patient doing very well  Son killed in hit and run accident April 2023[place] automobile-- was eventually caught patient has to go to court  Atrial fibrillation --history of cardioversion 03/29/2022----patient on Eliquis/metoprolol  Hyperlipidemia patient refills of Lipitor   Menopausal syndrome--had Mirena removed 3 years ago No period since   History anemia/cholecystectomy/heart surgery (systolic had leaking valve 1 surgery was done was normal)  Lab CBCs CMP lipids  hemoglobin A1c BNP  Return in 6 months

## 2023-04-24 ENCOUNTER — LAB VISIT (OUTPATIENT)
Dept: LAB | Facility: HOSPITAL | Age: 53
End: 2023-04-24
Attending: INTERNAL MEDICINE
Payer: COMMERCIAL

## 2023-04-24 ENCOUNTER — ANTI-COAG VISIT (OUTPATIENT)
Dept: CARDIOLOGY | Facility: CLINIC | Age: 53
End: 2023-04-24
Payer: COMMERCIAL

## 2023-04-24 DIAGNOSIS — Z95.2 S/P MVR (MITRAL VALVE REPLACEMENT): Primary | ICD-10-CM

## 2023-04-24 DIAGNOSIS — I48.91 ATRIAL FIBRILLATION, UNSPECIFIED TYPE: ICD-10-CM

## 2023-04-24 DIAGNOSIS — Z95.2 S/P MVR (MITRAL VALVE REPLACEMENT): ICD-10-CM

## 2023-04-24 DIAGNOSIS — Z79.01 LONG TERM (CURRENT) USE OF ANTICOAGULANTS: ICD-10-CM

## 2023-04-24 LAB
INR PPP: 2.4 (ref 0.8–1.2)
PROTHROMBIN TIME: 25.9 SEC (ref 9–12.5)

## 2023-04-24 PROCEDURE — 93793 ANTICOAG MGMT PT WARFARIN: CPT | Mod: S$GLB,,,

## 2023-04-24 PROCEDURE — 93793 PR ANTICOAGULANT MGMT FOR PT TAKING WARFARIN: ICD-10-PCS | Mod: S$GLB,,,

## 2023-04-24 PROCEDURE — 36415 COLL VENOUS BLD VENIPUNCTURE: CPT | Performed by: INTERNAL MEDICINE

## 2023-04-24 PROCEDURE — 85610 PROTHROMBIN TIME: CPT | Performed by: INTERNAL MEDICINE

## 2023-05-01 ENCOUNTER — TELEPHONE (OUTPATIENT)
Dept: OBSTETRICS AND GYNECOLOGY | Facility: CLINIC | Age: 53
End: 2023-05-01
Payer: COMMERCIAL

## 2023-05-01 NOTE — TELEPHONE ENCOUNTER
Called pt to schedule appt. Pt stated that she will call office back at a later time.  ----- Message from John Little MD sent at 5/1/2023 11:02 AM CDT -----  Please contact her to set up follow up pap with HPV and annual in the next 1-2 months.    John Little    ----- Message -----  From: John Little MD  Sent: 4/27/2023  12:00 AM CDT  To: John Little MD    Follow up for pap with HPV in May, 2023

## 2023-05-08 ENCOUNTER — LAB VISIT (OUTPATIENT)
Dept: LAB | Facility: HOSPITAL | Age: 53
End: 2023-05-08
Attending: INTERNAL MEDICINE
Payer: COMMERCIAL

## 2023-05-08 ENCOUNTER — ANTI-COAG VISIT (OUTPATIENT)
Dept: CARDIOLOGY | Facility: CLINIC | Age: 53
End: 2023-05-08
Payer: COMMERCIAL

## 2023-05-08 DIAGNOSIS — I48.91 ATRIAL FIBRILLATION, UNSPECIFIED TYPE: ICD-10-CM

## 2023-05-08 DIAGNOSIS — Z79.01 LONG TERM (CURRENT) USE OF ANTICOAGULANTS: ICD-10-CM

## 2023-05-08 DIAGNOSIS — Z95.2 S/P MVR (MITRAL VALVE REPLACEMENT): ICD-10-CM

## 2023-05-08 DIAGNOSIS — Z95.2 S/P MVR (MITRAL VALVE REPLACEMENT): Primary | ICD-10-CM

## 2023-05-08 LAB
INR PPP: 3.1 (ref 0.8–1.2)
PROTHROMBIN TIME: 32.4 SEC (ref 9–12.5)

## 2023-05-08 PROCEDURE — 93793 ANTICOAG MGMT PT WARFARIN: CPT | Mod: S$GLB,,,

## 2023-05-08 PROCEDURE — 93793 PR ANTICOAGULANT MGMT FOR PT TAKING WARFARIN: ICD-10-PCS | Mod: S$GLB,,,

## 2023-05-08 PROCEDURE — 85610 PROTHROMBIN TIME: CPT | Performed by: INTERNAL MEDICINE

## 2023-05-08 PROCEDURE — 36415 COLL VENOUS BLD VENIPUNCTURE: CPT | Performed by: INTERNAL MEDICINE

## 2023-05-22 ENCOUNTER — ANTI-COAG VISIT (OUTPATIENT)
Dept: CARDIOLOGY | Facility: CLINIC | Age: 53
End: 2023-05-22
Payer: COMMERCIAL

## 2023-05-22 ENCOUNTER — LAB VISIT (OUTPATIENT)
Dept: LAB | Facility: HOSPITAL | Age: 53
End: 2023-05-22
Attending: INTERNAL MEDICINE
Payer: COMMERCIAL

## 2023-05-22 DIAGNOSIS — Z79.01 LONG TERM (CURRENT) USE OF ANTICOAGULANTS: ICD-10-CM

## 2023-05-22 DIAGNOSIS — Z95.2 S/P MVR (MITRAL VALVE REPLACEMENT): ICD-10-CM

## 2023-05-22 DIAGNOSIS — I48.91 ATRIAL FIBRILLATION, UNSPECIFIED TYPE: ICD-10-CM

## 2023-05-22 DIAGNOSIS — Z95.2 S/P MVR (MITRAL VALVE REPLACEMENT): Primary | ICD-10-CM

## 2023-05-22 LAB
INR PPP: 3.4 (ref 0.8–1.2)
PROTHROMBIN TIME: 34 SEC (ref 9–12.5)

## 2023-05-22 PROCEDURE — 93793 ANTICOAG MGMT PT WARFARIN: CPT | Mod: S$GLB,,,

## 2023-05-22 PROCEDURE — 85610 PROTHROMBIN TIME: CPT | Performed by: INTERNAL MEDICINE

## 2023-05-22 PROCEDURE — 93793 PR ANTICOAGULANT MGMT FOR PT TAKING WARFARIN: ICD-10-PCS | Mod: S$GLB,,,

## 2023-05-22 PROCEDURE — 36415 COLL VENOUS BLD VENIPUNCTURE: CPT | Performed by: INTERNAL MEDICINE

## 2023-05-30 ENCOUNTER — ANTI-COAG VISIT (OUTPATIENT)
Dept: CARDIOLOGY | Facility: CLINIC | Age: 53
End: 2023-05-30
Payer: COMMERCIAL

## 2023-05-30 ENCOUNTER — LAB VISIT (OUTPATIENT)
Dept: LAB | Facility: HOSPITAL | Age: 53
End: 2023-05-30
Attending: INTERNAL MEDICINE
Payer: COMMERCIAL

## 2023-05-30 DIAGNOSIS — Z95.2 S/P MVR (MITRAL VALVE REPLACEMENT): ICD-10-CM

## 2023-05-30 DIAGNOSIS — Z95.2 S/P MVR (MITRAL VALVE REPLACEMENT): Primary | ICD-10-CM

## 2023-05-30 DIAGNOSIS — Z79.01 LONG TERM (CURRENT) USE OF ANTICOAGULANTS: ICD-10-CM

## 2023-05-30 LAB
INR PPP: 3.1 (ref 0.8–1.2)
PROTHROMBIN TIME: 33.1 SEC (ref 9–12.5)

## 2023-05-30 PROCEDURE — 93793 PR ANTICOAGULANT MGMT FOR PT TAKING WARFARIN: ICD-10-PCS | Mod: S$GLB,,,

## 2023-05-30 PROCEDURE — 85610 PROTHROMBIN TIME: CPT | Performed by: INTERNAL MEDICINE

## 2023-05-30 PROCEDURE — 93793 ANTICOAG MGMT PT WARFARIN: CPT | Mod: S$GLB,,,

## 2023-05-30 PROCEDURE — 36415 COLL VENOUS BLD VENIPUNCTURE: CPT | Performed by: INTERNAL MEDICINE

## 2023-06-01 ENCOUNTER — OFFICE VISIT (OUTPATIENT)
Dept: CARDIOLOGY | Facility: CLINIC | Age: 53
End: 2023-06-01
Payer: COMMERCIAL

## 2023-06-01 VITALS
SYSTOLIC BLOOD PRESSURE: 114 MMHG | DIASTOLIC BLOOD PRESSURE: 72 MMHG | OXYGEN SATURATION: 96 % | BODY MASS INDEX: 30.16 KG/M2 | HEART RATE: 82 BPM | HEIGHT: 69 IN | WEIGHT: 203.63 LBS

## 2023-06-01 DIAGNOSIS — Z95.2 S/P MVR (MITRAL VALVE REPLACEMENT): ICD-10-CM

## 2023-06-01 DIAGNOSIS — E78.00 PURE HYPERCHOLESTEROLEMIA: ICD-10-CM

## 2023-06-01 DIAGNOSIS — Z79.01 ANTICOAGULANT LONG-TERM USE: ICD-10-CM

## 2023-06-01 DIAGNOSIS — Z98.890 S/P MAZE OPERATION FOR ATRIAL FIBRILLATION: Primary | ICD-10-CM

## 2023-06-01 DIAGNOSIS — Z86.79 S/P MAZE OPERATION FOR ATRIAL FIBRILLATION: Primary | ICD-10-CM

## 2023-06-01 DIAGNOSIS — Z98.890 S/P TVR (TRICUSPID VALVE REPAIR): ICD-10-CM

## 2023-06-01 PROCEDURE — 93000 EKG 12-LEAD: ICD-10-PCS | Mod: S$GLB,,, | Performed by: INTERNAL MEDICINE

## 2023-06-01 PROCEDURE — 93000 ELECTROCARDIOGRAM COMPLETE: CPT | Mod: S$GLB,,, | Performed by: INTERNAL MEDICINE

## 2023-06-01 PROCEDURE — 1159F PR MEDICATION LIST DOCUMENTED IN MEDICAL RECORD: ICD-10-PCS | Mod: CPTII,S$GLB,, | Performed by: INTERNAL MEDICINE

## 2023-06-01 PROCEDURE — 3078F DIAST BP <80 MM HG: CPT | Mod: CPTII,S$GLB,, | Performed by: INTERNAL MEDICINE

## 2023-06-01 PROCEDURE — 3074F PR MOST RECENT SYSTOLIC BLOOD PRESSURE < 130 MM HG: ICD-10-PCS | Mod: CPTII,S$GLB,, | Performed by: INTERNAL MEDICINE

## 2023-06-01 PROCEDURE — 1159F MED LIST DOCD IN RCRD: CPT | Mod: CPTII,S$GLB,, | Performed by: INTERNAL MEDICINE

## 2023-06-01 PROCEDURE — 99999 PR PBB SHADOW E&M-EST. PATIENT-LVL III: CPT | Mod: PBBFAC,,, | Performed by: INTERNAL MEDICINE

## 2023-06-01 PROCEDURE — 4010F PR ACE/ARB THEARPY RXD/TAKEN: ICD-10-PCS | Mod: CPTII,S$GLB,, | Performed by: INTERNAL MEDICINE

## 2023-06-01 PROCEDURE — 4010F ACE/ARB THERAPY RXD/TAKEN: CPT | Mod: CPTII,S$GLB,, | Performed by: INTERNAL MEDICINE

## 2023-06-01 PROCEDURE — 99213 OFFICE O/P EST LOW 20 MIN: CPT | Mod: S$GLB,,, | Performed by: INTERNAL MEDICINE

## 2023-06-01 PROCEDURE — 3078F PR MOST RECENT DIASTOLIC BLOOD PRESSURE < 80 MM HG: ICD-10-PCS | Mod: CPTII,S$GLB,, | Performed by: INTERNAL MEDICINE

## 2023-06-01 PROCEDURE — 3074F SYST BP LT 130 MM HG: CPT | Mod: CPTII,S$GLB,, | Performed by: INTERNAL MEDICINE

## 2023-06-01 PROCEDURE — 99213 OFFICE O/P EST LOW 20 MIN: CPT | Mod: PBBFAC,PN | Performed by: INTERNAL MEDICINE

## 2023-06-01 PROCEDURE — 3008F PR BODY MASS INDEX (BMI) DOCUMENTED: ICD-10-PCS | Mod: CPTII,S$GLB,, | Performed by: INTERNAL MEDICINE

## 2023-06-01 PROCEDURE — 1160F PR REVIEW ALL MEDS BY PRESCRIBER/CLIN PHARMACIST DOCUMENTED: ICD-10-PCS | Mod: CPTII,S$GLB,, | Performed by: INTERNAL MEDICINE

## 2023-06-01 PROCEDURE — 3008F BODY MASS INDEX DOCD: CPT | Mod: CPTII,S$GLB,, | Performed by: INTERNAL MEDICINE

## 2023-06-01 PROCEDURE — 1160F RVW MEDS BY RX/DR IN RCRD: CPT | Mod: CPTII,S$GLB,, | Performed by: INTERNAL MEDICINE

## 2023-06-01 PROCEDURE — 99999 PR PBB SHADOW E&M-EST. PATIENT-LVL III: ICD-10-PCS | Mod: PBBFAC,,, | Performed by: INTERNAL MEDICINE

## 2023-06-01 PROCEDURE — 99213 PR OFFICE/OUTPT VISIT, EST, LEVL III, 20-29 MIN: ICD-10-PCS | Mod: S$GLB,,, | Performed by: INTERNAL MEDICINE

## 2023-06-01 RX ORDER — METOPROLOL TARTRATE 25 MG/1
12.5 TABLET, FILM COATED ORAL 2 TIMES DAILY
Qty: 30 TABLET | Refills: 11 | Status: SHIPPED | OUTPATIENT
Start: 2023-06-01 | End: 2024-05-31

## 2023-06-01 RX ORDER — LOSARTAN POTASSIUM 25 MG/1
25 TABLET ORAL DAILY
Qty: 30 TABLET | Refills: 11 | Status: SHIPPED | OUTPATIENT
Start: 2023-06-01 | End: 2023-11-01

## 2023-06-01 NOTE — PROGRESS NOTES
Subjective:      Patient ID: Riya Soto is a 52 y.o. female.    Chief Complaint: Follow-up    HPI:  Pt is feeling well .    Walks in the neighborhood    Does a lot of housework    Pt c/o persistent sensitivity of median sternotomy scar    Review of Systems   Cardiovascular:  Negative for chest pain, claudication, dyspnea on exertion, irregular heartbeat, leg swelling, near-syncope, orthopnea, palpitations and syncope.      Past Medical History:   Diagnosis Date    A-fib     Atrial fibrillation     Back pain     Hyperlipidemia     Mitral incompetence     Mitral valve stenosis     Neck pain         Past Surgical History:   Procedure Laterality Date    ABLATION N/A 11/7/2022    Procedure: MAZE;  Surgeon: Bobby Gilbert MD;  [facility];  Service: Cardiothoracic;  Laterality: N/A;  Cryoablation    CARDIAC SURGERY      to repair mitral valve    CHOLECYSTECTOMY      COLONOSCOPY      COLONOSCOPY N/A 6/8/2022    Procedure: COLONOSCOPY;  Surgeon: Sanford Andres MD;  Location: [facility] ENDO;  Service: Endoscopy;  Laterality: N/A;    CORONARY ANGIOGRAPHY N/A 8/29/2022    Procedure: ANGIOGRAM, CORONARY ARTERY;  Surgeon: Lexa Harley MD;  Location: [facility] CATH LAB;  Service: Cardiology;  Laterality: N/A;    MITRAL VALVE REPLACEMENT N/A 11/7/2022    Procedure: REPLACEMENT, MITRAL VALVE;  Surgeon: Bobby Gilbert MD;  [facility];  Service: Cardiothoracic;  Laterality: N/A;  REDO STERNOTOMY    STERNOTOMY N/A 11/7/2022    Procedure: STERNOTOMY;  Surgeon: Bobby Gilbert MD;  [facility];  Service: Cardiothoracic;  Laterality: N/A;  Redo sternotomy    TRANSESOPHAGEAL ECHOCARDIOGRAPHY N/A 03/29/2022    Procedure: ECHOCARDIOGRAM, TRANSESOPHAGEAL;  Surgeon: Dominic Wallis MD;  Location: [facility] EP LAB;  Service: Cardiology;  Laterality: N/A;    TREATMENT OF CARDIAC ARRHYTHMIA N/A 03/29/2022    Procedure: Cardioversion or Defibrillation;  Surgeon: Elmo Jones MD;   Location: [facility] EP LAB;  Service: Cardiology;  Laterality: N/A;  afib, dccv, stanley, anes, [place], 3prep, Provider to perform     TRICUSPID VALVULOPLASTY N/A 11/7/2022    Procedure: REPAIR, TRICUSPID VALVE;  Surgeon: Bobby Gilbert MD;  [facility];  Service: Cardiothoracic;  Laterality: N/A;       Family History   Problem Relation Age of Onset    Breast cancer Sister     Colon cancer Maternal Aunt     Ovarian cancer Neg Hx        Social History     Socioeconomic History    Marital status: Single   Tobacco Use    Smoking status: Never    Smokeless tobacco: Never   Substance and Sexual Activity    Alcohol use: Yes     Comment: socially    Drug use: No    Sexual activity: Yes     Partners: Male       Current Outpatient Medications on File Prior to Visit   Medication Sig Dispense Refill    aspirin (ECOTRIN) 81 MG EC tablet Take 1 tablet (81 mg total) by mouth once daily. 30 tablet 11    atorvastatin (LIPITOR) 10 MG tablet Take 1 tablet (10 mg total) by mouth every evening. 30 tablet 11    dronedarone (MULTAQ) 400 mg Tab Take 1 tablet (400 mg total) by mouth 2 (two) times daily with meals. 60 tablet 11    losartan (COZAAR) 25 MG tablet once daily.      metoprolol tartrate (LOPRESSOR) 25 MG tablet Take 0.5 tablets (12.5 mg total) by mouth 2 (two) times daily. 30 tablet 11    warfarin (COUMADIN) 5 MG tablet 5 mg every Mon, Wed, Fri; 7.5 mg all other days or as directed by coumadin clinic 50 tablet 11    [DISCONTINUED] citalopram (CELEXA) 10 MG tablet Take 1 tablet (10 mg total) by mouth once daily. 30 tablet 11    [DISCONTINUED] cyproheptadine (PERIACTIN) 4 mg tablet Half p.o. b.i.d.--not too sleepy 1 p.o. b.i.d. to increase appetite (Patient not taking: Reported on 4/12/2023) 60 tablet 5     No current facility-administered medications on file prior to visit.       Review of patient's allergies indicates:   Allergen Reactions    Amoxicillin Hives and Other (See Comments)     Objective:     Vitals:    06/01/23  "0821   BP: 114/72   BP Location: Right arm   Patient Position: Sitting   BP Method: Large (Automatic)   Pulse: 82   SpO2: 96%   Weight: 92.4 kg (203 lb 9.5 oz)   Height: 5' 9" (1.753 m)        Physical Exam  Constitutional:       Appearance: She is well-developed.   Eyes:      General: No scleral icterus.  Neck:      Vascular: No carotid bruit or JVD.   Cardiovascular:      Rate and Rhythm: Normal rate and regular rhythm.      Heart sounds: No murmur heard.    No gallop.      Comments: Mechanical mitral valve open and close noise is crisp and metallic.  Pulmonary:      Breath sounds: Normal breath sounds.   Chest:      Comments: Median sternotomy incision with cheloid formation.  Skin is well healed and sternum is stable  Musculoskeletal:      Right lower leg: No edema.      Left lower leg: No edema.   Skin:     General: Skin is warm and dry.   Neurological:      Mental Status: She is alert and oriented to person, place, and time.   Psychiatric:         Behavior: Behavior normal.         Thought Content: Thought content normal.         Judgment: Judgment normal.        ECG today: NSR with first degree AV block, long QT, otherwise WNL, no significant change    Lab Visit on 05/30/2023   Component Date Value Ref Range Status    Prothrombin Time 05/30/2023 33.1 (H)  9.0 - 12.5 sec Final    INR 05/30/2023 3.1 (H)  0.8 - 1.2 Final   Lab Visit on 05/22/2023   Component Date Value Ref Range Status    Prothrombin Time 05/22/2023 34.0 (H)  9.0 - 12.5 sec Final    INR 05/22/2023 3.4 (H)  0.8 - 1.2 Final   Lab Visit on 05/08/2023   Component Date Value Ref Range Status    Prothrombin Time 05/08/2023 32.4 (H)  9.0 - 12.5 sec Final    INR 05/08/2023 3.1 (H)  0.8 - 1.2 Final   Lab Visit on 04/24/2023   Component Date Value Ref Range Status    Prothrombin Time 04/24/2023 25.9 (H)  9.0 - 12.5 sec Final    INR 04/24/2023 2.4 (H)  0.8 - 1.2 Final   Lab Visit on 04/10/2023   Component Date Value Ref Range Status    Prothrombin Time " 04/10/2023 26.8 (H)  9.0 - 12.5 sec Final    INR 04/10/2023 2.5 (H)  0.8 - 1.2 Final   Lab Visit on 04/03/2023   Component Date Value Ref Range Status    Prothrombin Time 04/03/2023 31.1 (H)  9.0 - 12.5 sec Final    INR 04/03/2023 3.1 (H)  0.8 - 1.2 Final   Lab Visit on 03/27/2023   Component Date Value Ref Range Status    Prothrombin Time 03/27/2023 24.9 (H)  9.0 - 12.5 sec Final    INR 03/27/2023 2.4 (H)  0.8 - 1.2 Final   Lab Visit on 03/20/2023   Component Date Value Ref Range Status    Prothrombin Time 03/20/2023 25.5 (H)  9.0 - 12.5 sec Final    INR 03/20/2023 2.5 (H)  0.8 - 1.2 Final   Lab Visit on 03/13/2023   Component Date Value Ref Range Status    Prothrombin Time 03/13/2023 24.5 (H)  9.0 - 12.5 sec Final    INR 03/13/2023 2.3 (H)  0.8 - 1.2 Final   Lab Visit on 03/06/2023   Component Date Value Ref Range Status    Prothrombin Time 03/06/2023 27.0 (H)  9.0 - 12.5 sec Final    INR 03/06/2023 2.5 (H)  0.8 - 1.2 Final   There may be more visits with results that are not included.   ('        MyChart Results Release    [facility] Status: Active  Results Release       Contains abnormal data Basic Metabolic Panel  Order: [id number]  Status: Final result    Visible to patient: Yes (not seen)     Next appt: 06/20/2023 at 09:30 AM in Lab (LAB, [facility])    Dx: Long term (current) use of anticoagul...     0 Result Notes             Component Ref Range & Units 3 mo ago  (2/6/23) 6 mo ago  (11/13/22) 6 mo ago  (11/12/22) 6 mo ago  (11/11/22) 6 mo ago  (11/10/22) 6 mo ago  (11/9/22) 6 mo ago  (11/9/22)   Sodium 136 - 145 mmol/L 136  134 Low   135 Low   133 Low   129 Low    133 Low     Potassium 3.5 - 5.1 mmol/L 4.0  3.9  3.9  3.9  4.3  3.8  4.2    Chloride 95 - 110 mmol/L 104  95  96  96  95   100    CO2 23 - 29 mmol/L 27  26  26  26  26   26    Glucose 70 - 110 mg/dL 92  97  91  97  113 High    123 High     BUN 6 - 20 mg/dL 17  20  20  19  18   13    Creatinine 0.5 - 1.4 mg/dL 1.0  0.8  0.8  0.7  0.8   0.8    Calcium  8.7 - 10.5 mg/dL 9.1  9.5  9.3  8.8  8.8   8.6 Low     Anion Gap 8 - 16 mmol/L 5 Low   13  13  11  8   7 Low     eGFR >60 mL/min/1.73 m^2 >60  >60.0  >60.0  >60.0  >60.0   >60.0    Resulting Agency  [facility]              Specimen Collected: 02/06/23 10:19 Last Resulted: 02/06/23 11:53        Lab Flowsheet     Order Details     View Encounter     Lab and Collection Details     Routing    Result History          [facility])     Dx: Long term (current) use of anticoagul...     0 Result Notes             Component Ref Range & Units 3 mo ago  (2/6/23) 6 mo ago  (11/13/22) 6 mo ago  (11/12/22) 6 mo ago  (11/11/22) 6 mo ago  (11/10/22) 6 mo ago  (11/9/22) 6 mo ago  (11/8/22)   WBC 3.90 - 12.70 K/uL 4.77  7.90  8.31  10.27  14.42 High   13.80 High   11.23    RBC 4.00 - 5.40 M/uL 3.85 Low   3.37 Low   3.02 Low   3.18 Low   2.97 Low   2.93 Low   3.04 Low     Hemoglobin 12.0 - 16.0 g/dL 11.0 Low   10.0 Low   9.2 Low   9.6 Low   9.0 Low   8.9 Low   9.4 Low     Hematocrit 37.0 - 48.5 % 34.5 Low   31.0 Low   27.7 Low   30.1 Low   27.4 Low   26.9 Low   28.4 Low     MCV 82 - 98 fL 90  92  92  95  92  92  93    MCH 27.0 - 31.0 pg 28.6  29.7  30.5  30.2  30.3  30.4  30.9    MCHC 32.0 - 36.0 g/dL 31.9 Low   32.3  33.2  31.9 Low   32.8  33.1  33.1    RDW 11.5 - 14.5 % 14.4  14.0  14.1  14.1  14.4  14.5  14.5    Platelets 150 - 450 K/uL 257  292  206  194  126 Low   91 Low   105 Low     MPV 9.2 - 12.9 fL 10.4  10.5  10.6  11.8  12.5  12.1  12.3    Immature Granulocytes 0.0 - 0.5 % 0.2  1.0 High   0.4  0.4  0.7 High   0.8 High   0.2    Gran # (ANC) 1.8 - 7.7 K/uL 2.3  4.7  5.5  8.1 High   11.8 High   11.2 High   9.3 High     Immature Grans (Abs) 0.00 - 0.04 K/uL 0.01  0.08 High  CM  0.03 CM  0.04 CM  0.10 High  CM  0.11 High  CM  0.02 CM    Comment: Mild elevation in immature granulocytes is non specific and   can be seen in a variety of conditions including stress response,   acute inflammation, trauma and  pregnancy. Correlation with other   laboratory and clinical findings is essential.    Lymph # 1.0 - 4.8 K/uL 1.7  1.5  1.3  0.9 Low   0.9 Low   0.9 Low   0.6 Low     Mono # 0.3 - 1.0 K/uL 0.7  1.5 High   1.3 High   1.2 High   1.6 High   1.7 High   1.3 High     Eos # 0.0 - 0.5 K/uL 0.1  0.2  0.1  0.1  0.0  0.0  0.0    Baso # 0.00 - 0.20 K/uL 0.01  0.04  0.02  0.02  0.02  0.02  0.01    nRBC 0 /100 WBC 0  0  0  0  0  0  0    Gran % 38.0 - 73.0 % 47.6  59.5  66.2  78.6 High   81.9 High   80.9 High   83.1 High     Lymph % 18.0 - 48.0 % 34.8  18.6  15.5 Low   8.5 Low   6.1 Low   6.2 Low   5.3 Low     Mono % 4.0 - 15.0 % 15.3 High   18.4 High   16.0 High   11.2  11.0  12.0  11.3    Eosinophil % 0.0 - 8.0 % 1.9  2.0  1.7  1.1  0.2  0.0  0.0    Basophil % 0.0 - 1.9 % 0.2  0.5  0.2  0.2  0.1  0.1  0.1    Differential Method  Automated  Automated  Automated  Automated  Automated  Automated  Automated    Resulting Agency  [facility]              Specimen Collected: 02/06/23 10:19 Last Resulted: 02/06/23 10:55               Accession #: [id number]  Transthoracic echo (TTE) complete  Order# [id number]  Reading physician: Bennett Galvez MD Ordering physician: Bobby Gilbert MD Study date: 12/8/22     Reason for Exam  Priority: Routine  Dx: S/P MVR (mitral valve replacement) [Z95.2 (ICD-10-CM)]; Examination of participant in clinical trial [Z00.6 (ICD-10-CM)]     View Images Vital Vitrea     Show images for Echo  Summary    The left ventricle is normal in size with normal systolic function. The estimated ejection fraction is 55%.  Normal right ventricular size with mildly reduced right ventricular systolic function.  Indeterminate left ventricular diastolic function with elevated left atrial pressure.  Severe left atrial enlargement.  There is a normally functioning 29mm LivaNova mechanical mitral valve prosthesis. Mean gradient is 4mm Hg at a heart rate in the 60s.  The estimated PA systolic pressure is  29 mmHg.  Intermediate central venous pressure (8 mmHg).      Bobby Gilbert MD (Physician)                    DATE OF PROCEDURE:  11/7/2022     ATTENDING SURGEON:  Bobby Gilbert M.D.     ASSISTANT:  Yvan Jack M.D., (Cardiothoracic Resident)     PREOPERATIVE DIAGNOSES:  1.  Mitral stenosis and mitral regurgitation  2.  Previous mitral valve repair  3.  Severe tricuspid regurgitation  4.  Atrial fibrillation  5.  Chronic systolic heart failure  6.  Pulmonary hypertension     POSTOPERATIVE DIAGNOSES:  1.  Mitral stenosis and mitral regurgitation  2.  Previous mitral valve repair  3.  Severe tricuspid regurgitation  4.  Atrial fibrillation  5.  Chronic systolic heart failure  6.  Pulmonary hypertension     OPERATION PERFORMED:       1)  Mitral valve replacement with a 29 mm [employer] standard   mechanical prosthesis     2)  Tricuspid valve repair with a 26 mm Medtronic contour annuloplasty band     3)  Maze procedure with complete left and right atrial lesion sets without resection of left atrial appendage     4)  Redo sternotomy         Accession #: [id number]  Riya Nancy Kerrick  Cardiac catheterization  Order# [id number]  Reading physician: Lexa Harley MD Ordering physician: Lexa Harley MD Study date: 8/29/22     Patient Information    Name MRN Description   Riya Nancy Kerrick [id number] 51 y.o. female     Physicians    Panel Physicians Referring Physician Case Authorizing Physician   Lexa Harley MD (Primary)  MD Jose Armando Buchanan MD (Fellow)     Pablo Powers MD (Fellow)       Indications    Mitral valve insufficiency, unspecified etiology [I34.0 (ICD-10-CM)]   HFrEF (heart failure with reduced ejection fraction) [I50.20 (ICD-10-CM)]     Summary         Normal coronary arteries     The procedure log was documented by Documenter: Felisa Winters and verified by Lexa Villarreal MD.     Date: 8/29/2022  Time: 10:53 AM           Assessment:     1. S/P Radha  operation for atrial fibrillation    2. S/P MVR (mitral valve replacement)    3. S/P TVR (tricuspid valve repair)    4. Pure hypercholesterolemia    5. Anticoagulant long-term use      Plan:   Riya was seen today for follow-up.    Diagnoses and all orders for this visit:    S/P Maze operation for atrial fibrillation  -     IN OFFICE EKG 12-LEAD (to Muse)    S/P MVR (mitral valve replacement)  -     IN OFFICE EKG 12-LEAD (to Muse)    S/P TVR (tricuspid valve repair)  -     IN OFFICE EKG 12-LEAD (to Muse)    Pure hypercholesterolemia  -     IN OFFICE EKG 12-LEAD (to Muse)    Anticoagulant long-term use  -     IN OFFICE EKG 12-LEAD (to Muse)     Pt is doing well    Pt has tried hydrocortisone cream for the cheloid     Same meds    F/u with coumadin clinic    RTC 6 months     F/u with Dr Yadav with labs already ordered    No follow-ups on file.

## 2023-06-20 ENCOUNTER — ANTI-COAG VISIT (OUTPATIENT)
Dept: CARDIOLOGY | Facility: CLINIC | Age: 53
End: 2023-06-20
Payer: COMMERCIAL

## 2023-06-20 ENCOUNTER — LAB VISIT (OUTPATIENT)
Dept: LAB | Facility: HOSPITAL | Age: 53
End: 2023-06-20
Attending: INTERNAL MEDICINE
Payer: COMMERCIAL

## 2023-06-20 DIAGNOSIS — I48.0 PAROXYSMAL ATRIAL FIBRILLATION: ICD-10-CM

## 2023-06-20 DIAGNOSIS — Z95.2 S/P MVR (MITRAL VALVE REPLACEMENT): ICD-10-CM

## 2023-06-20 DIAGNOSIS — Z95.2 S/P MVR (MITRAL VALVE REPLACEMENT): Primary | ICD-10-CM

## 2023-06-20 DIAGNOSIS — Z79.01 LONG TERM (CURRENT) USE OF ANTICOAGULANTS: ICD-10-CM

## 2023-06-20 LAB
INR PPP: 2.6 (ref 0.8–1.2)
PROTHROMBIN TIME: 26.6 SEC (ref 9–12.5)

## 2023-06-20 PROCEDURE — 93793 ANTICOAG MGMT PT WARFARIN: CPT | Mod: S$GLB,,,

## 2023-06-20 PROCEDURE — 85610 PROTHROMBIN TIME: CPT | Performed by: INTERNAL MEDICINE

## 2023-06-20 PROCEDURE — 36415 COLL VENOUS BLD VENIPUNCTURE: CPT | Performed by: INTERNAL MEDICINE

## 2023-06-20 PROCEDURE — 93793 PR ANTICOAGULANT MGMT FOR PT TAKING WARFARIN: ICD-10-PCS | Mod: S$GLB,,,

## 2023-06-20 NOTE — PROGRESS NOTES
[facility]    2023 10:03 AM    Assessment/Plan:    Patient presents today with therapeutic INR.    Recommendation for patient's warfarin regimen: Continue current maintenance dose    Recommend repeat INR in 4 weeks  _________________________________________________________________    Riya Soto (52 y.o.) is followed by the [facility].    Anticoagulation Summary  As of 2023      INR goal:  2.5-3.5   TTR:  52.2 % (7 mo)   INR used for dosin.6 (2023)   Warfarin maintenance plan:  5 mg (5 mg x 1) every Mon, Wed, Fri; 7.5 mg (5 mg x 1.5) all other days   Weekly warfarin total:  45 mg   No change documented:  Skylar Marcelo, PharmD   Plan last modified:  Mahad McgarryD (2023)   Next INR check:  2023   Target end date:      Indications    S/P MVR (mitral valve replacement) [Z95.2]  Atrial fibrillation [I48.91]  Long term (current) use of anticoagulants [Z79.01]                 Anticoagulation Episode Summary       INR check location:  Clinic Lab    Preferred lab:      Send INR reminders to:  [facility] COUMADIN MONITORING POOL    Comments:  [facility] only Mon - Thu    (D/C ) [facility] [phone]          Anticoagulation Care Providers       Provider Role Specialty Phone number    Celsa Knight PA-C Referring Cardiothoracic Surgery [phone]    Chelsea Sears MD Responsible Cardiology [phone]

## 2023-07-03 RX ORDER — LOSARTAN POTASSIUM 25 MG/1
TABLET ORAL
Qty: 30 TABLET | Refills: 11 | OUTPATIENT
Start: 2023-07-03

## 2023-07-03 NOTE — TELEPHONE ENCOUNTER
LOV 06/01/2023    Losartan Rx sent on 06/01/2023 by Dr. Figueroa.  Spoke with Jimmie at [facility], they have the 06/01/2023 prescription on file.  We can disregard this request.

## 2023-07-13 DIAGNOSIS — I48.91 ATRIAL FIBRILLATION, UNSPECIFIED TYPE: ICD-10-CM

## 2023-07-13 DIAGNOSIS — Z00.6 RESEARCH SUBJECT: Primary | ICD-10-CM

## 2023-07-13 DIAGNOSIS — Z79.01 ANTICOAGULANT LONG-TERM USE: ICD-10-CM

## 2023-07-18 ENCOUNTER — LAB VISIT (OUTPATIENT)
Dept: LAB | Facility: HOSPITAL | Age: 53
End: 2023-07-18
Attending: INTERNAL MEDICINE
Payer: COMMERCIAL

## 2023-07-18 ENCOUNTER — ANTI-COAG VISIT (OUTPATIENT)
Dept: CARDIOLOGY | Facility: CLINIC | Age: 53
End: 2023-07-18
Payer: COMMERCIAL

## 2023-07-18 DIAGNOSIS — Z79.01 LONG TERM (CURRENT) USE OF ANTICOAGULANTS: ICD-10-CM

## 2023-07-18 DIAGNOSIS — Z95.2 S/P MVR (MITRAL VALVE REPLACEMENT): Primary | ICD-10-CM

## 2023-07-18 DIAGNOSIS — Z95.2 S/P MVR (MITRAL VALVE REPLACEMENT): ICD-10-CM

## 2023-07-18 LAB
INR PPP: 2.6 (ref 0.8–1.2)
PROTHROMBIN TIME: 26.5 SEC (ref 9–12.5)

## 2023-07-18 PROCEDURE — 93793 PR ANTICOAGULANT MGMT FOR PT TAKING WARFARIN: ICD-10-PCS | Mod: S$GLB,,,

## 2023-07-18 PROCEDURE — 36415 COLL VENOUS BLD VENIPUNCTURE: CPT | Performed by: INTERNAL MEDICINE

## 2023-07-18 PROCEDURE — 93793 ANTICOAG MGMT PT WARFARIN: CPT | Mod: S$GLB,,,

## 2023-07-18 PROCEDURE — 85610 PROTHROMBIN TIME: CPT | Performed by: INTERNAL MEDICINE

## 2023-08-22 ENCOUNTER — LAB VISIT (OUTPATIENT)
Dept: LAB | Facility: HOSPITAL | Age: 53
End: 2023-08-22
Attending: INTERNAL MEDICINE
Payer: COMMERCIAL

## 2023-08-22 ENCOUNTER — ANTI-COAG VISIT (OUTPATIENT)
Dept: CARDIOLOGY | Facility: CLINIC | Age: 53
End: 2023-08-22
Payer: COMMERCIAL

## 2023-08-22 DIAGNOSIS — Z95.2 S/P MVR (MITRAL VALVE REPLACEMENT): Primary | ICD-10-CM

## 2023-08-22 DIAGNOSIS — Z79.01 LONG TERM (CURRENT) USE OF ANTICOAGULANTS: ICD-10-CM

## 2023-08-22 DIAGNOSIS — Z95.2 S/P MVR (MITRAL VALVE REPLACEMENT): ICD-10-CM

## 2023-08-22 DIAGNOSIS — I48.0 PAROXYSMAL ATRIAL FIBRILLATION: ICD-10-CM

## 2023-08-22 LAB
INR PPP: 2.2 (ref 0.8–1.2)
PROTHROMBIN TIME: 24.1 SEC (ref 9–12.5)

## 2023-08-22 PROCEDURE — 36415 COLL VENOUS BLD VENIPUNCTURE: CPT | Performed by: INTERNAL MEDICINE

## 2023-08-22 PROCEDURE — 93793 PR ANTICOAGULANT MGMT FOR PT TAKING WARFARIN: ICD-10-PCS | Mod: S$GLB,,,

## 2023-08-22 PROCEDURE — 85610 PROTHROMBIN TIME: CPT | Performed by: INTERNAL MEDICINE

## 2023-08-22 PROCEDURE — 93793 ANTICOAG MGMT PT WARFARIN: CPT | Mod: S$GLB,,,

## 2023-09-18 ENCOUNTER — PATIENT MESSAGE (OUTPATIENT)
Dept: PRIMARY CARE CLINIC | Facility: CLINIC | Age: 53
End: 2023-09-18
Payer: COMMERCIAL

## 2023-09-25 ENCOUNTER — ANTI-COAG VISIT (OUTPATIENT)
Dept: CARDIOLOGY | Facility: CLINIC | Age: 53
End: 2023-09-25
Payer: COMMERCIAL

## 2023-09-25 ENCOUNTER — LAB VISIT (OUTPATIENT)
Dept: LAB | Facility: HOSPITAL | Age: 53
End: 2023-09-25
Attending: INTERNAL MEDICINE
Payer: COMMERCIAL

## 2023-09-25 DIAGNOSIS — Z79.01 LONG TERM (CURRENT) USE OF ANTICOAGULANTS: ICD-10-CM

## 2023-09-25 DIAGNOSIS — Z95.2 S/P MVR (MITRAL VALVE REPLACEMENT): Primary | ICD-10-CM

## 2023-09-25 DIAGNOSIS — Z95.2 S/P MVR (MITRAL VALVE REPLACEMENT): ICD-10-CM

## 2023-09-25 LAB
INR PPP: 2.7 (ref 0.8–1.2)
PROTHROMBIN TIME: 27.1 SEC (ref 9–12.5)

## 2023-09-25 PROCEDURE — 93793 ANTICOAG MGMT PT WARFARIN: CPT | Mod: S$GLB,,,

## 2023-09-25 PROCEDURE — 93793 PR ANTICOAGULANT MGMT FOR PT TAKING WARFARIN: ICD-10-PCS | Mod: S$GLB,,,

## 2023-09-25 PROCEDURE — 36415 COLL VENOUS BLD VENIPUNCTURE: CPT | Performed by: INTERNAL MEDICINE

## 2023-09-25 PROCEDURE — 85610 PROTHROMBIN TIME: CPT | Performed by: INTERNAL MEDICINE

## 2023-10-17 ENCOUNTER — TELEPHONE (OUTPATIENT)
Dept: CARDIOTHORACIC SURGERY | Facility: CLINIC | Age: 53
End: 2023-10-17
Payer: COMMERCIAL

## 2023-10-17 NOTE — TELEPHONE ENCOUNTER
----- Message from Ethel Glover sent at 10/17/2023  7:55 AM CDT -----  Regarding: Pt called to speak to the nurse regarding her care and would like a call back this morning asap due to getting sick while out of town  Patient Advice:    Pt called to speak to the nurse regarding her care and would like a call back this morning asap due to getting sick while out of town    Pt states that she left a previous message on yesterday and on one called her back.    Pt can be reached at [phone]

## 2023-10-17 NOTE — TELEPHONE ENCOUNTER
Returned call to pt. Pt reports she is admitted to hospital in [place] with GI bleed. Advised pt we had cancelled her appointments and the research coordinator will follow up with her to reschedule. Also advised pt I would notify her PCP and cardiologist. Pt verbalized understanding.

## 2023-10-17 NOTE — TELEPHONE ENCOUNTER
Spoke with pt and she stated that she is in [facility] and she is admitted in hospital over there to having procedure for heart. Pt stated that she she is loosing blood and her blood level was 5 and her procedure stephanie be for 11 o clock per there time zone. She want to know Dr. Palmer opinion on it. Pt want her to call and want a talk to nurse.       Please advise.

## 2023-10-18 ENCOUNTER — PATIENT MESSAGE (OUTPATIENT)
Dept: CARDIOLOGY | Facility: CLINIC | Age: 53
End: 2023-10-18
Payer: COMMERCIAL

## 2023-10-24 ENCOUNTER — TELEPHONE (OUTPATIENT)
Dept: CARDIOTHORACIC SURGERY | Facility: CLINIC | Age: 53
End: 2023-10-24
Payer: COMMERCIAL

## 2023-10-24 NOTE — PROGRESS NOTES
10/23/23 - INR was missed 10/19, Patient called to report she was out of town in [facility], reports had been passing blood and passed out, was admitted to a hospital 10/14 and discharged 10/18, said hemoglobin was low, had EGD 10/17 and was found to have bleeding ulcer, last warfarin dose was 10/12 and has been on lovenox 80mg/0.8ml every 12 hours since 10/19 -has 2 injections left, also since 10/19 has been taking: Metronidazole -500 mg twice a day, Clarithromycin - 500 mg twice a day and Pantoprazole 40mg twice a day all for 9 days, INR appointment was scheduled for 10/25 when Patient has follow up appointment with Dr Yadav

## 2023-10-24 NOTE — TELEPHONE ENCOUNTER
----- Message from Shruthi Patel sent at 10/24/2023  7:05 AM CDT -----  Pt would like to be called back regarding  rescheduling appts     Pt can be reached at

## 2023-10-25 ENCOUNTER — OFFICE VISIT (OUTPATIENT)
Dept: PRIMARY CARE CLINIC | Facility: CLINIC | Age: 53
End: 2023-10-25
Payer: COMMERCIAL

## 2023-10-25 ENCOUNTER — ANTI-COAG VISIT (OUTPATIENT)
Dept: CARDIOLOGY | Facility: CLINIC | Age: 53
End: 2023-10-25
Payer: COMMERCIAL

## 2023-10-25 VITALS
OXYGEN SATURATION: 98 % | SYSTOLIC BLOOD PRESSURE: 118 MMHG | RESPIRATION RATE: 17 BRPM | BODY MASS INDEX: 31.74 KG/M2 | HEART RATE: 69 BPM | WEIGHT: 214.31 LBS | DIASTOLIC BLOOD PRESSURE: 78 MMHG | HEIGHT: 69 IN | TEMPERATURE: 98 F

## 2023-10-25 DIAGNOSIS — Z79.01 LONG TERM (CURRENT) USE OF ANTICOAGULANTS: ICD-10-CM

## 2023-10-25 DIAGNOSIS — I50.20 HFREF (HEART FAILURE WITH REDUCED EJECTION FRACTION): ICD-10-CM

## 2023-10-25 DIAGNOSIS — E78.5 HYPERLIPIDEMIA, UNSPECIFIED HYPERLIPIDEMIA TYPE: Primary | ICD-10-CM

## 2023-10-25 DIAGNOSIS — D62 ACUTE BLOOD LOSS ANEMIA: ICD-10-CM

## 2023-10-25 DIAGNOSIS — Z87.11 HISTORY OF GASTRIC ULCER: ICD-10-CM

## 2023-10-25 DIAGNOSIS — Z98.890 S/P MAZE OPERATION FOR ATRIAL FIBRILLATION: ICD-10-CM

## 2023-10-25 DIAGNOSIS — I48.0 PAROXYSMAL ATRIAL FIBRILLATION: ICD-10-CM

## 2023-10-25 DIAGNOSIS — E66.3 OVERWEIGHT (BMI 25.0-29.9): ICD-10-CM

## 2023-10-25 DIAGNOSIS — Z86.79 S/P MAZE OPERATION FOR ATRIAL FIBRILLATION: ICD-10-CM

## 2023-10-25 DIAGNOSIS — Z95.2 S/P MVR (MITRAL VALVE REPLACEMENT): Primary | ICD-10-CM

## 2023-10-25 DIAGNOSIS — Z98.890 S/P TVR (TRICUSPID VALVE REPAIR): ICD-10-CM

## 2023-10-25 DIAGNOSIS — Z95.2 S/P MVR (MITRAL VALVE REPLACEMENT): ICD-10-CM

## 2023-10-25 DIAGNOSIS — R73.9 STRESS HYPERGLYCEMIA: ICD-10-CM

## 2023-10-25 PROCEDURE — 93793 ANTICOAG MGMT PT WARFARIN: CPT | Mod: S$GLB,,,

## 2023-10-25 PROCEDURE — 99999 PR PBB SHADOW E&M-EST. PATIENT-LVL V: ICD-10-PCS | Mod: PBBFAC,,, | Performed by: FAMILY MEDICINE

## 2023-10-25 PROCEDURE — 3078F PR MOST RECENT DIASTOLIC BLOOD PRESSURE < 80 MM HG: ICD-10-PCS | Mod: CPTII,S$GLB,, | Performed by: FAMILY MEDICINE

## 2023-10-25 PROCEDURE — 4010F PR ACE/ARB THEARPY RXD/TAKEN: ICD-10-PCS | Mod: CPTII,S$GLB,, | Performed by: FAMILY MEDICINE

## 2023-10-25 PROCEDURE — 3008F PR BODY MASS INDEX (BMI) DOCUMENTED: ICD-10-PCS | Mod: CPTII,S$GLB,, | Performed by: FAMILY MEDICINE

## 2023-10-25 PROCEDURE — 99214 OFFICE O/P EST MOD 30 MIN: CPT | Mod: S$GLB,,, | Performed by: FAMILY MEDICINE

## 2023-10-25 PROCEDURE — 99214 PR OFFICE/OUTPT VISIT, EST, LEVL IV, 30-39 MIN: ICD-10-PCS | Mod: S$GLB,,, | Performed by: FAMILY MEDICINE

## 2023-10-25 PROCEDURE — 4010F ACE/ARB THERAPY RXD/TAKEN: CPT | Mod: CPTII,S$GLB,, | Performed by: FAMILY MEDICINE

## 2023-10-25 PROCEDURE — 93793 PR ANTICOAGULANT MGMT FOR PT TAKING WARFARIN: ICD-10-PCS | Mod: S$GLB,,,

## 2023-10-25 PROCEDURE — 3008F BODY MASS INDEX DOCD: CPT | Mod: CPTII,S$GLB,, | Performed by: FAMILY MEDICINE

## 2023-10-25 PROCEDURE — 3074F SYST BP LT 130 MM HG: CPT | Mod: CPTII,S$GLB,, | Performed by: FAMILY MEDICINE

## 2023-10-25 PROCEDURE — 3074F PR MOST RECENT SYSTOLIC BLOOD PRESSURE < 130 MM HG: ICD-10-PCS | Mod: CPTII,S$GLB,, | Performed by: FAMILY MEDICINE

## 2023-10-25 PROCEDURE — 3078F DIAST BP <80 MM HG: CPT | Mod: CPTII,S$GLB,, | Performed by: FAMILY MEDICINE

## 2023-10-25 PROCEDURE — 99999 PR PBB SHADOW E&M-EST. PATIENT-LVL V: CPT | Mod: PBBFAC,,, | Performed by: FAMILY MEDICINE

## 2023-10-25 RX ORDER — PANTOPRAZOLE SODIUM 40 MG/1
40 TABLET, DELAYED RELEASE ORAL EVERY 12 HOURS
COMMUNITY
Start: 2023-10-18 | End: 2023-11-01

## 2023-10-25 RX ORDER — ENOXAPARIN SODIUM 100 MG/ML
INJECTION SUBCUTANEOUS
COMMUNITY
Start: 2023-10-18 | End: 2023-10-26 | Stop reason: SDUPTHER

## 2023-10-25 RX ORDER — OMEPRAZOLE 40 MG/1
40 CAPSULE, DELAYED RELEASE ORAL DAILY
Qty: 30 CAPSULE | Refills: 5 | Status: SHIPPED | OUTPATIENT
Start: 2023-10-25 | End: 2023-11-01

## 2023-10-25 RX ORDER — HYDROCODONE BITARTRATE AND ACETAMINOPHEN 10; 325 MG/1; MG/1
TABLET ORAL
COMMUNITY
End: 2023-11-01

## 2023-10-25 RX ORDER — GABAPENTIN 300 MG/1
CAPSULE ORAL
COMMUNITY
End: 2023-11-01

## 2023-10-25 NOTE — PROGRESS NOTES
Subjective:       Patient ID: Riay Soto is a 52 y.o. female.    Chief Complaint: Follow-up (Pt is here for a follow up from ed/)      HPI 51 yo BF pt left here 1---took plane [place] got sick--dizzy--went to the bathroom throwing up hunks of blood. Felt dizzy--Fri night on sofa --had crawl to BR 1-  Laid on floor--getting weaker and weaker --called 911. When EMT arrived BP was 89 systolic-??29 . IV fluid alot people ER --cut shirt off. Pt was given 4 units blood Did EGD showed bleeding ulcer . Pt was on coumadin --was given heparin IV --sent home needles stomach.   Appt 1- -- Dr Liriano--cardiololgy --see whenn needs get back on coumadin. On lovenox on blood thinners due to artificial mitral valve  No more dizziness has not pass anymore blood  Told see PCP and cardiologybin 2-5 days Pt unable get appt with cardiology   Was treated with Biaxin Flagyl Protonix  Sees  DR Figueroa--HLP--on atorvastatin--hx atrial fibrillation --hx valve mitral surgery --was on coumadin   Just buried son hit and run --son was 28 yo multiple times --ran out gas . Just buried him one week ago.     ROS no significant change other than history of present illness  Skin--no psoriasis eczema skin cancer  HEENT---no headaches ocular pain blurred vision diplopia epistaxis hoarseness change in voice thyroid trouble  Lung--no pneumonia asthma TB no smoking  Heart--history of mitral valve surgery-11 yrs ago--November had valve replacements x2 mitral valve and possible tricuspid valve--+ hyperlipidemia--atrial fib with history of cardioversion--CHF--no chest pain ankle edema palpitations MI--no stent bypass arrhythmia  Abdomen--no nausea vomiting diarrhea constipation ulcers hepatitis + diverticulosis +history cholecystectomy  -no UTI renal disease stones  GYN last menstrual period age 48--due for mammogram November for  Musculoskeletal cervical strain lumbosacral strain weakness left upper arm problems all stable    Neurologic no dizziness passing out seizures  No diabetes  Anemia--no anxiety or depression  Single--7 children--SSI due to heart---lives with 7 children         General: Well nourished, well developed, no acute distress +obesity appears depressed  Skin: No lesions   HEENT: Eyes PERRLA, EOM intact, nose patent, throat non-erythematous ears TMs clear  NECK: Supple, no bruits, No JVD, no nodes  Lungs: Clear, no rales, rhonchi, wheezing  Heart: Regular rate and rhythm, no murmurs, gallops, or rubs--clicking of heart valve is heard  Abdomen: flat, bowel sounds positive, no tenderness, or organomegaly  MS:  No significant change Some deformity of the left ankle had 3 surgery still has screw present --main problem left shoulder --sore with palpation--AC joint and triceps and supraspinatus area ., pain with raising arm overhead especially painful with trying to reach back to undo bra  Neuro: Alert, CN intact, oriented X 3  Extremities: No cyanosis, clubbing, or edema         Assessment:       1. Hyperlipidemia, unspecified hyperlipidemia type    2. History of gastric ulcer    3. S/P MVR (mitral valve replacement)    4. Paroxysmal atrial fibrillation    5. HFrEF (heart failure with reduced ejection fraction)    6. S/P TVR (tricuspid valve repair)    7. S/P Maze operation for atrial fibrillation    8. Long term (current) use of anticoagulants    9. Acute blood loss anemia    10. Stress hyperglycemia    11. Overweight (BMI 25.0-29.9)            Plan:       Hyperlipidemia, unspecified hyperlipidemia type  -     CBC Auto Differential; Future; Expected date: 10/25/2023  -     Comprehensive Metabolic Panel; Future; Expected date: 10/25/2023  -     Lipid Panel; Future; Expected date: 10/25/2023    History of gastric ulcer    S/P MVR (mitral valve replacement)  -     Ambulatory referral/consult to Cardiology; Future; Expected date: 11/01/2023    Paroxysmal atrial fibrillation  -     T4, Free; Future; Expected date: 10/25/2023  -      TSH; Future; Expected date: 10/25/2023    HFrEF (heart failure with reduced ejection fraction)    S/P TVR (tricuspid valve repair)    S/P Maze operation for atrial fibrillation    Long term (current) use of anticoagulants    Acute blood loss anemia    Stress hyperglycemia    Overweight (BMI 25.0-29.9)    Other orders  -     omeprazole (PRILOSEC) 40 MG capsule; Take 1 capsule (40 mg total) by mouth once daily.  Dispense: 30 capsule; Refill: 5            Main-Reason for Visit-  Recently went to [place]--had GI bleed --receive 4 units of blood--started on Biaxin Flagyl??H pylori--on Protonix--stopped Coumadin placed on Lovenox--pt needs see cardiology see when able get back on coumadin --also off of her blood pressure pills metoprolol and lisinopril blood pressure 118/78 sees Dr. Figueroa--will order Lab CBCs CMP and make appointment see Cardiology see when able get back on Coumadin and if they want blood pressure medications restarted since patient's blood pressure 118/78 off of metoprolol and lisinopril patient does have atrial fibrillation and in artificial mitral valve and tricuspid valve  Heart surgery 11-7-2022 mitral and tricuspid valve replaced--patient doing very well  Son killed in hit and run accident April 2023[place] several automobile-- was eventually caught patient has to go to court  Atrial fibrillation --history of cardioversion 03/29/2022----patient was on coumadin /metoprolol  Needs to see GI MD--recent gastric ulcer 01/13/2023 with GI bleed transfused 4 units of blood on Coumadin for valve replacement needs to have repeat EGD see if ulcers healed if GI agrees in 6 weeks  Hyperlipidemia patient refills of Lipitor   Menopausal syndrome--had Mirena removed 3 years ago No period since   History anemia/cholecystectomy/heart surgery (systolic had leaking valve 1 surgery was done was normal)  Lab CBCs CMP lipids hemoglobin A1c BNP  Return in 6 months

## 2023-10-26 RX ORDER — ENOXAPARIN SODIUM 100 MG/ML
80 INJECTION SUBCUTANEOUS EVERY 12 HOURS
Qty: 22.4 ML | Refills: 1 | Status: SHIPPED | OUTPATIENT
Start: 2023-10-26 | End: 2024-01-17 | Stop reason: ALTCHOICE

## 2023-10-26 NOTE — PROGRESS NOTES
Patient recently admitted to hospital in [place] with GI bleed. Warfarin discontinued, needs cardiac clearance to resume warfarin. Currently on lovenox, plan to continue that for now per Dr. Yadav. Will follow up cardiac rec regarding anticoagulation at 11/1/23 appointment.    Update: lovenox 80 mg subq q12hr rx sent to [facility], slightly lower than 1 mg/kg q12hr, pt weight ~97 kg. Continue reduced lovenox dose patient received upon hospital discharge given recent history of GI bleed.

## 2023-11-01 ENCOUNTER — OFFICE VISIT (OUTPATIENT)
Dept: CARDIOLOGY | Facility: CLINIC | Age: 53
End: 2023-11-01
Payer: COMMERCIAL

## 2023-11-01 VITALS
BODY MASS INDEX: 31.14 KG/M2 | OXYGEN SATURATION: 98 % | HEART RATE: 80 BPM | SYSTOLIC BLOOD PRESSURE: 121 MMHG | DIASTOLIC BLOOD PRESSURE: 58 MMHG | WEIGHT: 210.88 LBS

## 2023-11-01 DIAGNOSIS — Z92.89 HISTORY OF CARDIOVERSION: ICD-10-CM

## 2023-11-01 DIAGNOSIS — I48.0 PAROXYSMAL ATRIAL FIBRILLATION: ICD-10-CM

## 2023-11-01 DIAGNOSIS — I05.1 RHEUMATIC MITRAL REGURGITATION: ICD-10-CM

## 2023-11-01 DIAGNOSIS — Z86.79 S/P MAZE OPERATION FOR ATRIAL FIBRILLATION: ICD-10-CM

## 2023-11-01 DIAGNOSIS — Z98.890 S/P MAZE OPERATION FOR ATRIAL FIBRILLATION: ICD-10-CM

## 2023-11-01 DIAGNOSIS — E78.00 PURE HYPERCHOLESTEROLEMIA: ICD-10-CM

## 2023-11-01 DIAGNOSIS — Z95.2 S/P MITRAL VALVE REPLACEMENT: ICD-10-CM

## 2023-11-01 DIAGNOSIS — Z95.2 S/P MVR (MITRAL VALVE REPLACEMENT): ICD-10-CM

## 2023-11-01 DIAGNOSIS — K27.4 GASTROINTESTINAL HEMORRHAGE ASSOCIATED WITH PEPTIC ULCER: Primary | ICD-10-CM

## 2023-11-01 DIAGNOSIS — Z98.890 S/P TVR (TRICUSPID VALVE REPAIR): ICD-10-CM

## 2023-11-01 DIAGNOSIS — Z79.01 ANTICOAGULANT LONG-TERM USE: ICD-10-CM

## 2023-11-01 PROCEDURE — 3008F BODY MASS INDEX DOCD: CPT | Mod: CPTII,S$GLB,, | Performed by: INTERNAL MEDICINE

## 2023-11-01 PROCEDURE — 99999 PR PBB SHADOW E&M-EST. PATIENT-LVL III: ICD-10-PCS | Mod: PBBFAC,,, | Performed by: INTERNAL MEDICINE

## 2023-11-01 PROCEDURE — 99214 OFFICE O/P EST MOD 30 MIN: CPT | Mod: 25,S$GLB,, | Performed by: INTERNAL MEDICINE

## 2023-11-01 PROCEDURE — 3074F SYST BP LT 130 MM HG: CPT | Mod: CPTII,S$GLB,, | Performed by: INTERNAL MEDICINE

## 2023-11-01 PROCEDURE — 3078F DIAST BP <80 MM HG: CPT | Mod: CPTII,S$GLB,, | Performed by: INTERNAL MEDICINE

## 2023-11-01 PROCEDURE — 1160F RVW MEDS BY RX/DR IN RCRD: CPT | Mod: CPTII,S$GLB,, | Performed by: INTERNAL MEDICINE

## 2023-11-01 PROCEDURE — 99999 PR PBB SHADOW E&M-EST. PATIENT-LVL III: CPT | Mod: PBBFAC,,, | Performed by: INTERNAL MEDICINE

## 2023-11-01 PROCEDURE — 93000 EKG 12-LEAD: ICD-10-PCS | Mod: S$GLB,,, | Performed by: INTERNAL MEDICINE

## 2023-11-01 PROCEDURE — 93000 ELECTROCARDIOGRAM COMPLETE: CPT | Mod: S$GLB,,, | Performed by: INTERNAL MEDICINE

## 2023-11-01 PROCEDURE — 3008F PR BODY MASS INDEX (BMI) DOCUMENTED: ICD-10-PCS | Mod: CPTII,S$GLB,, | Performed by: INTERNAL MEDICINE

## 2023-11-01 PROCEDURE — 1159F MED LIST DOCD IN RCRD: CPT | Mod: CPTII,S$GLB,, | Performed by: INTERNAL MEDICINE

## 2023-11-01 PROCEDURE — 3074F PR MOST RECENT SYSTOLIC BLOOD PRESSURE < 130 MM HG: ICD-10-PCS | Mod: CPTII,S$GLB,, | Performed by: INTERNAL MEDICINE

## 2023-11-01 PROCEDURE — 1159F PR MEDICATION LIST DOCUMENTED IN MEDICAL RECORD: ICD-10-PCS | Mod: CPTII,S$GLB,, | Performed by: INTERNAL MEDICINE

## 2023-11-01 PROCEDURE — 1160F PR REVIEW ALL MEDS BY PRESCRIBER/CLIN PHARMACIST DOCUMENTED: ICD-10-PCS | Mod: CPTII,S$GLB,, | Performed by: INTERNAL MEDICINE

## 2023-11-01 PROCEDURE — 3078F PR MOST RECENT DIASTOLIC BLOOD PRESSURE < 80 MM HG: ICD-10-PCS | Mod: CPTII,S$GLB,, | Performed by: INTERNAL MEDICINE

## 2023-11-01 PROCEDURE — 99214 PR OFFICE/OUTPT VISIT, EST, LEVL IV, 30-39 MIN: ICD-10-PCS | Mod: 25,S$GLB,, | Performed by: INTERNAL MEDICINE

## 2023-11-01 PROCEDURE — 4010F ACE/ARB THERAPY RXD/TAKEN: CPT | Mod: CPTII,S$GLB,, | Performed by: INTERNAL MEDICINE

## 2023-11-01 PROCEDURE — 4010F PR ACE/ARB THEARPY RXD/TAKEN: ICD-10-PCS | Mod: CPTII,S$GLB,, | Performed by: INTERNAL MEDICINE

## 2023-11-01 RX ORDER — PANTOPRAZOLE SODIUM 40 MG/1
40 TABLET, DELAYED RELEASE ORAL DAILY
Qty: 30 TABLET | Refills: 11 | Status: SHIPPED | OUTPATIENT
Start: 2023-11-01 | End: 2024-10-31

## 2023-11-01 NOTE — PROGRESS NOTES
Subjective:      Patient ID: Riya Soto is a 53 y.o. female.    Chief Complaint: Follow-up    HPI:  A couple of weeks ago pt was flying to [place] for a friend's birthday.  Pt vomited blood on the airplane and had black stools.  Pt got weaker and weaker.    Pt was in [place].   Pt called 911.  Ambulance found pt unconscious on the floor with a BP of 89 mm Hg systolic.    Pt had lots of black stools 10/11/23 the day before hospitalization.    Pt received 4 units of blood during her hospitalization in [place]  at [facility]  on [facility].     Pt had EGD and found a bleeding ulcer.    Review of Systems   Cardiovascular:  Positive for palpitations (Heart beats fast at times, especially when climbing stairs) and syncope. Negative for chest pain, claudication, dyspnea on exertion, irregular heartbeat, leg swelling, near-syncope and orthopnea.        Past Medical History:   Diagnosis Date    A-fib     Atrial fibrillation     Back pain     Hyperlipidemia     Mitral incompetence     Mitral valve stenosis     Neck pain         Past Surgical History:   Procedure Laterality Date    ABLATION N/A 11/7/2022    Procedure: MAZE;  Surgeon: Bobby Gilbert MD;  [facility];  Service: Cardiothoracic;  Laterality: N/A;  Cryoablation    CARDIAC SURGERY      to repair mitral valve    CHOLECYSTECTOMY      COLONOSCOPY      COLONOSCOPY N/A 6/8/2022    Procedure: COLONOSCOPY;  Surgeon: Sanford Andres MD;  Location: [facility] ENDO;  Service: Endoscopy;  Laterality: N/A;    CORONARY ANGIOGRAPHY N/A 8/29/2022    Procedure: ANGIOGRAM, CORONARY ARTERY;  Surgeon: Lexa Harley MD;  Location: [facility] CATH LAB;  Service: Cardiology;  Laterality: N/A;    MITRAL VALVE REPLACEMENT N/A 11/7/2022    Procedure: REPLACEMENT, MITRAL VALVE;  Surgeon: Bobby Gilbert MD;  [facility] [place] [facility];  Service: Cardiothoracic;  Laterality: N/A;  REDO STERNOTOMY    STERNOTOMY N/A 11/7/2022    Procedure: STERNOTOMY;  Surgeon:  Bobby Gilbert MD;  Location: [facility] OR [address] [place] [address];  Service: Cardiothoracic;  Laterality: N/A;  Redo sternotomy    TRANSESOPHAGEAL ECHOCARDIOGRAPHY N/A 03/29/2022    Procedure: ECHOCARDIOGRAM, TRANSESOPHAGEAL;  Surgeon: Dominic Wallis MD;  Location: [facility] EP LAB;  Service: Cardiology;  Laterality: N/A;    TREATMENT OF CARDIAC ARRHYTHMIA N/A 03/29/2022    Procedure: Cardioversion or Defibrillation;  Surgeon: Elmo Jones MD;  Location: [facility] EP LAB;  Service: Cardiology;  Laterality: N/A;  afib, dccv, stanley, anes, ID, 3prep, Provider to perform     TRICUSPID VALVULOPLASTY N/A 11/7/2022    Procedure: REPAIR, TRICUSPID VALVE;  Surgeon: Bobby Gilbert MD;  [facility];  Service: Cardiothoracic;  Laterality: N/A;       Family History   Problem Relation Age of Onset    Breast cancer Sister     Colon cancer Maternal Aunt     Ovarian cancer Neg Hx        Social History     Socioeconomic History    Marital status: Single   Tobacco Use    Smoking status: Never    Smokeless tobacco: Never   Substance and Sexual Activity    Alcohol use: Yes     Comment: socially    Drug use: No    Sexual activity: Yes     Partners: Male     Social Determinants of Health     Financial Resource Strain: Low Risk  (1/19/2022)    Overall Financial Resource Strain (CARDIA)     Difficulty of Paying Living Expenses: Not hard at all   Food Insecurity: No Food Insecurity (1/19/2022)    Hunger Vital Sign     Worried About Running Out of Food in the Last Year: Never true     Ran Out of Food in the Last Year: Never true   Transportation Needs: No Transportation Needs (1/19/2022)    PRAPARE - Transportation     Lack of Transportation (Medical): No     Lack of Transportation (Non-Medical): No   Stress: No Stress Concern Present (1/19/2022)    [employer] [place] [employer] - Occupational Stress Questionnaire     Feeling of Stress : Not at all   Social Connections: Unknown (1/19/2022)    Social Connection and Isolation Panel  [NHANES]     Frequency of Communication with Friends and Family: Three times a week     Frequency of Social Gatherings with Friends and Family: Three times a week     Marital Status: Never    Housing Stability: Unknown (1/19/2022)    Housing Stability Vital Sign     Unable to Pay for Housing in the Last Year: No     Unstable Housing in the Last Year: No       Current Outpatient Medications on File Prior to Visit   Medication Sig Dispense Refill    dronedarone (MULTAQ) 400 mg Tab Take 1 tablet (400 mg total) by mouth 2 (two) times daily with meals. 60 tablet 11    enoxaparin (LOVENOX) 80 mg/0.8 mL Syrg Inject 0.8 mLs (80 mg total) into the skin every 12 (twelve) hours. 22.4 mL 1    metoprolol tartrate (LOPRESSOR) 25 MG tablet Take 0.5 tablets (12.5 mg total) by mouth 2 (two) times daily. 30 tablet 11    [DISCONTINUED] pantoprazole (PROTONIX) 40 MG tablet Take 40 mg by mouth every 12 (twelve) hours.      atorvastatin (LIPITOR) 10 MG tablet Take 1 tablet (10 mg total) by mouth every evening. (Patient not taking: Reported on 10/25/2023) 30 tablet 11    warfarin (COUMADIN) 5 MG tablet 5 mg every Mon, Wed, Fri; 7.5 mg all other days or as directed by coumadin clinic (Patient not taking: Reported on 10/25/2023) 50 tablet 11    [DISCONTINUED] aspirin (ECOTRIN) 81 MG EC tablet Take 1 tablet (81 mg total) by mouth once daily. (Patient not taking: Reported on 10/25/2023) 30 tablet 11    [DISCONTINUED] citalopram (CELEXA) 10 MG tablet Take 1 tablet (10 mg total) by mouth once daily. 30 tablet 11    [DISCONTINUED] gabapentin (NEURONTIN) 300 MG capsule Take 1 capsule 3 times a day by oral route.      [DISCONTINUED] HYDROcodone-acetaminophen (NORCO)  mg per tablet Take 1 tablet every 4 hours by oral route.      [DISCONTINUED] losartan (COZAAR) 25 MG tablet Take 1 tablet (25 mg total) by mouth once daily. (Patient not taking: Reported on 10/25/2023) 30 tablet 11    [DISCONTINUED] omeprazole (PRILOSEC) 40 MG capsule  Take 1 capsule (40 mg total) by mouth once daily. 30 capsule 5     No current facility-administered medications on file prior to visit.       Review of patient's allergies indicates:   Allergen Reactions    Amoxicillin Hives and Other (See Comments)     Objective:     Vitals:    11/01/23 0827   BP: (!) 121/58   BP Location: Left arm   Patient Position: Sitting   BP Method: Large (Automatic)   Pulse: 80   SpO2: 98%   Weight: 95.7 kg (210 lb 13.9 oz)        Physical Exam  Constitutional:       Appearance: She is well-developed.   Eyes:      General: No scleral icterus.  Neck:      Vascular: No carotid bruit or JVD.   Cardiovascular:      Rate and Rhythm: Normal rate. Rhythm irregular.      Heart sounds: No murmur heard.     No gallop.      Comments: Crisp metallic MV open and close noise  Pulmonary:      Breath sounds: Normal breath sounds.   Musculoskeletal:      Right lower leg: No edema.      Left lower leg: No edema.   Skin:     General: Skin is warm and dry.   Neurological:      Mental Status: She is alert and oriented to person, place, and time.   Psychiatric:         Behavior: Behavior normal.         Thought Content: Thought content normal.         Judgment: Judgment normal.              ECG today appears to show an atypical flutter or atrial tachycardia with PVC and an overall ventricular response of 78 bpm    No records from [place] are available      Assessment:     1. Gastrointestinal hemorrhage associated with peptic ulcer    2. S/P MVR (mitral valve replacement)    3. S/P Maze operation for atrial fibrillation    4. S/P mitral valve replacement    5. S/P TVR (tricuspid valve repair)    6. Rheumatic mitral regurgitation    7. Pure hypercholesterolemia    8. Paroxysmal atrial fibrillation    9. History of cardioversion    10. Anticoagulant long-term use      Plan:   Riya was seen today for follow-up.    Diagnoses and all orders for this visit:    Gastrointestinal hemorrhage associated with peptic ulcer  -      IN OFFICE EKG 12-LEAD (to [place])  -     Ambulatory referral/consult to Gastroenterology; Future  -     CBC Auto Differential; Future  -     Comprehensive Metabolic Panel; Future  -     Lipid Panel; Future  -     TSH; Future  -     T4, FREE; Future  -     IRON AND TIBC; Future  -     Echo Saline Bubble? No; Future    S/P MVR (mitral valve replacement)  -     Ambulatory referral/consult to Cardiology  -     IN OFFICE EKG 12-LEAD (to [place])  -     Ambulatory referral/consult to Gastroenterology; Future  -     CBC Auto Differential; Future  -     Comprehensive Metabolic Panel; Future  -     Lipid Panel; Future  -     TSH; Future  -     T4, FREE; Future  -     IRON AND TIBC; Future  -     Echo Saline Bubble? No; Future    S/P Maze operation for atrial fibrillation  -     CBC Auto Differential; Future  -     Comprehensive Metabolic Panel; Future  -     Lipid Panel; Future  -     TSH; Future  -     T4, FREE; Future  -     IRON AND TIBC; Future  -     Echo Saline Bubble? No; Future    S/P mitral valve replacement  -     CBC Auto Differential; Future  -     Comprehensive Metabolic Panel; Future  -     Lipid Panel; Future  -     TSH; Future  -     T4, FREE; Future  -     IRON AND TIBC; Future  -     Echo Saline Bubble? No; Future    S/P TVR (tricuspid valve repair)  -     CBC Auto Differential; Future  -     Comprehensive Metabolic Panel; Future  -     Lipid Panel; Future  -     TSH; Future  -     T4, FREE; Future  -     IRON AND TIBC; Future  -     Echo Saline Bubble? No; Future    Rheumatic mitral regurgitation  -     CBC Auto Differential; Future  -     Comprehensive Metabolic Panel; Future  -     Lipid Panel; Future  -     TSH; Future  -     T4, FREE; Future  -     IRON AND TIBC; Future  -     Echo Saline Bubble? No; Future    Pure hypercholesterolemia  -     CBC Auto Differential; Future  -     Comprehensive Metabolic Panel; Future  -     Lipid Panel; Future  -     TSH; Future  -     T4, FREE; Future  -     IRON AND  TIBC; Future  -     Echo Saline Bubble? No; Future    Paroxysmal atrial fibrillation  -     CBC Auto Differential; Future  -     Comprehensive Metabolic Panel; Future  -     Lipid Panel; Future  -     TSH; Future  -     T4, FREE; Future  -     IRON AND TIBC; Future  -     Echo Saline Bubble? No; Future    History of cardioversion  -     CBC Auto Differential; Future  -     Comprehensive Metabolic Panel; Future  -     Lipid Panel; Future  -     TSH; Future  -     T4, FREE; Future  -     IRON AND TIBC; Future  -     Echo Saline Bubble? No; Future    Anticoagulant long-term use  -     Ambulatory referral/consult to Gastroenterology; Future  -     CBC Auto Differential; Future  -     Comprehensive Metabolic Panel; Future  -     Lipid Panel; Future  -     TSH; Future  -     T4, FREE; Future  -     IRON AND TIBC; Future  -     Echo Saline Bubble? No; Future    Other orders  -     pantoprazole (PROTONIX) 40 MG tablet; Take 1 tablet (40 mg total) by mouth once daily.     Discontinue ASA    Continue pantoprazole    Stay off losartan    Hold atorvastatin for now    Continue low -dose metoprolol and continue the Multaq    F/u with electrophysiology--Dr Elmo Jones    Repeat labs now    Continue Lovenox until cleared by GI to resume warfarin    Need records from [facility],  [address] [place], [place] [address],  phone [phone]    RTC 2 weeks    Go to ER for any black stools    Avoid NSAID's    Avoid alcohol    Follow up in about 2 weeks (around 11/15/2023).

## 2023-11-02 ENCOUNTER — OFFICE VISIT (OUTPATIENT)
Dept: GASTROENTEROLOGY | Facility: CLINIC | Age: 53
End: 2023-11-02
Payer: COMMERCIAL

## 2023-11-02 ENCOUNTER — TELEPHONE (OUTPATIENT)
Dept: ELECTROPHYSIOLOGY | Facility: CLINIC | Age: 53
End: 2023-11-02
Payer: COMMERCIAL

## 2023-11-02 VITALS — BODY MASS INDEX: 31.29 KG/M2 | WEIGHT: 211.88 LBS

## 2023-11-02 DIAGNOSIS — Z79.01 ANTICOAGULANT LONG-TERM USE: ICD-10-CM

## 2023-11-02 DIAGNOSIS — K27.4 GASTROINTESTINAL HEMORRHAGE ASSOCIATED WITH PEPTIC ULCER: Primary | ICD-10-CM

## 2023-11-02 DIAGNOSIS — Z95.2 S/P MVR (MITRAL VALVE REPLACEMENT): ICD-10-CM

## 2023-11-02 PROCEDURE — 1159F MED LIST DOCD IN RCRD: CPT | Mod: CPTII,S$GLB,, | Performed by: INTERNAL MEDICINE

## 2023-11-02 PROCEDURE — 4010F PR ACE/ARB THEARPY RXD/TAKEN: ICD-10-PCS | Mod: CPTII,S$GLB,, | Performed by: INTERNAL MEDICINE

## 2023-11-02 PROCEDURE — 1159F PR MEDICATION LIST DOCUMENTED IN MEDICAL RECORD: ICD-10-PCS | Mod: CPTII,S$GLB,, | Performed by: INTERNAL MEDICINE

## 2023-11-02 PROCEDURE — 3008F PR BODY MASS INDEX (BMI) DOCUMENTED: ICD-10-PCS | Mod: CPTII,S$GLB,, | Performed by: INTERNAL MEDICINE

## 2023-11-02 PROCEDURE — 4010F ACE/ARB THERAPY RXD/TAKEN: CPT | Mod: CPTII,S$GLB,, | Performed by: INTERNAL MEDICINE

## 2023-11-02 PROCEDURE — 99214 OFFICE O/P EST MOD 30 MIN: CPT | Mod: S$GLB,,, | Performed by: INTERNAL MEDICINE

## 2023-11-02 PROCEDURE — 3008F BODY MASS INDEX DOCD: CPT | Mod: CPTII,S$GLB,, | Performed by: INTERNAL MEDICINE

## 2023-11-02 PROCEDURE — 99214 PR OFFICE/OUTPT VISIT, EST, LEVL IV, 30-39 MIN: ICD-10-PCS | Mod: S$GLB,,, | Performed by: INTERNAL MEDICINE

## 2023-11-02 PROCEDURE — 99999 PR PBB SHADOW E&M-EST. PATIENT-LVL III: ICD-10-PCS | Mod: PBBFAC,,, | Performed by: INTERNAL MEDICINE

## 2023-11-02 PROCEDURE — 99999 PR PBB SHADOW E&M-EST. PATIENT-LVL III: CPT | Mod: PBBFAC,,, | Performed by: INTERNAL MEDICINE

## 2023-11-02 NOTE — PROGRESS NOTES
Subjective:       Patient ID: Riya Soto is a 53 y.o. female.    Chief Complaint: Other (Peptic ulcers)     Patient here today to establish care with aforementioned complaints.  In 2021 she underwent screening colonoscopy with Dr. Hall.  Exam was significant only for sigmoid diverticulosis.  Ten year recall interval was recommended.  More recently patient was hospitalized for GI bleed while traveling to [place].  EGD was performed at that time which by her account was significant for a bleeding ulcer.  Database incomplete.    Patient does have history of mechanical mitral valve on chronic anticoagulation.  Since discharge she is been on daily Lovenox.      Review of Systems   Gastrointestinal:  Negative for abdominal distention, abdominal pain, blood in stool and constipation.       The following portions of the patient's history were reviewed and updated as appropriate: allergies, current medications, past family history, past medical history, past social history, past surgical history and problem list.    Objective:      Physical Exam  Constitutional:       Appearance: She is well-developed.   HENT:      Head: Normocephalic and atraumatic.   Eyes:      Conjunctiva/sclera: Conjunctivae normal.   Pulmonary:      Effort: Pulmonary effort is normal. No respiratory distress.   Musculoskeletal:      Cervical back: Normal range of motion.   Neurological:      Mental Status: She is alert and oriented to person, place, and time.   Psychiatric:         Behavior: Behavior normal.         Thought Content: Thought content normal.         Judgment: Judgment normal.           Pertinent labs and imaging studies reviewed    Assessment:       1. Gastrointestinal hemorrhage associated with peptic ulcer    2. S/P MVR (mitral valve replacement)    3. Anticoagulant long-term use        Plan:       Schedule EGD to document ulcer healing.  Will check H pylori biopsies at that time  Continue PPI for now      (Portions of this  note were dictated using voice recognition software and may contain dictation related errors in spelling/grammar/syntax not found on text review)

## 2023-11-06 ENCOUNTER — LAB VISIT (OUTPATIENT)
Dept: LAB | Facility: HOSPITAL | Age: 53
End: 2023-11-06
Attending: INTERNAL MEDICINE
Payer: COMMERCIAL

## 2023-11-06 ENCOUNTER — ANTI-COAG VISIT (OUTPATIENT)
Dept: CARDIOLOGY | Facility: CLINIC | Age: 53
End: 2023-11-06
Payer: COMMERCIAL

## 2023-11-06 DIAGNOSIS — Z79.01 LONG TERM (CURRENT) USE OF ANTICOAGULANTS: ICD-10-CM

## 2023-11-06 DIAGNOSIS — Z95.2 S/P MVR (MITRAL VALVE REPLACEMENT): ICD-10-CM

## 2023-11-06 DIAGNOSIS — Z95.2 S/P MVR (MITRAL VALVE REPLACEMENT): Primary | ICD-10-CM

## 2023-11-06 LAB
INR PPP: 1 (ref 0.8–1.2)
PROTHROMBIN TIME: 11.1 SEC (ref 9–12.5)

## 2023-11-06 PROCEDURE — 93793 PR ANTICOAGULANT MGMT FOR PT TAKING WARFARIN: ICD-10-PCS | Mod: S$GLB,,,

## 2023-11-06 PROCEDURE — 93793 ANTICOAG MGMT PT WARFARIN: CPT | Mod: S$GLB,,,

## 2023-11-06 PROCEDURE — 36415 COLL VENOUS BLD VENIPUNCTURE: CPT | Performed by: INTERNAL MEDICINE

## 2023-11-06 PROCEDURE — 85610 PROTHROMBIN TIME: CPT | Performed by: INTERNAL MEDICINE

## 2023-11-06 NOTE — PROGRESS NOTES
[facility]    2023 10:49 AM    Assessment/Plan:    Patient presents today with subtherapeutic  INR.    Assessment of patient findings and chart review: Warfarin on hold for last several weeks due to recent history of GI bleed. GI, Dr. Michele recommends waiting until after EGD to determine when she can restart warfarin. Patient is currently on lovenox, plan to resume that at this time. Patient advised to hold lovenox at least 24 hours prior to EGD and 24 hours post EGD unless instructed otherwise by procedural MD.    Recommend repeat INR once patient resumes warfarin.  _________________________________________________________________    Riya Soto (53 y.o.) is followed by the [email or web address].    Anticoagulation Summary  As of 2023      INR goal:  2.5-3.5   TTR:  46.9 % (11.7 mo)   INR used for dosin.0 (2023)   Warfarin maintenance plan:  No maintenance plan   Plan last modified:  Enedelia Swanson, PharmD (2023)   Next INR check:  2023   Target end date:      Indications    S/P MVR (mitral valve replacement) [Z95.2]  Atrial fibrillation [I48.91]  Long term (current) use of anticoagulants [Z79.01]                 Anticoagulation Episode Summary       INR check location:  Clinic Lab    Preferred lab:      Send INR reminders to:  [facility] [place] COUMADIN MONITORING POOL    Comments:  [facility] or [facility] only Mon - Thu    (D/C ) [facility] [place] [phone]          Anticoagulation Care Providers       Provider Role Specialty Phone number    Celsa Knight PA-C Referring Cardiothoracic Surgery [phone]    Ralf Figueroa MD Responsible Cardiology [phone]

## 2023-11-06 NOTE — PROGRESS NOTES
Patient has not resumed coumadin at this time. She is scheduled for an EGD on 11/13/23 and after that it will be decided when she will re-start it. She is only taking Lovenox 80 mg Q 12 hrs.

## 2023-11-07 ENCOUNTER — TELEPHONE (OUTPATIENT)
Dept: CARDIOLOGY | Facility: CLINIC | Age: 53
End: 2023-11-07
Payer: COMMERCIAL

## 2023-11-07 NOTE — TELEPHONE ENCOUNTER
----- Message from Gabrielle Rojas RN sent at 11/7/2023 12:30 PM CST -----  Patient is scheduled for an EGD, she is on Lovenox.  She need clearance for EGD and instructions for lovenox.

## 2023-11-15 ENCOUNTER — OFFICE VISIT (OUTPATIENT)
Dept: CARDIOLOGY | Facility: CLINIC | Age: 53
End: 2023-11-15
Payer: COMMERCIAL

## 2023-11-15 VITALS
HEART RATE: 78 BPM | DIASTOLIC BLOOD PRESSURE: 61 MMHG | BODY MASS INDEX: 30.96 KG/M2 | OXYGEN SATURATION: 98 % | WEIGHT: 209.69 LBS | SYSTOLIC BLOOD PRESSURE: 100 MMHG

## 2023-11-15 DIAGNOSIS — Z98.890 S/P TVR (TRICUSPID VALVE REPAIR): ICD-10-CM

## 2023-11-15 DIAGNOSIS — I48.0 PAROXYSMAL ATRIAL FIBRILLATION: Primary | ICD-10-CM

## 2023-11-15 DIAGNOSIS — Z79.01 ANTICOAGULANT LONG-TERM USE: ICD-10-CM

## 2023-11-15 DIAGNOSIS — Z95.2 S/P MITRAL VALVE REPLACEMENT: ICD-10-CM

## 2023-11-15 DIAGNOSIS — Z86.79 S/P MAZE OPERATION FOR ATRIAL FIBRILLATION: ICD-10-CM

## 2023-11-15 DIAGNOSIS — Z98.890 S/P MAZE OPERATION FOR ATRIAL FIBRILLATION: ICD-10-CM

## 2023-11-15 DIAGNOSIS — I05.1 RHEUMATIC MITRAL REGURGITATION: ICD-10-CM

## 2023-11-15 DIAGNOSIS — E78.5 HYPERLIPIDEMIA, UNSPECIFIED HYPERLIPIDEMIA TYPE: ICD-10-CM

## 2023-11-15 PROCEDURE — 1159F PR MEDICATION LIST DOCUMENTED IN MEDICAL RECORD: ICD-10-PCS | Mod: CPTII,S$GLB,, | Performed by: INTERNAL MEDICINE

## 2023-11-15 PROCEDURE — 4010F ACE/ARB THERAPY RXD/TAKEN: CPT | Mod: CPTII,S$GLB,, | Performed by: INTERNAL MEDICINE

## 2023-11-15 PROCEDURE — 99999 PR PBB SHADOW E&M-EST. PATIENT-LVL III: CPT | Mod: PBBFAC,,, | Performed by: INTERNAL MEDICINE

## 2023-11-15 PROCEDURE — 99214 PR OFFICE/OUTPT VISIT, EST, LEVL IV, 30-39 MIN: ICD-10-PCS | Mod: S$GLB,,, | Performed by: INTERNAL MEDICINE

## 2023-11-15 PROCEDURE — 3008F PR BODY MASS INDEX (BMI) DOCUMENTED: ICD-10-PCS | Mod: CPTII,S$GLB,, | Performed by: INTERNAL MEDICINE

## 2023-11-15 PROCEDURE — 3078F PR MOST RECENT DIASTOLIC BLOOD PRESSURE < 80 MM HG: ICD-10-PCS | Mod: CPTII,S$GLB,, | Performed by: INTERNAL MEDICINE

## 2023-11-15 PROCEDURE — 3008F BODY MASS INDEX DOCD: CPT | Mod: CPTII,S$GLB,, | Performed by: INTERNAL MEDICINE

## 2023-11-15 PROCEDURE — 3074F SYST BP LT 130 MM HG: CPT | Mod: CPTII,S$GLB,, | Performed by: INTERNAL MEDICINE

## 2023-11-15 PROCEDURE — 1160F RVW MEDS BY RX/DR IN RCRD: CPT | Mod: CPTII,S$GLB,, | Performed by: INTERNAL MEDICINE

## 2023-11-15 PROCEDURE — 99999 PR PBB SHADOW E&M-EST. PATIENT-LVL III: ICD-10-PCS | Mod: PBBFAC,,, | Performed by: INTERNAL MEDICINE

## 2023-11-15 PROCEDURE — 4010F PR ACE/ARB THEARPY RXD/TAKEN: ICD-10-PCS | Mod: CPTII,S$GLB,, | Performed by: INTERNAL MEDICINE

## 2023-11-15 PROCEDURE — 3074F PR MOST RECENT SYSTOLIC BLOOD PRESSURE < 130 MM HG: ICD-10-PCS | Mod: CPTII,S$GLB,, | Performed by: INTERNAL MEDICINE

## 2023-11-15 PROCEDURE — 1159F MED LIST DOCD IN RCRD: CPT | Mod: CPTII,S$GLB,, | Performed by: INTERNAL MEDICINE

## 2023-11-15 PROCEDURE — 3078F DIAST BP <80 MM HG: CPT | Mod: CPTII,S$GLB,, | Performed by: INTERNAL MEDICINE

## 2023-11-15 PROCEDURE — 99214 OFFICE O/P EST MOD 30 MIN: CPT | Mod: S$GLB,,, | Performed by: INTERNAL MEDICINE

## 2023-11-15 PROCEDURE — 1160F PR REVIEW ALL MEDS BY PRESCRIBER/CLIN PHARMACIST DOCUMENTED: ICD-10-PCS | Mod: CPTII,S$GLB,, | Performed by: INTERNAL MEDICINE

## 2023-11-15 NOTE — PROGRESS NOTES
11/15/23 It was recommended by cardiologist Dr. Figueroa  that patient follow dosing instructions:   Lovenox 0.8 mLs (80 mg total) into the skin every 12 (twelve) hours; Coumadin 5 mg Mon, Wed and Fri and 7.5 mg all other days  Wed 11/15 Lovenox  Thur 11/16 Lovenox  Fri 11/17 Lovenox and Coumadin   Sat 11/18 Lovenox and Coumadin  Sun 11/19 Lovenox and Coumadin  Mon 11/20/23 Test appt has been scheduled

## 2023-11-15 NOTE — PROGRESS NOTES
Subjective:      Patient ID: Riya Soto is a 53 y.o. female.    Chief Complaint: Follow-up    HPI: pt is feeling well    Pt does housework    No bleeding    No melena    Pt had EGD which showed a healed ulcer    Review of Systems   Cardiovascular:  Negative for chest pain, claudication, dyspnea on exertion, irregular heartbeat, leg swelling, near-syncope, orthopnea, palpitations and syncope.        Past Medical History:   Diagnosis Date    A-fib     Atrial fibrillation     Back pain     Hyperlipidemia     Mitral incompetence     Mitral valve stenosis     Neck pain         Past Surgical History:   Procedure Laterality Date    ABLATION N/A 11/07/2022    Procedure: MAZE;  Surgeon: Bobby Gilbert MD;  Location: [address];  Service: Cardiothoracic;  Laterality: N/A;  Cryoablation    CARDIAC SURGERY      to repair mitral valve    CHOLECYSTECTOMY      COLONOSCOPY      COLONOSCOPY N/A 06/08/2022    Procedure: COLONOSCOPY;  Surgeon: Sanford Andres MD;  Location: [facility];  Service: Endoscopy;  Laterality: N/A;    CORONARY ANGIOGRAPHY N/A 08/29/2022    Procedure: ANGIOGRAM, CORONARY ARTERY;  Surgeon: Lexa Harley MD;  Location: [facility] CATH LAB;  Service: Cardiology;  Laterality: N/A;    EGD, WITH CLOSED BIOPSY  11/13/2023    ESOPHAGOGASTRODUODENOSCOPY N/A 11/13/2023    Procedure: EGD (ESOPHAGOGASTRODUODENOSCOPY);  Surgeon: Shashi Michele MD;  Location: [facility];  Service: Endoscopy;  Laterality: N/A;    MITRAL VALVE REPLACEMENT N/A 11/07/2022    Procedure: REPLACEMENT, MITRAL VALVE;  Surgeon: Bobby Gilbert MD;  Location: [address];  Service: Cardiothoracic;  Laterality: N/A;  REDO STERNOTOMY    STERNOTOMY N/A 11/07/2022    Procedure: STERNOTOMY;  Surgeon: Bobby Gilbert MD;  Location: [address];  Service: Cardiothoracic;  Laterality: N/A;  Redo sternotomy    TRANSESOPHAGEAL ECHOCARDIOGRAPHY N/A 03/29/2022    Procedure: ECHOCARDIOGRAM, TRANSESOPHAGEAL;  Surgeon:  Dominic Wallis MD;  Location: [facility] EP LAB;  Service: Cardiology;  Laterality: N/A;    TREATMENT OF CARDIAC ARRHYTHMIA N/A 03/29/2022    Procedure: Cardioversion or Defibrillation;  Surgeon: Elmo Jones MD;  Location: [facility] EP LAB;  Service: Cardiology;  Laterality: N/A;  afib, dccv, [place], [place], [place], 3prep, Provider to perform     TRICUSPID VALVULOPLASTY N/A 11/07/2022    Procedure: REPAIR, TRICUSPID VALVE;  Surgeon: Bobby Gilbert MD;  [facility]: [facility] FLR;  Service: Cardiothoracic;  Laterality: N/A;       Family History   Problem Relation Age of Onset    Breast cancer Sister     Colon cancer Maternal Aunt     Ovarian cancer Neg Hx        Social History     Socioeconomic History    Marital status: Single   Tobacco Use    Smoking status: Never    Smokeless tobacco: Never   Substance and Sexual Activity    Alcohol use: Yes     Comment: socially    Drug use: No    Sexual activity: Yes     Partners: Male     Social Determinants of Health     Financial Resource Strain: Low Risk  (1/19/2022)    Overall Financial Resource Strain (CARDIA)     Difficulty of Paying Living Expenses: Not hard at all   Food Insecurity: No Food Insecurity (1/19/2022)    Hunger Vital Sign     Worried About Running Out of Food in the Last Year: Never true     Ran Out of Food in the Last Year: Never true   Transportation Needs: No Transportation Needs (1/19/2022)    PRAPARE - Transportation     Lack of Transportation (Medical): No     Lack of Transportation (Non-Medical): No   Stress: No Stress Concern Present (1/19/2022)    [place] citizen [place] of Occupational [employer] - Occupational Stress Questionnaire     Feeling of Stress : Not at all   Social Connections: Unknown (1/19/2022)    Social Connection and Isolation Panel [NHANES]     Frequency of Communication with Friends and Family: Three times a week     Frequency of Social Gatherings with Friends and Family: Three times a week     Marital Status: Never    Housing Stability: Unknown  (1/19/2022)    Housing Stability Vital Sign     Unable to Pay for Housing in the Last Year: No     Unstable Housing in the Last Year: No       Current Outpatient Medications on File Prior to Visit   Medication Sig Dispense Refill    dronedarone (MULTAQ) 400 mg Tab Take 1 tablet (400 mg total) by mouth 2 (two) times daily with meals. 60 tablet 11    enoxaparin (LOVENOX) 80 mg/0.8 mL Syrg Inject 0.8 mLs (80 mg total) into the skin every 12 (twelve) hours. 22.4 mL 1    metoprolol tartrate (LOPRESSOR) 25 MG tablet Take 0.5 tablets (12.5 mg total) by mouth 2 (two) times daily. 30 tablet 11    pantoprazole (PROTONIX) 40 MG tablet Take 1 tablet (40 mg total) by mouth once daily. 30 tablet 11    warfarin (COUMADIN) 5 MG tablet 5 mg every Mon, Wed, Fri; 7.5 mg all other days or as directed by coumadin clinic 50 tablet 11    atorvastatin (LIPITOR) 10 MG tablet Take 1 tablet (10 mg total) by mouth every evening. (Patient not taking: Reported on 10/25/2023) 30 tablet 11    omeprazole (PRILOSEC) 40 MG capsule Take 40 mg by mouth once daily.      [DISCONTINUED] citalopram (CELEXA) 10 MG tablet Take 1 tablet (10 mg total) by mouth once daily. 30 tablet 11     Current Facility-Administered Medications on File Prior to Visit   Medication Dose Route Frequency Provider Last Rate Last Admin    0.9%  NaCl infusion   Intravenous Continuous Shashi Michele MD        LIDOcaine (PF) 10 mg/ml (1%) injection 10 mg  1 mL Intradermal Once PRN Shashi Michele MD           Review of patient's allergies indicates:   Allergen Reactions    Amoxicillin Hives and Other (See Comments)     Objective:     Vitals:    11/15/23 1406   BP: 100/61   BP Location: Right arm   Patient Position: Sitting   BP Method: Large (Automatic)   Pulse: 78   SpO2: 98%   Weight: 95.1 kg (209 lb 10.5 oz)        Physical Exam  Constitutional:       Appearance: She is well-developed.   Eyes:      General: No scleral icterus.  Neck:      Vascular: No carotid  bruit or JVD.   Cardiovascular:      Rate and Rhythm: Normal rate and regular rhythm.      Heart sounds: No murmur heard.     No gallop.      Comments: Mechanical crisp MV open and close noise    Pulmonary:      Breath sounds: Normal breath sounds.   Skin:     General: Skin is warm and dry.   Neurological:      Mental Status: She is alert and oriented to person, place, and time.   Psychiatric:         Behavior: Behavior normal.         Thought Content: Thought content normal.         Judgment: Judgment normal.        Lab Visit on 11/06/2023   Component Date Value Ref Range Status    Prothrombin Time 11/06/2023 11.1  9.0 - 12.5 sec Final    INR 11/06/2023 1.0  0.8 - 1.2 Final   Lab Visit on 11/01/2023   Component Date Value Ref Range Status    WBC 11/01/2023 5.94  3.90 - 12.70 K/uL Final    RBC 11/01/2023 3.42 (L)  4.00 - 5.40 M/uL Final    Hemoglobin 11/01/2023 10.2 (L)  12.0 - 16.0 g/dL Final    Hematocrit 11/01/2023 33.2 (L)  37.0 - 48.5 % Final    MCV 11/01/2023 97  82 - 98 fL Final    MCH 11/01/2023 29.8  27.0 - 31.0 pg Final    MCHC 11/01/2023 30.7 (L)  32.0 - 36.0 g/dL Final    RDW 11/01/2023 14.1  11.5 - 14.5 % Final    Platelets 11/01/2023 333  150 - 450 K/uL Final    MPV 11/01/2023 9.3  9.2 - 12.9 fL Final    Immature Granulocytes 11/01/2023 0.2  0.0 - 0.5 % Final    Gran # (ANC) 11/01/2023 3.6  1.8 - 7.7 K/uL Final    Immature Grans (Abs) 11/01/2023 0.01  0.00 - 0.04 K/uL Final    Lymph # 11/01/2023 1.5  1.0 - 4.8 K/uL Final    Mono # 11/01/2023 0.8  0.3 - 1.0 K/uL Final    Eos # 11/01/2023 0.0  0.0 - 0.5 K/uL Final    Baso # 11/01/2023 0.01  0.00 - 0.20 K/uL Final    nRBC 11/01/2023 0  0 /100 WBC Final    Gran % 11/01/2023 60.0  38.0 - 73.0 % Final    Lymph % 11/01/2023 25.3  18.0 - 48.0 % Final    Mono % 11/01/2023 13.8  4.0 - 15.0 % Final    Eosinophil % 11/01/2023 0.5  0.0 - 8.0 % Final    Basophil % 11/01/2023 0.2  0.0 - 1.9 % Final    Differential Method 11/01/2023 Automated   Final    Sodium  11/01/2023 139  136 - 145 mmol/L Final    Potassium 11/01/2023 3.7  3.5 - 5.1 mmol/L Final    Chloride 11/01/2023 106  95 - 110 mmol/L Final    CO2 11/01/2023 26  23 - 29 mmol/L Final    Glucose 11/01/2023 100  70 - 110 mg/dL Final    BUN 11/01/2023 15  6 - 20 mg/dL Final    Creatinine 11/01/2023 0.9  0.5 - 1.4 mg/dL Final    Calcium 11/01/2023 9.4  8.7 - 10.5 mg/dL Final    Total Protein 11/01/2023 8.0  6.0 - 8.4 g/dL Final    Albumin 11/01/2023 4.0  3.5 - 5.2 g/dL Final    Total Bilirubin 11/01/2023 0.9  0.1 - 1.0 mg/dL Final    Alkaline Phosphatase 11/01/2023 84  55 - 135 U/L Final    AST 11/01/2023 38  10 - 40 U/L Final    ALT 11/01/2023 39  10 - 44 U/L Final    eGFR 11/01/2023 >60.0  >60 mL/min/1.73 m^2 Final    Anion Gap 11/01/2023 7 (L)  8 - 16 mmol/L Final    Cholesterol 11/01/2023 156  120 - 199 mg/dL Final    Triglycerides 11/01/2023 78  30 - 150 mg/dL Final    HDL 11/01/2023 47  40 - 75 mg/dL Final    LDL Cholesterol 11/01/2023 93.4  63.0 - 159.0 mg/dL Final    HDL/Cholesterol Ratio 11/01/2023 30.1  20.0 - 50.0 % Final    Total Cholesterol/HDL Ratio 11/01/2023 3.3  2.0 - 5.0 Final    Non-HDL Cholesterol 11/01/2023 109  mg/dL Final    TSH 11/01/2023 1.783  0.400 - 4.000 uIU/mL Final    Free T4 11/01/2023 1.16  0.71 - 1.51 ng/dL Final    Iron 11/01/2023 61  30 - 160 ug/dL Final    Transferrin 11/01/2023 322  200 - 375 mg/dL Final    TIBC 11/01/2023 477 (H)  250 - 450 ug/dL Final    Saturated Iron 11/01/2023 13 (L)  20 - 50 % Final   Lab Visit on 10/25/2023   Component Date Value Ref Range Status    Prothrombin Time 10/25/2023 10.8  9.0 - 12.5 sec Final    INR 10/25/2023 1.0  0.8 - 1.2 Final   Lab Visit on 09/25/2023   Component Date Value Ref Range Status    Prothrombin Time 09/25/2023 27.1 (H)  9.0 - 12.5 sec Final    INR 09/25/2023 2.7 (H)  0.8 - 1.2 Final   Lab Visit on 08/22/2023   Component Date Value Ref Range Status    Prothrombin Time 08/22/2023 24.1 (H)  9.0 - 12.5 sec Final    INR 08/22/2023 2.2  (H)  0.8 - 1.2 Final   Lab Visit on 07/18/2023   Component Date Value Ref Range Status    Prothrombin Time 07/18/2023 26.5 (H)  9.0 - 12.5 sec Final    INR 07/18/2023 2.6 (H)  0.8 - 1.2 Final   Lab Visit on 06/20/2023   Component Date Value Ref Range Status    Prothrombin Time 06/20/2023 26.6 (H)  9.0 - 12.5 sec Final    INR 06/20/2023 2.6 (H)  0.8 - 1.2 Final   Lab Visit on 05/30/2023   Component Date Value Ref Range Status    Prothrombin Time 05/30/2023 33.1 (H)  9.0 - 12.5 sec Final    INR 05/30/2023 3.1 (H)  0.8 - 1.2 Final   Lab Visit on 05/22/2023   Component Date Value Ref Range Status    Prothrombin Time 05/22/2023 34.0 (H)  9.0 - 12.5 sec Final    INR 05/22/2023 3.4 (H)  0.8 - 1.2 Final   (  Findings:       The esophagus was normal.        Patchy mildly erythematous mucosa without bleeding was found in the        gastric antrum. Biopsies were taken with a cold forceps for        Helicobacter pylori testing. Verification of patient identification        for the specimen was done. Estimated blood loss was minimal.        The examined duodenum was normal.   Impression:            - Normal esophagus.                          - Erythematous mucosa in the antrum. Likely site                          of previous ulcer, healled. Biopsied.                          - Normal examined duodenum.   Recommendation:        - Discharge patient to home.                          - Resume previous diet.                          - Continue present medications. Ok to start DOAC                          if indicated.                          - Await pathology results.                          - Return to GI office PRN.   Shashi Michele MD   11/13/2023 10:50:40 AM   This report has been verified and signed electronically.   Dear patient,   As a result of recent federal legislation (The Federal Cures Act), you may   receive lab or pathology results from your procedure in your [facility] [employer]   account before your physician is  able to contact you. Your physician or   their representative will relay the results to you with their   recommendations at their soonest availability.   Thank you,   Number of Addenda: 0   Note Initiated On: 11/13/2023 10:05 AM   Estimated Blood Loss:  Estimated blood loss was minimal.        This report has been verified and signed electronically.     Specimen Collected: 11/13/23 10:05   [facility]     Dx: Long term (current) use of anticoagul...     0 Result Notes             Component Ref Range & Units 3 mo ago  (2/6/23) 6 mo ago  (11/13/22) 6 mo ago  (11/12/22) 6 mo ago  (11/11/22) 6 mo ago  (11/10/22) 6 mo ago  (11/9/22) 6 mo ago  (11/8/22)   WBC 3.90 - 12.70 K/uL 4.77  7.90  8.31  10.27  14.42 High   13.80 High   11.23    RBC 4.00 - 5.40 M/uL 3.85 Low   3.37 Low   3.02 Low   3.18 Low   2.97 Low   2.93 Low   3.04 Low     Hemoglobin 12.0 - 16.0 g/dL 11.0 Low   10.0 Low   9.2 Low   9.6 Low   9.0 Low   8.9 Low   9.4 Low     Hematocrit 37.0 - 48.5 % 34.5 Low   31.0 Low   27.7 Low   30.1 Low   27.4 Low   26.9 Low   28.4 Low     MCV 82 - 98 fL 90  92  92  95  92  92  93    MCH 27.0 - 31.0 pg 28.6  29.7  30.5  30.2  30.3  30.4  30.9    MCHC 32.0 - 36.0 g/dL 31.9 Low   32.3  33.2  31.9 Low   32.8  33.1  33.1    RDW 11.5 - 14.5 % 14.4  14.0  14.1  14.1  14.4  14.5  14.5    Platelets 150 - 450 K/uL 257  292  206  194  126 Low   91 Low   105 Low     MPV 9.2 - 12.9 fL 10.4  10.5  10.6  11.8  12.5  12.1  12.3    Immature Granulocytes 0.0 - 0.5 % 0.2  1.0 High   0.4  0.4  0.7 High   0.8 High   0.2    Gran # (ANC) 1.8 - 7.7 K/uL 2.3  4.7  5.5  8.1 High   11.8 High   11.2 High   9.3 High     Immature Grans (Abs) 0.00 - 0.04 K/uL 0.01  0.08 High  CM  0.03 CM  0.04 CM  0.10 High  CM  0.11 High  CM  0.02 CM    Comment: Mild elevation in immature granulocytes is non specific and   can be seen in a variety of conditions including stress response,   acute inflammation, trauma and pregnancy. Correlation with other   laboratory and  clinical findings is essential.    Lymph # 1.0 - 4.8 K/uL 1.7  1.5  1.3  0.9 Low   0.9 Low   0.9 Low   0.6 Low     Mono # 0.3 - 1.0 K/uL 0.7  1.5 High   1.3 High   1.2 High   1.6 High   1.7 High   1.3 High     Eos # 0.0 - 0.5 K/uL 0.1  0.2  0.1  0.1  0.0  0.0  0.0    Baso # 0.00 - 0.20 K/uL 0.01  0.04  0.02  0.02  0.02  0.02  0.01    nRBC 0 /100 WBC 0  0  0  0  0  0  0    Gran % 38.0 - 73.0 % 47.6  59.5  66.2  78.6 High   81.9 High   80.9 High   83.1 High     Lymph % 18.0 - 48.0 % 34.8  18.6  15.5 Low   8.5 Low   6.1 Low   6.2 Low   5.3 Low     Mono % 4.0 - 15.0 % 15.3 High   18.4 High   16.0 High   11.2  11.0  12.0  11.3    Eosinophil % 0.0 - 8.0 % 1.9  2.0  1.7  1.1  0.2  0.0  0.0    Basophil % 0.0 - 1.9 % 0.2  0.5  0.2  0.2  0.1  0.1  0.1    Differential Method  Automated  Automated  Automated  Automated  Automated  Automated  Automated    Resulting Agency  [facility]              Specimen Collected: 02/06/23 10:19 Last Resulted: 02/06/23 10:55         Accession #: [id number]  Transthoracic echo (TTE) complete  Order# [id number]  Reading physician: Bennett Galvez MD Ordering physician: Bobby Gilbert MD Study date: 12/8/22     Reason for Exam  Priority: Routine  Dx: S/P MVR (mitral valve replacement) [Z95.2 (ICD-10-CM)]; Examination of participant in clinical trial [Z00.6 (ICD-10-CM)]     View Images Vital Vitrea     Show images for Echo  Summary    The left ventricle is normal in size with normal systolic function. The estimated ejection fraction is 55%.  Normal right ventricular size with mildly reduced right ventricular systolic function.  Indeterminate left ventricular diastolic function with elevated left atrial pressure.  Severe left atrial enlargement.  There is a normally functioning 29mm LivaNova mechanical mitral valve prosthesis. Mean gradient is 4mm Hg at a heart rate in the 60s.  The estimated PA systolic pressure is 29 mmHg.  Intermediate central venous pressure (8  mmHg).     Assessment:     1. Paroxysmal atrial fibrillation    2. Hyperlipidemia, unspecified hyperlipidemia type    3. S/P Maze operation for atrial fibrillation    4. S/P mitral valve replacement    5. S/P TVR (tricuspid valve repair)    6. Rheumatic mitral regurgitation    7. Anticoagulant long-term use      Plan:   Riya was seen today for follow-up.    Diagnoses and all orders for this visit:    Paroxysmal atrial fibrillation  -     CBC Auto Differential; Future  -     Comprehensive Metabolic Panel; Future    Hyperlipidemia, unspecified hyperlipidemia type  -     CBC Auto Differential; Future  -     Comprehensive Metabolic Panel; Future    S/P Maze operation for atrial fibrillation  -     CBC Auto Differential; Future  -     Comprehensive Metabolic Panel; Future    S/P mitral valve replacement  -     CBC Auto Differential; Future  -     Comprehensive Metabolic Panel; Future    S/P TVR (tricuspid valve repair)  -     CBC Auto Differential; Future  -     Comprehensive Metabolic Panel; Future    Rheumatic mitral regurgitation  -     CBC Auto Differential; Future  -     Comprehensive Metabolic Panel; Future    Anticoagulant long-term use  -     CBC Auto Differential; Future  -     Comprehensive Metabolic Panel; Future     Will resume warfarin at the prior dose of 5 mg one tablet MWF and one and a half tablet TTSS.  Anticipate DC Lovenox after 3 or 4 days of warfarin per coumadin clinic.  Discussed in detail overlapping the warfarin and Lovenox for 3 days and then checking an INR    Rest of meds the same.  Pt will continue the pantoprazole and discontinue the omeprazole    RTC 2 months with lab    Follow up in about 2 months (around 1/15/2024).

## 2023-11-16 ENCOUNTER — RESEARCH ENCOUNTER (OUTPATIENT)
Dept: RESEARCH | Facility: HOSPITAL | Age: 53
End: 2023-11-16
Payer: COMMERCIAL

## 2023-11-16 DIAGNOSIS — Z00.6 RESEARCH SUBJECT: Primary | ICD-10-CM

## 2023-11-16 DIAGNOSIS — Z79.01 ANTICOAGULATED ON COUMADIN: ICD-10-CM

## 2023-11-16 NOTE — PROGRESS NOTES
[facility] Mitral, Aortic and Tricuspid post-market study in the real world setting (MANTRA)     Study ID: [id number]  11-16-23: Spoke with pt. She reports being in [place] with bleeding ulcer, she received 4 units of blood. Will try to obtain records. Messaged 's nurse to try to get pt's 1 year follow-up appt arranged.Will complete 1 year follow-up appt when pt comes to see MD. Daysi Titus RN, CCM, CRC

## 2023-11-17 NOTE — PROGRESS NOTES
Spoke with ECHO lab- They can only do a Trial ECHO on 11-27. CAlled pt- no answer, left a message asking pt to call me back.    Daysi Titus RN, CCM, CRC

## 2023-11-20 ENCOUNTER — ANTI-COAG VISIT (OUTPATIENT)
Dept: CARDIOLOGY | Facility: CLINIC | Age: 53
End: 2023-11-20
Payer: COMMERCIAL

## 2023-11-20 ENCOUNTER — LAB VISIT (OUTPATIENT)
Dept: LAB | Facility: HOSPITAL | Age: 53
End: 2023-11-20
Attending: INTERNAL MEDICINE
Payer: COMMERCIAL

## 2023-11-20 DIAGNOSIS — Z95.2 S/P MVR (MITRAL VALVE REPLACEMENT): ICD-10-CM

## 2023-11-20 DIAGNOSIS — Z95.2 S/P MVR (MITRAL VALVE REPLACEMENT): Primary | ICD-10-CM

## 2023-11-20 DIAGNOSIS — Z79.01 LONG TERM (CURRENT) USE OF ANTICOAGULANTS: ICD-10-CM

## 2023-11-20 LAB
INR PPP: 1.2 (ref 0.8–1.2)
PROTHROMBIN TIME: 12.5 SEC (ref 9–12.5)

## 2023-11-20 PROCEDURE — 36415 COLL VENOUS BLD VENIPUNCTURE: CPT | Performed by: INTERNAL MEDICINE

## 2023-11-20 PROCEDURE — 85610 PROTHROMBIN TIME: CPT | Performed by: INTERNAL MEDICINE

## 2023-11-20 PROCEDURE — 93793 PR ANTICOAGULANT MGMT FOR PT TAKING WARFARIN: ICD-10-PCS | Mod: S$GLB,,,

## 2023-11-20 PROCEDURE — 93793 ANTICOAG MGMT PT WARFARIN: CPT | Mod: S$GLB,,,

## 2023-11-20 NOTE — PROGRESS NOTES
[facility]    2023 12:15 PM    Assessment/Plan:    Patient presents today with subtherapeutic  INR.    Assessment of patient findings and chart review: Warfarin resumed     Recommendation for patient's warfarin regimen: Boost dose today to 7.5mg then resume current maintenance dose. Continue lovenox    Recommend repeat INR on Wednesday.  _________________________________________________________________    Riya Soto (53 y.o.) is followed by the [facility].    Anticoagulation Summary  As of 2023      INR goal:  2.5-3.5   TTR:  45.1 % (1 y)   INR used for dosin.2 (2023)   Warfarin maintenance plan:  5 mg (5 mg x 1) every Mon, Wed, Fri; 7.5 mg (5 mg x 1.5) all other days   Weekly warfarin total:  45 mg   Plan last modified:  Braxton Mendez, PharmD (11/15/2023)   Next INR check:  2023   Target end date:      Indications    S/P MVR (mitral valve replacement) [Z95.2]  Atrial fibrillation [I48.91]  Long term (current) use of anticoagulants [Z79.01]                 Anticoagulation Episode Summary       INR check location:  Clinic Lab    Preferred lab:      Send INR reminders to:  [facility] COUMADIN MONITORING POOL    Comments:  [facility] only Mon - Thu    (D/C ) [facility] -[phone]          Anticoagulation Care Providers       Provider Role Specialty Phone number    Celsa Knight PA-C Referring Cardiothoracic Surgery [phone]    Ralf Figueroa MD Responsible Cardiology [phone]            Patient Findings       Positives:  Missed doses    Negatives:  Signs/symptoms of bleeding, Change in health, Change in alcohol use, Change in activity, Upcoming invasive procedure, Emergency department visit, Upcoming dental procedure, Extra doses, Change in medications, Change in diet/appetite, Hospital admission,  Bruising    Comments:  Missed dose on Friday 11/17/23, resumed taking on Saturday 11/18/23 and took 7.5 mg on Sat and Sun  No other changes to report to medications or diet

## 2023-11-22 ENCOUNTER — LAB VISIT (OUTPATIENT)
Dept: LAB | Facility: HOSPITAL | Age: 53
End: 2023-11-22
Attending: INTERNAL MEDICINE
Payer: COMMERCIAL

## 2023-11-22 ENCOUNTER — RESEARCH ENCOUNTER (OUTPATIENT)
Dept: RESEARCH | Facility: HOSPITAL | Age: 53
End: 2023-11-22
Payer: COMMERCIAL

## 2023-11-22 ENCOUNTER — ANTI-COAG VISIT (OUTPATIENT)
Dept: CARDIOLOGY | Facility: CLINIC | Age: 53
End: 2023-11-22
Payer: COMMERCIAL

## 2023-11-22 DIAGNOSIS — I48.0 PAROXYSMAL ATRIAL FIBRILLATION: ICD-10-CM

## 2023-11-22 DIAGNOSIS — Z79.01 LONG TERM (CURRENT) USE OF ANTICOAGULANTS: ICD-10-CM

## 2023-11-22 DIAGNOSIS — Z95.2 S/P MVR (MITRAL VALVE REPLACEMENT): ICD-10-CM

## 2023-11-22 DIAGNOSIS — Z95.2 S/P MVR (MITRAL VALVE REPLACEMENT): Primary | ICD-10-CM

## 2023-11-22 LAB
INR PPP: 1.3 (ref 0.8–1.2)
PROTHROMBIN TIME: 13.5 SEC (ref 9–12.5)

## 2023-11-22 PROCEDURE — 36415 COLL VENOUS BLD VENIPUNCTURE: CPT | Performed by: INTERNAL MEDICINE

## 2023-11-22 PROCEDURE — 93793 PR ANTICOAGULANT MGMT FOR PT TAKING WARFARIN: ICD-10-PCS | Mod: S$GLB,,,

## 2023-11-22 PROCEDURE — 85610 PROTHROMBIN TIME: CPT | Performed by: INTERNAL MEDICINE

## 2023-11-22 PROCEDURE — 93793 ANTICOAG MGMT PT WARFARIN: CPT | Mod: S$GLB,,,

## 2023-11-22 NOTE — PROGRESS NOTES
[facility] Mitral, Aortic and Tricuspid post-market study in the real world setting (MANTRA)   Spoke with pt, ratient reports being in [place] with bleeding ulcer on  10-23-23. Pt seen by GI in [facility] and had EGD performed. Patient reports no longer having any s/s of bleeding . Pt reports having 4 blood transfusions   Daysi Titus RN, CCM, CRC

## 2023-11-22 NOTE — PROGRESS NOTES
[facility]    2023 1:57 PM    Assessment/Plan:    Patient presents today with subtherapeutic  INR.    Recommendation for patient's warfarin regimen: 7.5 mg today and tomorrow and continue lovenox. Increasing dose conservatively given recent history of GI bleed.    Recommend repeat INR on Friday  _________________________________________________________________    Riya Nancy Paezolph (53 y.o.) is followed by the [facility].    Anticoagulation Summary  As of 2023      INR goal:  2.5-3.5   TTR:  44.8 % (1 y)   INR used for dosin.3 (2023)   Warfarin maintenance plan:  5 mg (5 mg x 1) every Mon, Wed, Fri; 7.5 mg (5 mg x 1.5) all other days   Weekly warfarin total:  45 mg   Plan last modified:  Braxton Mendez, PharmD (11/15/2023)   Next INR check:  2023   Target end date:      Indications    S/P MVR (mitral valve replacement) [Z95.2]  Atrial fibrillation [I48.91]  Long term (current) use of anticoagulants [Z79.01]                 Anticoagulation Episode Summary       INR check location:  Clinic Lab    Preferred lab:      Send INR reminders to:  [facility] COUMADIN MONITORING POOL    Comments:  [facility] or [facility] only Mon - Thu    (D/C ) [facility] -[phone]          Anticoagulation Care Providers       Provider Role Specialty Phone number    Celsa Knight PA-C Referring Cardiothoracic Surgery [phone]    Ralf Figueroa MD Responsible Cardiology [phone]            Patient Findings       Negatives:  Signs/symptoms of thrombosis, Signs/symptoms of bleeding, Laboratory test error suspected, Change in health, Change in alcohol use, Change in activity, Upcoming invasive procedure, Emergency department visit, Upcoming dental procedure, Missed doses, Extra doses, Change in medications, Change in diet/appetite, Hospital admission, Bruising,  Other complaints    Comments:  Pt confirmed correct warfarin dose. Pt confirmed there have been no changes to diet, health or medication and denies any signs of bleeding.

## 2023-11-22 NOTE — PROGRESS NOTES
Patient leaves to go out of town on Friday at 4 am and will be back last Monday night. Will be able to go to the lab on Tuesday 11/28/23.   [facility] Ph: [phone] Fx: [phone].  Please advise

## 2023-11-24 ENCOUNTER — LAB VISIT (OUTPATIENT)
Dept: LAB | Facility: HOSPITAL | Age: 53
End: 2023-11-24
Attending: INTERNAL MEDICINE
Payer: COMMERCIAL

## 2023-11-24 ENCOUNTER — ANTI-COAG VISIT (OUTPATIENT)
Dept: CARDIOLOGY | Facility: CLINIC | Age: 53
End: 2023-11-24
Payer: COMMERCIAL

## 2023-11-24 DIAGNOSIS — I48.0 PAROXYSMAL ATRIAL FIBRILLATION: ICD-10-CM

## 2023-11-24 DIAGNOSIS — Z95.2 S/P MVR (MITRAL VALVE REPLACEMENT): Primary | ICD-10-CM

## 2023-11-24 DIAGNOSIS — Z95.2 S/P MVR (MITRAL VALVE REPLACEMENT): ICD-10-CM

## 2023-11-24 DIAGNOSIS — Z79.01 LONG TERM (CURRENT) USE OF ANTICOAGULANTS: ICD-10-CM

## 2023-11-24 LAB
INR PPP: 1.4 (ref 0.8–1.2)
PROTHROMBIN TIME: 14.7 SEC (ref 9–12.5)

## 2023-11-24 PROCEDURE — 93793 PR ANTICOAGULANT MGMT FOR PT TAKING WARFARIN: ICD-10-PCS | Mod: S$GLB,,,

## 2023-11-24 PROCEDURE — 36415 COLL VENOUS BLD VENIPUNCTURE: CPT | Performed by: INTERNAL MEDICINE

## 2023-11-24 PROCEDURE — 93793 ANTICOAG MGMT PT WARFARIN: CPT | Mod: S$GLB,,,

## 2023-11-24 PROCEDURE — 85610 PROTHROMBIN TIME: CPT | Performed by: INTERNAL MEDICINE

## 2023-11-24 NOTE — PROGRESS NOTES
[facility]    2023 10:00 AM    Assessment/Plan:    Patient presents today with subtherapeutic  INR.    Assessment of patient findings and chart review: INR below goal as we increase doses conservatively 2/2 recent GI bleed    Recommendation for patient's warfarin regimen:  10mg x2 doses then resume maintenance dose per calendar; continue bridge with LMWH    Recommend repeat INR in 4 days  _________________________________________________________________    Riya Soto (53 y.o.) is followed by the [facility].    Anticoagulation Summary  As of 2023      INR goal:  2.5-3.5   TTR:  44.6 % (1 y)   INR used for dosin.4 (2023)   Warfarin maintenance plan:  5 mg (5 mg x 1) every Mon, Wed, Fri; 7.5 mg (5 mg x 1.5) all other days   Weekly warfarin total:  45 mg   Plan last modified:  Braxton Mendez, PharmD (11/15/2023)   Next INR check:  2023   Target end date:      Indications    S/P MVR (mitral valve replacement) [Z95.2]  Atrial fibrillation [I48.91]  Long term (current) use of anticoagulants [Z79.01]                 Anticoagulation Episode Summary       INR check location:  Clinic Lab    Preferred lab:      Send INR reminders to:  [facility] COUMADIN MONITORING POOL    Comments:  [facility] or [facility] [place] [facility] only Mon - Thu    (D/C ) [facility] [place] [phone]          Anticoagulation Care Providers       Provider Role Specialty Phone number    Celsa Knight PA-C Referring Cardiothoracic Surgery [phone]    Ralf Figueroa MD Responsible Cardiology [phone]            Patient Findings       Negatives:  Signs/symptoms of bleeding, Change in health, Change in alcohol use, Change in activity, Upcoming invasive procedure, Emergency department visit, Upcoming dental procedure, Missed doses, Extra doses, Change in medications, Change in diet/appetite,  Hospital admission, Bruising    Comments:  All coumadin doses, days and correct tablet size correctly verified and taken per calendar as advised  Lovenox injections taken per calendar  No changes to report

## 2023-11-28 ENCOUNTER — LAB VISIT (OUTPATIENT)
Dept: LAB | Facility: HOSPITAL | Age: 53
End: 2023-11-28
Attending: INTERNAL MEDICINE
Payer: COMMERCIAL

## 2023-11-28 ENCOUNTER — ANTI-COAG VISIT (OUTPATIENT)
Dept: CARDIOLOGY | Facility: CLINIC | Age: 53
End: 2023-11-28
Payer: COMMERCIAL

## 2023-11-28 ENCOUNTER — TELEPHONE (OUTPATIENT)
Dept: RESEARCH | Facility: HOSPITAL | Age: 53
End: 2023-11-28
Payer: COMMERCIAL

## 2023-11-28 DIAGNOSIS — I48.0 PAROXYSMAL ATRIAL FIBRILLATION: ICD-10-CM

## 2023-11-28 DIAGNOSIS — Z00.6 EXAMINATION OF PARTICIPANT IN CLINICAL TRIAL: ICD-10-CM

## 2023-11-28 DIAGNOSIS — Z95.2 S/P MVR (MITRAL VALVE REPLACEMENT): Primary | ICD-10-CM

## 2023-11-28 DIAGNOSIS — Z95.2 S/P MVR (MITRAL VALVE REPLACEMENT): ICD-10-CM

## 2023-11-28 DIAGNOSIS — Z79.01 LONG TERM (CURRENT) USE OF ANTICOAGULANTS: ICD-10-CM

## 2023-11-28 LAB
INR PPP: 1.8 (ref 0.8–1.2)
PROTHROMBIN TIME: 20.1 SEC (ref 9–12.5)

## 2023-11-28 PROCEDURE — 93793 PR ANTICOAGULANT MGMT FOR PT TAKING WARFARIN: ICD-10-PCS | Mod: S$GLB,,, | Performed by: PHARMACIST

## 2023-11-28 PROCEDURE — 85610 PROTHROMBIN TIME: CPT | Performed by: INTERNAL MEDICINE

## 2023-11-28 PROCEDURE — 93793 ANTICOAG MGMT PT WARFARIN: CPT | Mod: S$GLB,,, | Performed by: PHARMACIST

## 2023-11-28 PROCEDURE — 36415 COLL VENOUS BLD VENIPUNCTURE: CPT | Performed by: INTERNAL MEDICINE

## 2023-11-28 NOTE — PROGRESS NOTES
INR steadily inclining with adjusted warfarin doses. Patient confirms proper warfarin and lovenox dosing.  With INR moving upward, will continue to monitor it closely.  Will boost warfarin doses x 2 and recheck INR on Thursday.  Patient finished all doses of lovenox and since INR will be above 2 by tomorrow, will continue with single anticoagulant therapy at this time.

## 2023-11-28 NOTE — TELEPHONE ENCOUNTER
Spoke with patient regarding visits for MANTRA study on 11/27 and 11/28. Patient was upset as she drove in from [place] to make her appointment yesterday, but it was cancelled. When looking at the appointment, it appears the appointment was cancelled by the patient via SMS on 11/26/23. Patient is not coming in for visits today on 11/28/23 and stated she cannot come this week and is leaving for a cruise on [place] 12[place]23 and will not be back until 12/8/23 but has a commitment on 12/11/23 so her visit will need to be rescheduled after that date.

## 2023-11-29 NOTE — PROGRESS NOTES
11/29/23 patient did not receive voice mail with dosing instructions and therefore took 7.5 mg last night instead of 10 mg. Please advise

## 2023-11-30 ENCOUNTER — ANTI-COAG VISIT (OUTPATIENT)
Dept: CARDIOLOGY | Facility: CLINIC | Age: 53
End: 2023-11-30
Payer: COMMERCIAL

## 2023-11-30 ENCOUNTER — LAB VISIT (OUTPATIENT)
Dept: LAB | Facility: HOSPITAL | Age: 53
End: 2023-11-30
Attending: INTERNAL MEDICINE
Payer: COMMERCIAL

## 2023-11-30 DIAGNOSIS — Z79.01 LONG TERM (CURRENT) USE OF ANTICOAGULANTS: ICD-10-CM

## 2023-11-30 DIAGNOSIS — Z95.2 S/P MVR (MITRAL VALVE REPLACEMENT): Primary | ICD-10-CM

## 2023-11-30 DIAGNOSIS — Z95.2 S/P MVR (MITRAL VALVE REPLACEMENT): ICD-10-CM

## 2023-11-30 DIAGNOSIS — I48.0 PAROXYSMAL ATRIAL FIBRILLATION: ICD-10-CM

## 2023-11-30 LAB
INR PPP: 2.9 (ref 0.8–1.2)
PROTHROMBIN TIME: 30.6 SEC (ref 9–12.5)

## 2023-11-30 PROCEDURE — 85610 PROTHROMBIN TIME: CPT | Performed by: INTERNAL MEDICINE

## 2023-11-30 PROCEDURE — 93793 PR ANTICOAGULANT MGMT FOR PT TAKING WARFARIN: ICD-10-PCS | Mod: S$GLB,,, | Performed by: PHARMACIST

## 2023-11-30 PROCEDURE — 93793 ANTICOAG MGMT PT WARFARIN: CPT | Mod: S$GLB,,, | Performed by: PHARMACIST

## 2023-11-30 PROCEDURE — 36415 COLL VENOUS BLD VENIPUNCTURE: CPT | Performed by: INTERNAL MEDICINE

## 2023-11-30 NOTE — PROGRESS NOTES
[facility]    2023 3:59 PM    Assessment/Plan:    Patient presents today with therapeutic INR.    Assessment of patient findings and chart review:  INR is therapeutic today but of concern is that she took 12.5mg last night (not seeing full impact of this dose yet) and INR increased from 1.8 to 2.9 over the last 2 days. We will make the necessary adjustments and keep close watch.     Recommendation for patient's warfarin regimen:  per calendar    Recommend repeat INR in 4 days  _________________________________________________________________    Riya Soto (53 y.o.) is followed by the [facility] Anticoagulation Management Program.    Anticoagulation Summary  As of 2023      INR goal:  2.5-3.5   TTR:  44.1 % (1 y)   INR used for dosin.9 (2023)   Warfarin maintenance plan:  5 mg (5 mg x 1) every Mon, Wed, Fri; 7.5 mg (5 mg x 1.5) all other days   Weekly warfarin total:  45 mg   Plan last modified:  Braxton Mendez, PharmD (11/15/2023)   Next INR check:     Target end date:      Indications    S/P MVR (mitral valve replacement) [Z95.2]  Atrial fibrillation [I48.91]  Long term (current) use of anticoagulants [Z79.01]                 Anticoagulation Episode Summary       INR check location:  Clinic Lab    Preferred lab:      Send INR reminders to:  [facility] COUMADIN MONITORING POOL    Comments:  [facility] [place] [facility] Mon - Thu    (D/C ) [facility] [place] [phone]          Anticoagulation Care Providers       Provider Role Specialty Phone number    Celsa Knight PA-C Referring Cardiothoracic Surgery [phone]    Ralf Figueroa MD Responsible Cardiology [phone]            Patient Findings       Negatives:  Signs/symptoms of bleeding, Change in health, Change in alcohol use, Change in activity, Upcoming invasive procedure, Emergency department visit, Upcoming dental  procedure, Missed doses, Extra doses, Change in medications, Change in diet/appetite, Hospital admission, Bruising    Comments:  Patient verbally confirmed the accurate dosage and frequency of their coumadin medication. The patient  adhered to the medication plan scheduled on the calendar without any deviations.  No other changes to report  Patient confirmed that she took 12.5 mg last night 11/29/23, patient last dose of Lovenox was few days ago on 11/28/23 when she ran out and was informed that she did not need to get it refill.

## 2023-11-30 NOTE — PROGRESS NOTES
Patient is going on a cruise and leaves on Saturday 12/2/23 and returns on 12/11/23. Please advise

## 2023-12-01 ENCOUNTER — TELEPHONE (OUTPATIENT)
Dept: GASTROENTEROLOGY | Facility: CLINIC | Age: 53
End: 2023-12-01
Payer: COMMERCIAL

## 2023-12-01 NOTE — TELEPHONE ENCOUNTER
----- Message from Shashi Michele MD sent at 11/27/2023 10:56 AM CST -----  Pathologic findings for recent EGD reviewed.  No evidence of H pylori infection identified.  Some mild inflammation was noted.  Continue current medications.  Follow up as needed

## 2023-12-11 ENCOUNTER — ANTI-COAG VISIT (OUTPATIENT)
Dept: CARDIOLOGY | Facility: CLINIC | Age: 53
End: 2023-12-11
Payer: COMMERCIAL

## 2023-12-11 ENCOUNTER — LAB VISIT (OUTPATIENT)
Dept: LAB | Facility: HOSPITAL | Age: 53
End: 2023-12-11
Attending: INTERNAL MEDICINE
Payer: COMMERCIAL

## 2023-12-11 DIAGNOSIS — I48.0 PAROXYSMAL ATRIAL FIBRILLATION: ICD-10-CM

## 2023-12-11 DIAGNOSIS — Z95.2 S/P MVR (MITRAL VALVE REPLACEMENT): Primary | ICD-10-CM

## 2023-12-11 DIAGNOSIS — Z79.01 LONG TERM (CURRENT) USE OF ANTICOAGULANTS: ICD-10-CM

## 2023-12-11 DIAGNOSIS — Z95.2 S/P MVR (MITRAL VALVE REPLACEMENT): ICD-10-CM

## 2023-12-11 LAB
INR PPP: 1.8 (ref 0.8–1.2)
PROTHROMBIN TIME: 19.7 SEC (ref 9–12.5)

## 2023-12-11 PROCEDURE — 36415 COLL VENOUS BLD VENIPUNCTURE: CPT | Performed by: INTERNAL MEDICINE

## 2023-12-11 PROCEDURE — 85610 PROTHROMBIN TIME: CPT | Performed by: INTERNAL MEDICINE

## 2023-12-11 PROCEDURE — 93793 PR ANTICOAGULANT MGMT FOR PT TAKING WARFARIN: ICD-10-PCS | Mod: S$GLB,,,

## 2023-12-11 PROCEDURE — 93793 ANTICOAG MGMT PT WARFARIN: CPT | Mod: S$GLB,,,

## 2023-12-11 NOTE — PROGRESS NOTES
[facility]    2023 1:12 PM    Assessment/Plan:    Patient presents today with subtherapeutic  INR.    Recommendation for patient's warfarin regimen: Boost dose today to 7.5mg then increase maintenance dose    Recommend repeat INR in 1 week  _________________________________________________________________    Riyajoanie Soto (53 y.o.) is followed by the [facility] Anticoagulation Management Program.    Anticoagulation Summary  As of 2023      INR goal:  2.5-3.5   TTR:  43.8 % (1.1 y)   INR used for dosin.8 (2023)   Warfarin maintenance plan:  5 mg (5 mg x 1) every Mon, Wed, Fri; 7.5 mg (5 mg x 1.5) all other days   Weekly warfarin total:  45 mg   Plan last modified:  Skylar Marcelo, PharmD (2023)   Next INR check:  2023   Target end date:      Indications    S/P MVR (mitral valve replacement) [Z95.2]  Atrial fibrillation [I48.91]  Long term (current) use of anticoagulants [Z79.01]                 Anticoagulation Episode Summary       INR check location:  Clinic Lab    Preferred lab:      Send INR reminders to:  [facility] COUMADIN MONITORING POOL    Comments:  [facility] or [facility] [place] only Mon - Thu    (D/C ) [facility] [place] [phone]          Anticoagulation Care Providers       Provider Role Specialty Phone number    Celsa Knight PA-C Referring Cardiothoracic Surgery [phone]    Ralf Figueroa MD [facility] Cardiology [phone]            Patient Findings       Positives:  Change in alcohol use    Negatives:  Signs/symptoms of bleeding, Change in health, Change in activity, Upcoming invasive procedure, Emergency department visit, Upcoming dental procedure, Missed doses, Extra doses, Change in medications, Change in diet/appetite, Hospital admission, Bruising    Comments:  Patient verbally confirmed the accurate dosage and frequency of their coumadin medication. The  patient adhered to the medication plan scheduled on the calendar without any deviations.  Had a few alcoholic beverages last week. Patient states that she is in pain to her back and side pain level is about 7/10

## 2023-12-12 ENCOUNTER — OFFICE VISIT (OUTPATIENT)
Dept: PRIMARY CARE CLINIC | Facility: CLINIC | Age: 53
End: 2023-12-12
Payer: COMMERCIAL

## 2023-12-12 ENCOUNTER — HOSPITAL ENCOUNTER (OUTPATIENT)
Dept: CARDIOLOGY | Facility: HOSPITAL | Age: 53
Discharge: HOME | End: 2023-12-12
Attending: THORACIC SURGERY (CARDIOTHORACIC VASCULAR SURGERY)
Payer: COMMERCIAL

## 2023-12-12 VITALS
HEIGHT: 69 IN | BODY MASS INDEX: 30.5 KG/M2 | SYSTOLIC BLOOD PRESSURE: 122 MMHG | DIASTOLIC BLOOD PRESSURE: 76 MMHG | OXYGEN SATURATION: 98 % | RESPIRATION RATE: 18 BRPM | HEART RATE: 83 BPM | TEMPERATURE: 97 F | WEIGHT: 205.94 LBS

## 2023-12-12 VITALS — HEIGHT: 69 IN | WEIGHT: 205 LBS | BODY MASS INDEX: 30.36 KG/M2

## 2023-12-12 DIAGNOSIS — D62 ACUTE BLOOD LOSS ANEMIA: Primary | ICD-10-CM

## 2023-12-12 DIAGNOSIS — Z86.79 S/P MAZE OPERATION FOR ATRIAL FIBRILLATION: ICD-10-CM

## 2023-12-12 DIAGNOSIS — E78.00 PURE HYPERCHOLESTEROLEMIA: ICD-10-CM

## 2023-12-12 DIAGNOSIS — Z00.6 RESEARCH SUBJECT: ICD-10-CM

## 2023-12-12 DIAGNOSIS — Z98.890 S/P MAZE OPERATION FOR ATRIAL FIBRILLATION: ICD-10-CM

## 2023-12-12 DIAGNOSIS — Z98.890 S/P TVR (TRICUSPID VALVE REPAIR): ICD-10-CM

## 2023-12-12 DIAGNOSIS — I48.0 PAROXYSMAL ATRIAL FIBRILLATION: ICD-10-CM

## 2023-12-12 DIAGNOSIS — Z95.2 S/P MVR (MITRAL VALVE REPLACEMENT): ICD-10-CM

## 2023-12-12 DIAGNOSIS — G89.4 CHRONIC PAIN SYNDROME: ICD-10-CM

## 2023-12-12 DIAGNOSIS — Z79.01 ANTICOAGULATED ON COUMADIN: ICD-10-CM

## 2023-12-12 DIAGNOSIS — Z79.01 LONG TERM (CURRENT) USE OF ANTICOAGULANTS: ICD-10-CM

## 2023-12-12 DIAGNOSIS — Z87.11 HISTORY OF GASTRIC ULCER: ICD-10-CM

## 2023-12-12 PROBLEM — Z01.818 PRE-OP TESTING: Status: RESOLVED | Noted: 2022-11-03 | Resolved: 2023-12-12

## 2023-12-12 LAB
AORTIC ROOT ANNULUS: 3.2 CM
AORTIC VALVE CUSP SEPERATION: 1.6 CM
AV INDEX (PROSTH): 0.91
AV MEAN GRADIENT: 7 MMHG
AV PEAK GRADIENT: 13 MMHG
AV VALVE AREA BY VELOCITY RATIO: 2.49 CM²
AV VALVE AREA: 2.87 CM²
AV VELOCITY RATIO: 0.79
BSA FOR ECHO PROCEDURE: 2.13 M2
CV ECHO LV RWT: 0.48 CM
DOP CALC AO PEAK VEL: 1.83 M/S
DOP CALC AO VTI: 31.2 CM
DOP CALC LVOT AREA: 3.1 CM2
DOP CALC LVOT DIAMETER: 2 CM
DOP CALC LVOT PEAK VEL: 1.45 M/S
DOP CALC LVOT STROKE VOLUME: 89.49 CM3
DOP CALCLVOT PEAK VEL VTI: 28.5 CM
E WAVE DECELERATION TIME: 296 MSEC
E/A RATIO: 2.93
E/E' RATIO: 32.55 M/S
ECHO LV POSTERIOR WALL: 1.13 CM (ref 0.6–1.1)
FRACTIONAL SHORTENING: 20 % (ref 28–44)
INTERVENTRICULAR SEPTUM: 1 CM (ref 0.6–1.1)
IVRT: 102 MSEC
LEFT INTERNAL DIMENSION IN SYSTOLE: 3.78 CM (ref 2.1–4)
LEFT VENTRICLE DIASTOLIC VOLUME INDEX: 48.8 ML/M2
LEFT VENTRICLE DIASTOLIC VOLUME: 102 ML
LEFT VENTRICLE MASS INDEX: 86 G/M2
LEFT VENTRICLE SYSTOLIC VOLUME INDEX: 29.3 ML/M2
LEFT VENTRICLE SYSTOLIC VOLUME: 61.2 ML
LEFT VENTRICULAR INTERNAL DIMENSION IN DIASTOLE: 4.7 CM (ref 3.5–6)
LEFT VENTRICULAR MASS: 179.3 G
LV LATERAL E/E' RATIO: 35.8 M/S
LV SEPTAL E/E' RATIO: 29.83 M/S
LVOT MG: 5 MMHG
LVOT MV: 0.99 CM/S
MV PEAK A VEL: 0.61 M/S
MV PEAK E VEL: 1.79 M/S
MV STENOSIS PRESSURE HALF TIME: 94 MS
MV VALVE AREA P 1/2 METHOD: 2.34 CM2
PISA TR MAX VEL: 2.13 M/S
RA PRESSURE ESTIMATED: 3 MMHG
RIGHT VENTRICULAR END-DIASTOLIC DIMENSION: 1.83 CM
RV TB RVSP: 5 MMHG
TDI LATERAL: 0.05 M/S
TDI SEPTAL: 0.06 M/S
TDI: 0.06 M/S
TR MAX PG: 18 MMHG
TV REST PULMONARY ARTERY PRESSURE: 21 MMHG
Z-SCORE OF LEFT VENTRICULAR DIMENSION IN END DIASTOLE: -3.16
Z-SCORE OF LEFT VENTRICULAR DIMENSION IN END SYSTOLE: -0.33

## 2023-12-12 PROCEDURE — 4010F ACE/ARB THERAPY RXD/TAKEN: CPT | Mod: CPTII,S$GLB,, | Performed by: FAMILY MEDICINE

## 2023-12-12 PROCEDURE — 1159F MED LIST DOCD IN RCRD: CPT | Mod: CPTII,S$GLB,, | Performed by: FAMILY MEDICINE

## 2023-12-12 PROCEDURE — 3074F PR MOST RECENT SYSTOLIC BLOOD PRESSURE < 130 MM HG: ICD-10-PCS | Mod: CPTII,S$GLB,, | Performed by: FAMILY MEDICINE

## 2023-12-12 PROCEDURE — 99999 PR PBB SHADOW E&M-EST. PATIENT-LVL III: ICD-10-PCS | Mod: PBBFAC,,, | Performed by: FAMILY MEDICINE

## 2023-12-12 PROCEDURE — 99999 PR PBB SHADOW E&M-EST. PATIENT-LVL III: CPT | Mod: PBBFAC,,, | Performed by: FAMILY MEDICINE

## 2023-12-12 PROCEDURE — 3074F SYST BP LT 130 MM HG: CPT | Mod: CPTII,S$GLB,, | Performed by: FAMILY MEDICINE

## 2023-12-12 PROCEDURE — 4010F PR ACE/ARB THEARPY RXD/TAKEN: ICD-10-PCS | Mod: CPTII,S$GLB,, | Performed by: FAMILY MEDICINE

## 2023-12-12 PROCEDURE — 3008F BODY MASS INDEX DOCD: CPT | Mod: CPTII,S$GLB,, | Performed by: FAMILY MEDICINE

## 2023-12-12 PROCEDURE — 3078F PR MOST RECENT DIASTOLIC BLOOD PRESSURE < 80 MM HG: ICD-10-PCS | Mod: CPTII,S$GLB,, | Performed by: FAMILY MEDICINE

## 2023-12-12 PROCEDURE — 99214 OFFICE O/P EST MOD 30 MIN: CPT | Mod: S$GLB,,, | Performed by: FAMILY MEDICINE

## 2023-12-12 PROCEDURE — 93306 ECHO (CUPID ONLY): ICD-10-PCS | Mod: 26,,, | Performed by: INTERNAL MEDICINE

## 2023-12-12 PROCEDURE — 3078F DIAST BP <80 MM HG: CPT | Mod: CPTII,S$GLB,, | Performed by: FAMILY MEDICINE

## 2023-12-12 PROCEDURE — 93306 TTE W/DOPPLER COMPLETE: CPT | Mod: 26,,, | Performed by: INTERNAL MEDICINE

## 2023-12-12 PROCEDURE — 1159F PR MEDICATION LIST DOCUMENTED IN MEDICAL RECORD: ICD-10-PCS | Mod: CPTII,S$GLB,, | Performed by: FAMILY MEDICINE

## 2023-12-12 PROCEDURE — 3008F PR BODY MASS INDEX (BMI) DOCUMENTED: ICD-10-PCS | Mod: CPTII,S$GLB,, | Performed by: FAMILY MEDICINE

## 2023-12-12 PROCEDURE — 99214 PR OFFICE/OUTPT VISIT, EST, LEVL IV, 30-39 MIN: ICD-10-PCS | Mod: S$GLB,,, | Performed by: FAMILY MEDICINE

## 2023-12-12 PROCEDURE — 93306 TTE W/DOPPLER COMPLETE: CPT

## 2023-12-12 RX ORDER — ATORVASTATIN CALCIUM 10 MG/1
10 TABLET, FILM COATED ORAL NIGHTLY
Qty: 90 TABLET | Refills: 3 | Status: SHIPPED | OUTPATIENT
Start: 2023-12-12 | End: 2024-12-11

## 2023-12-12 RX ORDER — WARFARIN SODIUM 5 MG/1
TABLET ORAL
Qty: 135 TABLET | Refills: 1 | Status: SHIPPED | OUTPATIENT
Start: 2023-12-12

## 2023-12-12 RX ORDER — ATORVASTATIN CALCIUM 10 MG/1
10 TABLET, FILM COATED ORAL NIGHTLY
Qty: 30 TABLET | Refills: 11 | Status: SHIPPED | OUTPATIENT
Start: 2023-12-12 | End: 2023-12-12 | Stop reason: SDUPTHER

## 2023-12-12 NOTE — PROGRESS NOTES
12/12/23: Unable to reach patient on 12/11/23 to give her her dosing instructions. Spoke with patient today 12/12/23 to confirm dose she took last night patient states that she took 5 mg. Please advise

## 2023-12-12 NOTE — PROGRESS NOTES
Subjective:       Patient ID: Riya Soto is a 53 y.o. female.    Chief Complaint: 6 week follow up      HPI 54 yo BM in for 6 week follow  up-- hx being [place] threw up blood --told had bleeding ulcer. Did EGD showed bleeding ulcer. Got 4 units blood   Patient has an artificial mitral valve--on coumadin--sees Cardiology--Dr Liriano has appt 12- --history atrial fibrillation/hyperlipidemia/mitral valve replacement/CHF   Hx son dying 9 mo ago hit and run   Eating well- +BM --+ ambulating well  History diverticulosis cholecystectomy  Lab done in November CBCs CMP lipids T4 TSH all basically normal except for hematocrit 33.2 serum iron was normal            ROS   Skin--no psoriasis eczema skin cancer  HEENT---no headaches ocular pain blurred vision diplopia epistaxis hoarseness change in voice thyroid trouble  Lung--no pneumonia asthma TB no smoking  Heart--history of mitral valve surgery-11 yrs ago--November had valve replacements x2 mitral valve and possible tricuspid valve--+ hyperlipidemia--atrial fib with history of cardioversion--CHF--no chest pain ankle edema palpitations MI--no stent bypass arrhythmia  Abdomen--no nausea vomiting diarrhea constipation ulcers hepatitis + diverticulosis +history cholecystectomy  -no UTI renal disease stones  GYN last menstrual period age 48--due for mammogram November for  Musculoskeletal cervical strain lumbosacral strain occas   Neurologic no dizziness passing out seizures  No diabetes  Anemia--no anxiety or depression  Single--7 children--SSI due to heart---lives with 7 children         General: Well nourished, well developed, no acute distress +obesity appears depressed  Skin: No lesions   HEENT: Eyes PERRLA, EOM intact, nose patent, throat non-erythematous ears TMs clear  NECK: Supple, no bruits, No JVD, no nodes  Lungs: Clear, no rales, rhonchi, wheezing  Heart: Regular rate and rhythm, no murmurs, gallops, or rubs--clicking of heart valve is  heard  Abdomen: flat, bowel sounds positive, no tenderness, or organomegaly  MS:  No significant change Some deformity of the left ankle had 3 surgery still has screw present --main problem left shoulder --sore with palpation--AC joint and triceps and supraspinatus area ., pain with raising arm overhead especially painful with trying to reach back to undo bra  Neuro: Alert, CN intact, oriented X 3  Extremities: No cyanosis, clubbing, or edema         Assessment:       1. Acute blood loss anemia    2. History of gastric ulcer    3. Long term (current) use of anticoagulants    4. Pure hypercholesterolemia    5. S/P MVR (mitral valve replacement)    6. Paroxysmal atrial fibrillation    7. S/P TVR (tricuspid valve repair)    8. S/P Maze operation for atrial fibrillation    9. Chronic pain syndrome              Plan:       Acute blood loss anemia    History of gastric ulcer    Long term (current) use of anticoagulants    Pure hypercholesterolemia  -     CBC Auto Differential; Future; Expected date: 12/12/2023  -     Comprehensive Metabolic Panel; Future; Expected date: 12/12/2023  -     CBC Auto Differential; Future; Expected date: 06/12/2024  -     Comprehensive Metabolic Panel; Future; Expected date: 06/12/2024  -     Lipid Panel; Future; Expected date: 06/12/2024  -     TSH; Future; Expected date: 06/12/2024  -     T4, Free; Future; Expected date: 06/12/2024    S/P MVR (mitral valve replacement)    Paroxysmal atrial fibrillation  -     TSH; Future; Expected date: 06/12/2024  -     T4, Free; Future; Expected date: 06/12/2024    S/P TVR (tricuspid valve repair)    S/P Maze operation for atrial fibrillation    Chronic pain syndrome    Other orders  -     Discontinue: atorvastatin (LIPITOR) 10 MG tablet; Take 1 tablet (10 mg total) by mouth every evening.  Dispense: 30 tablet; Refill: 11  -     atorvastatin (LIPITOR) 10 MG tablet; Take 1 tablet (10 mg total) by mouth every evening.  Dispense: 90 tablet; Refill:  3              Main-Reason for Visit-  Recently went to [place]--had GI bleed --receive 4 units of blood--hx bleeding ulcer --anemia secondary to blood loss hematocrit 33.2  Heart surgery 11-7-2022 mitral and tricuspid valve replaced--patient doing very well  Son killed in hit and run accident April 2023[place] several automobile-- was eventually caught patient has to go to court  Atrial fibrillation --history of cardioversion 03/29/2022----patient was on coumadin /metoprolol  Needs to see GI MD--recent gastric ulcer 01/13/2023 with GI bleed transfused 4 units of blood on Coumadin for valve replacement needs to have repeat EGD see if ulcers healed if GI agrees in 6 weeks  Hyperlipidemia patient refills of Lipitor   Menopausal syndrome--had Mirena removed 3 years ago No period since   History anemia/cholecystectomy/heart surgery (systolic had leaking valve 1 surgery was done was normal)  Lab CBC CMP 12- CBCs CMP lipids hemoglobin A1c T4 TSH in 6 mo   Return in 6 months

## 2023-12-15 ENCOUNTER — TELEPHONE (OUTPATIENT)
Dept: CARDIOLOGY | Facility: CLINIC | Age: 53
End: 2023-12-15
Payer: COMMERCIAL

## 2023-12-15 NOTE — TELEPHONE ENCOUNTER
----- Message from Marianela Salcedo NP sent at 12/15/2023  3:05 PM CST -----  Regarding: FW: Echo Read  Print for vb  ----- Message -----  From: Bennett Tabares  Sent: 12/14/2023   8:05 AM CST  To: Silviano Duncan MD  Subject: Echo Read                                        Hi Dr. Duncan,    This patient is part of a research study and had a TTE done that you read on 12 Dec 2023. There are a couple of additional data points that I was hoping you may be able to add to the summary for us.    1) Mean Mitral pressure gradient   2) Peak Mitral pressure gradient  3) Left atrial volume    The last two have 5 specific options (you can just note which is correct)  4) Paravalvular leak         O None          O Trace          O Mild         O Moderate          O Severe     5) Central regurgitation         O None          O Trace          O Mild         O Moderate          O Severe     Thank you!

## 2023-12-18 NOTE — PROGRESS NOTES
Subjective:      Patient ID: Riya Soto is a 53 y.o. female.    Chief Complaint: No chief complaint on file.      HPI:  Riya Soto is a 53 y.o. female who presents to clinic for [facility] f/u. Medical conditions include s/p redo MVR, TVR, and MAZE in Nov/2022 with Dr. Gilbert, previously undergone a mitral valve repair June 2011 at [facility] by Dr Garcia. Since operation, patient is doing well. No longer sob. Denies palpitations.     NYHA 2    Current medications Reviewed  Current Outpatient Medications on File Prior to Visit   Medication Sig Dispense Refill    atorvastatin (LIPITOR) 10 MG tablet Take 1 tablet (10 mg total) by mouth every evening. 90 tablet 3    dronedarone (MULTAQ) 400 mg Tab Take 1 tablet (400 mg total) by mouth 2 (two) times daily with meals. 60 tablet 11    enoxaparin (LOVENOX) 80 mg/0.8 mL Syrg Inject 0.8 mLs (80 mg total) into the skin every 12 (twelve) hours. (Patient not taking: Reported on 12/12/2023) 22.4 mL 1    metoprolol tartrate (LOPRESSOR) 25 MG tablet Take 0.5 tablets (12.5 mg total) by mouth 2 (two) times daily. 30 tablet 11    omeprazole (PRILOSEC) 40 MG capsule Take 40 mg by mouth once daily.      pantoprazole (PROTONIX) 40 MG tablet Take 1 tablet (40 mg total) by mouth once daily. 30 tablet 11    warfarin (COUMADIN) 5 MG tablet 1-1.5 tabs daily (5-7.5 mg) or as directed by coumadin clinic 135 tablet 1    [DISCONTINUED] citalopram (CELEXA) 10 MG tablet Take 1 tablet (10 mg total) by mouth once daily. 30 tablet 11     Current Facility-Administered Medications on File Prior to Visit   Medication Dose Route Frequency Provider Last Rate Last Admin    0.9%  NaCl infusion   Intravenous Continuous Shashi Michele MD        LIDOcaine (PF) 10 mg/ml (1%) injection 10 mg  1 mL Intradermal Once PRN hSashi Michele MD           Review of Systems   Constitutional:  Negative for activity change, appetite change, fatigue and fever.   HENT:  Negative for  nosebleeds.    Respiratory:  Negative for cough and shortness of breath.    Cardiovascular:  Negative for chest pain, palpitations and leg swelling.   Gastrointestinal:  Negative for abdominal distention, abdominal pain and nausea.   Genitourinary:  Negative for frequency.   Musculoskeletal:  Negative for arthralgias and myalgias.   Skin:  Negative for rash.   Neurological:  Negative for dizziness and numbness.   Hematological:  Does not bruise/bleed easily.     Objective:   Physical Exam  Constitutional:       Appearance: Normal appearance.   HENT:      Head: Normocephalic and atraumatic.   Eyes:      Extraocular Movements: Extraocular movements intact.   Cardiovascular:      Rate and Rhythm: Normal rate and regular rhythm.   Pulmonary:      Effort: Pulmonary effort is normal.   Abdominal:      General: Abdomen is flat.   Musculoskeletal:         General: Normal range of motion.      Cervical back: Normal range of motion.   Skin:     General: Skin is warm and dry.      Capillary Refill: Capillary refill takes less than 2 seconds.      Coloration: Skin is not pale.   Neurological:      General: No focal deficit present.      Mental Status: She is alert.         Diagnotic Results: reviewed   EKG 12/19  NSR   Echo 12/12/2023    Left Ventricle: The left ventricle is normal in size. Normal wall thickness. There is concentric remodeling. Normal wall motion. There is mildly reduced systolic function with a visually estimated ejection fraction of 40 - 45%. There is normal diastolic function.    Right Ventricle: Normal right ventricular cavity size. Wall thickness is normal. Right ventricle wall motion  is normal. Systolic function is normal.    Left Atrium: Left atrium is moderately dilated.    Right Atrium: Right atrium is dilated.    Mitral Valve: There is a mechanical valve in the mitral position. It is reported to be a 29 mm   With normal function   Tricuspid Valve: The tricuspid valve is repaired by annular ring.     IVC/SVC: Normal venous pressure at 3 mmHg.    Assessment:   S/p MVR, TVR, MAZE  Plan:     CTS Attending Note:    I have personally taken the history and examined this patient and agree with the SAMM's note as stated above.  Doing very well after mitral replacement.

## 2023-12-19 ENCOUNTER — TELEPHONE (OUTPATIENT)
Dept: CARDIOLOGY | Facility: CLINIC | Age: 53
End: 2023-12-19
Payer: COMMERCIAL

## 2023-12-19 ENCOUNTER — ANTI-COAG VISIT (OUTPATIENT)
Dept: CARDIOLOGY | Facility: CLINIC | Age: 53
End: 2023-12-19
Payer: COMMERCIAL

## 2023-12-19 ENCOUNTER — RESEARCH ENCOUNTER (OUTPATIENT)
Dept: RESEARCH | Facility: HOSPITAL | Age: 53
End: 2023-12-19
Payer: COMMERCIAL

## 2023-12-19 ENCOUNTER — OFFICE VISIT (OUTPATIENT)
Dept: CARDIOTHORACIC SURGERY | Facility: CLINIC | Age: 53
End: 2023-12-19
Payer: COMMERCIAL

## 2023-12-19 ENCOUNTER — HOSPITAL ENCOUNTER (OUTPATIENT)
Dept: CARDIOLOGY | Facility: CLINIC | Age: 53
Discharge: HOME | End: 2023-12-19
Payer: COMMERCIAL

## 2023-12-19 ENCOUNTER — LAB VISIT (OUTPATIENT)
Dept: LAB | Facility: HOSPITAL | Age: 53
End: 2023-12-19
Attending: THORACIC SURGERY (CARDIOTHORACIC VASCULAR SURGERY)
Payer: COMMERCIAL

## 2023-12-19 VITALS
HEART RATE: 60 BPM | HEIGHT: 69 IN | OXYGEN SATURATION: 98 % | SYSTOLIC BLOOD PRESSURE: 122 MMHG | DIASTOLIC BLOOD PRESSURE: 63 MMHG | WEIGHT: 205 LBS | BODY MASS INDEX: 30.36 KG/M2

## 2023-12-19 DIAGNOSIS — Z95.2 S/P MVR (MITRAL VALVE REPLACEMENT): Primary | ICD-10-CM

## 2023-12-19 DIAGNOSIS — Z00.6 RESEARCH SUBJECT: ICD-10-CM

## 2023-12-19 DIAGNOSIS — I48.0 PAROXYSMAL ATRIAL FIBRILLATION: ICD-10-CM

## 2023-12-19 DIAGNOSIS — Z95.2 S/P MVR (MITRAL VALVE REPLACEMENT): ICD-10-CM

## 2023-12-19 DIAGNOSIS — Z98.890 S/P MAZE OPERATION FOR ATRIAL FIBRILLATION: ICD-10-CM

## 2023-12-19 DIAGNOSIS — Z79.01 LONG TERM (CURRENT) USE OF ANTICOAGULANTS: ICD-10-CM

## 2023-12-19 DIAGNOSIS — Z79.01 ANTICOAGULATED ON COUMADIN: ICD-10-CM

## 2023-12-19 DIAGNOSIS — Z86.79 S/P MAZE OPERATION FOR ATRIAL FIBRILLATION: ICD-10-CM

## 2023-12-19 DIAGNOSIS — Z98.890 S/P TVR (TRICUSPID VALVE REPAIR): ICD-10-CM

## 2023-12-19 LAB
ANION GAP SERPL CALC-SCNC: 9 MMOL/L (ref 8–16)
BASOPHILS # BLD AUTO: 0.02 K/UL (ref 0–0.2)
BASOPHILS NFR BLD: 0.4 % (ref 0–1.9)
BUN SERPL-MCNC: 13 MG/DL (ref 6–20)
CALCIUM SERPL-MCNC: 9.3 MG/DL (ref 8.7–10.5)
CHLORIDE SERPL-SCNC: 107 MMOL/L (ref 95–110)
CO2 SERPL-SCNC: 26 MMOL/L (ref 23–29)
CREAT SERPL-MCNC: 1 MG/DL (ref 0.5–1.4)
DIFFERENTIAL METHOD: ABNORMAL
EOSINOPHIL # BLD AUTO: 0 K/UL (ref 0–0.5)
EOSINOPHIL NFR BLD: 0.8 % (ref 0–8)
ERYTHROCYTE [DISTWIDTH] IN BLOOD BY AUTOMATED COUNT: 13.7 % (ref 11.5–14.5)
EST. GFR  (NO RACE VARIABLE): >60 ML/MIN/1.73 M^2
GLUCOSE SERPL-MCNC: 96 MG/DL (ref 70–110)
HCT VFR BLD AUTO: 30.8 % (ref 37–48.5)
HGB BLD-MCNC: 10.2 G/DL (ref 12–16)
IMM GRANULOCYTES # BLD AUTO: 0.01 K/UL (ref 0–0.04)
IMM GRANULOCYTES NFR BLD AUTO: 0.2 % (ref 0–0.5)
INR PPP: 5.2 (ref 0.8–1.2)
LYMPHOCYTES # BLD AUTO: 1.3 K/UL (ref 1–4.8)
LYMPHOCYTES NFR BLD: 25.8 % (ref 18–48)
MCH RBC QN AUTO: 28.1 PG (ref 27–31)
MCHC RBC AUTO-ENTMCNC: 33.1 G/DL (ref 32–36)
MCV RBC AUTO: 85 FL (ref 82–98)
MONOCYTES # BLD AUTO: 0.5 K/UL (ref 0.3–1)
MONOCYTES NFR BLD: 10.2 % (ref 4–15)
NEUTROPHILS # BLD AUTO: 3.1 K/UL (ref 1.8–7.7)
NEUTROPHILS NFR BLD: 62.6 % (ref 38–73)
NRBC BLD-RTO: 0 /100 WBC
PLATELET # BLD AUTO: 341 K/UL (ref 150–450)
PMV BLD AUTO: 9.5 FL (ref 9.2–12.9)
POTASSIUM SERPL-SCNC: 3.3 MMOL/L (ref 3.5–5.1)
PROTHROMBIN TIME: 50.2 SEC (ref 9–12.5)
RBC # BLD AUTO: 3.63 M/UL (ref 4–5.4)
SODIUM SERPL-SCNC: 142 MMOL/L (ref 136–145)
WBC # BLD AUTO: 4.92 K/UL (ref 3.9–12.7)

## 2023-12-19 PROCEDURE — 93793 ANTICOAG MGMT PT WARFARIN: CPT | Mod: S$GLB,,,

## 2023-12-19 PROCEDURE — 1159F PR MEDICATION LIST DOCUMENTED IN MEDICAL RECORD: ICD-10-PCS | Mod: CPTII,S$GLB,, | Performed by: THORACIC SURGERY (CARDIOTHORACIC VASCULAR SURGERY)

## 2023-12-19 PROCEDURE — 93005 ELECTROCARDIOGRAM TRACING: CPT | Mod: S$GLB,,, | Performed by: THORACIC SURGERY (CARDIOTHORACIC VASCULAR SURGERY)

## 2023-12-19 PROCEDURE — 3074F PR MOST RECENT SYSTOLIC BLOOD PRESSURE < 130 MM HG: ICD-10-PCS | Mod: CPTII,S$GLB,, | Performed by: THORACIC SURGERY (CARDIOTHORACIC VASCULAR SURGERY)

## 2023-12-19 PROCEDURE — 80048 BASIC METABOLIC PNL TOTAL CA: CPT | Performed by: THORACIC SURGERY (CARDIOTHORACIC VASCULAR SURGERY)

## 2023-12-19 PROCEDURE — 99999 PR PBB SHADOW E&M-EST. PATIENT-LVL III: ICD-10-PCS | Mod: PBBFAC,,, | Performed by: THORACIC SURGERY (CARDIOTHORACIC VASCULAR SURGERY)

## 2023-12-19 PROCEDURE — 93010 EKG 12-LEAD: ICD-10-PCS | Mod: S$GLB,,, | Performed by: INTERNAL MEDICINE

## 2023-12-19 PROCEDURE — 1159F MED LIST DOCD IN RCRD: CPT | Mod: CPTII,S$GLB,, | Performed by: THORACIC SURGERY (CARDIOTHORACIC VASCULAR SURGERY)

## 2023-12-19 PROCEDURE — 3074F SYST BP LT 130 MM HG: CPT | Mod: CPTII,S$GLB,, | Performed by: THORACIC SURGERY (CARDIOTHORACIC VASCULAR SURGERY)

## 2023-12-19 PROCEDURE — 99024 PR POST-OP FOLLOW-UP VISIT: ICD-10-PCS | Mod: S$GLB,,, | Performed by: THORACIC SURGERY (CARDIOTHORACIC VASCULAR SURGERY)

## 2023-12-19 PROCEDURE — 99024 POSTOP FOLLOW-UP VISIT: CPT | Mod: S$GLB,,, | Performed by: THORACIC SURGERY (CARDIOTHORACIC VASCULAR SURGERY)

## 2023-12-19 PROCEDURE — 93010 ELECTROCARDIOGRAM REPORT: CPT | Mod: S$GLB,,, | Performed by: INTERNAL MEDICINE

## 2023-12-19 PROCEDURE — 93005 EKG 12-LEAD: ICD-10-PCS | Mod: S$GLB,,, | Performed by: THORACIC SURGERY (CARDIOTHORACIC VASCULAR SURGERY)

## 2023-12-19 PROCEDURE — 93793 PR ANTICOAGULANT MGMT FOR PT TAKING WARFARIN: ICD-10-PCS | Mod: S$GLB,,,

## 2023-12-19 PROCEDURE — 3078F DIAST BP <80 MM HG: CPT | Mod: CPTII,S$GLB,, | Performed by: THORACIC SURGERY (CARDIOTHORACIC VASCULAR SURGERY)

## 2023-12-19 PROCEDURE — 99999 PR PBB SHADOW E&M-EST. PATIENT-LVL III: CPT | Mod: PBBFAC,,, | Performed by: THORACIC SURGERY (CARDIOTHORACIC VASCULAR SURGERY)

## 2023-12-19 PROCEDURE — 85610 PROTHROMBIN TIME: CPT | Performed by: INTERNAL MEDICINE

## 2023-12-19 PROCEDURE — 4010F ACE/ARB THERAPY RXD/TAKEN: CPT | Mod: CPTII,S$GLB,, | Performed by: THORACIC SURGERY (CARDIOTHORACIC VASCULAR SURGERY)

## 2023-12-19 PROCEDURE — 85025 COMPLETE CBC W/AUTO DIFF WBC: CPT | Performed by: THORACIC SURGERY (CARDIOTHORACIC VASCULAR SURGERY)

## 2023-12-19 PROCEDURE — 3078F PR MOST RECENT DIASTOLIC BLOOD PRESSURE < 80 MM HG: ICD-10-PCS | Mod: CPTII,S$GLB,, | Performed by: THORACIC SURGERY (CARDIOTHORACIC VASCULAR SURGERY)

## 2023-12-19 PROCEDURE — 4010F PR ACE/ARB THEARPY RXD/TAKEN: ICD-10-PCS | Mod: CPTII,S$GLB,, | Performed by: THORACIC SURGERY (CARDIOTHORACIC VASCULAR SURGERY)

## 2023-12-19 PROCEDURE — 36415 COLL VENOUS BLD VENIPUNCTURE: CPT | Performed by: INTERNAL MEDICINE

## 2023-12-19 NOTE — PROGRESS NOTES
Update: Patient reports taking 10mg yesterday instead of prescribed 5 mg. Plan to hold dose today and recheck INR on Thursday.

## 2023-12-19 NOTE — PROGRESS NOTES
Lupe with [facility] called in a verbal result as: INR -5.2 dated today 12/19/23

## 2023-12-19 NOTE — PROGRESS NOTES
Trial: Corcym MANTRA (2021.298)  PI: Dr. Gilbert  Date: 12/19/2023  Visit: Annual Visit (1 Year Follow-Up)    Participant ID: [id number]    12/19/2023: Spoke with the patient for the 1 year follow-up for the Corcym MANTRA Study. MANTRA is a post-market study is to monitor ongoing safety and performance of the Corcym devices and accessories used for aortic, mitral, and tricuspid valvular diseases after implant.       Current list of medications obtained and verified with patient.     Patient did report having an event on 14 Oct 2023 where they were hospitalized that has been documented in the EDC.     Patient answered questionnaire for KCCQ-12 and EQ-5D-5L. Documents attached to note.     NYHA class determined be the team, as Class 2    EKG performed on 19 Dec 2023, with a heart rate of 61, and rhythm noted to be sinus rhythm with sinus arrhythmia with 1st degree A-V block.    Research staff contact information reviewed again with patient, should any questions or concerns arise. Their next follow up visit is at the 24 month point.      ECHO completed on 12 Dec 2023, and results reviewed by Dr. Gilbert.    Blood test completed on 19 Dec 2023, and results reviewed by Dr. Gilbert.

## 2023-12-19 NOTE — PROGRESS NOTES
[facility]    2023 3:16 PM    Assessment/Plan:    Patient presents today with supratherapeutic INR.    Recommendation for patient's warfarin regimen: Hold dose today then resume current maintenance dose. Patient has history of GI bleed, patient advised to go to the ED if any s/sx of bleeding.    Recommend repeat INR in 1 week  _________________________________________________________________    Riyajoanie Thompsonph (53 y.o.) is followed by the [facility].    Anticoagulation Summary  As of 2023      INR goal:  2.5-3.5   TTR:  43.5 % (1.1 y)   INR used for dosin.2 (2023)   Warfarin maintenance plan:  5 mg (5 mg x 1) every Mon, Wed, Fri; 7.5 mg (5 mg x 1.5) all other days   Weekly warfarin total:  45 mg   Plan last modified:  Skylar Marcelo, PharmD (2023)   Next INR check:  2023   Target end date:      Indications    S/P MVR (mitral valve replacement) [Z95.2]  Atrial fibrillation [I48.91]  Long term (current) use of anticoagulants [Z79.01]                 Anticoagulation Episode Summary       INR check location:  Clinic Lab    Preferred lab:      Send INR reminders to:  [facility] COUMADIN MONITORING POOL    Comments:  [facility] or [facility] [place] [facility] only Mon - Thu    (D/C ) [facility] [place] [phone]          Anticoagulation Care Providers       Provider Role Specialty Phone number    Celsa Knight PA-C Referring Cardiothoracic Surgery [phone]    Ralf Figueroa MD Responsible Cardiology [phone]            Patient Findings       Negatives:  Signs/symptoms of thrombosis, Signs/symptoms of bleeding, Laboratory test error suspected, Change in health, Change in alcohol use, Change in activity, Upcoming invasive procedure, Emergency department visit, Upcoming dental procedure, Missed doses, Extra doses, Change in medications, Change in diet/appetite,  Hospital admission, Bruising, Other complaints    Comments:  Patient questioned regarding elevated INR. Proper warfarin dosing confirmed. Patient denied any changes in medications, diet, and health. Patient also denied s/sx of bleeding. Patient advised to visit ED in the event of s/sx of bleeding

## 2023-12-20 NOTE — TELEPHONE ENCOUNTER
I spoke with pt.  Chronic anemia stable  Mild hypokalemia.  Pt instructed to eat a banana a day for a week.  Warfarin held today due to elevated INR with f/u with coumadin clinic

## 2023-12-21 ENCOUNTER — LAB VISIT (OUTPATIENT)
Dept: LAB | Facility: HOSPITAL | Age: 53
End: 2023-12-21
Attending: INTERNAL MEDICINE
Payer: COMMERCIAL

## 2023-12-21 ENCOUNTER — ANTI-COAG VISIT (OUTPATIENT)
Dept: CARDIOLOGY | Facility: CLINIC | Age: 53
End: 2023-12-21
Payer: COMMERCIAL

## 2023-12-21 ENCOUNTER — DOCUMENTATION ONLY (OUTPATIENT)
Dept: CARDIOLOGY | Facility: CLINIC | Age: 53
End: 2023-12-21

## 2023-12-21 DIAGNOSIS — Z79.01 LONG TERM (CURRENT) USE OF ANTICOAGULANTS: ICD-10-CM

## 2023-12-21 DIAGNOSIS — Z95.2 S/P MVR (MITRAL VALVE REPLACEMENT): Primary | ICD-10-CM

## 2023-12-21 DIAGNOSIS — Z95.2 S/P MVR (MITRAL VALVE REPLACEMENT): ICD-10-CM

## 2023-12-21 DIAGNOSIS — I48.0 PAROXYSMAL ATRIAL FIBRILLATION: ICD-10-CM

## 2023-12-21 LAB
INR PPP: 3.6 (ref 0.8–1.2)
PROTHROMBIN TIME: 35.6 SEC (ref 9–12.5)

## 2023-12-21 PROCEDURE — 85610 PROTHROMBIN TIME: CPT | Performed by: INTERNAL MEDICINE

## 2023-12-21 PROCEDURE — 36415 COLL VENOUS BLD VENIPUNCTURE: CPT | Performed by: INTERNAL MEDICINE

## 2023-12-21 PROCEDURE — 93793 PR ANTICOAGULANT MGMT FOR PT TAKING WARFARIN: ICD-10-PCS | Mod: S$GLB,,,

## 2023-12-21 PROCEDURE — 93793 ANTICOAG MGMT PT WARFARIN: CPT | Mod: S$GLB,,,

## 2023-12-21 NOTE — PROGRESS NOTES
[facility]    12/21/2023 10:54 AM    Assessment/Plan:    Patient presents today with slightly supratherapeutic INR.    Recommendation for patient's warfarin regimen: Continue current maintenance dose as INR will likely continue to drop from held dose on 12/19.    Recommend repeat INR on Tuesday, 12/26  _________________________________________________________________    Riya Nancy Soto (53 y.o.) is followed by the [facility].    Anticoagulation Summary  As of 12/21/2023      INR goal:  2.5-3.5   TTR:  43.3 % (1.1 y)   INR used for dosing:  3.6 (12/21/2023)   Warfarin maintenance plan:  5 mg (5 mg x 1) every Mon, Wed, Fri; 7.5 mg (5 mg x 1.5) all other days   Weekly warfarin total:  45 mg   Plan last modified:  Skylar Marcelo, PharmD (12/11/2023)   Next INR check:  12/26/2023   Target end date:      Indications    S/P MVR (mitral valve replacement) [Z95.2]  Atrial fibrillation [I48.91]  Long term (current) use of anticoagulants [Z79.01]                 Anticoagulation Episode Summary       INR check location:  Clinic Lab    Preferred lab:      Send INR reminders to:  [facility] COUMADIN MONITORING POOL    Comments:  [facility] - [facility]    (D/C 12/13) [facility] [phone]          Anticoagulation Care Providers       Provider Role Specialty Phone number    Celsa Knight PA-C Referring Cardiothoracic Surgery [phone]    Ralf Figueroa MD Responsible Cardiology [phone]            Patient Findings       Negatives:  Signs/symptoms of bleeding, Change in health, Change in alcohol use, Change in activity, Upcoming invasive procedure, Emergency department visit, Upcoming dental procedure, Missed doses, Extra doses, Change in medications, Change in diet/appetite, Hospital admission, Bruising    Comments:  Patient verbally confirmed the accurate dosage and  frequency of their coumadin medication. The patient states that they adhered to the medication plan as scheduled on the calendar without any deviations.  Patient confirmed hold dose on 12/19/23 per calendar  No other changes to report

## 2023-12-26 ENCOUNTER — ANTI-COAG VISIT (OUTPATIENT)
Dept: CARDIOLOGY | Facility: CLINIC | Age: 53
End: 2023-12-26
Payer: COMMERCIAL

## 2023-12-26 ENCOUNTER — LAB VISIT (OUTPATIENT)
Dept: LAB | Facility: HOSPITAL | Age: 53
End: 2023-12-26
Attending: INTERNAL MEDICINE
Payer: COMMERCIAL

## 2023-12-26 DIAGNOSIS — Z95.2 S/P MVR (MITRAL VALVE REPLACEMENT): Primary | ICD-10-CM

## 2023-12-26 DIAGNOSIS — Z95.2 S/P MVR (MITRAL VALVE REPLACEMENT): ICD-10-CM

## 2023-12-26 DIAGNOSIS — Z79.01 LONG TERM (CURRENT) USE OF ANTICOAGULANTS: ICD-10-CM

## 2023-12-26 DIAGNOSIS — I48.0 PAROXYSMAL ATRIAL FIBRILLATION: ICD-10-CM

## 2023-12-26 LAB
INR PPP: 2.9 (ref 0.8–1.2)
PROTHROMBIN TIME: 30.8 SEC (ref 9–12.5)

## 2023-12-26 PROCEDURE — 36415 COLL VENOUS BLD VENIPUNCTURE: CPT | Performed by: INTERNAL MEDICINE

## 2023-12-26 PROCEDURE — 93793 ANTICOAG MGMT PT WARFARIN: CPT | Mod: S$GLB,,,

## 2023-12-26 PROCEDURE — 93793 PR ANTICOAGULANT MGMT FOR PT TAKING WARFARIN: ICD-10-PCS | Mod: S$GLB,,,

## 2023-12-26 PROCEDURE — 85610 PROTHROMBIN TIME: CPT | Performed by: INTERNAL MEDICINE

## 2023-12-26 NOTE — PROGRESS NOTES
[facility]    2023 9:37 AM    Assessment/Plan:    Patient presents today with therapeutic INR.    Recommendation for patient's warfarin regimen: Continue current maintenance dose    Recommend repeat INR in 1 week  _________________________________________________________________    Riya Soto (53 y.o.) is followed by the [facility].    Anticoagulation Summary  As of 2023      INR goal:  2.5-3.5   TTR:  43.9 % (1.1 y)   INR used for dosin.9 (2023)   Warfarin maintenance plan:  5 mg (5 mg x 1) every Mon, Wed, Fri; 7.5 mg (5 mg x 1.5) all other days   Weekly warfarin total:  45 mg   No change documented:  Sklyar Marcelo, Tom   Plan last modified:  Skylar Marcelo PharmD (2023)   Next INR check:  2024   Target end date:      Indications    S/P MVR (mitral valve replacement) [Z95.2]  Atrial fibrillation [I48.91]  Long term (current) use of anticoagulants [Z79.01]                 Anticoagulation Episode Summary       INR check location:  Clinic Lab    Preferred lab:      Send INR reminders to:  [facility] COUMADIN MONITORING POOL    Comments:  [facility] - [facility]    (D/C ) [facility] [phone]          Anticoagulation Care Providers       Provider Role Specialty Phone number    Celsa Knight PA-C Referring Cardiothoracic Surgery [phone]    Ralf Figueroa MD [facility] Cardiology [phone]

## 2024-01-02 ENCOUNTER — ANTI-COAG VISIT (OUTPATIENT)
Dept: CARDIOLOGY | Facility: CLINIC | Age: 54
End: 2024-01-02
Payer: COMMERCIAL

## 2024-01-02 ENCOUNTER — LAB VISIT (OUTPATIENT)
Dept: LAB | Facility: HOSPITAL | Age: 54
End: 2024-01-02
Attending: INTERNAL MEDICINE
Payer: COMMERCIAL

## 2024-01-02 DIAGNOSIS — Z79.01 LONG TERM (CURRENT) USE OF ANTICOAGULANTS: ICD-10-CM

## 2024-01-02 DIAGNOSIS — Z95.2 S/P MVR (MITRAL VALVE REPLACEMENT): ICD-10-CM

## 2024-01-02 DIAGNOSIS — I48.0 PAROXYSMAL ATRIAL FIBRILLATION: ICD-10-CM

## 2024-01-02 DIAGNOSIS — Z95.2 S/P MVR (MITRAL VALVE REPLACEMENT): Primary | ICD-10-CM

## 2024-01-02 LAB
INR PPP: 2.2 (ref 0.8–1.2)
PROTHROMBIN TIME: 24 SEC (ref 9–12.5)

## 2024-01-02 PROCEDURE — 93793 ANTICOAG MGMT PT WARFARIN: CPT | Mod: S$GLB,,,

## 2024-01-02 PROCEDURE — 85610 PROTHROMBIN TIME: CPT | Performed by: INTERNAL MEDICINE

## 2024-01-02 PROCEDURE — 36415 COLL VENOUS BLD VENIPUNCTURE: CPT | Performed by: INTERNAL MEDICINE

## 2024-01-02 NOTE — PROGRESS NOTES
[facility]    2024 12:51 PM    Assessment/Plan:    Patient presents today with subtherapeutic  INR.    Recommendation for patient's warfarin regimen: Boost dose today to 10mg then resume current maintenance dose    Recommend repeat INR in 2 weeks  _________________________________________________________________    Riyajoanie Soto (53 y.o.) is followed by the [facility].    Anticoagulation Summary  As of 2024      INR goal:  2.5-3.5   TTR:  44.1 % (1.1 y)   INR used for dosin.2 (2024)   Warfarin maintenance plan:  5 mg (5 mg x 1) every Mon, Wed, Fri; 7.5 mg (5 mg x 1.5) all other days   Weekly warfarin total:  45 mg   Plan last modified:  Skylar Marcelo, PharmD (2023)   Next INR check:  2024   Target end date:      Indications    S/P MVR (mitral valve replacement) [Z95.2]  Atrial fibrillation [I48.91]  Long term (current) use of anticoagulants [Z79.01]                 Anticoagulation Episode Summary       INR check location:  Clinic Lab    Preferred lab:      Send INR reminders to:  [facility] COUMADIN MONITORING POOL    Comments:  [facility] or [facility] only Mon - Thu    (D/C ) [facility] [phone]          Anticoagulation Care Providers       Provider Role Specialty Phone number    Celsa Knight PA-C Referring Cardiothoracic Surgery [phone]    Ralf Figueroa MD [facility] Cardiology [phone]            Patient Findings       Positives:  Change in diet/appetite    Negatives:  Signs/symptoms of bleeding, Change in health, Change in alcohol use, Change in activity, Upcoming invasive procedure, Emergency department visit, Upcoming dental procedure, Missed doses, Extra doses, Change in medications, Hospital admission, Bruising    Comments:  Patient verbally confirmed the accurate dosage and frequency of their coumadin medication. The  patient states that they adhered to the medication plan as scheduled on the calendar without any deviations.  Patient has caesar salad last week

## 2024-01-17 ENCOUNTER — OFFICE VISIT (OUTPATIENT)
Dept: CARDIOLOGY | Facility: CLINIC | Age: 54
End: 2024-01-17
Payer: COMMERCIAL

## 2024-01-17 ENCOUNTER — ANTI-COAG VISIT (OUTPATIENT)
Dept: CARDIOLOGY | Facility: CLINIC | Age: 54
End: 2024-01-17
Payer: COMMERCIAL

## 2024-01-17 VITALS
DIASTOLIC BLOOD PRESSURE: 72 MMHG | HEART RATE: 81 BPM | SYSTOLIC BLOOD PRESSURE: 118 MMHG | WEIGHT: 209.88 LBS | BODY MASS INDEX: 30.99 KG/M2 | OXYGEN SATURATION: 97 %

## 2024-01-17 DIAGNOSIS — Z79.01 LONG TERM (CURRENT) USE OF ANTICOAGULANTS: ICD-10-CM

## 2024-01-17 DIAGNOSIS — Z95.2 S/P MVR (MITRAL VALVE REPLACEMENT): Primary | ICD-10-CM

## 2024-01-17 DIAGNOSIS — I48.0 PAROXYSMAL ATRIAL FIBRILLATION: ICD-10-CM

## 2024-01-17 DIAGNOSIS — E78.5 HYPERLIPIDEMIA, UNSPECIFIED HYPERLIPIDEMIA TYPE: ICD-10-CM

## 2024-01-17 DIAGNOSIS — E87.6 HYPOKALEMIA: ICD-10-CM

## 2024-01-17 DIAGNOSIS — Z86.79 S/P MAZE OPERATION FOR ATRIAL FIBRILLATION: ICD-10-CM

## 2024-01-17 DIAGNOSIS — R06.09 DYSPNEA ON EXERTION: ICD-10-CM

## 2024-01-17 DIAGNOSIS — Z98.890 S/P TVR (TRICUSPID VALVE REPAIR): ICD-10-CM

## 2024-01-17 DIAGNOSIS — Z98.890 S/P MAZE OPERATION FOR ATRIAL FIBRILLATION: ICD-10-CM

## 2024-01-17 PROCEDURE — 1160F RVW MEDS BY RX/DR IN RCRD: CPT | Mod: CPTII,S$GLB,, | Performed by: INTERNAL MEDICINE

## 2024-01-17 PROCEDURE — 1159F MED LIST DOCD IN RCRD: CPT | Mod: CPTII,S$GLB,, | Performed by: INTERNAL MEDICINE

## 2024-01-17 PROCEDURE — 3078F DIAST BP <80 MM HG: CPT | Mod: CPTII,S$GLB,, | Performed by: INTERNAL MEDICINE

## 2024-01-17 PROCEDURE — 99214 OFFICE O/P EST MOD 30 MIN: CPT | Mod: S$GLB,,, | Performed by: INTERNAL MEDICINE

## 2024-01-17 PROCEDURE — 99999 PR PBB SHADOW E&M-EST. PATIENT-LVL III: CPT | Mod: PBBFAC,,, | Performed by: INTERNAL MEDICINE

## 2024-01-17 PROCEDURE — 3008F BODY MASS INDEX DOCD: CPT | Mod: CPTII,S$GLB,, | Performed by: INTERNAL MEDICINE

## 2024-01-17 PROCEDURE — 3074F SYST BP LT 130 MM HG: CPT | Mod: CPTII,S$GLB,, | Performed by: INTERNAL MEDICINE

## 2024-01-17 NOTE — PROGRESS NOTES
[facility]    2024 11:07 AM    Assessment/Plan:    Patient presents today with therapeutic INR.    Recommendation for patient's warfarin regimen: Continue current maintenance dose    Recommend repeat INR in 3 weeks  _________________________________________________________________    Riya Soto (53 y.o.) is followed by the [facility].    Anticoagulation Summary  As of 2024      INR goal:  2.5-3.5   TTR:  43.4 % (1.2 y)   INR used for dosin.6 (2024)   Warfarin maintenance plan:  5 mg (5 mg x 1) every Mon, Wed, Fri; 7.5 mg (5 mg x 1.5) all other days   Weekly warfarin total:  45 mg   No change documented:  Skylar Marcelo, Tom   Plan last modified:  Skylar Marcelo PharmD (2023)   Next INR check:  2024   Target end date:      Indications    S/P MVR (mitral valve replacement) [Z95.2]  Atrial fibrillation [I48.91]  Long term (current) use of anticoagulants [Z79.01]                 Anticoagulation Episode Summary       INR check location:  Clinic Lab    Preferred lab:      Send INR reminders to:  [facility] COUMADIN MONITORING POOL    Comments:  [facility] Mon - Thu    (D/C ) [facility] [phone]          Anticoagulation Care Providers       Provider Role Specialty Phone number    Celsa Knight PA-C Referring Cardiothoracic Surgery [phone]    Ralf Figueroa MD [facility] Cardiology [phone]
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
## 2024-01-29 ENCOUNTER — HOSPITAL ENCOUNTER (EMERGENCY)
Facility: HOSPITAL | Age: 54
Discharge: HOME | End: 2024-01-29
Attending: EMERGENCY MEDICINE
Payer: COMMERCIAL

## 2024-01-29 VITALS
HEIGHT: 67 IN | WEIGHT: 205 LBS | OXYGEN SATURATION: 99 % | TEMPERATURE: 98 F | BODY MASS INDEX: 32.18 KG/M2 | HEART RATE: 96 BPM | DIASTOLIC BLOOD PRESSURE: 65 MMHG | SYSTOLIC BLOOD PRESSURE: 123 MMHG | RESPIRATION RATE: 18 BRPM

## 2024-01-29 DIAGNOSIS — M79.605 LEFT LEG PAIN: Primary | ICD-10-CM

## 2024-01-29 PROCEDURE — 25000003 PHARM REV CODE 250: Performed by: EMERGENCY MEDICINE

## 2024-01-29 PROCEDURE — 99283 EMERGENCY DEPT VISIT LOW MDM: CPT

## 2024-01-29 RX ORDER — METHOCARBAMOL 500 MG/1
1000 TABLET, FILM COATED ORAL 3 TIMES DAILY PRN
Qty: 20 TABLET | Refills: 0 | Status: SHIPPED | OUTPATIENT
Start: 2024-01-29 | End: 2024-02-03

## 2024-01-29 RX ORDER — METHOCARBAMOL 500 MG/1
1000 TABLET, FILM COATED ORAL
Status: COMPLETED | OUTPATIENT
Start: 2024-01-29 | End: 2024-01-29

## 2024-01-29 RX ADMIN — METHOCARBAMOL 1000 MG: 500 TABLET ORAL at 01:01

## 2024-01-29 NOTE — ED PROVIDER NOTES
"Encounter Date: 1/29/2024       History     Chief Complaint   Patient presents with    Leg Pain     Left leg pain "for a while." States she "feels a knot behind her knee." Pt is on coumadin for a mechanical heart valve       53-year-old female with a past medical history of atrial fibrillation, hyperlipidemia, and a mitral valve replacement presents with left leg pain.  She reports that she has had pain behind the left medial knee for approximately 2 months.  She denies any recent trauma, falls, swelling, skin lesions, or fever/chills.  She has not taking anything for pain relief.  She is chronically anticoagulated with Coumadin for her mitral valve replacement.  Her pain is worse with palpation and movement.  There are no alleviating factors.      Review of patient's allergies indicates:   Allergen Reactions    Amoxicillin Hives and Other (See Comments)     Past Medical History:   Diagnosis Date    A-fib     Atrial fibrillation     Back pain     Hyperlipidemia     Mitral incompetence     Mitral valve stenosis     Neck pain      Past Surgical History:   Procedure Laterality Date    ABLATION N/A 11/07/2022    Procedure: MAZE;  Surgeon: Bobby Gilbert MD;  Location: [facility] OR [address] [place] [address];  Service: Cardiothoracic;  Laterality: N/A;  Cryoablation    CARDIAC SURGERY      to repair mitral valve    CHOLECYSTECTOMY      COLONOSCOPY      COLONOSCOPY N/A 06/08/2022    Procedure: COLONOSCOPY;  Surgeon: Sanford Andres MD;  Location: [facility];  Service: Endoscopy;  Laterality: N/A;    CORONARY ANGIOGRAPHY N/A 08/29/2022    Procedure: ANGIOGRAM, CORONARY ARTERY;  Surgeon: Lexa Harley MD;  Location: [facility] CATH LAB;  Service: Cardiology;  Laterality: N/A;    EGD, WITH CLOSED BIOPSY  11/13/2023    ESOPHAGOGASTRODUODENOSCOPY N/A 11/13/2023    Procedure: EGD (ESOPHAGOGASTRODUODENOSCOPY);  Surgeon: Shashi Michele MD;  Location: [facility];  Service: Endoscopy;  Laterality: N/A;    MITRAL VALVE REPLACEMENT N/A " 11/07/2022    Procedure: REPLACEMENT, MITRAL VALVE;  Surgeon: Bobby Gilbert MD;  [facility];  Service: Cardiothoracic;  Laterality: N/A;  REDO STERNOTOMY    STERNOTOMY N/A 11/07/2022    Procedure: STERNOTOMY;  Surgeon: Bobby Gilbert MD;  [facility];  Service: Cardiothoracic;  Laterality: N/A;  Redo sternotomy    TRANSESOPHAGEAL ECHOCARDIOGRAPHY N/A 03/29/2022    Procedure: ECHOCARDIOGRAM, TRANSESOPHAGEAL;  Surgeon: Dominic Wallis MD;  Location: [facility] EP LAB;  Service: Cardiology;  Laterality: N/A;    TREATMENT OF CARDIAC ARRHYTHMIA N/A 03/29/2022    Procedure: Cardioversion or Defibrillation;  Surgeon: Elmo Jones MD;  Location: [facility] EP LAB;  Service: Cardiology;  Laterality: N/A;  afib, dccv, stanely, anes, UT, 3prep, Provider to perform     TRICUSPID VALVULOPLASTY N/A 11/07/2022    Procedure: REPAIR, TRICUSPID VALVE;  Surgeon: Bobby Gilbert MD;  Location: [facility] OR [address] [place] [address];  Service: Cardiothoracic;  Laterality: N/A;     Family History   Problem Relation Age of Onset    Breast cancer Sister     Colon cancer Maternal Aunt     Ovarian cancer Neg Hx      Social History     Tobacco Use    Smoking status: Never    Smokeless tobacco: Never   Substance Use Topics    Alcohol use: Yes     Comment: socially    Drug use: No     Review of Systems   Constitutional:  Negative for chills and fever.   Respiratory:  Negative for shortness of breath.    Cardiovascular:  Negative for chest pain.   Gastrointestinal:  Negative for abdominal pain, nausea and vomiting.   Genitourinary:  Negative for dysuria.   Musculoskeletal:  Positive for myalgias. Negative for back pain.   Skin:  Negative for color change.   Psychiatric/Behavioral:  Negative for confusion.        Physical Exam     Initial Vitals   BP Pulse Resp Temp SpO2   01/29/24 0020 01/29/24 0020 01/29/24 0020 01/29/24 0020 01/29/24 0029   123/65 96 18 98 °F (36.7 °C) 99 %      MAP       --                Physical Exam    Nursing note  and vitals reviewed.  Constitutional: She appears well-developed and well-nourished.   HENT:   Head: Normocephalic and atraumatic.   Eyes: EOM are normal. Pupils are equal, round, and reactive to light.   Neck: Neck supple.   Musculoskeletal:         General: Tenderness present.      Cervical back: Neck supple.      Comments: Tenderness to palpation along the medial posterior left knee.  No edema, ecchymosis, masses, skin color changes, or skin lesions noted.  2+ left dorsalis pedis pulse noted.     Neurological: She is alert and oriented to person, place, and time. She has normal strength. GCS score is 15. GCS eye subscore is 4. GCS verbal subscore is 5. GCS motor subscore is 6.   Skin: Skin is warm and dry.   Psychiatric: She has a normal mood and affect.         ED Course   Procedures  Labs Reviewed - No data to display       Imaging Results    None          Medications   methocarbamoL tablet 1,000 mg (1,000 mg Oral Given 1/29/24 0102)     Medical Decision Making  53-year-old female presented with leg pain.    Initial differential diagnosis included but not limited to musculoskeletal pain, tendonitis, and arthritis.    Risk  Prescription drug management.  Risk Details: The patient was emergently evaluated in the emergency department, her evaluation was significant for a middle-age female with tenderness along the medial posterior left knee.  The patient likely has a tendinitis versus musculoskeletal pain.  I doubt an acute DVT, as the patient is chronically anticoagulated with Coumadin.  I do not believe that she needs any radiologic imaging including ultrasound or x-ray at this time.  She was treated with a dose of p.o. Robaxin here in the emergency department.  She also had an Ace wrap placed to the region by the nursing staff for support and comfort.  She is stable for discharge to home and does not require further care or workup at this time.  She will be discharged home with p.o. Robaxin.  She is referred to  primary care for follow-up.                                      Clinical Impression:  Final diagnoses:  [M79.605] Left leg pain (Primary)          ED Disposition Condition    Discharge Stable          ED Prescriptions       Medication Sig Dispense Start Date End Date Auth. Provider    methocarbamoL (ROBAXIN) 500 MG Tab Take 2 tablets (1,000 mg total) by mouth 3 (three) times daily as needed (pain). 20 tablet 1/29/2024 2/3/2024 Atrem Roy MD          Follow-up Information       Follow up With Specialties Details Why Contact Info    Pantera Yadav MD Family Medicine Schedule an appointment as soon as possible for a visit   [address] [place] [address]  [phone]               Artem Roy MD  01/29/24 0118

## 2024-02-07 ENCOUNTER — LAB VISIT (OUTPATIENT)
Dept: LAB | Facility: HOSPITAL | Age: 54
End: 2024-02-07
Attending: INTERNAL MEDICINE
Payer: COMMERCIAL

## 2024-02-07 ENCOUNTER — ANTI-COAG VISIT (OUTPATIENT)
Dept: CARDIOLOGY | Facility: CLINIC | Age: 54
End: 2024-02-07
Payer: COMMERCIAL

## 2024-02-07 DIAGNOSIS — Z95.2 S/P MVR (MITRAL VALVE REPLACEMENT): ICD-10-CM

## 2024-02-07 DIAGNOSIS — Z98.890 S/P TVR (TRICUSPID VALVE REPAIR): ICD-10-CM

## 2024-02-07 DIAGNOSIS — Z79.01 LONG TERM (CURRENT) USE OF ANTICOAGULANTS: ICD-10-CM

## 2024-02-07 DIAGNOSIS — Z95.2 S/P MITRAL VALVE REPLACEMENT: ICD-10-CM

## 2024-02-07 DIAGNOSIS — E78.5 HYPERLIPIDEMIA, UNSPECIFIED HYPERLIPIDEMIA TYPE: ICD-10-CM

## 2024-02-07 DIAGNOSIS — I48.0 PAROXYSMAL ATRIAL FIBRILLATION: ICD-10-CM

## 2024-02-07 DIAGNOSIS — R06.09 DYSPNEA ON EXERTION: ICD-10-CM

## 2024-02-07 DIAGNOSIS — Z79.01 ANTICOAGULANT LONG-TERM USE: ICD-10-CM

## 2024-02-07 DIAGNOSIS — Z95.2 S/P MVR (MITRAL VALVE REPLACEMENT): Primary | ICD-10-CM

## 2024-02-07 DIAGNOSIS — Z98.890 S/P MAZE OPERATION FOR ATRIAL FIBRILLATION: ICD-10-CM

## 2024-02-07 DIAGNOSIS — Z86.79 S/P MAZE OPERATION FOR ATRIAL FIBRILLATION: ICD-10-CM

## 2024-02-07 DIAGNOSIS — E87.6 HYPOKALEMIA: ICD-10-CM

## 2024-02-07 DIAGNOSIS — I05.1 RHEUMATIC MITRAL REGURGITATION: ICD-10-CM

## 2024-02-07 LAB
ALBUMIN SERPL BCP-MCNC: 4 G/DL (ref 3.5–5.2)
ALP SERPL-CCNC: 105 U/L (ref 55–135)
ALT SERPL W/O P-5'-P-CCNC: 20 U/L (ref 10–44)
ANION GAP SERPL CALC-SCNC: 7 MMOL/L (ref 8–16)
ANION GAP SERPL CALC-SCNC: 7 MMOL/L (ref 8–16)
AST SERPL-CCNC: 20 U/L (ref 10–40)
BILIRUB SERPL-MCNC: 0.8 MG/DL (ref 0.1–1)
BUN SERPL-MCNC: 15 MG/DL (ref 6–20)
BUN SERPL-MCNC: 15 MG/DL (ref 6–20)
CALCIUM SERPL-MCNC: 9.3 MG/DL (ref 8.7–10.5)
CALCIUM SERPL-MCNC: 9.3 MG/DL (ref 8.7–10.5)
CHLORIDE SERPL-SCNC: 107 MMOL/L (ref 95–110)
CHLORIDE SERPL-SCNC: 107 MMOL/L (ref 95–110)
CO2 SERPL-SCNC: 28 MMOL/L (ref 23–29)
CO2 SERPL-SCNC: 28 MMOL/L (ref 23–29)
CREAT SERPL-MCNC: 1 MG/DL (ref 0.5–1.4)
CREAT SERPL-MCNC: 1 MG/DL (ref 0.5–1.4)
EST. GFR  (NO RACE VARIABLE): >60 ML/MIN/1.73 M^2
EST. GFR  (NO RACE VARIABLE): >60 ML/MIN/1.73 M^2
GLUCOSE SERPL-MCNC: 97 MG/DL (ref 70–110)
GLUCOSE SERPL-MCNC: 97 MG/DL (ref 70–110)
INR PPP: 3.4 (ref 0.8–1.2)
MAGNESIUM SERPL-MCNC: 2 MG/DL (ref 1.6–2.6)
POTASSIUM SERPL-SCNC: 3.7 MMOL/L (ref 3.5–5.1)
POTASSIUM SERPL-SCNC: 3.7 MMOL/L (ref 3.5–5.1)
PROT SERPL-MCNC: 7.9 G/DL (ref 6–8.4)
PROTHROMBIN TIME: 33.5 SEC (ref 9–12.5)
SODIUM SERPL-SCNC: 142 MMOL/L (ref 136–145)
SODIUM SERPL-SCNC: 142 MMOL/L (ref 136–145)

## 2024-02-07 PROCEDURE — 80053 COMPREHEN METABOLIC PANEL: CPT | Performed by: INTERNAL MEDICINE

## 2024-02-07 PROCEDURE — 85610 PROTHROMBIN TIME: CPT | Performed by: INTERNAL MEDICINE

## 2024-02-07 PROCEDURE — 36415 COLL VENOUS BLD VENIPUNCTURE: CPT | Performed by: INTERNAL MEDICINE

## 2024-02-07 PROCEDURE — 83735 ASSAY OF MAGNESIUM: CPT | Performed by: INTERNAL MEDICINE

## 2024-02-07 PROCEDURE — 93793 ANTICOAG MGMT PT WARFARIN: CPT | Mod: S$GLB,,,

## 2024-02-07 NOTE — PROGRESS NOTES
[facility]    02/07/2024 12:02 PM    Assessment/Plan:    Patient presents today with therapeutic INR.    Recommendation for patient's warfarin regimen: Decrease maintenance dose given recent history of GI bleed    Recommend repeat INR in 3 weeks  _________________________________________________________________    Riya Soto (53 y.o.) is followed by the [employer] [facility] Management [facility].    Anticoagulation Summary  As of 2/7/2024      INR goal:  2.5-3.5   TTR:  46.1 % (1.2 y)   INR used for dosing:  3.4 (2/7/2024)   Warfarin maintenance plan:  7.5 mg (5 mg x 1.5) every Tue, Thu, Sat; 5 mg (5 mg x 1) all other days   Weekly warfarin total:  42.5 mg   Plan last modified:  Skylar Marcelo, PharmD (2/7/2024)   Next INR check:  2/28/2024   Target end date:      Indications    S/P MVR (mitral valve replacement) [Z95.2]  Atrial fibrillation [I48.91]  Long term (current) use of anticoagulants [Z79.01]                 Anticoagulation Episode Summary       INR check location:  Clinic Lab    Preferred lab:      Send INR reminders to:  [facility] COUMADIN MONITORING POOL    Comments:  NMJAYLIN or KRIS [facility] [place] [facility] only Mon - Thu    (D/C 12/13) [facility] [place] [phone]          Anticoagulation Care Providers       Provider Role Specialty Phone number    Celsa Knight PA-C Referring Cardiothoracic Surgery [phone]    Ralf Figueroa MD Responsible Cardiology [phone]

## 2024-02-28 ENCOUNTER — LAB VISIT (OUTPATIENT)
Dept: LAB | Facility: HOSPITAL | Age: 54
End: 2024-02-28
Attending: INTERNAL MEDICINE
Payer: COMMERCIAL

## 2024-02-28 ENCOUNTER — ANTI-COAG VISIT (OUTPATIENT)
Dept: CARDIOLOGY | Facility: CLINIC | Age: 54
End: 2024-02-28
Payer: COMMERCIAL

## 2024-02-28 DIAGNOSIS — Z79.01 LONG TERM (CURRENT) USE OF ANTICOAGULANTS: ICD-10-CM

## 2024-02-28 DIAGNOSIS — Z95.2 S/P MVR (MITRAL VALVE REPLACEMENT): ICD-10-CM

## 2024-02-28 DIAGNOSIS — Z95.2 S/P MVR (MITRAL VALVE REPLACEMENT): Primary | ICD-10-CM

## 2024-02-28 LAB
INR PPP: 2.7 (ref 0.8–1.2)
PROTHROMBIN TIME: 27.6 SEC (ref 9–12.5)

## 2024-02-28 PROCEDURE — 85610 PROTHROMBIN TIME: CPT | Performed by: INTERNAL MEDICINE

## 2024-02-28 PROCEDURE — 36415 COLL VENOUS BLD VENIPUNCTURE: CPT | Performed by: INTERNAL MEDICINE

## 2024-02-28 PROCEDURE — 93793 ANTICOAG MGMT PT WARFARIN: CPT | Mod: S$GLB,,,

## 2024-02-28 NOTE — PROGRESS NOTES
[facility]    2024 10:53 AM    Assessment/Plan:    Patient presents today with therapeutic INR.    Recommendation for patient's warfarin regimen: Continue current maintenance dose    Recommend repeat INR in 3 weeks  _________________________________________________________________    Riya Soto (53 y.o.) is followed by the [facility].    Anticoagulation Summary  As of 2024      INR goal:  2.5-3.5   TTR:  48.5 % (1.3 y)   INR used for dosin.7 (2024)   Warfarin maintenance plan:  7.5 mg (5 mg x 1.5) every Tue, Thu, Sat; 5 mg (5 mg x 1) all other days   Weekly warfarin total:  42.5 mg   No change documented:  Skylar Marcelo, Tom   Plan last modified:  Skylar Marcelo, PharmD (2024)   Next INR check:  3/20/2024   Target end date:      Indications    S/P MVR (mitral valve replacement) [Z95.2]  Atrial fibrillation [I48.91]  Long term (current) use of anticoagulants [Z79.01]                 Anticoagulation Episode Summary       INR check location:  Clinic Lab    Preferred lab:      Send INR reminders to:  [facility] COUMADIN MONITORING POOL    Comments:  NMJAYLIN or KRIS [facility] [place] [facility] only     (D/C ) [facility] [place] [phone]          Anticoagulation Care Providers       Provider Role Specialty Phone number    Celsa Knight PA-C Referring Cardiothoracic Surgery [phone]    Ralf Figueroa MD Responsible Cardiology [phone]

## 2024-03-20 ENCOUNTER — ANTI-COAG VISIT (OUTPATIENT)
Dept: CARDIOLOGY | Facility: CLINIC | Age: 54
End: 2024-03-20
Payer: COMMERCIAL

## 2024-03-20 ENCOUNTER — LAB VISIT (OUTPATIENT)
Dept: LAB | Facility: HOSPITAL | Age: 54
End: 2024-03-20
Attending: INTERNAL MEDICINE
Payer: COMMERCIAL

## 2024-03-20 DIAGNOSIS — Z79.01 LONG TERM (CURRENT) USE OF ANTICOAGULANTS: ICD-10-CM

## 2024-03-20 DIAGNOSIS — Z95.2 S/P MVR (MITRAL VALVE REPLACEMENT): Primary | ICD-10-CM

## 2024-03-20 DIAGNOSIS — Z95.2 S/P MVR (MITRAL VALVE REPLACEMENT): ICD-10-CM

## 2024-03-20 DIAGNOSIS — I48.0 PAROXYSMAL ATRIAL FIBRILLATION: ICD-10-CM

## 2024-03-20 LAB
INR PPP: 1.8 (ref 0.8–1.2)
PROTHROMBIN TIME: 18.7 SEC (ref 9–12.5)

## 2024-03-20 PROCEDURE — 85610 PROTHROMBIN TIME: CPT | Performed by: INTERNAL MEDICINE

## 2024-03-20 PROCEDURE — 36415 COLL VENOUS BLD VENIPUNCTURE: CPT | Performed by: INTERNAL MEDICINE

## 2024-03-20 PROCEDURE — 93793 ANTICOAG MGMT PT WARFARIN: CPT | Mod: S$GLB,,,

## 2024-03-21 NOTE — PROGRESS NOTES
[facility]    2024 8:59 AM    Assessment/Plan:    Patient presents today with subtherapeutic  INR.    Assessment of patient findings and chart review: pt reports consuming some ETOH and greens - both of which she states she does not normally do    Recommendation for patient's warfarin regimen: Boost dose today to 10mg then resume current maintenance dose    Recommend repeat INR in 1 week  _________________________________________________________________    Riya Nancy Soto (53 y.o.) is followed by the [employer] [facility] Management Program.    Anticoagulation Summary  As of 3/20/2024      INR goal:  2.5-3.5   TTR:  47.4 % (1.3 y)   INR used for dosin.8 (3/20/2024)   Warfarin maintenance plan:  7.5 mg (5 mg x 1.5) every Tue, Thu, Sat; 5 mg (5 mg x 1) all other days   Weekly warfarin total:  42.5 mg   Plan last modified:  Skylar Marcelo, PharmD (2024)   Next INR check:  3/27/2024   Target end date:      Indications    S/P MVR (mitral valve replacement) [Z95.2]  Atrial fibrillation [I48.91]  Long term (current) use of anticoagulants [Z79.01]                 Anticoagulation Episode Summary       INR check location:  Clinic Lab    Preferred lab:      Send INR reminders to:  [facility] COUMADIN MONITORING POOL    Comments:  NMJAYLIN or KR[facility] [place] [facility] only Mon - Thu    (D/C ) [facility] [phone]          Anticoagulation Care Providers       Provider Role Specialty Phone number    Celsa Knight PA-C Referring Cardiothoracic Surgery [phone]    Ralf Figueroa MD Responsible Cardiology [phone]            Patient Findings       Positives:  Change in alcohol use, Change in diet/appetite    Negatives:  Signs/symptoms of bleeding, Change in health, Upcoming invasive procedure, Emergency department visit, Upcoming dental procedure, Missed doses, Extra doses, Change in medications, Hospital  admission, Bruising    Comments:  Patient confirmed correct warfarin dosing per calendar. Correct tablet size and color was confirmed  Had some rum punch earlier this week (does not usually consume alcohol)  Had spinach dip (usually does not eat greens)

## 2024-03-27 ENCOUNTER — ANTI-COAG VISIT (OUTPATIENT)
Dept: CARDIOLOGY | Facility: CLINIC | Age: 54
End: 2024-03-27
Payer: COMMERCIAL

## 2024-03-27 ENCOUNTER — LAB VISIT (OUTPATIENT)
Dept: LAB | Facility: HOSPITAL | Age: 54
End: 2024-03-27
Attending: INTERNAL MEDICINE
Payer: COMMERCIAL

## 2024-03-27 DIAGNOSIS — Z79.01 LONG TERM (CURRENT) USE OF ANTICOAGULANTS: ICD-10-CM

## 2024-03-27 DIAGNOSIS — Z95.2 S/P MVR (MITRAL VALVE REPLACEMENT): Primary | ICD-10-CM

## 2024-03-27 DIAGNOSIS — Z95.2 S/P MVR (MITRAL VALVE REPLACEMENT): ICD-10-CM

## 2024-03-27 LAB
INR PPP: 3.1 (ref 0.8–1.2)
PROTHROMBIN TIME: 31.4 SEC (ref 9–12.5)

## 2024-03-27 PROCEDURE — 93793 ANTICOAG MGMT PT WARFARIN: CPT | Mod: S$GLB,,,

## 2024-03-27 PROCEDURE — 85610 PROTHROMBIN TIME: CPT | Performed by: INTERNAL MEDICINE

## 2024-03-27 PROCEDURE — 36415 COLL VENOUS BLD VENIPUNCTURE: CPT | Performed by: INTERNAL MEDICINE

## 2024-03-27 NOTE — PROGRESS NOTES
[facility]    03/27/2024 11:49 AM    Assessment/Plan:    Patient presents today with therapeutic INR.    Recommendation for patient's warfarin regimen: Continue current maintenance dose    Recommend repeat INR in 2 weeks  _________________________________________________________________    Riya Soto (53 y.o.) is followed by the [employer] [facility].    Anticoagulation Summary  As of 3/27/2024      INR goal:  2.5-3.5   TTR:  47.4 % (1.3 y)   INR used for dosing:  3.1 (3/27/2024)   Warfarin maintenance plan:  7.5 mg (5 mg x 1.5) every Tue, Thu, Sat; 5 mg (5 mg x 1) all other days   Weekly warfarin total:  42.5 mg   No change documented:  Skylar Marcelo, Tom   Plan last modified:  Skylar Marcelo, PharmD (2/7/2024)   Next INR check:  4/10/2024   Target end date:      Indications    S/P MVR (mitral valve replacement) [Z95.2]  Atrial fibrillation [I48.91]  Long term (current) use of anticoagulants [Z79.01]                 Anticoagulation Episode Summary       INR check location:  Clinic Lab    Preferred lab:      Send INR reminders to:  [facility] COUMADIN MONITORING POOL    Comments:  NMJAYLIN or KR[facility] only Mon - Thu    (D/C 12/13) [facility] [phone]          Anticoagulation Care Providers       Provider Role Specialty Phone number    Celsa Knight PA-C Referring Cardiothoracic Surgery [phone]    Ralf Figueroa MD Responsible Cardiology [phone]

## 2024-04-10 ENCOUNTER — ANTI-COAG VISIT (OUTPATIENT)
Dept: CARDIOLOGY | Facility: CLINIC | Age: 54
End: 2024-04-10
Payer: COMMERCIAL

## 2024-04-10 ENCOUNTER — LAB VISIT (OUTPATIENT)
Dept: LAB | Facility: HOSPITAL | Age: 54
End: 2024-04-10
Attending: INTERNAL MEDICINE
Payer: COMMERCIAL

## 2024-04-10 DIAGNOSIS — Z95.2 S/P MVR (MITRAL VALVE REPLACEMENT): ICD-10-CM

## 2024-04-10 DIAGNOSIS — Z79.01 LONG TERM (CURRENT) USE OF ANTICOAGULANTS: ICD-10-CM

## 2024-04-10 DIAGNOSIS — Z95.2 S/P MVR (MITRAL VALVE REPLACEMENT): Primary | ICD-10-CM

## 2024-04-10 LAB
INR PPP: 2.2 (ref 0.8–1.2)
PROTHROMBIN TIME: 22.7 SEC (ref 9–12.5)

## 2024-04-10 PROCEDURE — 93793 ANTICOAG MGMT PT WARFARIN: CPT | Mod: S$GLB,,,

## 2024-04-10 PROCEDURE — 36415 COLL VENOUS BLD VENIPUNCTURE: CPT | Performed by: INTERNAL MEDICINE

## 2024-04-10 PROCEDURE — 85610 PROTHROMBIN TIME: CPT | Performed by: INTERNAL MEDICINE

## 2024-04-10 NOTE — PROGRESS NOTES
[facility]    04/10/2024 8:57 AM    Assessment/Plan:    Patient presents today with slightly subtherapeutic  INR.    Recommendation for patient's warfarin regimen: Boost dose today to 7.5mg then resume current maintenance dose    Recommend repeat INR in 2 weeks  _________________________________________________________________    Riya Soto (53 y.o.) is followed by the [employer] Anticoagulation Management Program.    Anticoagulation Summary  As of 4/10/2024      INR goal:  2.5-3.5   TTR:  47.9 % (1.4 y)   INR used for dosin.2 (4/10/2024)   Warfarin maintenance plan:  7.5 mg (5 mg x 1.5) every Tue, Thu, Sat; 5 mg (5 mg x 1) all other days   Weekly warfarin total:  42.5 mg   Plan last modified:  Skylar Marcelo, PharmD (2024)   Next INR check:  2024   Target end date:      Indications    S/P MVR (mitral valve replacement) [Z95.2]  Atrial fibrillation [I48.91]  Long term (current) use of anticoagulants [Z79.01]                 Anticoagulation Episode Summary       INR check location:  Clinic Lab    Preferred lab:      Send INR reminders to:  [facility] COUMADIN MONITORING POOL    Comments:  [facility] only     (D/C ) [facility] [phone]          Anticoagulation Care Providers       Provider Role Specialty Phone number    Celsa Knight PA-C Referring Cardiothoracic Surgery [phone]    Ralf Figueroa MD Responsible Cardiology [phone]

## 2024-04-24 ENCOUNTER — ANTI-COAG VISIT (OUTPATIENT)
Dept: CARDIOLOGY | Facility: CLINIC | Age: 54
End: 2024-04-24
Payer: COMMERCIAL

## 2024-04-24 ENCOUNTER — LAB VISIT (OUTPATIENT)
Dept: LAB | Facility: HOSPITAL | Age: 54
End: 2024-04-24
Attending: INTERNAL MEDICINE
Payer: COMMERCIAL

## 2024-04-24 DIAGNOSIS — Z95.2 S/P MVR (MITRAL VALVE REPLACEMENT): Primary | ICD-10-CM

## 2024-04-24 DIAGNOSIS — Z95.2 S/P MVR (MITRAL VALVE REPLACEMENT): ICD-10-CM

## 2024-04-24 DIAGNOSIS — Z79.01 LONG TERM (CURRENT) USE OF ANTICOAGULANTS: ICD-10-CM

## 2024-04-24 LAB
INR PPP: 2.6 (ref 0.8–1.2)
PROTHROMBIN TIME: 26.3 SEC (ref 9–12.5)

## 2024-04-24 PROCEDURE — 85610 PROTHROMBIN TIME: CPT | Performed by: INTERNAL MEDICINE

## 2024-04-24 PROCEDURE — 36415 COLL VENOUS BLD VENIPUNCTURE: CPT | Performed by: INTERNAL MEDICINE

## 2024-04-24 PROCEDURE — 93793 ANTICOAG MGMT PT WARFARIN: CPT | Mod: S$GLB,,,

## 2024-04-24 NOTE — PROGRESS NOTES
[facility]    2024 8:45 AM    Assessment/Plan:    Patient presents today with therapeutic INR.    Recommendation for patient's warfarin regimen: Continue current maintenance dose    Recommend repeat INR in 3 weeks  _________________________________________________________________    Riya Soto (53 y.o.) is followed by the [employer] [facility] Management Program.    Anticoagulation Summary  As of 2024      INR goal:  2.5-3.5   TTR:  47.3% (1.4 y)   INR used for dosin.6 (2024)   Warfarin maintenance plan:  7.5 mg (5 mg x 1.5) every Tue, Thu, Sat; 5 mg (5 mg x 1) all other days   Weekly warfarin total:  42.5 mg   No change documented:  Skylar Marcelo, Tom   Plan last modified:  Skylar Marcelo, PharmD (2024)   Next INR check:  5/15/2024   Target end date:      Indications    S/P MVR (mitral valve replacement) [Z95.2]  Atrial fibrillation [I48.91]  Long term (current) use of anticoagulants [Z79.01]                 Anticoagulation Episode Summary       INR check location:  Clinic Lab    Preferred lab:      Send INR reminders to:  [facility] COUMADIN MONITORING POOL    Comments:  NMJAYLIN or KR[facility] [place] [facility] only     (D/C ) [facility] [place] [phone]          Anticoagulation Care Providers       Provider Role Specialty Phone number    Celsa Knight PA-C Referring Cardiothoracic Surgery [phone]    Ralf Figueroa MD Responsible Cardiology [phone]

## 2024-05-17 ENCOUNTER — LAB VISIT (OUTPATIENT)
Dept: LAB | Facility: HOSPITAL | Age: 54
End: 2024-05-17
Attending: FAMILY MEDICINE
Payer: COMMERCIAL

## 2024-05-17 DIAGNOSIS — E78.00 PURE HYPERCHOLESTEROLEMIA: ICD-10-CM

## 2024-05-17 DIAGNOSIS — I48.0 PAROXYSMAL ATRIAL FIBRILLATION: ICD-10-CM

## 2024-05-17 LAB
ALBUMIN SERPL BCP-MCNC: 3.9 G/DL (ref 3.5–5.2)
ALP SERPL-CCNC: 118 U/L (ref 55–135)
ALT SERPL W/O P-5'-P-CCNC: 25 U/L (ref 10–44)
ANION GAP SERPL CALC-SCNC: 8 MMOL/L (ref 8–16)
AST SERPL-CCNC: 24 U/L (ref 10–40)
BASOPHILS # BLD AUTO: 0.02 K/UL (ref 0–0.2)
BASOPHILS NFR BLD: 0.4 % (ref 0–1.9)
BILIRUB SERPL-MCNC: 0.6 MG/DL (ref 0.1–1)
BUN SERPL-MCNC: 14 MG/DL (ref 6–20)
CALCIUM SERPL-MCNC: 9.4 MG/DL (ref 8.7–10.5)
CHLORIDE SERPL-SCNC: 104 MMOL/L (ref 95–110)
CHOLEST SERPL-MCNC: 158 MG/DL (ref 120–199)
CHOLEST/HDLC SERPL: 2.7 {RATIO} (ref 2–5)
CO2 SERPL-SCNC: 25 MMOL/L (ref 23–29)
CREAT SERPL-MCNC: 1 MG/DL (ref 0.5–1.4)
DIFFERENTIAL METHOD BLD: NORMAL
EOSINOPHIL # BLD AUTO: 0 K/UL (ref 0–0.5)
EOSINOPHIL NFR BLD: 0.8 % (ref 0–8)
ERYTHROCYTE [DISTWIDTH] IN BLOOD BY AUTOMATED COUNT: 14.5 % (ref 11.5–14.5)
EST. GFR  (NO RACE VARIABLE): >60 ML/MIN/1.73 M^2
GLUCOSE SERPL-MCNC: 102 MG/DL (ref 70–110)
HCT VFR BLD AUTO: 39.4 % (ref 37–48.5)
HDLC SERPL-MCNC: 58 MG/DL (ref 40–75)
HDLC SERPL: 36.7 % (ref 20–50)
HGB BLD-MCNC: 12.6 G/DL (ref 12–16)
IMM GRANULOCYTES # BLD AUTO: 0.01 K/UL (ref 0–0.04)
IMM GRANULOCYTES NFR BLD AUTO: 0.2 % (ref 0–0.5)
LDLC SERPL CALC-MCNC: 89 MG/DL (ref 63–159)
LYMPHOCYTES # BLD AUTO: 1.6 K/UL (ref 1–4.8)
LYMPHOCYTES NFR BLD: 33.7 % (ref 18–48)
MCH RBC QN AUTO: 29.8 PG (ref 27–31)
MCHC RBC AUTO-ENTMCNC: 32 G/DL (ref 32–36)
MCV RBC AUTO: 93 FL (ref 82–98)
MONOCYTES # BLD AUTO: 0.6 K/UL (ref 0.3–1)
MONOCYTES NFR BLD: 12.1 % (ref 4–15)
NEUTROPHILS # BLD AUTO: 2.6 K/UL (ref 1.8–7.7)
NEUTROPHILS NFR BLD: 52.8 % (ref 38–73)
NONHDLC SERPL-MCNC: 100 MG/DL
NRBC BLD-RTO: 0 /100 WBC
PLATELET # BLD AUTO: 238 K/UL (ref 150–450)
PMV BLD AUTO: 10.5 FL (ref 9.2–12.9)
POTASSIUM SERPL-SCNC: 4.1 MMOL/L (ref 3.5–5.1)
PROT SERPL-MCNC: 8.2 G/DL (ref 6–8.4)
RBC # BLD AUTO: 4.23 M/UL (ref 4–5.4)
SODIUM SERPL-SCNC: 137 MMOL/L (ref 136–145)
T4 FREE SERPL-MCNC: 1.01 NG/DL (ref 0.71–1.51)
TRIGL SERPL-MCNC: 55 MG/DL (ref 30–150)
TSH SERPL DL<=0.005 MIU/L-ACNC: 1.21 UIU/ML (ref 0.4–4)
WBC # BLD AUTO: 4.86 K/UL (ref 3.9–12.7)

## 2024-05-17 PROCEDURE — 84439 ASSAY OF FREE THYROXINE: CPT | Performed by: FAMILY MEDICINE

## 2024-05-17 PROCEDURE — 80061 LIPID PANEL: CPT | Performed by: FAMILY MEDICINE

## 2024-05-17 PROCEDURE — 85025 COMPLETE CBC W/AUTO DIFF WBC: CPT | Performed by: FAMILY MEDICINE

## 2024-05-17 PROCEDURE — 80053 COMPREHEN METABOLIC PANEL: CPT | Performed by: FAMILY MEDICINE

## 2024-05-17 PROCEDURE — 36415 COLL VENOUS BLD VENIPUNCTURE: CPT | Performed by: FAMILY MEDICINE

## 2024-05-17 PROCEDURE — 84443 ASSAY THYROID STIM HORMONE: CPT | Performed by: FAMILY MEDICINE

## 2024-06-04 DIAGNOSIS — Z86.79 S/P MAZE OPERATION FOR ATRIAL FIBRILLATION: ICD-10-CM

## 2024-06-04 DIAGNOSIS — Z98.890 S/P MAZE OPERATION FOR ATRIAL FIBRILLATION: ICD-10-CM

## 2024-06-04 DIAGNOSIS — I48.0 PAROXYSMAL ATRIAL FIBRILLATION: ICD-10-CM

## 2024-06-04 DIAGNOSIS — Z95.2 S/P MVR (MITRAL VALVE REPLACEMENT): Primary | ICD-10-CM

## 2024-06-04 DIAGNOSIS — Z79.01 LONG TERM (CURRENT) USE OF ANTICOAGULANTS: ICD-10-CM

## 2024-06-04 DIAGNOSIS — Z98.890 S/P TVR (TRICUSPID VALVE REPAIR): ICD-10-CM

## 2024-06-04 DIAGNOSIS — I05.1 RHEUMATIC MITRAL REGURGITATION: ICD-10-CM

## 2024-06-05 RX ORDER — METOPROLOL TARTRATE 25 MG/1
12.5 TABLET, FILM COATED ORAL 2 TIMES DAILY
Qty: 30 TABLET | Refills: 11 | Status: SHIPPED | OUTPATIENT
Start: 2024-06-05

## 2024-06-05 RX ORDER — DRONEDARONE 400 MG/1
TABLET, FILM COATED ORAL
Qty: 60 TABLET | Refills: 11 | Status: SHIPPED | OUTPATIENT
Start: 2024-06-05

## 2024-06-07 DIAGNOSIS — Z95.2 S/P MVR (MITRAL VALVE REPLACEMENT): ICD-10-CM

## 2024-06-07 DIAGNOSIS — I48.0 PAROXYSMAL ATRIAL FIBRILLATION: ICD-10-CM

## 2024-06-07 DIAGNOSIS — Z79.01 LONG TERM (CURRENT) USE OF ANTICOAGULANTS: ICD-10-CM

## 2024-06-11 RX ORDER — WARFARIN SODIUM 5 MG/1
TABLET ORAL
Qty: 135 TABLET | Refills: 1 | Status: SHIPPED | OUTPATIENT
Start: 2024-06-11

## 2024-06-12 ENCOUNTER — ANTI-COAG VISIT (OUTPATIENT)
Dept: CARDIOLOGY | Facility: CLINIC | Age: 54
End: 2024-06-12
Payer: COMMERCIAL

## 2024-06-12 ENCOUNTER — LAB VISIT (OUTPATIENT)
Dept: LAB | Facility: HOSPITAL | Age: 54
End: 2024-06-12
Attending: FAMILY MEDICINE
Payer: COMMERCIAL

## 2024-06-12 DIAGNOSIS — Z79.01 LONG TERM (CURRENT) USE OF ANTICOAGULANTS: ICD-10-CM

## 2024-06-12 DIAGNOSIS — I48.0 PAROXYSMAL ATRIAL FIBRILLATION: ICD-10-CM

## 2024-06-12 DIAGNOSIS — E78.00 PURE HYPERCHOLESTEROLEMIA: ICD-10-CM

## 2024-06-12 DIAGNOSIS — E78.5 HYPERLIPIDEMIA, UNSPECIFIED HYPERLIPIDEMIA TYPE: ICD-10-CM

## 2024-06-12 DIAGNOSIS — Z95.2 S/P MVR (MITRAL VALVE REPLACEMENT): ICD-10-CM

## 2024-06-12 DIAGNOSIS — Z95.2 S/P MVR (MITRAL VALVE REPLACEMENT): Primary | ICD-10-CM

## 2024-06-12 LAB
ALBUMIN SERPL BCP-MCNC: 3.9 G/DL (ref 3.5–5.2)
ALP SERPL-CCNC: 126 U/L (ref 55–135)
ALT SERPL W/O P-5'-P-CCNC: 24 U/L (ref 10–44)
ANION GAP SERPL CALC-SCNC: 8 MMOL/L (ref 8–16)
AST SERPL-CCNC: 23 U/L (ref 10–40)
BASOPHILS # BLD AUTO: 0.02 K/UL (ref 0–0.2)
BASOPHILS NFR BLD: 0.5 % (ref 0–1.9)
BILIRUB SERPL-MCNC: 0.9 MG/DL (ref 0.1–1)
BUN SERPL-MCNC: 14 MG/DL (ref 6–20)
CALCIUM SERPL-MCNC: 9.2 MG/DL (ref 8.7–10.5)
CHLORIDE SERPL-SCNC: 105 MMOL/L (ref 95–110)
CHOLEST SERPL-MCNC: 145 MG/DL (ref 120–199)
CHOLEST/HDLC SERPL: 2.8 {RATIO} (ref 2–5)
CO2 SERPL-SCNC: 26 MMOL/L (ref 23–29)
CREAT SERPL-MCNC: 1 MG/DL (ref 0.5–1.4)
DIFFERENTIAL METHOD BLD: ABNORMAL
EOSINOPHIL # BLD AUTO: 0.1 K/UL (ref 0–0.5)
EOSINOPHIL NFR BLD: 1.2 % (ref 0–8)
ERYTHROCYTE [DISTWIDTH] IN BLOOD BY AUTOMATED COUNT: 13.3 % (ref 11.5–14.5)
EST. GFR  (NO RACE VARIABLE): >60 ML/MIN/1.73 M^2
GLUCOSE SERPL-MCNC: 101 MG/DL (ref 70–110)
HCT VFR BLD AUTO: 38.5 % (ref 37–48.5)
HDLC SERPL-MCNC: 52 MG/DL (ref 40–75)
HDLC SERPL: 35.9 % (ref 20–50)
HGB BLD-MCNC: 12.3 G/DL (ref 12–16)
IMM GRANULOCYTES # BLD AUTO: 0.01 K/UL (ref 0–0.04)
IMM GRANULOCYTES NFR BLD AUTO: 0.2 % (ref 0–0.5)
INR PPP: 2.6 (ref 0.8–1.2)
LDLC SERPL CALC-MCNC: 79.8 MG/DL (ref 63–159)
LYMPHOCYTES # BLD AUTO: 1.4 K/UL (ref 1–4.8)
LYMPHOCYTES NFR BLD: 33.6 % (ref 18–48)
MCH RBC QN AUTO: 29.9 PG (ref 27–31)
MCHC RBC AUTO-ENTMCNC: 31.9 G/DL (ref 32–36)
MCV RBC AUTO: 94 FL (ref 82–98)
MONOCYTES # BLD AUTO: 0.5 K/UL (ref 0.3–1)
MONOCYTES NFR BLD: 10.6 % (ref 4–15)
NEUTROPHILS # BLD AUTO: 2.3 K/UL (ref 1.8–7.7)
NEUTROPHILS NFR BLD: 53.9 % (ref 38–73)
NONHDLC SERPL-MCNC: 93 MG/DL
NRBC BLD-RTO: 0 /100 WBC
PLATELET # BLD AUTO: 233 K/UL (ref 150–450)
PMV BLD AUTO: 9.9 FL (ref 9.2–12.9)
POTASSIUM SERPL-SCNC: 3.7 MMOL/L (ref 3.5–5.1)
PROT SERPL-MCNC: 8.1 G/DL (ref 6–8.4)
PROTHROMBIN TIME: 26.4 SEC (ref 9–12.5)
RBC # BLD AUTO: 4.11 M/UL (ref 4–5.4)
SODIUM SERPL-SCNC: 139 MMOL/L (ref 136–145)
T4 FREE SERPL-MCNC: 0.97 NG/DL (ref 0.71–1.51)
TRIGL SERPL-MCNC: 66 MG/DL (ref 30–150)
TSH SERPL DL<=0.005 MIU/L-ACNC: 0.95 UIU/ML (ref 0.4–4)
WBC # BLD AUTO: 4.23 K/UL (ref 3.9–12.7)

## 2024-06-12 PROCEDURE — 85025 COMPLETE CBC W/AUTO DIFF WBC: CPT | Performed by: FAMILY MEDICINE

## 2024-06-12 PROCEDURE — 93793 ANTICOAG MGMT PT WARFARIN: CPT | Mod: S$GLB,,,

## 2024-06-12 PROCEDURE — 84443 ASSAY THYROID STIM HORMONE: CPT | Performed by: FAMILY MEDICINE

## 2024-06-12 PROCEDURE — 85610 PROTHROMBIN TIME: CPT | Performed by: INTERNAL MEDICINE

## 2024-06-12 PROCEDURE — 80053 COMPREHEN METABOLIC PANEL: CPT | Performed by: FAMILY MEDICINE

## 2024-06-12 PROCEDURE — 80061 LIPID PANEL: CPT | Performed by: FAMILY MEDICINE

## 2024-06-12 PROCEDURE — 84439 ASSAY OF FREE THYROXINE: CPT | Performed by: FAMILY MEDICINE

## 2024-06-12 NOTE — PROGRESS NOTES
[facility]    2024 1:19 PM    Assessment/Plan:    Patient presents today with therapeutic INR.    Recommendation for patient's warfarin regimen: Continue current maintenance dose    Recommend repeat INR in 4 weeks  _________________________________________________________________    Riya Soto (53 y.o.) is followed by the [employer] [facility].    Anticoagulation Summary  As of 2024      INR goal:  2.5-3.5   TTR:  51.8% (1.6 y)   INR used for dosin.6 (2024)   Warfarin maintenance plan:  7.5 mg (5 mg x 1.5) every Tue, Thu, Sat; 5 mg (5 mg x 1) all other days   Weekly warfarin total:  42.5 mg   No change documented:  Skylar Marcelo, Tom   Plan last modified:  Skylar Marcelo, PharmD (2024)   Next INR check:  7/10/2024   Target end date:      Indications    S/P MVR (mitral valve replacement) [Z95.2]  Atrial fibrillation [I48.91]  Long term (current) use of anticoagulants [Z79.01]                 Anticoagulation Episode Summary       INR check location:  Clinic Lab    Preferred lab:      Send INR reminders to:  [facility] COUMADIN MONITORING POOL    Comments:  NMJAYLIN or KRIS [facility] only     (D/C ) [facility] [phone]          Anticoagulation Care Providers       Provider Role Specialty Phone number    Celsa Knight PA-C Referring Cardiothoracic Surgery [phone]    Ralf Figueroa MD Responsible Cardiology [phone]

## 2024-07-02 ENCOUNTER — HOSPITAL ENCOUNTER (EMERGENCY)
Facility: HOSPITAL | Age: 54
Discharge: HOME | End: 2024-07-03
Attending: EMERGENCY MEDICINE
Payer: COMMERCIAL

## 2024-07-02 DIAGNOSIS — M25.469 KNEE SWELLING: ICD-10-CM

## 2024-07-02 PROCEDURE — 99284 EMERGENCY DEPT VISIT MOD MDM: CPT | Mod: 25

## 2024-07-03 VITALS
RESPIRATION RATE: 18 BRPM | SYSTOLIC BLOOD PRESSURE: 119 MMHG | BODY MASS INDEX: 31.08 KG/M2 | HEIGHT: 67 IN | TEMPERATURE: 98 F | HEART RATE: 59 BPM | OXYGEN SATURATION: 99 % | DIASTOLIC BLOOD PRESSURE: 58 MMHG | WEIGHT: 198 LBS

## 2024-07-03 PROCEDURE — 63600175 PHARM REV CODE 636 W HCPCS: Mod: JZ | Performed by: EMERGENCY MEDICINE

## 2024-07-03 PROCEDURE — 96372 THER/PROPH/DIAG INJ SC/IM: CPT | Performed by: EMERGENCY MEDICINE

## 2024-07-03 RX ORDER — METHYLPREDNISOLONE 4 MG/1
TABLET ORAL
Qty: 1 EACH | Refills: 0 | Status: SHIPPED | OUTPATIENT
Start: 2024-07-03 | End: 2024-07-24

## 2024-07-03 RX ORDER — MORPHINE SULFATE 10 MG/ML
5 INJECTION INTRAMUSCULAR; INTRAVENOUS; SUBCUTANEOUS
Status: COMPLETED | OUTPATIENT
Start: 2024-07-03 | End: 2024-07-03

## 2024-07-03 RX ORDER — METHYLPREDNISOLONE SOD SUCC 125 MG
125 VIAL (EA) INJECTION
Status: DISCONTINUED | OUTPATIENT
Start: 2024-07-03 | End: 2024-07-03

## 2024-07-03 RX ORDER — METHYLPREDNISOLONE SOD SUCC 125 MG
125 VIAL (EA) INJECTION
Status: COMPLETED | OUTPATIENT
Start: 2024-07-03 | End: 2024-07-03

## 2024-07-03 RX ORDER — HYDROCODONE BITARTRATE AND ACETAMINOPHEN 5; 325 MG/1; MG/1
1 TABLET ORAL EVERY 6 HOURS PRN
Qty: 12 TABLET | Refills: 0 | Status: SHIPPED | OUTPATIENT
Start: 2024-07-03

## 2024-07-03 RX ADMIN — METHYLPREDNISOLONE SODIUM SUCCINATE 125 MG: 125 INJECTION, POWDER, FOR SOLUTION INTRAMUSCULAR; INTRAVENOUS at 01:07

## 2024-07-03 RX ADMIN — MORPHINE SULFATE 5 MG: 10 INJECTION, SOLUTION INTRAMUSCULAR; INTRAVENOUS at 01:07
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
## 2024-07-08 ENCOUNTER — LAB VISIT (OUTPATIENT)
Dept: LAB | Facility: HOSPITAL | Age: 54
End: 2024-07-08
Attending: INTERNAL MEDICINE
Payer: COMMERCIAL

## 2024-07-08 ENCOUNTER — ANTI-COAG VISIT (OUTPATIENT)
Dept: CARDIOLOGY | Facility: CLINIC | Age: 54
End: 2024-07-08
Payer: COMMERCIAL

## 2024-07-08 DIAGNOSIS — I48.0 PAROXYSMAL ATRIAL FIBRILLATION: ICD-10-CM

## 2024-07-08 DIAGNOSIS — Z79.01 LONG TERM (CURRENT) USE OF ANTICOAGULANTS: ICD-10-CM

## 2024-07-08 DIAGNOSIS — Z95.2 S/P MVR (MITRAL VALVE REPLACEMENT): Primary | ICD-10-CM

## 2024-07-08 DIAGNOSIS — Z95.2 S/P MVR (MITRAL VALVE REPLACEMENT): ICD-10-CM

## 2024-07-08 LAB
INR PPP: 2.7 (ref 0.8–1.2)
PROTHROMBIN TIME: 27 SEC (ref 9–12.5)

## 2024-07-08 PROCEDURE — 85610 PROTHROMBIN TIME: CPT | Performed by: INTERNAL MEDICINE

## 2024-07-08 PROCEDURE — 36415 COLL VENOUS BLD VENIPUNCTURE: CPT | Performed by: INTERNAL MEDICINE

## 2024-07-08 NOTE — PROGRESS NOTES
[facility]    2024 12:04 PM    Assessment/Plan:    Patient presents today with therapeutic INR.    Assessment of patient findings and chart review: Recent ED visit  d/t knee swelling, prescribed norco PRN and medrol dose pack.    Recommendation for patient's warfarin regimen: Continue current maintenance dose    Recommend repeat INR in 2 weeks  _________________________________________________________________    Riyajoanie Soto (53 y.o.) is followed by the [facility].    Anticoagulation Summary  As of 2024      INR goal:  2.5-3.5   TTR:  53.9% (1.6 y)   INR used for dosin.7 (2024)   Warfarin maintenance plan:  7.5 mg (5 mg x 1.5) every Tue, Thu, Sat; 5 mg (5 mg x 1) all other days   Weekly warfarin total:  42.5 mg   Plan last modified:  Skylar Marcelo, PharmD (2024)   Next INR check:  2024   Target end date:      Indications    S/P MVR (mitral valve replacement) [Z95.2]  Atrial fibrillation [I48.91]  Long term (current) use of anticoagulants [Z79.01]                 Anticoagulation Episode Summary       INR check location:  Clinic Lab    Preferred lab:      Send INR reminders to:  [facility] COUMADIN MONITORING POOL    Comments:  [facility] or [facility] [place] [facility] only Mon - Thu    (D/C ) [facility] [place] [phone]          Anticoagulation Care Providers       Provider Role Specialty Phone number    Celsa Knight PA-C Referring Cardiothoracic Surgery [phone]    Ralf Figueroa MD Responsible Cardiology [phone]

## 2024-07-09 ENCOUNTER — PATIENT MESSAGE (OUTPATIENT)
Dept: CARDIOLOGY | Facility: CLINIC | Age: 54
End: 2024-07-09
Payer: COMMERCIAL

## 2024-07-15 ENCOUNTER — OFFICE VISIT (OUTPATIENT)
Dept: ORTHOPEDICS | Facility: CLINIC | Age: 54
End: 2024-07-15
Payer: COMMERCIAL

## 2024-07-15 VITALS — HEIGHT: 67 IN | BODY MASS INDEX: 31.08 KG/M2 | WEIGHT: 198 LBS

## 2024-07-15 DIAGNOSIS — M25.562 ACUTE PAIN OF LEFT KNEE: Primary | ICD-10-CM

## 2024-07-15 PROCEDURE — 3008F BODY MASS INDEX DOCD: CPT | Mod: CPTII,S$GLB,, | Performed by: ORTHOPAEDIC SURGERY

## 2024-07-15 PROCEDURE — 99999 PR PBB SHADOW E&M-EST. PATIENT-LVL III: CPT | Mod: PBBFAC,,, | Performed by: ORTHOPAEDIC SURGERY

## 2024-07-15 PROCEDURE — 1160F RVW MEDS BY RX/DR IN RCRD: CPT | Mod: CPTII,S$GLB,, | Performed by: ORTHOPAEDIC SURGERY

## 2024-07-15 PROCEDURE — 99214 OFFICE O/P EST MOD 30 MIN: CPT | Mod: 25,S$GLB,, | Performed by: ORTHOPAEDIC SURGERY

## 2024-07-15 PROCEDURE — 1159F MED LIST DOCD IN RCRD: CPT | Mod: CPTII,S$GLB,, | Performed by: ORTHOPAEDIC SURGERY

## 2024-07-15 PROCEDURE — 20610 DRAIN/INJ JOINT/BURSA W/O US: CPT | Mod: LT,S$GLB,, | Performed by: ORTHOPAEDIC SURGERY

## 2024-07-15 RX ORDER — METHYLPREDNISOLONE ACETATE 80 MG/ML
80 INJECTION, SUSPENSION INTRA-ARTICULAR; INTRALESIONAL; INTRAMUSCULAR; SOFT TISSUE
Status: DISCONTINUED | OUTPATIENT
Start: 2024-07-15 | End: 2024-07-15 | Stop reason: HOSPADM

## 2024-07-15 RX ADMIN — METHYLPREDNISOLONE ACETATE 80 MG: 80 INJECTION, SUSPENSION INTRA-ARTICULAR; INTRALESIONAL; INTRAMUSCULAR; SOFT TISSUE at 08:07

## 2024-07-15 NOTE — PROGRESS NOTES
CC:  53-year-old female presents for evaluation of left knee pain.  The patient reports chronic pain in the left knee for a couple of weeks now.  She was seen in the emergency room and they gave her some anti-inflammatories and some p.o. steroids to take.  They also gave her a steroid injection in her muscle but not the knee.  She continues to complain of pain and swelling in the left knee.  She currently rates pain as a 6/10.  They also gave her a knee immobilizer which she is wearing today.    Past Medical History:   Diagnosis Date    A-fib     Atrial fibrillation     Back pain     Hyperlipidemia     Mitral incompetence     Mitral valve stenosis     Neck pain        Past Surgical History:   Procedure Laterality Date    ABLATION N/A 11/07/2022    Procedure: MAZE;  Surgeon: Bobby Gilbert MD;  Location: [facility] OR [address] [place] [address];  Service: Cardiothoracic;  Laterality: N/A;  Cryoablation    CARDIAC SURGERY      to repair mitral valve    CHOLECYSTECTOMY      COLONOSCOPY      COLONOSCOPY N/A 06/08/2022    Procedure: COLONOSCOPY;  Surgeon: Sanford Andres MD;  Location: [facility];  Service: Endoscopy;  Laterality: N/A;    CORONARY ANGIOGRAPHY N/A 08/29/2022    Procedure: ANGIOGRAM, CORONARY ARTERY;  Surgeon: Lexa Harley MD;  Location: [facility] CATH LAB;  Service: Cardiology;  Laterality: N/A;    EGD, WITH CLOSED BIOPSY  11/13/2023    ESOPHAGOGASTRODUODENOSCOPY N/A 11/13/2023    Procedure: EGD (ESOPHAGOGASTRODUODENOSCOPY);  Surgeon: Shashi Michele MD;  Location: [facility];  Service: Endoscopy;  Laterality: N/A;    MITRAL VALVE REPLACEMENT N/A 11/07/2022    Procedure: REPLACEMENT, MITRAL VALVE;  Surgeon: Bobby Gilbert MD;  Location: [facility] OR [address] [place] [address];  Service: Cardiothoracic;  Laterality: N/A;  REDO STERNOTOMY    STERNOTOMY N/A 11/07/2022    Procedure: STERNOTOMY;  Surgeon: Bobby Gilbert MD;  Location: [address];  Service: Cardiothoracic;  Laterality: N/A;  Redo sternotomy    TRANSESOPHAGEAL  ECHOCARDIOGRAPHY N/A 03/29/2022    Procedure: ECHOCARDIOGRAM, TRANSESOPHAGEAL;  Surgeon: Dominic Wallis MD;  Location: [facility] EP LAB;  Service: Cardiology;  Laterality: N/A;    TREATMENT OF CARDIAC ARRHYTHMIA N/A 03/29/2022    Procedure: Cardioversion or Defibrillation;  Surgeon: Elmo Jones MD;  Location: [facility] EP LAB;  Service: Cardiology;  Laterality: N/A;  afib, dccv, stanley, anes, [place], 3prep, Provider to perform     TRICUSPID VALVULOPLASTY N/A 11/07/2022    Procedure: REPAIR, TRICUSPID VALVE;  Surgeon: Bobby Gilbert MD;  Location: [facility] OR [address] [place] [address];  Service: Cardiothoracic;  Laterality: N/A;       Current Outpatient Medications on File Prior to Visit   Medication Sig Dispense Refill    atorvastatin (LIPITOR) 10 MG tablet Take 1 tablet (10 mg total) by mouth every evening. 90 tablet 3    HYDROcodone-acetaminophen (NORCO) 5-325 mg per tablet Take 1 tablet by mouth every 6 (six) hours as needed for Pain. 12 tablet 0    methylPREDNISolone (MEDROL DOSEPACK) 4 mg tablet Use as directed 1 each 0    metoprolol tartrate (LOPRESSOR) 25 MG tablet TAKE 1/2 TABLET(12.5 MG) BY MOUTH TWICE DAILY 30 tablet 11    MULTAQ 400 mg Tab TAKE 1 TABLET(400 MG) BY MOUTH TWICE DAILY WITH MEALS 60 tablet 11    warfarin (COUMADIN) 5 MG tablet TAKE 1 TO 1 AND 1/2 TABLETS(5 TO 7.5 MG) BY MOUTH DAILY AS DIRECTED BY COUMADIN CLINIC 135 tablet 1    [DISCONTINUED] citalopram (CELEXA) 10 MG tablet Take 1 tablet (10 mg total) by mouth once daily. 30 tablet 11     Current Facility-Administered Medications on File Prior to Visit   Medication Dose Route Frequency Provider Last Rate Last Admin    0.9%  NaCl infusion   Intravenous Continuous Shashi Michele MD        LIDOcaine (PF) 10 mg/ml (1%) injection 10 mg  1 mL Intradermal Once PRN Shashi Michele MD           ROS:    Constitution: Denies chills, fever, and sweats.  HENT: Denies headaches or blurry vision.  Cardiovascular: Denies chest pain or irregular heart  beat.  Respiratory: Denies cough or shortness of breath.  Gastrointestinal: Denies abdominal pain, nausea, or vomiting.  Genitourinary:  Denies urinary incontinence, bladder and kidney issues  Musculoskeletal:  Denies muscle cramps.  Neurological: Denies dizziness or focal weakness.  Psychiatric/Behavioral: Normal mental status.  Hematologic/Lymphatic: Denies bleeding problem or easy bruising/bleeding.  Skin: Denies rash or suspicious lesions.    Physical examination     Gen - No acute distress, well nourished, well groomed   Eyes - Extraoccular motions intact, pupils equally round and reactive to light and accommodation   ENT - normocephalic, atruamtic, oropharynx clear   Neck - Supple, no abnormal masses   Cardiovascular - regular rate and rhythm   Pulmonary - clear to auscultation bilaterally, no wheezes, ronchi, or rales   Abdomen - soft, non-tender, non-distended, positive bowel sounds   Psych - The patient is alert and oriented x3 with normal mood and affect    Examination of the Left Lower Extremity:     Skin intact throughout.  Motor function is intact distally EHL/FHL/TA/gene   +2 dorsalis pedis and posterior tibial pulses   Sensation to light touch intact distally dorsal, plantar, and first web space     Examination of the Left knee:    ROM 0 - 150   Effusion positive  Tenderness to palpation at the joint line positive  Pain during range of motion positive  Crepitation during range of motion positive     positive increased pain noted with flexion past 90   positive antalgic gait noted   negative Lachman's Test   negative Anterior Drawer Test   negative Posterior Drawer Test   negative McMurrays Test   negative Disco Test   negative Varus/Valgus instability    X-ray images were examined and personally interpreted by me.  Three views of the left knee dated 07/02/2024 show joint space is well-maintained with no advanced arthritic changes and no acute fractures.    Dx:  Left knee pain    Plan:  I did offer the  patient an intra-articular injection and she agreed.  We injected the left knee with a mixture of 2, 2, 1.  She tolerated that well.  Follow up p.r.n.

## 2024-07-15 NOTE — PROCEDURES
Large Joint Aspiration/Injection: L knee    Date/Time: 7/15/2024 8:15 AM    Performed by: George Kathleen II, MD  Authorized by: George Kathleen II, MD    Consent Done?:  Yes (Verbal)  Indications:  Pain    Local anesthesia used?: Yes    Local anesthetic:  Topical anesthetic    Details:  Needle Size:  22 G  Approach:  Anterolateral  Location:  Knee  Site:  L knee  Medications:  80 mg methylPREDNISolone acetate 80 mg/mL  Patient tolerance:  Patient tolerated the procedure well with no immediate complications

## 2024-07-24 ENCOUNTER — OFFICE VISIT (OUTPATIENT)
Dept: PRIMARY CARE CLINIC | Facility: CLINIC | Age: 54
End: 2024-07-24
Payer: COMMERCIAL

## 2024-07-24 ENCOUNTER — TELEPHONE (OUTPATIENT)
Dept: PRIMARY CARE CLINIC | Facility: CLINIC | Age: 54
End: 2024-07-24
Payer: COMMERCIAL

## 2024-07-24 VITALS
RESPIRATION RATE: 18 BRPM | OXYGEN SATURATION: 98 % | HEIGHT: 67 IN | SYSTOLIC BLOOD PRESSURE: 134 MMHG | WEIGHT: 211.19 LBS | DIASTOLIC BLOOD PRESSURE: 80 MMHG | HEART RATE: 74 BPM | BODY MASS INDEX: 33.15 KG/M2

## 2024-07-24 DIAGNOSIS — E66.3 OVERWEIGHT (BMI 25.0-29.9): ICD-10-CM

## 2024-07-24 DIAGNOSIS — Z13.6 ENCOUNTER FOR LIPID SCREENING FOR CARDIOVASCULAR DISEASE: ICD-10-CM

## 2024-07-24 DIAGNOSIS — I48.0 PAROXYSMAL ATRIAL FIBRILLATION: ICD-10-CM

## 2024-07-24 DIAGNOSIS — Z87.11 HISTORY OF GASTRIC ULCER: ICD-10-CM

## 2024-07-24 DIAGNOSIS — Z12.31 VISIT FOR SCREENING MAMMOGRAM: ICD-10-CM

## 2024-07-24 DIAGNOSIS — Z92.89 HISTORY OF CARDIOVERSION: ICD-10-CM

## 2024-07-24 DIAGNOSIS — I34.0 MITRAL VALVE INSUFFICIENCY, UNSPECIFIED ETIOLOGY: ICD-10-CM

## 2024-07-24 DIAGNOSIS — M25.469 KNEE SWELLING: ICD-10-CM

## 2024-07-24 DIAGNOSIS — Z79.01 ANTICOAGULANT LONG-TERM USE: ICD-10-CM

## 2024-07-24 DIAGNOSIS — Z13.220 ENCOUNTER FOR LIPID SCREENING FOR CARDIOVASCULAR DISEASE: ICD-10-CM

## 2024-07-24 DIAGNOSIS — Z12.31 ENCOUNTER FOR SCREENING MAMMOGRAM FOR MALIGNANT NEOPLASM OF BREAST: ICD-10-CM

## 2024-07-24 DIAGNOSIS — I05.1 RHEUMATIC MITRAL REGURGITATION: ICD-10-CM

## 2024-07-24 DIAGNOSIS — M17.0 OSTEOARTHRITIS OF BOTH KNEES, UNSPECIFIED OSTEOARTHRITIS TYPE: ICD-10-CM

## 2024-07-24 DIAGNOSIS — Z95.2 S/P MVR (MITRAL VALVE REPLACEMENT): ICD-10-CM

## 2024-07-24 DIAGNOSIS — M25.561 RIGHT KNEE PAIN, UNSPECIFIED CHRONICITY: Primary | ICD-10-CM

## 2024-07-24 PROCEDURE — 3079F DIAST BP 80-89 MM HG: CPT | Mod: CPTII,S$GLB,, | Performed by: FAMILY MEDICINE

## 2024-07-24 PROCEDURE — 1159F MED LIST DOCD IN RCRD: CPT | Mod: CPTII,S$GLB,, | Performed by: FAMILY MEDICINE

## 2024-07-24 PROCEDURE — 3008F BODY MASS INDEX DOCD: CPT | Mod: CPTII,S$GLB,, | Performed by: FAMILY MEDICINE

## 2024-07-24 PROCEDURE — 99214 OFFICE O/P EST MOD 30 MIN: CPT | Mod: S$GLB,,, | Performed by: FAMILY MEDICINE

## 2024-07-24 PROCEDURE — 99999 PR PBB SHADOW E&M-EST. PATIENT-LVL V: CPT | Mod: PBBFAC,,, | Performed by: FAMILY MEDICINE

## 2024-07-24 PROCEDURE — 3075F SYST BP GE 130 - 139MM HG: CPT | Mod: CPTII,S$GLB,, | Performed by: FAMILY MEDICINE

## 2024-07-24 RX ORDER — METHOCARBAMOL 500 MG/1
TABLET, FILM COATED ORAL
Qty: 40 TABLET | Refills: 5 | Status: SHIPPED | OUTPATIENT
Start: 2024-07-24

## 2024-07-24 RX ORDER — GABAPENTIN 300 MG/1
CAPSULE ORAL
Qty: 90 CAPSULE | Refills: 11 | Status: SHIPPED | OUTPATIENT
Start: 2024-07-24

## 2024-07-24 RX ORDER — HYDROCODONE BITARTRATE AND ACETAMINOPHEN 5; 325 MG/1; MG/1
1 TABLET ORAL EVERY 6 HOURS PRN
Qty: 30 TABLET | Refills: 0 | Status: SHIPPED | OUTPATIENT
Start: 2024-07-24

## 2024-07-24 NOTE — PROGRESS NOTES
Subjective:       Patient ID: Riya Soto is a 53 y.o. female.    Chief Complaint: 6 month check up  and fluid in legs      HPI 54 yo BM -in for six-month checkup-patient was seen emergency room [id number] for knee swelling--had x-rays showing mild tricompartment narrowing mild joint space narrowing in his small suprapatellar effusion in his small spur--patient was given a steroid shot morphine shot discharged on Norco and Medrol Dosepak-- later seen by orthopedist-- last week---told still had some inflammation of the left knee and popliteal area--injected the knee with steroid. Now some swelling right knee --now complaining of swelling of the right knee  History of bleeding ulcer in the past got 4 units of blood--did have repeat EGD November 20, 2023 showing the ulcers had healed  Patient has an artificial mitral valve--on coumadin--sees Cardiology--Dr Liriano  --history atrial fibrillation/hyperlipidemia/mitral valve replacement/CHF   Hx son dying 9 mo ago hit and run   Eating well- +BM --+ ambulating well  History diverticulosis cholecystectomy  Lab done in June CBCs CMP lipids thyroid all normal PT INR show patient to be in therapeutic range  Patient states needs mammogram  Patient told due to Coumadin unable to take NSAIDs can not stay on prednisone--will try gabapentin muscle relaxer and hydrocodone for breakthrough pain              ROS   Skin--no psoriasis eczema skin cancer  HEENT---no headaches ocular pain blurred vision diplopia epistaxis hoarseness change in voice thyroid trouble  Lung--no pneumonia asthma TB no smoking  Heart--history of mitral valve surgery-11 yrs ago--November had valve replacements x2 mitral valve and possible tricuspid valve--+ hyperlipidemia--atrial fib with history of cardioversion--CHF--no chest pain ankle edema palpitations MI--no stent bypass arrhythmia  Abdomen--no nausea vomiting diarrhea constipation ulcers hepatitis + diverticulosis +history cholecystectomy  -no UTI  renal disease stones  GYN last menstrual period age 48--due for mammogram November for  Musculoskeletal cervical strain lumbosacral strain occas   Neurologic no dizziness passing out seizures  No diabetes  Anemia--no anxiety or depression  Single--7 children--SSI due to heart---lives with 7 children         General: Well nourished, well developed, no acute distress +obesity appears depressed  Skin: No lesions   HEENT: Eyes PERRLA, EOM intact, nose patent, throat non-erythematous ears TMs clear  NECK: Supple, no bruits, No JVD, no nodes  Lungs: Clear, no rales, rhonchi, wheezing  Heart: Regular rate and rhythm, no murmurs, gallops, or rubs--clicking of heart valve is heard  Abdomen: flat, bowel sounds positive, no tenderness, or organomegaly  MS:  No significant change Some deformity of the left ankle had 3 surgery still has screw present --main problem left shoulder --sore with palpation--AC joint and triceps and supraspinatus area ., pain with raising arm overhead especially painful with trying to reach back to undo bra  Neuro: Alert, CN intact, oriented X 3  Extremities: No cyanosis, clubbing, or edema         Assessment:       1. Right knee pain, unspecified chronicity    2. Encounter for screening mammogram for malignant neoplasm of breast    3. Osteoarthritis of both knees, unspecified osteoarthritis type    4. S/P MVR (mitral valve replacement)    5. Overweight (BMI 25.0-29.9)    6. Paroxysmal atrial fibrillation    7. History of cardioversion    8. Anticoagulant long-term use    9. Rheumatic mitral regurgitation    10. Mitral valve insufficiency, unspecified etiology    11. History of gastric ulcer              Plan:       Right knee pain, unspecified chronicity    Encounter for screening mammogram for malignant neoplasm of breast  -     Mammo Digital Screening Bilat w/ Reyes; Future; Expected date: 07/24/2024    Osteoarthritis of both knees, unspecified osteoarthritis type    S/P MVR (mitral valve  replacement)    Overweight (BMI 25.0-29.9)    Paroxysmal atrial fibrillation    History of cardioversion    Anticoagulant long-term use    Rheumatic mitral regurgitation    Mitral valve insufficiency, unspecified etiology    History of gastric ulcer            Went repeat EGD  Main-Reason for Visit-  Left knee pain--recently seen by orthopedist--had knee injected--now pain in the right knee--x-ray right knee--no NSAIDs due to being on Coumadin and history of ulcer disease--will treat with gabapentin 300 mg q.d. can increase to t.i.d.--Robaxin 500 q.h.s.--Moist heat/theragesic/range of motion exercise---hydrocodone for breakthrough pain told can not keep giving pain medications  Recently went to [place]--had GI bleed --receive 4 units of blood--hx bleeding ulcer --patient had subsequent EGD November 20, 2023 showing the ulcers had healed== patient unable take NSAIDs due to being on Coumadin  Heart surgery 11-7-2022 mitral and tricuspid valve replaced--patient doing very well--needs cardiac follow-up  Son killed in hit and run accident April 2023--[place] several automobile-- was eventually caught patient has to go to court  Atrial fibrillation --history of cardioversion 03/29/2022----patient was on coumadin /metoprolol--needs cardiac follow-up  Hyperlipidemia patient refills of Lipitor   Menopausal syndrome--had Mirena removed 3 years ago No period since   History anemia/cholecystectomy/heart surgery (systolic had leaking valve 1 surgery was done was normal)  Lab CBCs CMP lipids hemoglobin A1c T4 TSH --done in June was within normal limits no need to redo lab for six-month  Return in 6 months

## 2024-07-24 NOTE — TELEPHONE ENCOUNTER
----- Message from Demetrius Tran sent at 7/24/2024  8:31 AM CDT -----  Name Of Caller: Riya        Provider Name:Pantera Yadav        Does patient feel the need to be seen today? yes        Relationship to the Pt?: patient        Contact Preference?:[phone]        What is the nature of the call?: Patient states that she maybe about 5 minutes late for her 8:40am appointment.

## 2024-08-15 ENCOUNTER — HOSPITAL ENCOUNTER (OUTPATIENT)
Dept: RADIOLOGY | Facility: HOSPITAL | Age: 54
Discharge: HOME | End: 2024-08-15
Attending: FAMILY MEDICINE
Payer: COMMERCIAL

## 2024-08-15 DIAGNOSIS — Z12.31 VISIT FOR SCREENING MAMMOGRAM: ICD-10-CM

## 2024-08-15 DIAGNOSIS — M25.561 RIGHT KNEE PAIN, UNSPECIFIED CHRONICITY: ICD-10-CM

## 2024-08-15 PROCEDURE — 77067 SCR MAMMO BI INCL CAD: CPT | Mod: 26,,, | Performed by: RADIOLOGY

## 2024-08-15 PROCEDURE — 73562 X-RAY EXAM OF KNEE 3: CPT | Mod: TC,PO,RT

## 2024-08-15 PROCEDURE — 77063 BREAST TOMOSYNTHESIS BI: CPT | Mod: 26,,, | Performed by: RADIOLOGY

## 2024-08-15 PROCEDURE — 73562 X-RAY EXAM OF KNEE 3: CPT | Mod: 26,RT,, | Performed by: RADIOLOGY

## 2024-08-15 PROCEDURE — 77067 SCR MAMMO BI INCL CAD: CPT | Mod: TC,PO

## 2024-08-15 PROCEDURE — 77063 BREAST TOMOSYNTHESIS BI: CPT | Mod: TC,PO

## 2024-09-18 ENCOUNTER — LAB VISIT (OUTPATIENT)
Dept: LAB | Facility: HOSPITAL | Age: 54
End: 2024-09-18
Attending: INTERNAL MEDICINE
Payer: COMMERCIAL

## 2024-09-18 ENCOUNTER — ANTI-COAG VISIT (OUTPATIENT)
Dept: CARDIOLOGY | Facility: CLINIC | Age: 54
End: 2024-09-18
Payer: COMMERCIAL

## 2024-09-18 DIAGNOSIS — Z79.01 LONG TERM (CURRENT) USE OF ANTICOAGULANTS: ICD-10-CM

## 2024-09-18 DIAGNOSIS — Z95.2 S/P MVR (MITRAL VALVE REPLACEMENT): Primary | ICD-10-CM

## 2024-09-18 DIAGNOSIS — Z95.2 S/P MVR (MITRAL VALVE REPLACEMENT): ICD-10-CM

## 2024-09-18 DIAGNOSIS — I48.0 PAROXYSMAL ATRIAL FIBRILLATION: ICD-10-CM

## 2024-09-18 LAB
INR PPP: 2.1 (ref 0.8–1.2)
PROTHROMBIN TIME: 21.6 SEC (ref 9–12.5)

## 2024-09-18 PROCEDURE — 85610 PROTHROMBIN TIME: CPT | Performed by: INTERNAL MEDICINE

## 2024-09-18 PROCEDURE — 36415 COLL VENOUS BLD VENIPUNCTURE: CPT | Performed by: INTERNAL MEDICINE

## 2024-09-18 PROCEDURE — 93793 ANTICOAG MGMT PT WARFARIN: CPT | Mod: S$GLB,,,

## 2024-09-18 NOTE — PROGRESS NOTES
[facility]    2024 9:40 AM    Assessment/Plan:    Patient presents today with subtherapeutic  INR.    Assessment of patient findings and chart review: no significant changes reported    Recommendation for patient's warfarin regimen: Boost dose today to 7.5mg then resume current maintenance dose    Recommend repeat INR on Monday  _________________________________________________________________    Riyajoanie Soto (53 y.o.) is followed by the [facility].    Anticoagulation Summary  As of 2024      INR goal:  2.5-3.5   TTR:  51.7% (1.8 y)   INR used for dosin.1 (2024)   Warfarin maintenance plan:  7.5 mg (5 mg x 1.5) every Tue, Thu, Sat; 5 mg (5 mg x 1) all other days   Weekly warfarin total:  42.5 mg   Plan last modified:  Skylar Marcelo, PharmD (2024)   Next INR check:  2024   Target end date:      Indications    S/P MVR (mitral valve replacement) [Z95.2]  Atrial fibrillation [I48.91]  Long term (current) use of anticoagulants [Z79.01]                 Anticoagulation Episode Summary       INR check location:  Clinic Lab    Preferred lab:      Send INR reminders to:  [facility] COUMADIN MONITORING POOL    Comments:  [facility] or [facility] only Mon - Thu    (D/C ) [facility] [phone]          Anticoagulation Care Providers       Provider Role Specialty Phone number    Celsa Knight PA-C Referring Cardiothoracic Surgery [phone]    Ralf Figueroa MD Responsible Cardiology [phone]    Antoni Easton MD  Interventional Cardiology [phone]

## 2024-09-23 ENCOUNTER — ANTI-COAG VISIT (OUTPATIENT)
Dept: CARDIOLOGY | Facility: CLINIC | Age: 54
End: 2024-09-23
Payer: COMMERCIAL

## 2024-09-23 DIAGNOSIS — Z79.01 LONG TERM (CURRENT) USE OF ANTICOAGULANTS: ICD-10-CM

## 2024-09-23 DIAGNOSIS — Z95.2 S/P MVR (MITRAL VALVE REPLACEMENT): Primary | ICD-10-CM

## 2024-09-23 PROCEDURE — 93793 ANTICOAG MGMT PT WARFARIN: CPT | Mod: S$GLB,,,

## 2024-09-23 NOTE — PROGRESS NOTES
[facility]    09/23/2024 12:49 PM    Assessment/Plan:    Patient presents today with therapeutic INR.    Recommendation for patient's warfarin regimen: Continue current maintenance dose    Recommend repeat INR in 2 weeks  _________________________________________________________________    Riya Soto (53 y.o.) is followed by the [facility].    Anticoagulation Summary  As of 9/23/2024      INR goal:  2.5-3.5   TTR:  51.8% (1.8 y)   INR used for dosing:  3.2 (9/23/2024)   Warfarin maintenance plan:  7.5 mg (5 mg x 1.5) every Tue, Thu, Sat; 5 mg (5 mg x 1) all other days   Weekly warfarin total:  42.5 mg   No change documented:  Skylar Marcelo, Tom   Plan last modified:  Skylar Marcelo, PharmD (2/7/2024)   Next INR check:  10/9/2024   Target end date:      Indications    S/P MVR (mitral valve replacement) [Z95.2]  Atrial fibrillation [I48.91]  Long term (current) use of anticoagulants [Z79.01]                 Anticoagulation Episode Summary       INR check location:  Clinic Lab    Preferred lab:      Send INR reminders to:  [facility] COUMADIN MONITORING POOL    Comments:  [facility] or [facility] [facility] only Mon - Thu    (D/C 12/13) [facility] [phone]          Anticoagulation Care Providers       Provider Role Specialty Phone number    Celsa Knight PA-C Referring Cardiothoracic Surgery [phone]    Ralf Figueroa MD Responsible Cardiology [phone]    Antoni Easton MD  Interventional Cardiology [phone]

## 2024-10-25 ENCOUNTER — ANTI-COAG VISIT (OUTPATIENT)
Dept: CARDIOLOGY | Facility: CLINIC | Age: 54
End: 2024-10-25
Payer: COMMERCIAL

## 2024-10-25 ENCOUNTER — LAB VISIT (OUTPATIENT)
Dept: LAB | Facility: HOSPITAL | Age: 54
End: 2024-10-25
Attending: INTERNAL MEDICINE
Payer: COMMERCIAL

## 2024-10-25 DIAGNOSIS — Z95.2 S/P MVR (MITRAL VALVE REPLACEMENT): Primary | ICD-10-CM

## 2024-10-25 DIAGNOSIS — Z95.2 S/P MVR (MITRAL VALVE REPLACEMENT): ICD-10-CM

## 2024-10-25 DIAGNOSIS — Z79.01 LONG TERM (CURRENT) USE OF ANTICOAGULANTS: ICD-10-CM

## 2024-10-25 DIAGNOSIS — I48.0 PAROXYSMAL ATRIAL FIBRILLATION: ICD-10-CM

## 2024-10-25 LAB
INR PPP: 2 (ref 0.8–1.2)
PROTHROMBIN TIME: 20.6 SEC (ref 9–12.5)

## 2024-10-25 PROCEDURE — 36415 COLL VENOUS BLD VENIPUNCTURE: CPT | Performed by: INTERNAL MEDICINE

## 2024-10-25 PROCEDURE — 85610 PROTHROMBIN TIME: CPT | Performed by: INTERNAL MEDICINE

## 2024-10-25 NOTE — PROGRESS NOTES
[facility]    10/25/2024 9:36 AM    Assessment/Plan:    Patient presents today with subtherapeutic  INR.    Assessment of patient findings and chart review: pt reports missed dose on Tuesday, 10/22    Recommendation for patient's warfarin regimen: Boost dose today to 7.5mg then resume current maintenance dose    Recommend repeat INR in 2 weeks  _________________________________________________________________    Riya Soto (53 y.o.) is followed by the [facility].    Anticoagulation Summary  As of 10/25/2024      INR goal:  2.5-3.5   TTR:  52.1% (1.9 y)   INR used for dosin.0 (10/25/2024)   Warfarin maintenance plan:  7.5 mg (5 mg x 1.5) every Tue, Thu, Sat; 5 mg (5 mg x 1) all other days   Weekly warfarin total:  42.5 mg   Plan last modified:  Skylar Marcelo, PharmD (2024)   Next INR check:  2024   Target end date:  --    Indications    S/P MVR (mitral valve replacement) [Z95.2]  Atrial fibrillation [I48.91]  Long term (current) use of anticoagulants [Z79.01]                 Anticoagulation Episode Summary       INR check location:  Clinic Lab    Preferred lab:  --    Send INR reminders to:  [facility] COUMADIN MONITORING POOL    Comments:  NMJAYLIN or KRIS [facility] only Mon - Thu    (D/C ) [facility] [phone]          Anticoagulation Care Providers       Provider Role Specialty Phone number    Celsa Knight PA-C Referring Cardiothoracic Surgery [phone]    Ralf Figueroa MD Responsible Cardiology [phone]    Antoni Easton MD  Interventional Cardiology [phone]

## 2024-10-30 ENCOUNTER — OFFICE VISIT (OUTPATIENT)
Dept: CARDIOLOGY | Facility: CLINIC | Age: 54
End: 2024-10-30
Payer: COMMERCIAL

## 2024-10-30 VITALS
WEIGHT: 219.13 LBS | BODY MASS INDEX: 34.39 KG/M2 | OXYGEN SATURATION: 98 % | SYSTOLIC BLOOD PRESSURE: 118 MMHG | HEART RATE: 63 BPM | DIASTOLIC BLOOD PRESSURE: 70 MMHG | HEIGHT: 67 IN

## 2024-10-30 DIAGNOSIS — I05.1 RHEUMATIC MITRAL REGURGITATION: ICD-10-CM

## 2024-10-30 DIAGNOSIS — R06.02 SOB (SHORTNESS OF BREATH): ICD-10-CM

## 2024-10-30 DIAGNOSIS — Z98.890 S/P TVR (TRICUSPID VALVE REPAIR): ICD-10-CM

## 2024-10-30 DIAGNOSIS — I34.0 MITRAL VALVE INSUFFICIENCY, UNSPECIFIED ETIOLOGY: ICD-10-CM

## 2024-10-30 DIAGNOSIS — Z98.890 S/P MAZE OPERATION FOR ATRIAL FIBRILLATION: ICD-10-CM

## 2024-10-30 DIAGNOSIS — Z95.2 S/P MVR (MITRAL VALVE REPLACEMENT): ICD-10-CM

## 2024-10-30 DIAGNOSIS — Z79.01 LONG TERM (CURRENT) USE OF ANTICOAGULANTS: ICD-10-CM

## 2024-10-30 DIAGNOSIS — R06.09 DYSPNEA ON EXERTION: ICD-10-CM

## 2024-10-30 DIAGNOSIS — E87.6 HYPOKALEMIA: ICD-10-CM

## 2024-10-30 DIAGNOSIS — Z86.79 S/P MAZE OPERATION FOR ATRIAL FIBRILLATION: ICD-10-CM

## 2024-10-30 DIAGNOSIS — Z92.89 HISTORY OF CARDIOVERSION: ICD-10-CM

## 2024-10-30 DIAGNOSIS — E78.00 PURE HYPERCHOLESTEROLEMIA: Primary | ICD-10-CM

## 2024-10-30 DIAGNOSIS — I48.0 PAROXYSMAL ATRIAL FIBRILLATION: ICD-10-CM

## 2024-10-30 DIAGNOSIS — I50.20 HFREF (HEART FAILURE WITH REDUCED EJECTION FRACTION): ICD-10-CM

## 2024-10-30 PROCEDURE — 99999 PR PBB SHADOW E&M-EST. PATIENT-LVL IV: CPT | Mod: PBBFAC,,,

## 2024-10-30 NOTE — PROGRESS NOTES
[facility] - Cardiology [address]  Cardiology Clinic Note      Chief Complaint  Chief Complaint   Patient presents with    s/p Mitral valve replacement       HPI:  Ms. Soto is a 54-year-old female with a past medical history of rheumatic mitral regurgitation status post mitral valve replacement (previously undergone a mitral valve repair June 2011 at [facility] by Dr Garcia) and tricuspid valve repair, history of atrial fibrillation status post Maze operation, history of cardioversion, hyperlipidemia, HFrEF echo 12/2023 EF 40-45% with normal diastolic and systolic function moderately dilated LA dilated RA normal function mechanical mitral valve and repair tricuspid valve normal CVP 3 improvement from echo 03/2022 EF 40%    Patient is new to me and here to establish care  Previously seen by Dr. Figueroa  Status post mitral valve replacement in tricuspid valve repair November 2022  Has been on warfarin since and is enrolled in Coumadin Clinic  She is aware of giving vitamin K level constant in diet while on warfarin  Patient reports that she is active feels good  She does endorse dyspnea on exertion but not cause impairment in daily activities  She doing well with medications  Denies chest pain, palpitations, lightheadedness, dizziness, or syncope/presyncope  Denies cough, LE edema, orthopnea, or PND  Denies falls or head injures  Denies easy bruising, hematochezia, or hemoptysis  Denies fever, chills, or NVD  Denies any recent travels or close contact with sick individuals  Denies tobacco or alcohol use  She is independent and active without any debilities    Medications  Current Outpatient Medications   Medication Sig Dispense Refill    atorvastatin (LIPITOR) 10 MG tablet Take 1 tablet (10 mg total) by mouth every evening. 90 tablet 3    methocarbamoL (ROBAXIN) 500 MG Tab 1 p.o. q.h.s. p.r.n. muscle spasm 40 tablet 5    metoprolol tartrate (LOPRESSOR) 25 MG tablet TAKE 1/2 TABLET(12.5 MG) BY MOUTH TWICE  DAILY 30 tablet 11    MULTAQ 400 mg Tab TAKE 1 TABLET(400 MG) BY MOUTH TWICE DAILY WITH MEALS 60 tablet 11    warfarin (COUMADIN) 5 MG tablet TAKE 1 TO 1 AND 1/2 TABLETS(5 TO 7.5 MG) BY MOUTH DAILY AS DIRECTED BY COUMADIN CLINIC 135 tablet 1    gabapentin (NEURONTIN) 300 MG capsule 1 p.o. for bilateral knee pain can take once a day or 3 times per day (Patient not taking: Reported on 10/30/2024) 90 capsule 11    HYDROcodone-acetaminophen (NORCO) 5-325 mg per tablet Take 1 tablet by mouth every 6 (six) hours as needed for Pain. (Patient not taking: Reported on 10/30/2024) 12 tablet 0    HYDROcodone-acetaminophen (NORCO) 5-325 mg per tablet Take 1 tablet by mouth every 6 (six) hours as needed. (Patient not taking: Reported on 10/30/2024) 30 tablet 0    methylPREDNISolone (MEDROL DOSEPACK) 4 mg tablet use as directed 21 each 0     No current facility-administered medications for this visit.     Facility-Administered Medications Ordered in Other Visits   Medication Dose Route Frequency Provider Last Rate Last Admin    0.9%  NaCl infusion   Intravenous Continuous Shashi Michele MD        LIDOcaine (PF) 10 mg/ml (1%) injection 10 mg  1 mL Intradermal Once PRN Shashi Michele MD            History  Past Medical History:   Diagnosis Date    A-fib     Atrial fibrillation     Back pain     Hyperlipidemia     Mitral incompetence     Mitral valve stenosis     Neck pain      Past Surgical History:   Procedure Laterality Date    ABLATION N/A 11/07/2022    Procedure: MAZE;  Surgeon: Bobby Gilbert MD;  Location: [address];  Service: Cardiothoracic;  Laterality: N/A;  Cryoablation    CARDIAC SURGERY      to repair mitral valve    CHOLECYSTECTOMY      COLONOSCOPY      COLONOSCOPY N/A 06/08/2022    Procedure: COLONOSCOPY;  Surgeon: Sanford Andres MD;  Location: [facility];  Service: Endoscopy;  Laterality: N/A;    CORONARY ANGIOGRAPHY N/A 08/29/2022    Procedure: ANGIOGRAM, CORONARY ARTERY;  Surgeon:  Lexa Harley MD;  Location: [facility] CATH LAB;  Service: Cardiology;  Laterality: N/A;    EGD, WITH CLOSED BIOPSY  11/13/2023    ESOPHAGOGASTRODUODENOSCOPY N/A 11/13/2023    Procedure: EGD (ESOPHAGOGASTRODUODENOSCOPY);  Surgeon: Shashi Michele MD;  Location: [facility] ENDO;  Service: Endoscopy;  Laterality: N/A;    MITRAL VALVE REPLACEMENT N/A 11/07/2022    Procedure: REPLACEMENT, MITRAL VALVE;  Surgeon: Bobby Gilbert MD;  [address];  Service: Cardiothoracic;  Laterality: N/A;  REDO STERNOTOMY    STERNOTOMY N/A 11/07/2022    Procedure: STERNOTOMY;  Surgeon: Bobby Gilbert MD;  [facility];  Service: Cardiothoracic;  Laterality: N/A;  Redo sternotomy    TRANSESOPHAGEAL ECHOCARDIOGRAPHY N/A 03/29/2022    Procedure: ECHOCARDIOGRAM, TRANSESOPHAGEAL;  Surgeon: Dominic Wallis MD;  Location: [facility] EP LAB;  Service: Cardiology;  Laterality: N/A;    TREATMENT OF CARDIAC ARRHYTHMIA N/A 03/29/2022    Procedure: Cardioversion or Defibrillation;  Surgeon: Elmo Jones MD;  Location: [facility] EP LAB;  Service: Cardiology;  Laterality: N/A;  afib, dccv, [place], [place], [place], 3prep, Provider to perform     TRICUSPID VALVULOPLASTY N/A 11/07/2022    Procedure: REPAIR, TRICUSPID VALVE;  Surgeon: Bobby Gilbert MD;  [address];  Service: Cardiothoracic;  Laterality: N/A;     Social History     Socioeconomic History    Marital status: Single   Tobacco Use    Smoking status: Never    Smokeless tobacco: Never   Substance and Sexual Activity    Alcohol use: Yes     Comment: socially    Drug use: No    Sexual activity: Yes     Partners: Male     Social Drivers of Health     Financial Resource Strain: Low Risk  (1/19/2022)    Overall Financial Resource Strain (CARDIA)     Difficulty of Paying Living Expenses: Not hard at all   Food Insecurity: No Food Insecurity (1/19/2022)    Hunger Vital Sign     Worried About Running Out of Food in the Last Year: Never true     Ran Out of Food  in the Last Year: Never true   Transportation Needs: No Transportation Needs (1/19/2022)    PRAPARE - Transportation     Lack of Transportation (Medical): No     Lack of Transportation (Non-Medical): No   Stress: No Stress Concern Present (1/19/2022)    [employer] - Occupational Stress Questionnaire     Feeling of Stress : Not at all   Housing Stability: Unknown (1/19/2022)    Housing Stability Vital Sign     Unable to Pay for Housing in the Last Year: No     Unstable Housing in the Last Year: No     Family History   Problem Relation Name Age of Onset    Breast cancer Sister 2     Colon cancer Maternal Aunt      Ovarian cancer Neg Hx          Allergies  Review of patient's allergies indicates:   Allergen Reactions    Amoxicillin Hives and Other (See Comments)       Review of Systems   Review of Systems   Constitutional: Negative for chills, decreased appetite, diaphoresis, fever, malaise/fatigue, weight gain and weight loss.   Eyes:  Negative for blurred vision.   Cardiovascular:  Positive for dyspnea on exertion. Negative for chest pain, claudication, irregular heartbeat, leg swelling, near-syncope, orthopnea, palpitations, paroxysmal nocturnal dyspnea and syncope.   Respiratory:  Positive for shortness of breath. Negative for cough, snoring, sputum production and wheezing.    Endocrine: Negative for cold intolerance, heat intolerance, polydipsia, polyphagia and polyuria.   Skin:  Negative for color change, dry skin, itching, nail changes and poor wound healing.   Musculoskeletal:  Negative for back pain, gout, joint pain and joint swelling.   Gastrointestinal:  Negative for bloating, abdominal pain, constipation, diarrhea, hematemesis, hematochezia, melena, nausea and vomiting.   Genitourinary:  Negative for dysuria and hematuria.   Neurological:  Negative for dizziness, headaches, light-headedness, numbness, paresthesias and weakness.   Psychiatric/Behavioral:  Negative for altered  mental status, depression and memory loss.        Physical Exam  Vitals:    10/30/24 0937   BP: 118/70   Pulse: 63     Wt Readings from Last 1 Encounters:   11/18/24 99.4 kg (219 lb 2.2 oz)     Physical Exam  Constitutional:       General: She is not in acute distress.  HENT:      Head: Normocephalic and atraumatic.      Mouth/Throat:      Mouth: Mucous membranes are moist.   Neck:      Vascular: No JVD.   Cardiovascular:      Rate and Rhythm: Normal rate and regular rhythm.      Pulses: Normal pulses and intact distal pulses.      Heart sounds: Murmur heard.      No gallop.   Pulmonary:      Effort: Pulmonary effort is normal. No respiratory distress.      Breath sounds: Normal breath sounds. No wheezing.   Abdominal:      General: Bowel sounds are normal. There is no distension.      Palpations: Abdomen is soft.      Tenderness: There is no abdominal tenderness.   Skin:     General: Skin is warm and dry.   Neurological:      Mental Status: She is alert and oriented to person, place, and time.         Labs  Lab Visit on 10/25/2024   Component Date Value Ref Range Status    Prothrombin Time 10/25/2024 20.6 (H)  9.0 - 12.5 sec Final    INR 10/25/2024 2.0 (H)  0.8 - 1.2 Final    Comment: Coumadin Therapy:  2.0 - 3.0 for INR for all indicators except mechanical heart valves  and antiphospholipid syndromes which should use 2.5 - 3.5.     Lab Visit on 09/23/2024   Component Date Value Ref Range Status    Prothrombin Time 09/23/2024 33.1 (H)  9.0 - 12.5 sec Final    INR 09/23/2024 3.2 (H)  0.8 - 1.2 Final    Comment: Coumadin Therapy:  2.0 - 3.0 for INR for all indicators except mechanical heart valves  and antiphospholipid syndromes which should use 2.5 - 3.5.  LOT^040^PT Inn^[id number]     Lab Visit on 09/18/2024   Component Date Value Ref Range Status    Prothrombin Time 09/18/2024 21.6 (H)  9.0 - 12.5 sec Final    INR 09/18/2024 2.1 (H)  0.8 - 1.2 Final    Comment: Coumadin Therapy:  2.0 - 3.0 for INR for all indicators  except mechanical heart valves  and antiphospholipid syndromes which should use 2.5 - 3.5.     Lab Visit on 07/08/2024   Component Date Value Ref Range Status    Prothrombin Time 07/08/2024 27.0 (H)  9.0 - 12.5 sec Final    INR 07/08/2024 2.7 (H)  0.8 - 1.2 Final    Comment: Coumadin Therapy:  2.0 - 3.0 for INR for all indicators except mechanical heart valves  and antiphospholipid syndromes which should use 2.5 - 3.5.     Lab Visit on 06/12/2024   Component Date Value Ref Range Status    WBC 06/12/2024 4.23  3.90 - 12.70 K/uL Final    RBC 06/12/2024 4.11  4.00 - 5.40 M/uL Final    Hemoglobin 06/12/2024 12.3  12.0 - 16.0 g/dL Final    Hematocrit 06/12/2024 38.5  37.0 - 48.5 % Final    MCV 06/12/2024 94  82 - 98 fL Final    MCH 06/12/2024 29.9  27.0 - 31.0 pg Final    MCHC 06/12/2024 31.9 (L)  32.0 - 36.0 g/dL Final    RDW 06/12/2024 13.3  11.5 - 14.5 % Final    Platelets 06/12/2024 233  150 - 450 K/uL Final    MPV 06/12/2024 9.9  9.2 - 12.9 fL Final    Immature Granulocytes 06/12/2024 0.2  0.0 - 0.5 % Final    Gran # (ANC) 06/12/2024 2.3  1.8 - 7.7 K/uL Final    Immature Grans (Abs) 06/12/2024 0.01  0.00 - 0.04 K/uL Final    Comment: Mild elevation in immature granulocytes is non specific and   can be seen in a variety of conditions including stress response,   acute inflammation, trauma and pregnancy. Correlation with other   laboratory and clinical findings is essential.      Lymph # 06/12/2024 1.4  1.0 - 4.8 K/uL Final    Mono # 06/12/2024 0.5  0.3 - 1.0 K/uL Final    Eos # 06/12/2024 0.1  0.0 - 0.5 K/uL Final    Baso # 06/12/2024 0.02  0.00 - 0.20 K/uL Final    nRBC 06/12/2024 0  0 /100 WBC Final    Gran % 06/12/2024 53.9  38.0 - 73.0 % Final    Lymph % 06/12/2024 33.6  18.0 - 48.0 % Final    Mono % 06/12/2024 10.6  4.0 - 15.0 % Final    Eosinophil % 06/12/2024 1.2  0.0 - 8.0 % Final    Basophil % 06/12/2024 0.5  0.0 - 1.9 % Final    Differential Method 06/12/2024 Automated   Final    Sodium 06/12/2024 139  136  - 145 mmol/L Final    Potassium 06/12/2024 3.7  3.5 - 5.1 mmol/L Final    Chloride 06/12/2024 105  95 - 110 mmol/L Final    CO2 06/12/2024 26  23 - 29 mmol/L Final    Glucose 06/12/2024 101  70 - 110 mg/dL Final    BUN 06/12/2024 14  6 - 20 mg/dL Final    Creatinine 06/12/2024 1.0  0.5 - 1.4 mg/dL Final    Calcium 06/12/2024 9.2  8.7 - 10.5 mg/dL Final    Total Protein 06/12/2024 8.1  6.0 - 8.4 g/dL Final    Albumin 06/12/2024 3.9  3.5 - 5.2 g/dL Final    Total Bilirubin 06/12/2024 0.9  0.1 - 1.0 mg/dL Final    Comment: For infants and newborns, interpretation of results should be based  on gestational age, weight and in agreement with clinical  observations.    Premature Infant recommended reference ranges:  Up to 24 hours.............<8.0 mg/dL  Up to 48 hours............<12.0 mg/dL  3-5 days..................<15.0 mg/dL  6-29 days.................<15.0 mg/dL      Alkaline Phosphatase 06/12/2024 126  55 - 135 U/L Final    AST 06/12/2024 23  10 - 40 U/L Final    ALT 06/12/2024 24  10 - 44 U/L Final    eGFR 06/12/2024 >60  >60 mL/min/1.73 m^2 Final    Anion Gap 06/12/2024 8  8 - 16 mmol/L Final    Prothrombin Time 06/12/2024 26.4 (H)  9.0 - 12.5 sec Final    INR 06/12/2024 2.6 (H)  0.8 - 1.2 Final    Comment: Coumadin Therapy:  2.0 - 3.0 for INR for all indicators except mechanical heart valves  and antiphospholipid syndromes which should use 2.5 - 3.5.      Cholesterol 06/12/2024 145  120 - 199 mg/dL Final    Comment: The National Cholesterol Education Program (NCEP) has set the  following guidelines (reference ranges) for Cholesterol:  Optimal.....................<200 mg/dL  Borderline High.............200-239 mg/dL  High........................> or = 240 mg/dL      Triglycerides 06/12/2024 66  30 - 150 mg/dL Final    Comment: The National Cholesterol Education Program (NCEP) has set the  following guidelines (reference values) for triglycerides:  Normal......................<150 mg/dL  Borderline  High.............150-199 mg/dL  High........................200-499 mg/dL      HDL 06/12/2024 52  40 - 75 mg/dL Final    Comment: The National Cholesterol Education Program (NCEP) has set the  following guidelines (reference values) for HDL Cholesterol:  Low...............<40 mg/dL  Optimal...........>60 mg/dL      LDL Cholesterol 06/12/2024 79.8  63.0 - 159.0 mg/dL Final    Comment: The National Cholesterol Education Program (NCEP) has set the  following guidelines (reference values) for LDL Cholesterol:  Optimal.......................<130 mg/dL  Borderline High...............130-159 mg/dL  High..........................160-189 mg/dL  Very High.....................>190 mg/dL      HDL/Cholesterol Ratio 06/12/2024 35.9  20.0 - 50.0 % Final    Total Cholesterol/HDL Ratio 06/12/2024 2.8  2.0 - 5.0 Final    Non-HDL Cholesterol 06/12/2024 93  mg/dL Final    Comment: Risk category and Non-HDL cholesterol goals:  Coronary heart disease (CHD)or equivalent (10-year risk of CHD >20%):  Non-HDL cholesterol goal     <130 mg/dL  Two or more CHD risk factors and 10-year risk of CHD <= 20%:  Non-HDL cholesterol goal     <160 mg/dL  0 to 1 CHD risk factor:  Non-HDL cholesterol goal     <190 mg/dL      Free T4 06/12/2024 0.97  0.71 - 1.51 ng/dL Final    TSH 06/12/2024 0.947  0.400 - 4.000 uIU/mL Final   Lab Visit on 05/17/2024   Component Date Value Ref Range Status    WBC 05/17/2024 4.86  3.90 - 12.70 K/uL Final    RBC 05/17/2024 4.23  4.00 - 5.40 M/uL Final    Hemoglobin 05/17/2024 12.6  12.0 - 16.0 g/dL Final    Hematocrit 05/17/2024 39.4  37.0 - 48.5 % Final    MCV 05/17/2024 93  82 - 98 fL Final    MCH 05/17/2024 29.8  27.0 - 31.0 pg Final    MCHC 05/17/2024 32.0  32.0 - 36.0 g/dL Final    RDW 05/17/2024 14.5  11.5 - 14.5 % Final    Platelets 05/17/2024 238  150 - 450 K/uL Final    MPV 05/17/2024 10.5  9.2 - 12.9 fL Final    Immature Granulocytes 05/17/2024 0.2  0.0 - 0.5 % Final    Gran # (ANC) 05/17/2024 2.6  1.8 - 7.7 K/uL  Final    Immature Grans (Abs) 05/17/2024 0.01  0.00 - 0.04 K/uL Final    Comment: Mild elevation in immature granulocytes is non specific and   can be seen in a variety of conditions including stress response,   acute inflammation, trauma and pregnancy. Correlation with other   laboratory and clinical findings is essential.      Lymph # 05/17/2024 1.6  1.0 - 4.8 K/uL Final    Mono # 05/17/2024 0.6  0.3 - 1.0 K/uL Final    Eos # 05/17/2024 0.0  0.0 - 0.5 K/uL Final    Baso # 05/17/2024 0.02  0.00 - 0.20 K/uL Final    nRBC 05/17/2024 0  0 /100 WBC Final    Gran % 05/17/2024 52.8  38.0 - 73.0 % Final    Lymph % 05/17/2024 33.7  18.0 - 48.0 % Final    Mono % 05/17/2024 12.1  4.0 - 15.0 % Final    Eosinophil % 05/17/2024 0.8  0.0 - 8.0 % Final    Basophil % 05/17/2024 0.4  0.0 - 1.9 % Final    Differential Method 05/17/2024 Automated   Final    Sodium 05/17/2024 137  136 - 145 mmol/L Final    Potassium 05/17/2024 4.1  3.5 - 5.1 mmol/L Final    Chloride 05/17/2024 104  95 - 110 mmol/L Final    CO2 05/17/2024 25  23 - 29 mmol/L Final    Glucose 05/17/2024 102  70 - 110 mg/dL Final    BUN 05/17/2024 14  6 - 20 mg/dL Final    Creatinine 05/17/2024 1.0  0.5 - 1.4 mg/dL Final    Calcium 05/17/2024 9.4  8.7 - 10.5 mg/dL Final    Total Protein 05/17/2024 8.2  6.0 - 8.4 g/dL Final    Albumin 05/17/2024 3.9  3.5 - 5.2 g/dL Final    Total Bilirubin 05/17/2024 0.6  0.1 - 1.0 mg/dL Final    Comment: For infants and newborns, interpretation of results should be based  on gestational age, weight and in agreement with clinical  observations.    Premature Infant recommended reference ranges:  Up to 24 hours.............<8.0 mg/dL  Up to 48 hours............<12.0 mg/dL  3-5 days..................<15.0 mg/dL  6-29 days.................<15.0 mg/dL      Alkaline Phosphatase 05/17/2024 118  55 - 135 U/L Final    AST 05/17/2024 24  10 - 40 U/L Final    ALT 05/17/2024 25  10 - 44 U/L Final    eGFR 05/17/2024 >60  >60 mL/min/1.73 m^2 Final     Anion Gap 05/17/2024 8  8 - 16 mmol/L Final    Cholesterol 05/17/2024 158  120 - 199 mg/dL Final    Comment: The National Cholesterol Education Program (NCEP) has set the  following guidelines (reference ranges) for Cholesterol:  Optimal.....................<200 mg/dL  Borderline High.............200-239 mg/dL  High........................> or = 240 mg/dL      Triglycerides 05/17/2024 55  30 - 150 mg/dL Final    Comment: The National Cholesterol Education Program (NCEP) has set the  following guidelines (reference values) for triglycerides:  Normal......................<150 mg/dL  Borderline High.............150-199 mg/dL  High........................200-499 mg/dL      HDL 05/17/2024 58  40 - 75 mg/dL Final    Comment: The National Cholesterol Education Program (NCEP) has set the  following guidelines (reference values) for HDL Cholesterol:  Low...............<40 mg/dL  Optimal...........>60 mg/dL      LDL Cholesterol 05/17/2024 89.0  63.0 - 159.0 mg/dL Final    Comment: The National Cholesterol Education Program (NCEP) has set the  following guidelines (reference values) for LDL Cholesterol:  Optimal.......................<130 mg/dL  Borderline High...............130-159 mg/dL  High..........................160-189 mg/dL  Very High.....................>190 mg/dL      HDL/Cholesterol Ratio 05/17/2024 36.7  20.0 - 50.0 % Final    Total Cholesterol/HDL Ratio 05/17/2024 2.7  2.0 - 5.0 Final    Non-HDL Cholesterol 05/17/2024 100  mg/dL Final    Comment: Risk category and Non-HDL cholesterol goals:  Coronary heart disease (CHD)or equivalent (10-year risk of CHD >20%):  Non-HDL cholesterol goal     <130 mg/dL  Two or more CHD risk factors and 10-year risk of CHD <= 20%:  Non-HDL cholesterol goal     <160 mg/dL  0 to 1 CHD risk factor:  Non-HDL cholesterol goal     <190 mg/dL      TSH 05/17/2024 1.206  0.400 - 4.000 uIU/mL Final    Free T4 05/17/2024 1.01  0.71 - 1.51 ng/dL Final       EKG  Reviewed    Echo        Imaging  US Soft Tissue, Lower Extremity, Left    Result Date: 11/19/2024  EXAMINATION: US SOFT TISSUE, LOWER EXTREMITY, LEFT CLINICAL HISTORY: Pain in left knee TECHNIQUE: Ultrasound in area of  reported pain left posterior thigh/knee COMPARISON: None FINDINGS: No findings are seen in area of reported pain.  No findings seen to account for the history of pain.  If indicated clinically further evaluation could be obtained with CT or MRI     As above Electronically signed by: Adali Yuen MD Date:    11/19/2024 Time:    14:12    US Soft Tissue, Lower Extremity, Right    Result Date: 11/19/2024  EXAMINATION: US SOFT TISSUE, LOWER EXTREMITY, RIGHT CLINICAL HISTORY: Pain in left knee TECHNIQUE: Ultrasound the right posterior thigh in area of reported pain COMPARISON: None FINDINGS: Findings on ultrasound.  No findings seen to account for the history of pain.  If further imaging evaluation is indicated clinically suggest CT or MRI     As above Electronically signed by: Adali Yuen MD Date:    11/19/2024 Time:    14:09      Prior coronary angiogram / intervention:  08/2022 normal coronary arteries    Assessment and Plan  Mitral valve insufficiency, unspecified etiology  S/P MVR (mitral valve replacement)  S/P TVR (tricuspid valve repair)  Stable  Repeat echo given shortness of breath and dyspnea on exertion  Continue with beta-blocker and anticoagulation    Paroxysmal atrial fibrillation  History of cardioversion  S/P Maze operation for atrial fibrillation  Most recent EKG normal sinus rhythm  Continue anticoagulation and beta-blocker    HFrEF (heart failure with reduced ejection fraction)  SOB (shortness of breath)/Dyspnea on exertion  NYHA 2  Endorses shortness of breath and dyspnea on exertion  Appears euvolemic today  Repeat echo  Blood pressure ranges 110s/70s; taken off ACEi in the past due to intolerance  Continue with Lopressor 25 for now  Wt Readings from Last 3 Encounters:   11/18/24 99.4 kg (219 lb  2.2 oz)   10/30/24 99.4 kg (219 lb 2.2 oz)   07/24/24 95.8 kg (211 lb 3.2 oz)     Long term (current) use of anticoagulants  No bleeding issues at this time  Also seen in Coumadin Clinic    Pure hypercholesterolemia (Primary)  Continue statin  Follow lipid panel can consider increasing atorvastatin to 20mg at next visit    Hypokalemia  Stable    Follow Up  Follow up in about 6 months (around 4/30/2025), or if symptoms worsen or fail to improve.       Brandi R. Carter, FNP-C [facility] - Cardiology    Total professional time spent for the encounter: 40 minutes  Time was spent preparing to see the patient, reviewing results of prior testing, obtaining and/or reviewing separately obtained history, performing a medically appropriate examination and interview, counseling and educating the patient/family, ordering medications/tests/procedures, referring and communicating with other health care professionals, documenting clinical information in the electronic health record, and independently interpreting results.

## 2024-11-06 ENCOUNTER — LAB VISIT (OUTPATIENT)
Dept: LAB | Facility: HOSPITAL | Age: 54
End: 2024-11-06
Attending: INTERNAL MEDICINE
Payer: MEDICARE

## 2024-11-06 DIAGNOSIS — Z95.2 S/P MVR (MITRAL VALVE REPLACEMENT): ICD-10-CM

## 2024-11-06 DIAGNOSIS — I48.0 PAROXYSMAL ATRIAL FIBRILLATION: ICD-10-CM

## 2024-11-06 DIAGNOSIS — Z79.01 LONG TERM (CURRENT) USE OF ANTICOAGULANTS: ICD-10-CM

## 2024-11-06 LAB
INR PPP: 1.9 (ref 0.8–1.2)
PROTHROMBIN TIME: 19.6 SEC (ref 9–12.5)

## 2024-11-06 PROCEDURE — 85610 PROTHROMBIN TIME: CPT | Performed by: INTERNAL MEDICINE

## 2024-11-06 PROCEDURE — 36415 COLL VENOUS BLD VENIPUNCTURE: CPT | Performed by: INTERNAL MEDICINE

## 2024-11-07 ENCOUNTER — ANTI-COAG VISIT (OUTPATIENT)
Dept: CARDIOLOGY | Facility: CLINIC | Age: 54
End: 2024-11-07
Payer: MEDICAID

## 2024-11-07 DIAGNOSIS — Z95.2 S/P MVR (MITRAL VALVE REPLACEMENT): Primary | ICD-10-CM

## 2024-11-07 DIAGNOSIS — I48.0 PAROXYSMAL ATRIAL FIBRILLATION: ICD-10-CM

## 2024-11-07 DIAGNOSIS — Z79.01 LONG TERM (CURRENT) USE OF ANTICOAGULANTS: ICD-10-CM

## 2024-11-07 PROCEDURE — 93793 ANTICOAG MGMT PT WARFARIN: CPT | Mod: ,,,

## 2024-11-07 NOTE — PROGRESS NOTES
[facility]    2024 8:56 AM    Assessment/Plan:    Patient presents today with subtherapeutic  INR.    Assessment of patient findings and chart review: no changes reported per pt    Recommendation for patient's warfarin regimen: Boost dose today to 10mg then increase maintenance dose    Recommend repeat INR in 1.5 weeks  _________________________________________________________________    Riya Soto (54 y.o.) is followed by the [facility].    Anticoagulation Summary  As of 2024      INR goal:  2.5-3.5   TTR:  51.2% (2 y)   INR used for dosin.9 (2024)   Warfarin maintenance plan:  5 mg (5 mg x 1) every Mon, Wed, Fri; 7.5 mg (5 mg x 1.5) all other days   Weekly warfarin total:  45 mg   Plan last modified:  Skylar Marcelo, PharmD (2024)   Next INR check:  2024   Target end date:  --    Indications    S/P MVR (mitral valve replacement) [Z95.2]  Atrial fibrillation [I48.91]  Long term (current) use of anticoagulants [Z79.01]                 Anticoagulation Episode Summary       INR check location:  Clinic Lab    Preferred lab:  --    Send INR reminders to:  [facility] COUMADIN MONITORING POOL    Comments:  NMJAYLIN or KRIS [facility] [place] [facility] only Mon - Thu    (D/C ) [facility] [phone]          Anticoagulation Care Providers       Provider Role Specialty Phone number    Celsa Knight PA-C Referring Cardiothoracic Surgery [phone]    Ralf Figueroa MD Responsible Cardiology [phone]    Antoni Easton MD  Interventional Cardiology [phone]

## 2024-11-18 ENCOUNTER — LAB VISIT (OUTPATIENT)
Dept: LAB | Facility: HOSPITAL | Age: 54
End: 2024-11-18
Attending: INTERNAL MEDICINE
Payer: MEDICARE

## 2024-11-18 ENCOUNTER — ANTI-COAG VISIT (OUTPATIENT)
Dept: CARDIOLOGY | Facility: CLINIC | Age: 54
End: 2024-11-18
Payer: MEDICARE

## 2024-11-18 ENCOUNTER — OFFICE VISIT (OUTPATIENT)
Dept: ORTHOPEDICS | Facility: CLINIC | Age: 54
End: 2024-11-18
Payer: MEDICARE

## 2024-11-18 VITALS — HEIGHT: 67 IN | BODY MASS INDEX: 34.39 KG/M2 | WEIGHT: 219.13 LBS

## 2024-11-18 DIAGNOSIS — M25.562 CHRONIC PAIN OF BOTH KNEES: ICD-10-CM

## 2024-11-18 DIAGNOSIS — Z79.01 LONG TERM (CURRENT) USE OF ANTICOAGULANTS: ICD-10-CM

## 2024-11-18 DIAGNOSIS — M25.561 CHRONIC PAIN OF BOTH KNEES: ICD-10-CM

## 2024-11-18 DIAGNOSIS — M79.604 BILATERAL LEG PAIN: Primary | ICD-10-CM

## 2024-11-18 DIAGNOSIS — G89.29 CHRONIC PAIN OF BOTH KNEES: ICD-10-CM

## 2024-11-18 DIAGNOSIS — Z95.2 S/P MVR (MITRAL VALVE REPLACEMENT): ICD-10-CM

## 2024-11-18 DIAGNOSIS — I48.0 PAROXYSMAL ATRIAL FIBRILLATION: ICD-10-CM

## 2024-11-18 DIAGNOSIS — M79.605 BILATERAL LEG PAIN: Primary | ICD-10-CM

## 2024-11-18 DIAGNOSIS — M25.562 ACUTE PAIN OF LEFT KNEE: ICD-10-CM

## 2024-11-18 DIAGNOSIS — Z95.2 S/P MVR (MITRAL VALVE REPLACEMENT): Primary | ICD-10-CM

## 2024-11-18 LAB
INR PPP: 2.1 (ref 0.8–1.2)
PROTHROMBIN TIME: 21.8 SEC (ref 9–12.5)

## 2024-11-18 PROCEDURE — 85610 PROTHROMBIN TIME: CPT | Performed by: INTERNAL MEDICINE

## 2024-11-18 PROCEDURE — 99214 OFFICE O/P EST MOD 30 MIN: CPT | Mod: S$PBB,,, | Performed by: FAMILY MEDICINE

## 2024-11-18 PROCEDURE — 99213 OFFICE O/P EST LOW 20 MIN: CPT | Mod: PBBFAC,PO | Performed by: FAMILY MEDICINE

## 2024-11-18 PROCEDURE — 36415 COLL VENOUS BLD VENIPUNCTURE: CPT | Performed by: INTERNAL MEDICINE

## 2024-11-18 PROCEDURE — 99999 PR PBB SHADOW E&M-EST. PATIENT-LVL III: CPT | Mod: PBBFAC,,, | Performed by: FAMILY MEDICINE

## 2024-11-18 PROCEDURE — G0250 MD INR TEST REVIE INTER MGMT: HCPCS | Mod: ,,,

## 2024-11-18 RX ORDER — METHYLPREDNISOLONE 4 MG/1
TABLET ORAL
Qty: 21 EACH | Refills: 0 | Status: SHIPPED | OUTPATIENT
Start: 2024-11-18

## 2024-11-18 NOTE — PROGRESS NOTES
Subjective     Patient ID: Riya Soto is a 54 y.o. female.    Chief Complaint: Knee Pain (Pt here w/ c/o bilat knee pain. Pt rates pain a 0 at rest and states pain is only present at night. Pt denies any recent fall/injury. Pt states pain has been ongoing for 1 yr. )    54-year-old female here today with complaints of obscured bilateral anterior thigh pain as well as posterior knee pain.  This has been ongoing now for the last year.  She has hurts both sides hurts equally.  She localizes area of the pain to her anterior thighs just proximal to her knees.  She also reports having pain in the posterior aspect of her knees that occurs only at night.  She feels like both areas swell randomly.  She was seen by Dr. Kathleen in July with complaints of only the left side hurting.  Gave a steroid injection in her knee which provided no relief.  She has also tried gabapentin which she states she stopped taking because it made her feel sleepy.  Pain does seem to be made worse with any movement of the knees particular deep flexion.  She has not taken any other medication for this.  She is on Coumadin in his not able to take NSAIDs.  She has had x-rays done of both knees that show some mild degenerative changes in the left knee but no obvious issues on the right.        Review of Systems   Constitutional: Negative for chills and decreased appetite.   HENT:  Negative for congestion and sore throat.    Eyes:  Negative for blurred vision.   Cardiovascular:  Negative for chest pain, dyspnea on exertion and palpitations.   Respiratory:  Negative for cough and shortness of breath.    Skin:  Negative for rash.   Neurological:  Negative for difficulty with concentration, disturbances in coordination and headaches.   Psychiatric/Behavioral:  Negative for altered mental status, depression, hallucinations, memory loss and suicidal ideas.           Objective     General    Nursing note and vitals reviewed.  Constitutional: She is  oriented to person, place, and time. She appears well-developed and well-nourished.   HENT:   Nose: Nose normal.   Eyes: EOM are normal. Pupils are equal, round, and reactive to light.   Neck: Neck supple.   Cardiovascular:  Normal rate.            Pulmonary/Chest: Effort normal.   Abdominal: Soft.   Neurological: She is alert and oriented to person, place, and time. She has normal reflexes.   Psychiatric: She has a normal mood and affect. Her behavior is normal. Judgment and thought content normal.           Right Knee Exam     Inspection   Effusion: absent    Crepitus   The patient has crepitus of the patella.    Range of Motion   Extension:  50   Flexion:  140     Tests   Meniscus   Sreedhar:  Medial - negative Lateral - negative  Ligament Examination   Lachman: normal (-1 to 2mm)   PCL-Posterior Drawer: normal (0 to 2mm)     MCL - Valgus: normal (0 to 2mm)  LCL - Varus: normal  Patella   Patellar apprehension: negative  Passive Patellar Tilt: neutral  Patellar Tracking: normal    Other   Popliteal (Baker's) Cyst: present  Sensation: normal    Left Knee Exam     Inspection   Effusion: absent    Crepitus   The patient has crepitus of the patella.    Range of Motion   Extension:  50   Flexion:  140     Tests   Meniscus   Sreedhar:  Medial - negative Lateral - negative  Stability   Lachman: normal (-1 to 2mm)   PCL-Posterior Drawer: normal (0 to 2mm)  MCL - Valgus: normal (0 to 2mm)  LCL - Varus: normal (0 to 2mm)  Patella   Patellar apprehension: negative  Passive Patellar Tilt: neutral  Patellar Tracking: normal    Other   Popliteal (Baker's) Cyst: absent  Sensation: normal    Muscle Strength   Right Lower Extremity   Quadriceps:  5/5   Hamstrin/5   Left Lower Extremity   Quadriceps:  5/5   Hamstrin/5     Vascular Exam     Right Pulses  Dorsalis Pedis:      2+          Left Pulses  Dorsalis Pedis:      2+            Physical Exam  Vitals and nursing note reviewed.   Constitutional:       Appearance: She  is well-developed and well-nourished.   HENT:      Nose: Nose normal.   Eyes:      Extraocular Movements: EOM normal.      Pupils: Pupils are equal, round, and reactive to light.   Cardiovascular:      Rate and Rhythm: Normal rate.      Pulses:           Dorsalis pedis pulses are 2+ on the right side and 2+ on the left side.   Pulmonary:      Effort: Pulmonary effort is normal.   Abdominal:      Palpations: Abdomen is soft.   Musculoskeletal:      Cervical back: Normal range of motion and neck supple.      Right knee: No effusion.      Instability Tests: Medial Sreedhar test negative and lateral Sreedhar test negative.      Left knee: No effusion.      Instability Tests: Medial Sreedhar test negative and lateral Sreedhar test negative.        Legs:    Neurological:      Mental Status: She is alert and oriented to person, place, and time.      Deep Tendon Reflexes: Reflexes are normal and symmetric.   Psychiatric:         Mood and Affect: Mood and affect normal.         Behavior: Behavior normal.         Thought Content: Thought content normal.         Judgment: Judgment normal.        EXAMINATION:  XR KNEE 3 VIEW RIGHT     CLINICAL HISTORY:  Pain in right knee     FINDINGS:  Four views of the right knee show no acute fracture, dislocation or destructive osseous lesion. The joint spaces are preserved. Bone mineralization is normal, with no evidence of a joint effusion.     Impression:     Negative right knee radiographs.     Assessment and Plan     Encounter Diagnoses   Name Primary?    Acute pain of left knee     Chronic pain of both knees     Bilateral leg pain Yes         Riya was seen today for knee pain.    Diagnoses and all orders for this visit:    Bilateral leg pain  -     US Soft Tissue, Lower Extremity, Left; Future  -     US Soft Tissue, Lower Extremity, Right; Future    Acute pain of left knee  -     US Soft Tissue, Lower Extremity, Left; Future  -     US Soft Tissue, Lower Extremity, Right;  Future    Chronic pain of both knees  -     US Soft Tissue, Lower Extremity, Left; Future  -     US Soft Tissue, Lower Extremity, Right; Future    Other orders  -     methylPREDNISolone (MEDROL DOSEPACK) 4 mg tablet; use as directed    Unclear etiology for her pain.  Did not appreciate any direct knee pathology.  Not notice any mass or soft tissue swelling on anterior thighs or any Baker cyst in the posterior knees.  I am going to order ultrasounds of the soft tissues of both lower extremities and give her a Medrol Dosepak.  Will follow up with the results of the ultrasound once completed.

## 2024-11-18 NOTE — PROGRESS NOTES
[facility]    2024 12:06 PM    Assessment/Plan:    Patient presents today with subtherapeutic  INR.    Assessment of patient findings and chart review: pt prescribed medrol dose pack today, unsure if/when starting, pt advised to notify CC with start date.    Recommendation for patient's warfarin regimen: Boost dose today to 7.5mg then increase maintenance dose    Recommend repeat INR in 1 week  _________________________________________________________________    Riyajoanie Soto (54 y.o.) is followed by the [facility].    Anticoagulation Summary  As of 2024      INR goal:  2.5-3.5   TTR:  50.4% (2 y)   INR used for dosin.1 (2024)   Warfarin maintenance plan:  5 mg (5 mg x 1) every Mon, Fri; 7.5 mg (5 mg x 1.5) all other days   Weekly warfarin total:  47.5 mg   Plan last modified:  Skylar Marcelo, PharmD (2024)   Next INR check:  2024   Target end date:  --    Indications    S/P MVR (mitral valve replacement) [Z95.2]  Atrial fibrillation [I48.91]  Long term (current) use of anticoagulants [Z79.01]                 Anticoagulation Episode Summary       INR check location:  Clinic Lab    Preferred lab:  --    Send INR reminders to:  [facility] COUMADIN MONITORING POOL    Comments:  [facility] or [facility] only Mon - Thu    (D/C ) [facility] [phone]          Anticoagulation Care Providers       Provider Role Specialty Phone number    Celsa Knight PA-C Referring Cardiothoracic Surgery [phone]    Ralf Figueroa MD Responsible Cardiology [phone]    Antoni Easton MD  Interventional Cardiology [phone]

## 2024-11-19 ENCOUNTER — HOSPITAL ENCOUNTER (OUTPATIENT)
Dept: RADIOLOGY | Facility: HOSPITAL | Age: 54
Discharge: HOME | End: 2024-11-19
Attending: FAMILY MEDICINE
Payer: MEDICARE

## 2024-11-19 DIAGNOSIS — M79.605 BILATERAL LEG PAIN: ICD-10-CM

## 2024-11-19 DIAGNOSIS — M25.562 CHRONIC PAIN OF BOTH KNEES: ICD-10-CM

## 2024-11-19 DIAGNOSIS — M25.561 CHRONIC PAIN OF BOTH KNEES: ICD-10-CM

## 2024-11-19 DIAGNOSIS — M25.562 ACUTE PAIN OF LEFT KNEE: ICD-10-CM

## 2024-11-19 DIAGNOSIS — M79.604 BILATERAL LEG PAIN: ICD-10-CM

## 2024-11-19 DIAGNOSIS — G89.29 CHRONIC PAIN OF BOTH KNEES: ICD-10-CM

## 2024-11-19 PROCEDURE — 76882 US LMTD JT/FCL EVL NVASC XTR: CPT | Mod: TC,LT

## 2024-11-19 PROCEDURE — 76882 US LMTD JT/FCL EVL NVASC XTR: CPT | Mod: 26,RT,, | Performed by: RADIOLOGY

## 2024-11-19 PROCEDURE — 76882 US LMTD JT/FCL EVL NVASC XTR: CPT | Mod: TC,RT

## 2024-11-25 ENCOUNTER — ANTI-COAG VISIT (OUTPATIENT)
Dept: CARDIOLOGY | Facility: CLINIC | Age: 54
End: 2024-11-25
Payer: MEDICARE

## 2024-11-25 ENCOUNTER — LAB VISIT (OUTPATIENT)
Dept: LAB | Facility: HOSPITAL | Age: 54
End: 2024-11-25
Attending: FAMILY MEDICINE
Payer: MEDICARE

## 2024-11-25 DIAGNOSIS — Z79.01 LONG TERM (CURRENT) USE OF ANTICOAGULANTS: ICD-10-CM

## 2024-11-25 DIAGNOSIS — I48.0 PAROXYSMAL ATRIAL FIBRILLATION: ICD-10-CM

## 2024-11-25 DIAGNOSIS — Z95.2 S/P MVR (MITRAL VALVE REPLACEMENT): ICD-10-CM

## 2024-11-25 DIAGNOSIS — Z95.2 S/P MVR (MITRAL VALVE REPLACEMENT): Primary | ICD-10-CM

## 2024-11-25 LAB
INR PPP: 1.8 (ref 0.8–1.2)
PROTHROMBIN TIME: 18.5 SEC (ref 9–12.5)

## 2024-11-25 PROCEDURE — 36415 COLL VENOUS BLD VENIPUNCTURE: CPT | Performed by: INTERNAL MEDICINE

## 2024-11-25 PROCEDURE — 85610 PROTHROMBIN TIME: CPT | Performed by: INTERNAL MEDICINE

## 2024-11-25 PROCEDURE — 93793 ANTICOAG MGMT PT WARFARIN: CPT | Mod: ,,,

## 2024-11-26 NOTE — PROGRESS NOTES
[facility]    2024 9:59 AM    Assessment/Plan:    Patient presents today with subtherapeutic  INR.    Assessment of patient findings and chart review: pt reports taking lower weekly dose than advised    Recommendation for patient's warfarin regimen: Boost dose today to 10mg then increase maintenance dose    Recommend repeat INR in 1 week  _________________________________________________________________    Riyajoanie Soto (54 y.o.) is followed by the [facility].    Anticoagulation Summary  As of 2024      INR goal:  2.5-3.5   TTR:  49.9% (2 y)   INR used for dosin.8 (2024)   Warfarin maintenance plan:  5 mg (5 mg x 1) every Fri; 7.5 mg (5 mg x 1.5) all other days   Weekly warfarin total:  50 mg   Plan last modified:  Skylar Marcelo, PharmD (2024)   Next INR check:  2024   Target end date:  --    Indications    S/P MVR (mitral valve replacement) [Z95.2]  Atrial fibrillation [I48.91]  Long term (current) use of anticoagulants [Z79.01]                 Anticoagulation Episode Summary       INR check location:  Clinic Lab    Preferred lab:  --    Send INR reminders to:  [facility] COUMADIN MONITORING POOL    Comments:  NMJAYLIN or KRIS [facility] only Mon - Thu    (D/C ) [facility] [phone]          Anticoagulation Care Providers       Provider Role Specialty Phone number    Celsa Knight PA-C Referring Cardiothoracic Surgery [phone]    Ralf Figueroa MD Responsible Cardiology [phone]    Antoni Easton MD  Interventional Cardiology [phone]

## 2024-12-02 ENCOUNTER — LAB VISIT (OUTPATIENT)
Dept: LAB | Facility: HOSPITAL | Age: 54
End: 2024-12-02
Attending: INTERNAL MEDICINE
Payer: MEDICARE

## 2024-12-02 ENCOUNTER — TELEPHONE (OUTPATIENT)
Dept: RESEARCH | Facility: HOSPITAL | Age: 54
End: 2024-12-02
Payer: MEDICARE

## 2024-12-02 ENCOUNTER — ANTI-COAG VISIT (OUTPATIENT)
Dept: CARDIOLOGY | Facility: CLINIC | Age: 54
End: 2024-12-02
Payer: MEDICARE

## 2024-12-02 DIAGNOSIS — Z95.2 S/P MVR (MITRAL VALVE REPLACEMENT): ICD-10-CM

## 2024-12-02 DIAGNOSIS — Z79.01 LONG TERM (CURRENT) USE OF ANTICOAGULANTS: ICD-10-CM

## 2024-12-02 DIAGNOSIS — I48.0 PAROXYSMAL ATRIAL FIBRILLATION: ICD-10-CM

## 2024-12-02 DIAGNOSIS — Z95.2 S/P MVR (MITRAL VALVE REPLACEMENT): Primary | ICD-10-CM

## 2024-12-02 LAB
INR PPP: 2.4 (ref 0.8–1.2)
PROTHROMBIN TIME: 24.5 SEC (ref 9–12.5)

## 2024-12-02 PROCEDURE — 85610 PROTHROMBIN TIME: CPT | Performed by: INTERNAL MEDICINE

## 2024-12-02 PROCEDURE — 93793 ANTICOAG MGMT PT WARFARIN: CPT | Mod: ,,,

## 2024-12-02 PROCEDURE — 36415 COLL VENOUS BLD VENIPUNCTURE: CPT | Performed by: INTERNAL MEDICINE

## 2024-12-02 NOTE — PROGRESS NOTES
[facility]    2024 11:30 AM    Assessment/Plan:    Patient presents today with therapeutic INR.    Assessment of patient findings and chart review: INR at goal. Medications and chart reviewed. Pt stated she will be starting medrol dose pack 12/3/24.     Recommendation for patient's warfarin regimen: lower maintenance dose    Recommend repeat INR in 1 weeks  _________________________________________________________________    Riyajoanie Soto (54 y.o.) is followed by the [facility].    Anticoagulation Summary  As of 2024      INR goal:  2.5-3.5   TTR:  49.4% (2 y)   INR used for dosin.4 (2024)   Warfarin maintenance plan:  5 mg (5 mg x 1) every Fri; 7.5 mg (5 mg x 1.5) all other days   Weekly warfarin total:  50 mg   Plan last modified:  Skylar Marcelo, PharmD (2024)   Next INR check:  2024   Target end date:  --    Indications    S/P MVR (mitral valve replacement) [Z95.2]  Atrial fibrillation [I48.91]  Long term (current) use of anticoagulants [Z79.01]                 Anticoagulation Episode Summary       INR check location:  Clinic Lab    Preferred lab:  --    Send INR reminders to:  [facility] COUMADIN MONITORING POOL    Comments:  NMJAYLIN or KRIS [facility] [place] [facility] only Mon - Thu    (D/C ) [facility] [place] [phone]          Anticoagulation Care Providers       Provider Role Specialty Phone number    Celsa Knight PA-C Referring Cardiothoracic Surgery [phone]    Ralf Figueroa MD Responsible Cardiology [phone]    Antoni Easton MD  Interventional Cardiology [phone]

## 2024-12-02 NOTE — TELEPHONE ENCOUNTER
Trial: Corcym MANTRA (2021.298)  PI: Dr. Gilbert  Visit: Annual Visit (2 year Follow-Up)    Participant ID: [id number]    12/02/2024    Subject expressed agreement to continued participation in the research study.    Subject is a participant in the Corb[employer] MANTRA Study. This post-market study is to monitor ongoing safety and performance of the Corcym devices and accessories used for aortic, mitral, and tricuspid valvular diseases after implant.     They are present today for their 2 year follow up visit. Current list of medications obtained and verified. Subject did not have any recent hospitalization, ED visits, or other adverse events since their last visit. All questions answered to patient satisfaction.    Research staff contact information reviewed again with patient, should any questions or concerns arise. Their next follow up visit is at the 3 year point.    For Study Team:  Were EKGs reviewed? No EKG to review  Were echoes reviewed? No ECHO to review  Were labs reviewed? Yes  Were vital signs performed? Yes  Is there an updated NYHA class?  To be reviewed and entered after provider completes note from 30 Oct 2024.

## 2024-12-04 ENCOUNTER — PATIENT MESSAGE (OUTPATIENT)
Dept: CARDIOLOGY | Facility: CLINIC | Age: 54
End: 2024-12-04
Payer: MEDICARE

## 2024-12-05 ENCOUNTER — TELEPHONE (OUTPATIENT)
Dept: CARDIOLOGY | Facility: CLINIC | Age: 54
End: 2024-12-05
Payer: MEDICARE

## 2024-12-05 NOTE — TELEPHONE ENCOUNTER
LVM for patient, JAUN Geeer ordered an echocardiogram which you have not scheduled.  Please contact the office at [phone] so that I may assist in scheduling the test.

## 2024-12-09 ENCOUNTER — ANTI-COAG VISIT (OUTPATIENT)
Dept: CARDIOLOGY | Facility: CLINIC | Age: 54
End: 2024-12-09
Payer: MEDICARE

## 2024-12-09 ENCOUNTER — LAB VISIT (OUTPATIENT)
Dept: LAB | Facility: HOSPITAL | Age: 54
End: 2024-12-09
Attending: INTERNAL MEDICINE
Payer: MEDICARE

## 2024-12-09 DIAGNOSIS — I48.0 PAROXYSMAL ATRIAL FIBRILLATION: ICD-10-CM

## 2024-12-09 DIAGNOSIS — Z79.01 LONG TERM (CURRENT) USE OF ANTICOAGULANTS: ICD-10-CM

## 2024-12-09 DIAGNOSIS — Z95.2 S/P MVR (MITRAL VALVE REPLACEMENT): Primary | ICD-10-CM

## 2024-12-09 DIAGNOSIS — Z95.2 S/P MVR (MITRAL VALVE REPLACEMENT): ICD-10-CM

## 2024-12-09 LAB
INR PPP: 2.6 (ref 0.8–1.2)
PROTHROMBIN TIME: 26.1 SEC (ref 9–12.5)

## 2024-12-09 PROCEDURE — 93793 ANTICOAG MGMT PT WARFARIN: CPT | Mod: ,,,

## 2024-12-09 PROCEDURE — 85610 PROTHROMBIN TIME: CPT | Performed by: INTERNAL MEDICINE

## 2024-12-09 PROCEDURE — 36415 COLL VENOUS BLD VENIPUNCTURE: CPT | Performed by: INTERNAL MEDICINE

## 2024-12-09 NOTE — PROGRESS NOTES
[facility]    2024 11:36 AM    Assessment/Plan:    Patient presents today with therapeutic INR.    Assessment of patient findings and chart review: pt to complete medrol dose munir tonight    Recommendation for patient's warfarin regimen: Continue current maintenance dose    Recommend repeat INR in 1 week  _________________________________________________________________    Riya Soto (54 y.o.) is followed by the [facility].    Anticoagulation Summary  As of 2024      INR goal:  2.5-3.5   TTR:  49.4% (2.1 y)   INR used for dosin.6 (2024)   Warfarin maintenance plan:  5 mg (5 mg x 1) every Fri; 7.5 mg (5 mg x 1.5) all other days   Weekly warfarin total:  50 mg   No change documented:  Skylar Marcelo, Tom   Plan last modified:  Skylar Marcelo PharmD (2024)   Next INR check:  2024   Target end date:  --    Indications    S/P MVR (mitral valve replacement) [Z95.2]  Atrial fibrillation [I48.91]  Long term (current) use of anticoagulants [Z79.01]                 Anticoagulation Episode Summary       INR check location:  Clinic Lab    Preferred lab:  --    Send INR reminders to:  [facility] COUMADIN MONITORING POOL    Comments:  NMJAYLIN or KRIS [facility] [place] [facility] only Mon - Thu    (D/C ) [facility] [place] [phone]          Anticoagulation Care Providers       Provider Role Specialty Phone number    Celsa Knight PA-C Referring Cardiothoracic Surgery [phone]    Ralf Figueroa MD Responsible Cardiology [phone]    Antoni Easton MD  Interventional Cardiology [phone]

## 2024-12-13 DIAGNOSIS — Z79.01 LONG TERM (CURRENT) USE OF ANTICOAGULANTS: ICD-10-CM

## 2024-12-13 DIAGNOSIS — Z95.2 S/P MVR (MITRAL VALVE REPLACEMENT): ICD-10-CM

## 2024-12-13 DIAGNOSIS — I48.0 PAROXYSMAL ATRIAL FIBRILLATION: ICD-10-CM

## 2024-12-14 RX ORDER — WARFARIN SODIUM 5 MG/1
TABLET ORAL
Qty: 135 TABLET | Refills: 3 | Status: SHIPPED | OUTPATIENT
Start: 2024-12-14

## 2024-12-16 ENCOUNTER — LAB VISIT (OUTPATIENT)
Dept: LAB | Facility: HOSPITAL | Age: 54
End: 2024-12-16
Attending: INTERNAL MEDICINE
Payer: MEDICARE

## 2024-12-16 ENCOUNTER — ANTI-COAG VISIT (OUTPATIENT)
Dept: CARDIOLOGY | Facility: CLINIC | Age: 54
End: 2024-12-16
Payer: MEDICARE

## 2024-12-16 DIAGNOSIS — I48.0 PAROXYSMAL ATRIAL FIBRILLATION: ICD-10-CM

## 2024-12-16 DIAGNOSIS — Z95.2 S/P MVR (MITRAL VALVE REPLACEMENT): Primary | ICD-10-CM

## 2024-12-16 DIAGNOSIS — Z95.2 S/P MVR (MITRAL VALVE REPLACEMENT): ICD-10-CM

## 2024-12-16 DIAGNOSIS — Z79.01 LONG TERM (CURRENT) USE OF ANTICOAGULANTS: ICD-10-CM

## 2024-12-16 LAB
INR PPP: 2 (ref 0.8–1.2)
PROTHROMBIN TIME: 21 SEC (ref 9–12.5)

## 2024-12-16 PROCEDURE — 85610 PROTHROMBIN TIME: CPT | Performed by: INTERNAL MEDICINE

## 2024-12-16 PROCEDURE — 93793 ANTICOAG MGMT PT WARFARIN: CPT | Mod: ,,,

## 2024-12-16 PROCEDURE — 36415 COLL VENOUS BLD VENIPUNCTURE: CPT | Performed by: INTERNAL MEDICINE

## 2024-12-16 NOTE — PROGRESS NOTES
[facility]    2024 11:50 AM    Assessment/Plan:    Patient presents today with subtherapeutic  INR.    Assessment of patient findings and chart review: pt reports taking probiotic 3x last week, stopped taking it.    Recommendation for patient's warfarin regimen: Boost dose today to 10mg then resume current maintenance dose    Recommend repeat INR in 1 week  _________________________________________________________________    Riya Soto (54 y.o.) is followed by the [facility].    Anticoagulation Summary  As of 2024      INR goal:  2.5-3.5   TTR:  49.1% (2.1 y)   INR used for dosin.0 (2024)   Warfarin maintenance plan:  5 mg (5 mg x 1) every Fri; 7.5 mg (5 mg x 1.5) all other days   Weekly warfarin total:  50 mg   Plan last modified:  Skylar Marcelo, PharmD (2024)   Next INR check:  2024   Target end date:  --    Indications    S/P MVR (mitral valve replacement) [Z95.2]  Atrial fibrillation [I48.91]  Long term (current) use of anticoagulants [Z79.01]                 Anticoagulation Episode Summary       INR check location:  Clinic Lab    Preferred lab:  --    Send INR reminders to:  [facility] COUMADIN MONITORING POOL    Comments:  NMJAYLIN or KRIS [facility] only Mon - Thu    (D/C ) [facility] [place] [phone]          Anticoagulation Care Providers       Provider Role Specialty Phone number    Celsa Knight PA-C Referring Cardiothoracic Surgery [phone]    Ralf Figueroa MD Responsible Cardiology [phone]    Antoni Easton MD  Interventional Cardiology [phone]

## 2024-12-23 ENCOUNTER — LAB VISIT (OUTPATIENT)
Dept: LAB | Facility: HOSPITAL | Age: 54
End: 2024-12-23
Attending: INTERNAL MEDICINE
Payer: MEDICARE

## 2024-12-23 ENCOUNTER — ANTI-COAG VISIT (OUTPATIENT)
Dept: CARDIOLOGY | Facility: CLINIC | Age: 54
End: 2024-12-23
Payer: MEDICARE

## 2024-12-23 DIAGNOSIS — Z95.2 S/P MVR (MITRAL VALVE REPLACEMENT): ICD-10-CM

## 2024-12-23 DIAGNOSIS — Z79.01 LONG TERM (CURRENT) USE OF ANTICOAGULANTS: ICD-10-CM

## 2024-12-23 DIAGNOSIS — I48.0 PAROXYSMAL ATRIAL FIBRILLATION: ICD-10-CM

## 2024-12-23 DIAGNOSIS — Z95.2 S/P MVR (MITRAL VALVE REPLACEMENT): Primary | ICD-10-CM

## 2024-12-23 LAB
INR PPP: 2.7 (ref 0.8–1.2)
PROTHROMBIN TIME: 27.3 SEC (ref 9–12.5)

## 2024-12-23 PROCEDURE — 85610 PROTHROMBIN TIME: CPT | Performed by: INTERNAL MEDICINE

## 2024-12-23 PROCEDURE — 36415 COLL VENOUS BLD VENIPUNCTURE: CPT | Performed by: INTERNAL MEDICINE

## 2024-12-23 PROCEDURE — 93793 ANTICOAG MGMT PT WARFARIN: CPT | Mod: ,,,

## 2024-12-23 NOTE — PROGRESS NOTES
[facility]    2024 11:19 AM    Assessment/Plan:    Patient presents today with therapeutic INR.    Recommendation for patient's warfarin regimen: Continue current maintenance dose    Recommend repeat INR in 2 weeks  _________________________________________________________________    Riya Soto (54 y.o.) is followed by the [facility] [facility].    Anticoagulation Summary  As of 2024      INR goal:  2.5-3.5   TTR:  49.0% (2.1 y)   INR used for dosin.7 (2024)   Warfarin maintenance plan:  5 mg (5 mg x 1) every Fri; 7.5 mg (5 mg x 1.5) all other days   Weekly warfarin total:  50 mg   No change documented:  Skylar Marcelo PharmD   Plan last modified:  Skylar Marcelo PharmD (2024)   Next INR check:  2025   Target end date:  --    Indications    S/P MVR (mitral valve replacement) [Z95.2]  Atrial fibrillation [I48.91]  Long term (current) use of anticoagulants [Z79.01]                 Anticoagulation Episode Summary       INR check location:  Clinic Lab    Preferred lab:  --    Send INR reminders to:  [facility] COUMADIN MONITORING POOL    Comments:  NMJAYLIN or KRIS [facility] only Mon - Thu    (D/C ) [facility] [place] [phone]          Anticoagulation Care Providers       Provider Role Specialty Phone number    Celsa Knight PA-C Referring Cardiothoracic Surgery [phone]    Ralf Figueroa MD Responsible Cardiology [phone]    Antoni Easton MD  Interventional Cardiology [phone]

## 2025-01-06 ENCOUNTER — LAB VISIT (OUTPATIENT)
Dept: LAB | Facility: HOSPITAL | Age: 55
End: 2025-01-06
Attending: INTERNAL MEDICINE
Payer: MEDICARE

## 2025-01-06 ENCOUNTER — ANTI-COAG VISIT (OUTPATIENT)
Dept: CARDIOLOGY | Facility: CLINIC | Age: 55
End: 2025-01-06
Payer: MEDICARE

## 2025-01-06 DIAGNOSIS — Z79.01 LONG TERM (CURRENT) USE OF ANTICOAGULANTS: ICD-10-CM

## 2025-01-06 DIAGNOSIS — Z95.2 S/P MVR (MITRAL VALVE REPLACEMENT): Primary | ICD-10-CM

## 2025-01-06 DIAGNOSIS — Z95.2 S/P MVR (MITRAL VALVE REPLACEMENT): ICD-10-CM

## 2025-01-06 DIAGNOSIS — I48.0 PAROXYSMAL ATRIAL FIBRILLATION: ICD-10-CM

## 2025-01-06 LAB
INR PPP: 1.9 (ref 0.8–1.2)
PROTHROMBIN TIME: 20.1 SEC (ref 9–12.5)

## 2025-01-06 PROCEDURE — 85610 PROTHROMBIN TIME: CPT | Performed by: INTERNAL MEDICINE

## 2025-01-06 PROCEDURE — 36415 COLL VENOUS BLD VENIPUNCTURE: CPT | Performed by: INTERNAL MEDICINE

## 2025-01-06 PROCEDURE — 93793 ANTICOAG MGMT PT WARFARIN: CPT | Mod: ,,,

## 2025-01-06 NOTE — PROGRESS NOTES
[facility]    2025 9:51 AM    Assessment/Plan:    Patient presents today with subtherapeutic  INR.    Recommendation for patient's warfarin regimen: Boost dose today to 10mg then increase maintenance dose    Recommend repeat INR in 2 weeks  _________________________________________________________________    Riya Soto (54 y.o.) is followed by the [email or web address].    Anticoagulation Summary  As of 2025      INR goal:  2.5-3.5   TTR:  48.5% (2.1 y)   INR used for dosin.9 (2025)   Warfarin maintenance plan:  7.5 mg (5 mg x 1.5) every day   Weekly warfarin total:  52.5 mg   Plan last modified:  Skylar Marcelo, PharmD (2025)   Next INR check:  2025   Target end date:  --    Indications    S/P MVR (mitral valve replacement) [Z95.2]  Atrial fibrillation [I48.91]  Long term (current) use of anticoagulants [Z79.01]                 Anticoagulation Episode Summary       INR check location:  Clinic Lab    Preferred lab:  --    Send INR reminders to:  [facility] COUMADIN MONITORING POOL    Comments:  [facility] or KRIS [facility] only Mon - Thu    (D/C ) [facility] [place] [phone]          Anticoagulation Care Providers       Provider Role Specialty Phone number    Celsa Knight PA-C Referring Cardiothoracic Surgery [phone]    Ralf Figueroa MD Responsible Cardiology [phone]    Antoni Easton MD  Interventional Cardiology [phone]

## 2025-01-24 ENCOUNTER — ANTI-COAG VISIT (OUTPATIENT)
Dept: CARDIOLOGY | Facility: CLINIC | Age: 55
End: 2025-01-24
Payer: MEDICARE

## 2025-01-24 ENCOUNTER — LAB VISIT (OUTPATIENT)
Dept: LAB | Facility: HOSPITAL | Age: 55
End: 2025-01-24
Attending: INTERNAL MEDICINE
Payer: MEDICARE

## 2025-01-24 DIAGNOSIS — I48.0 PAROXYSMAL ATRIAL FIBRILLATION: ICD-10-CM

## 2025-01-24 DIAGNOSIS — Z95.2 S/P MVR (MITRAL VALVE REPLACEMENT): ICD-10-CM

## 2025-01-24 DIAGNOSIS — Z79.01 LONG TERM (CURRENT) USE OF ANTICOAGULANTS: ICD-10-CM

## 2025-01-24 DIAGNOSIS — Z95.2 S/P MVR (MITRAL VALVE REPLACEMENT): Primary | ICD-10-CM

## 2025-01-24 LAB
INR PPP: 3.3 (ref 0.8–1.2)
PROTHROMBIN TIME: 33.1 SEC (ref 9–12.5)

## 2025-01-24 PROCEDURE — 85610 PROTHROMBIN TIME: CPT | Performed by: INTERNAL MEDICINE

## 2025-01-24 PROCEDURE — 36415 COLL VENOUS BLD VENIPUNCTURE: CPT | Performed by: INTERNAL MEDICINE

## 2025-01-24 PROCEDURE — 93793 ANTICOAG MGMT PT WARFARIN: CPT | Mod: S$GLB,,,

## 2025-01-24 NOTE — PROGRESS NOTES
[email or web address]    01/24/2025 11:39 AM    Assessment/Plan:    Patient presents today with therapeutic INR.    Recommendation for patient's warfarin regimen: Continue current maintenance dose    Recommend repeat INR in 1 week  _________________________________________________________________    Riya Soto (54 y.o.) is followed by the [employer] [facility].    Anticoagulation Summary  As of 1/24/2025      INR goal:  2.5-3.5   TTR:  48.7% (2.2 y)   INR used for dosing:  3.3 (1/24/2025)   Warfarin maintenance plan:  7.5 mg (5 mg x 1.5) every day   Weekly warfarin total:  52.5 mg   No change documented:  Skylar Marcelo PharmD   Plan last modified:  Skylar Marcelo PharmD (1/6/2025)   Next INR check:  2/3/2025   Target end date:  --    Indications    S/P MVR (mitral valve replacement) [Z95.2]  Atrial fibrillation [I48.91]  Long term (current) use of anticoagulants [Z79.01]                 Anticoagulation Episode Summary       INR check location:  Clinic Lab    Preferred lab:  --    Send INR reminders to:  [facility] COUMADIN MONITORING POOL    Comments:  NMJAYLIN or [facility] [place] [facility] only Mon - u    (D/C 12/13) [facility] [place] [phone]          Anticoagulation Care Providers       Provider Role Specialty Phone number    Celsa Knight PA-C Referring Cardiothoracic Surgery [phone]    Ralf Figueroa MD Responsible Cardiology [phone]    Antoni Easton MD  Interventional Cardiology [phone]

## 2025-01-28 ENCOUNTER — OFFICE VISIT (OUTPATIENT)
Dept: PRIMARY CARE CLINIC | Facility: CLINIC | Age: 55
End: 2025-01-28
Payer: MEDICARE

## 2025-01-28 VITALS
OXYGEN SATURATION: 98 % | DIASTOLIC BLOOD PRESSURE: 68 MMHG | WEIGHT: 218.69 LBS | HEIGHT: 67 IN | HEART RATE: 64 BPM | SYSTOLIC BLOOD PRESSURE: 118 MMHG | RESPIRATION RATE: 18 BRPM | BODY MASS INDEX: 34.33 KG/M2

## 2025-01-28 DIAGNOSIS — Z95.2 S/P MVR (MITRAL VALVE REPLACEMENT): ICD-10-CM

## 2025-01-28 DIAGNOSIS — Z98.890 S/P TVR (TRICUSPID VALVE REPAIR): ICD-10-CM

## 2025-01-28 DIAGNOSIS — Z79.01 ANTICOAGULANT LONG-TERM USE: ICD-10-CM

## 2025-01-28 DIAGNOSIS — I48.91 ATRIAL FIBRILLATION, UNSPECIFIED TYPE: ICD-10-CM

## 2025-01-28 DIAGNOSIS — Z98.890 S/P MAZE OPERATION FOR ATRIAL FIBRILLATION: ICD-10-CM

## 2025-01-28 DIAGNOSIS — R73.9 STRESS HYPERGLYCEMIA: ICD-10-CM

## 2025-01-28 DIAGNOSIS — E78.00 PURE HYPERCHOLESTEROLEMIA: Primary | ICD-10-CM

## 2025-01-28 DIAGNOSIS — Z86.79 S/P MAZE OPERATION FOR ATRIAL FIBRILLATION: ICD-10-CM

## 2025-01-28 PROCEDURE — 3008F BODY MASS INDEX DOCD: CPT | Mod: CPTII,S$GLB,, | Performed by: FAMILY MEDICINE

## 2025-01-28 PROCEDURE — 1159F MED LIST DOCD IN RCRD: CPT | Mod: CPTII,S$GLB,, | Performed by: FAMILY MEDICINE

## 2025-01-28 PROCEDURE — 3074F SYST BP LT 130 MM HG: CPT | Mod: CPTII,S$GLB,, | Performed by: FAMILY MEDICINE

## 2025-01-28 PROCEDURE — 99214 OFFICE O/P EST MOD 30 MIN: CPT | Mod: S$GLB,,, | Performed by: FAMILY MEDICINE

## 2025-01-28 PROCEDURE — 99999 PR PBB SHADOW E&M-EST. PATIENT-LVL III: CPT | Mod: PBBFAC,,, | Performed by: FAMILY MEDICINE

## 2025-01-28 PROCEDURE — 3078F DIAST BP <80 MM HG: CPT | Mod: CPTII,S$GLB,, | Performed by: FAMILY MEDICINE

## 2025-01-28 RX ORDER — ATORVASTATIN CALCIUM 10 MG/1
10 TABLET, FILM COATED ORAL NIGHTLY
Qty: 90 TABLET | Refills: 3 | Status: SHIPPED | OUTPATIENT
Start: 2025-01-28 | End: 2026-01-28

## 2025-01-28 NOTE — PROGRESS NOTES
Subjective:       Patient ID: Riya Soto is a 54 y.o. female.    Chief Complaint: 6 month check up      HPI 53 yo BF in for 6 mo checkup --eating well--+BM--ambulating well  Hyperlipidemia on atorvastatin  History of atrial fibrillation on metoprolol 25 mg---history cardioversion/rheumatic mitral regurgitation with mitral insufficiency tricuspid regurgitation status post tricuspid valve repair status post Maze operation for atrial fib status post mitral valve replacement--sees cardiology not sure next appt --on coumadin Unable afford multaq $800  History cholecystectomy diverticulosis history gastric ulcer  Osteoarthritis knees bilaterally some problems with legs history chronic pain syndrome  Multaq  cost $800 pt unable afford them none since Oct                 ROS   Skin--no psoriasis eczema skin cancer  HEENT---no headaches ocular pain blurred vision diplopia epistaxis hoarseness change in voice thyroid trouble  Lung--no pneumonia asthma TB no smoking  Heart--history of mitral valve surgery-11 yrs ago--November had valve replacements x2 mitral valve and tricuspid valve replaced --+ hyperlipidemia--atrial fib with history of cardioversion- and Maze procedure -CHF--no chest pain ankle edema palpitations MI--no stent bypass arrhythmia  Abdomen--no nausea vomiting diarrhea constipation hepatitis + diverticulosis +history cholecystectomy--history gastric ulcer  -no UTI renal disease stones  GYN last menstrual period age 48--due for mammogram November   Musculoskeletal cervical strain lumbosacral strain occas ---osteoarthritis knees and pains in the legs  Neurologic no dizziness passing out seizures  No diabetes  Anemia--no anxiety or depression  Single--7 children--SSI due to heart---lives with  children---son killed by a drunk  March 2023         General: Well nourished, well developed, no acute distress +obesity appears depressed  Skin: No lesions   HEENT: Eyes PERRLA, EOM intact, nose patent,  throat non-erythematous ears TMs clear  NECK: Supple, no bruits, No JVD, no nodes  Lungs: Clear, no rales, rhonchi, wheezing  Heart: Regular rate and rhythm, no murmurs, gallops, or rubs--clicking of heart valve is heard  Abdomen: flat, bowel sounds positive, no tenderness, or organomegaly  MS:  No significant change Some deformity of the left ankle had 3 surgery still has screw present --main problem left shoulder --sore with palpation--AC joint and triceps and supraspinatus area ., pain with raising arm overhead especially painful with trying to reach back to undo bra  Neuro: Alert, CN intact, oriented X 3  Extremities: No cyanosis, clubbing, or edema         Assessment:       1. Pure hypercholesterolemia    2. Atrial fibrillation, unspecified type    3. Anticoagulant long-term use    4. S/P Maze operation for atrial fibrillation    5. S/P MVR (mitral valve replacement)    6. S/P TVR (tricuspid valve repair)    7. Stress hyperglycemia                Plan:       Pure hypercholesterolemia  -     CBC Auto Differential; Future; Expected date: 01/28/2025  -     Lipid Panel; Future; Expected date: 01/28/2025  -     Comprehensive Metabolic Panel; Future; Expected date: 01/28/2025    Atrial fibrillation, unspecified type  -     Ambulatory referral/consult to Cardiology; Future; Expected date: 02/04/2025    Anticoagulant long-term use    S/P Maze operation for atrial fibrillation    S/P MVR (mitral valve replacement)    S/P TVR (tricuspid valve repair)    Stress hyperglycemia  -     Hemoglobin A1C; Future; Expected date: 01/28/2025    Other orders  -     atorvastatin (LIPITOR) 10 MG tablet; Take 1 tablet (10 mg total) by mouth every evening.  Dispense: 90 tablet; Refill: 3              Went repeat EGD  Main-Reason for Visit-  Left knee pain--recently seen by orthopedist--had knee injected--now pain in the right knee--x-ray right knee--no NSAIDs due to being on Coumadin and history of ulcer disease--will treat with  gabapentin 300 mg q.d. can increase to t.i.d.--Robaxin 500 q.h.s.--Moist heat/theragesic/range of motion exercise---hydrocodone for breakthrough pain told can not keep giving pain medications  Recently went to [place]--had GI bleed --receive 4 units of blood--hx bleeding ulcer --patient had subsequent EGD November 20, 2023 showing the ulcers had healed== patient unable take NSAIDs due to being on Coumadin  Heart surgery 11-7-2022 mitral and tricuspid valve replaced--patient doing very well--needs cardiac follow-up  Son killed in hit and run accident April 2023[place] several automobile-- was eventually caught patient has to go to court  Atrial fibrillation --history of cardioversion 03/29/2022----patient was on coumadin /metoprolol--needs cardiac follow-up  Hyperlipidemia patient refills of Lipitor   Menopausal syndrome--had Mirena removed 3 years ago No period since   History anemia/cholecystectomy/heart surgery (systolic had leaking valve 1 surgery was done was normal)  Lab CBCs CMP lipids hemoglobin A1c T4 TSH --done in June was within normal limits no need to redo lab for six-month  Return in 6 months

## 2025-02-03 ENCOUNTER — LAB VISIT (OUTPATIENT)
Dept: LAB | Facility: HOSPITAL | Age: 55
End: 2025-02-03
Attending: INTERNAL MEDICINE
Payer: MEDICARE

## 2025-02-03 ENCOUNTER — ANTI-COAG VISIT (OUTPATIENT)
Dept: CARDIOLOGY | Facility: CLINIC | Age: 55
End: 2025-02-03
Payer: MEDICARE

## 2025-02-03 DIAGNOSIS — I48.0 PAROXYSMAL ATRIAL FIBRILLATION: ICD-10-CM

## 2025-02-03 DIAGNOSIS — Z79.01 LONG TERM (CURRENT) USE OF ANTICOAGULANTS: ICD-10-CM

## 2025-02-03 DIAGNOSIS — Z95.2 S/P MVR (MITRAL VALVE REPLACEMENT): ICD-10-CM

## 2025-02-03 DIAGNOSIS — Z95.2 S/P MVR (MITRAL VALVE REPLACEMENT): Primary | ICD-10-CM

## 2025-02-03 LAB
INR PPP: 1.7 (ref 0.8–1.2)
PROTHROMBIN TIME: 18 SEC (ref 9–12.5)

## 2025-02-03 PROCEDURE — 93793 ANTICOAG MGMT PT WARFARIN: CPT | Mod: S$GLB,,,

## 2025-02-03 PROCEDURE — 36415 COLL VENOUS BLD VENIPUNCTURE: CPT | Performed by: INTERNAL MEDICINE

## 2025-02-03 PROCEDURE — 85610 PROTHROMBIN TIME: CPT | Performed by: INTERNAL MEDICINE

## 2025-02-03 NOTE — PROGRESS NOTES
[facility]    2025 9:30 AM    Assessment/Plan:    Patient presents today with subtherapeutic  INR.    Assessment of patient findings and chart review: pt missed dose x 3 days last week, reports self adjusting dose the subsequent 3 days, see calendar.    Recommendation for patient's warfarin regimen: Continue current maintenance dose, no boost or increase as pt self increased dose the last 3 days    Recommend repeat INR in 1 week  _________________________________________________________________    Riya Soto (54 y.o.) is followed by the [email or web address].    Anticoagulation Summary  As of 2/3/2025      INR goal:  2.5-3.5   TTR:  48.7% (2.2 y)   INR used for dosin.7 (2/3/2025)   Warfarin maintenance plan:  7.5 mg (5 mg x 1.5) every day   Weekly warfarin total:  52.5 mg   Plan last modified:  Skylar Marcelo, PharmD (2025)   Next INR check:  2/10/2025   Target end date:  --    Indications    S/P MVR (mitral valve replacement) [Z95.2]  Atrial fibrillation [I48.91]  Long term (current) use of anticoagulants [Z79.01]                 Anticoagulation Episode Summary       INR check location:  Clinic Lab    Preferred lab:  --    Send INR reminders to:  [facility] COUMADIN MONITORING POOL    Comments:  [facility] or [facility] [place] [facility] only Mon - Thu    (D/C ) [facility] [place] [phone]          Anticoagulation Care Providers       Provider Role Specialty Phone number    Celsa Knight PA-C Referring Cardiothoracic Surgery [phone]    Ralf Figueroa MD Responsible Cardiology [phone]    Antoni Easton MD  Interventional Cardiology [phone]

## 2025-02-10 ENCOUNTER — LAB VISIT (OUTPATIENT)
Dept: LAB | Facility: HOSPITAL | Age: 55
End: 2025-02-10
Attending: ANESTHESIOLOGY
Payer: MEDICARE

## 2025-02-10 ENCOUNTER — ANTI-COAG VISIT (OUTPATIENT)
Dept: CARDIOLOGY | Facility: CLINIC | Age: 55
End: 2025-02-10
Payer: MEDICARE

## 2025-02-10 DIAGNOSIS — Z79.01 LONG TERM (CURRENT) USE OF ANTICOAGULANTS: ICD-10-CM

## 2025-02-10 DIAGNOSIS — Z95.2 S/P MVR (MITRAL VALVE REPLACEMENT): Primary | ICD-10-CM

## 2025-02-10 DIAGNOSIS — Z95.2 S/P MVR (MITRAL VALVE REPLACEMENT): ICD-10-CM

## 2025-02-10 DIAGNOSIS — I48.0 PAROXYSMAL ATRIAL FIBRILLATION: ICD-10-CM

## 2025-02-10 LAB
INR PPP: 3.1 (ref 0.8–1.2)
PROTHROMBIN TIME: 31.4 SEC (ref 9–12.5)

## 2025-02-10 PROCEDURE — 36415 COLL VENOUS BLD VENIPUNCTURE: CPT | Performed by: INTERNAL MEDICINE

## 2025-02-10 PROCEDURE — 93793 ANTICOAG MGMT PT WARFARIN: CPT | Mod: S$GLB,,,

## 2025-02-10 PROCEDURE — 85610 PROTHROMBIN TIME: CPT | Performed by: INTERNAL MEDICINE

## 2025-02-10 NOTE — PROGRESS NOTES
[facility]    02/10/2025 9:20 AM    Assessment/Plan:    Patient presents today with therapeutic INR.    Recommendation for patient's warfarin regimen: Continue current maintenance dose    Recommend repeat INR in 2 weeks  _________________________________________________________________    Riya Soto (54 y.o.) is followed by the [facility].    Anticoagulation Summary  As of 2/10/2025      INR goal:  2.5-3.5   TTR:  48.7% (2.2 y)   INR used for dosing:  3.1 (2/10/2025)   Warfarin maintenance plan:  7.5 mg (5 mg x 1.5) every day   Weekly warfarin total:  52.5 mg   No change documented:  Skylar Marcelo PharmD   Plan last modified:  Skylar Marcelo PharmD (1/6/2025)   Next INR check:  2/24/2025   Target end date:  --    Indications    S/P MVR (mitral valve replacement) [Z95.2]  Atrial fibrillation [I48.91]  Long term (current) use of anticoagulants [Z79.01]                 Anticoagulation Episode Summary       INR check location:  Clinic Lab    Preferred lab:  --    Send INR reminders to:  [facility] COUMADIN MONITORING POOL    Comments:  [facility] or [facility] only Mon - u    (D/C 12/13) [facility] [phone]          Anticoagulation Care Providers       Provider Role Specialty Phone number    Celsa Knight PA-C Referring Cardiothoracic Surgery [phone]    Ralf Figueroa MD Responsible Cardiology [phone]    Antoni Easton MD  Interventional Cardiology [phone]

## 2025-02-14 ENCOUNTER — PATIENT MESSAGE (OUTPATIENT)
Dept: CARDIOLOGY | Facility: CLINIC | Age: 55
End: 2025-02-14
Payer: MEDICARE

## 2025-02-18 ENCOUNTER — ANTI-COAG VISIT (OUTPATIENT)
Dept: CARDIOLOGY | Facility: CLINIC | Age: 55
End: 2025-02-18
Payer: MEDICARE

## 2025-02-18 ENCOUNTER — RESULTS FOLLOW-UP (OUTPATIENT)
Dept: PRIMARY CARE CLINIC | Facility: CLINIC | Age: 55
End: 2025-02-18
Payer: MEDICARE

## 2025-02-18 ENCOUNTER — TELEPHONE (OUTPATIENT)
Dept: CARDIOLOGY | Facility: CLINIC | Age: 55
End: 2025-02-18
Payer: MEDICARE

## 2025-02-18 ENCOUNTER — LAB VISIT (OUTPATIENT)
Dept: LAB | Facility: HOSPITAL | Age: 55
End: 2025-02-18
Attending: FAMILY MEDICINE
Payer: MEDICARE

## 2025-02-18 DIAGNOSIS — I48.0 PAROXYSMAL ATRIAL FIBRILLATION: ICD-10-CM

## 2025-02-18 DIAGNOSIS — Z79.01 LONG TERM (CURRENT) USE OF ANTICOAGULANTS: ICD-10-CM

## 2025-02-18 DIAGNOSIS — E78.00 PURE HYPERCHOLESTEROLEMIA: ICD-10-CM

## 2025-02-18 DIAGNOSIS — R73.9 STRESS HYPERGLYCEMIA: ICD-10-CM

## 2025-02-18 DIAGNOSIS — E78.00 PURE HYPERCHOLESTEROLEMIA: Primary | ICD-10-CM

## 2025-02-18 DIAGNOSIS — Z95.2 S/P MVR (MITRAL VALVE REPLACEMENT): Primary | ICD-10-CM

## 2025-02-18 DIAGNOSIS — Z95.2 S/P MVR (MITRAL VALVE REPLACEMENT): ICD-10-CM

## 2025-02-18 LAB
ALBUMIN SERPL BCP-MCNC: 3.9 G/DL (ref 3.5–5.2)
ALP SERPL-CCNC: 103 U/L (ref 40–150)
ALT SERPL W/O P-5'-P-CCNC: 20 U/L (ref 10–44)
ANION GAP SERPL CALC-SCNC: 11 MMOL/L (ref 8–16)
AST SERPL-CCNC: 22 U/L (ref 10–40)
BASOPHILS # BLD AUTO: 0.02 K/UL (ref 0–0.2)
BASOPHILS NFR BLD: 0.4 % (ref 0–1.9)
BILIRUB SERPL-MCNC: 0.7 MG/DL (ref 0.1–1)
BUN SERPL-MCNC: 17 MG/DL (ref 6–20)
CALCIUM SERPL-MCNC: 9.4 MG/DL (ref 8.7–10.5)
CHLORIDE SERPL-SCNC: 102 MMOL/L (ref 95–110)
CHOLEST SERPL-MCNC: 202 MG/DL (ref 120–199)
CHOLEST/HDLC SERPL: 4.6 {RATIO} (ref 2–5)
CO2 SERPL-SCNC: 25 MMOL/L (ref 23–29)
CREAT SERPL-MCNC: 0.9 MG/DL (ref 0.5–1.4)
DIFFERENTIAL METHOD BLD: ABNORMAL
EOSINOPHIL # BLD AUTO: 0.1 K/UL (ref 0–0.5)
EOSINOPHIL NFR BLD: 1.3 % (ref 0–8)
ERYTHROCYTE [DISTWIDTH] IN BLOOD BY AUTOMATED COUNT: 12.8 % (ref 11.5–14.5)
EST. GFR  (NO RACE VARIABLE): >60 ML/MIN/1.73 M^2
ESTIMATED AVG GLUCOSE: 123 MG/DL (ref 68–131)
GLUCOSE SERPL-MCNC: 94 MG/DL (ref 70–110)
HBA1C MFR BLD: 5.9 % (ref 4.5–6.2)
HCT VFR BLD AUTO: 38.3 % (ref 37–48.5)
HDLC SERPL-MCNC: 44 MG/DL (ref 40–75)
HDLC SERPL: 21.8 % (ref 20–50)
HGB BLD-MCNC: 12.3 G/DL (ref 12–16)
IMM GRANULOCYTES # BLD AUTO: 0.01 K/UL (ref 0–0.04)
IMM GRANULOCYTES NFR BLD AUTO: 0.2 % (ref 0–0.5)
INR PPP: 4.1 (ref 0.8–1.2)
LDLC SERPL CALC-MCNC: 139.4 MG/DL (ref 63–159)
LYMPHOCYTES # BLD AUTO: 1.6 K/UL (ref 1–4.8)
LYMPHOCYTES NFR BLD: 36.5 % (ref 18–48)
MCH RBC QN AUTO: 30.8 PG (ref 27–31)
MCHC RBC AUTO-ENTMCNC: 32.1 G/DL (ref 32–36)
MCV RBC AUTO: 96 FL (ref 82–98)
MONOCYTES # BLD AUTO: 0.6 K/UL (ref 0.3–1)
MONOCYTES NFR BLD: 12.8 % (ref 4–15)
NEUTROPHILS # BLD AUTO: 2.2 K/UL (ref 1.8–7.7)
NEUTROPHILS NFR BLD: 48.8 % (ref 38–73)
NONHDLC SERPL-MCNC: 158 MG/DL
NRBC BLD-RTO: 0 /100 WBC
PLATELET # BLD AUTO: 250 K/UL (ref 150–450)
PMV BLD AUTO: 10.8 FL (ref 9.2–12.9)
POTASSIUM SERPL-SCNC: 3.8 MMOL/L (ref 3.5–5.1)
PROT SERPL-MCNC: 8.2 G/DL (ref 6–8.4)
PROTHROMBIN TIME: 40.5 SEC (ref 9–12.5)
RBC # BLD AUTO: 3.99 M/UL (ref 4–5.4)
SODIUM SERPL-SCNC: 138 MMOL/L (ref 136–145)
TRIGL SERPL-MCNC: 93 MG/DL (ref 30–150)
WBC # BLD AUTO: 4.46 K/UL (ref 3.9–12.7)

## 2025-02-18 PROCEDURE — 83036 HEMOGLOBIN GLYCOSYLATED A1C: CPT | Performed by: FAMILY MEDICINE

## 2025-02-18 PROCEDURE — 36415 COLL VENOUS BLD VENIPUNCTURE: CPT | Performed by: FAMILY MEDICINE

## 2025-02-18 PROCEDURE — 85025 COMPLETE CBC W/AUTO DIFF WBC: CPT | Performed by: FAMILY MEDICINE

## 2025-02-18 PROCEDURE — 80053 COMPREHEN METABOLIC PANEL: CPT | Performed by: FAMILY MEDICINE

## 2025-02-18 PROCEDURE — 85610 PROTHROMBIN TIME: CPT | Performed by: INTERNAL MEDICINE

## 2025-02-18 PROCEDURE — 80061 LIPID PANEL: CPT | Performed by: FAMILY MEDICINE

## 2025-02-18 RX ORDER — ATORVASTATIN CALCIUM 20 MG/1
20 TABLET, FILM COATED ORAL DAILY
Qty: 90 TABLET | Refills: 3 | Status: SHIPPED | OUTPATIENT
Start: 2025-02-18 | End: 2026-02-18

## 2025-02-18 NOTE — PROGRESS NOTES
[facility]    2025 10:58 AM    Assessment/Plan:    Patient presents today with supratherapeutic INR.    Assessment of patient findings and chart review: no significant changes reported per pt. Of note, pt out of town  - 3/2; 3/9 - 3/16; 3/22 -      Recommendation for patient's warfarin regimen: Lower dose today to 2.5mg then resume current maintenance dose    Recommend repeat INR in 3 days, stat AM hospital lab.  _________________________________________________________________    Riya Soto (54 y.o.) is followed by the [facility].    Anticoagulation Summary  As of 2025      INR goal:  2.5-3.5   TTR:  48.6% (2.2 y)   INR used for dosin.1 (2025)   Warfarin maintenance plan:  7.5 mg (5 mg x 1.5) every day   Weekly warfarin total:  52.5 mg   Plan last modified:  Skylar Marcelo, PharmD (2025)   Next INR check:  2025   Target end date:  --    Indications    S/P MVR (mitral valve replacement) [Z95.2]  Atrial fibrillation [I48.91]  Long term (current) use of anticoagulants [Z79.01]                 Anticoagulation Episode Summary       INR check location:  Clinic Lab    Preferred lab:  --    Send INR reminders to:  [facility] COUMADIN MONITORING POOL    Comments:  [facility] [place] [facility] only Mon - Thu    (D/C ) [facility] [place] [phone]          Anticoagulation Care Providers       Provider Role Specialty Phone number    Celsa Knight PA-C Referring Cardiothoracic Surgery [phone]    Ralf Figueroa MD Responsible Cardiology [phone]    Antoni Easton MD  Interventional Cardiology [phone]

## 2025-02-21 ENCOUNTER — ANTI-COAG VISIT (OUTPATIENT)
Dept: CARDIOLOGY | Facility: CLINIC | Age: 55
End: 2025-02-21
Payer: MEDICARE

## 2025-02-21 ENCOUNTER — LAB VISIT (OUTPATIENT)
Dept: LAB | Facility: HOSPITAL | Age: 55
End: 2025-02-21
Attending: FAMILY MEDICINE
Payer: MEDICARE

## 2025-02-21 DIAGNOSIS — Z79.01 LONG TERM (CURRENT) USE OF ANTICOAGULANTS: ICD-10-CM

## 2025-02-21 DIAGNOSIS — I48.0 PAROXYSMAL ATRIAL FIBRILLATION: ICD-10-CM

## 2025-02-21 DIAGNOSIS — Z95.2 S/P MVR (MITRAL VALVE REPLACEMENT): Primary | ICD-10-CM

## 2025-02-21 DIAGNOSIS — Z95.2 S/P MVR (MITRAL VALVE REPLACEMENT): ICD-10-CM

## 2025-02-21 LAB
INR PPP: 2.6 (ref 0.8–1.2)
PROTHROMBIN TIME: 26.6 SEC (ref 9–12.5)

## 2025-02-21 PROCEDURE — 36415 COLL VENOUS BLD VENIPUNCTURE: CPT | Performed by: INTERNAL MEDICINE

## 2025-02-21 PROCEDURE — 85610 PROTHROMBIN TIME: CPT | Performed by: INTERNAL MEDICINE

## 2025-02-21 NOTE — PROGRESS NOTES
[facility]    2025 9:42 AM    Assessment/Plan:    Patient presents today with therapeutic INR.    Assessment of patient findings and chart review: pt out of town  - 3/2.    Recommendation for patient's warfarin regimen: Decrease maintenance dose slightly    Recommend repeat INR in 1 week  _________________________________________________________________    Riya Soto (54 y.o.) is followed by the [facility] [facility].    Anticoagulation Summary  As of 2025      INR goal:  2.5-3.5   TTR:  48.6% (2.3 y)   INR used for dosin.6 (2025)   Warfarin maintenance plan:  5 mg (5 mg x 1) every Sun; 7.5 mg (5 mg x 1.5) all other days   Weekly warfarin total:  50 mg   Plan last modified:  Skylar Marcelo, PharmD (2025)   Next INR check:  3/3/2025   Target end date:  --    Indications    S/P MVR (mitral valve replacement) [Z95.2]  Atrial fibrillation [I48.91]  Long term (current) use of anticoagulants [Z79.01]                 Anticoagulation Episode Summary       INR check location:  Clinic Lab    Preferred lab:  --    Send INR reminders to:  [facility] COUMADIN MONITORING POOL    Comments:  [facility] or [facility] only     (D/C ) [facility] [place] -[phone]          Anticoagulation Care Providers       Provider Role Specialty Phone number    Celsa Knight PA-C Referring Cardiothoracic Surgery [phone]    Ralf Figueroa MD Responsible Cardiology [phone]    Antoni Easton MD  Interventional Cardiology [phone]

## 2025-02-21 NOTE — TELEPHONE ENCOUNTER
----- Message from Pantera Yadav MD sent at 2/18/2025  9:31 PM CST -----  Call te;ll pt lab Lipid Chol 202 better 180  better 100 CBC CMP WN:L HGBA1C 5.9 excellent Increase lipitor to 20 mg 1 po qd [facility] 6 mo CMP Lipid   ----- Message -----  From: Deuce, Soft Lab Interface  Sent: 2/18/2025   9:09 AM CST  To: Pantera Yadav MD

## 2025-03-03 ENCOUNTER — ANTI-COAG VISIT (OUTPATIENT)
Dept: CARDIOLOGY | Facility: CLINIC | Age: 55
End: 2025-03-03
Payer: MEDICARE

## 2025-03-03 ENCOUNTER — LAB VISIT (OUTPATIENT)
Dept: LAB | Facility: HOSPITAL | Age: 55
End: 2025-03-03
Attending: FAMILY MEDICINE
Payer: MEDICARE

## 2025-03-03 DIAGNOSIS — Z79.01 LONG TERM (CURRENT) USE OF ANTICOAGULANTS: ICD-10-CM

## 2025-03-03 DIAGNOSIS — I48.0 PAROXYSMAL ATRIAL FIBRILLATION: ICD-10-CM

## 2025-03-03 DIAGNOSIS — Z95.2 S/P MVR (MITRAL VALVE REPLACEMENT): ICD-10-CM

## 2025-03-03 DIAGNOSIS — Z95.2 S/P MVR (MITRAL VALVE REPLACEMENT): Primary | ICD-10-CM

## 2025-03-03 LAB
INR PPP: 3.6 (ref 0.8–1.2)
PROTHROMBIN TIME: 36.5 SEC (ref 9–12.5)

## 2025-03-03 PROCEDURE — 36415 COLL VENOUS BLD VENIPUNCTURE: CPT | Performed by: INTERNAL MEDICINE

## 2025-03-03 PROCEDURE — 93793 ANTICOAG MGMT PT WARFARIN: CPT | Mod: S$GLB,,,

## 2025-03-03 PROCEDURE — 85610 PROTHROMBIN TIME: CPT | Performed by: INTERNAL MEDICINE

## 2025-03-03 NOTE — PROGRESS NOTES
[facility]    03/03/2025 1:52 PM    Assessment/Plan:    Patient presents today with slightly supratherapeutic INR.    Assessment of patient findings and chart review: Pt reports recent ETOH consumption. Also, pt on a cruise 3/9-3/16.     Recommendation for patient's warfarin regimen: Lower dose today to 5mg then resume current maintenance dose    Recommend repeat INR in 2 weeks  _________________________________________________________________    Riya Nancy Soto (54 y.o.) is followed by the [facility].    Anticoagulation Summary  As of 3/3/2025      INR goal:  2.5-3.5   TTR:  49.1% (2.3 y)   INR used for dosing:  3.6 (3/3/2025)   Warfarin maintenance plan:  5 mg (5 mg x 1) every Sun; 7.5 mg (5 mg x 1.5) all other days   Weekly warfarin total:  50 mg   Plan last modified:  Skylar Mareclo, PharmD (2/21/2025)   Next INR check:  3/17/2025   Target end date:  --    Indications    S/P MVR (mitral valve replacement) [Z95.2]  Atrial fibrillation [I48.91]  Long term (current) use of anticoagulants [Z79.01]                 Anticoagulation Episode Summary       INR check location:  Clinic Lab    Preferred lab:  --    Send INR reminders to:  [facility] COUMADIN MONITORING POOL    Comments:  [facility] or [facility] only Mon - Thu    (D/C 12/13) [facility] [place] [phone]          Anticoagulation Care Providers       Provider Role Specialty Phone number    Celsa Knight PA-C Referring Cardiothoracic Surgery [phone]    Ralf Figueroa MD Responsible Cardiology [phone]    Antoni Easton MD  Interventional Cardiology [phone]

## 2025-03-17 ENCOUNTER — TELEPHONE (OUTPATIENT)
Dept: CARDIOLOGY | Facility: CLINIC | Age: 55
End: 2025-03-17
Payer: MEDICARE

## 2025-03-17 ENCOUNTER — ANTI-COAG VISIT (OUTPATIENT)
Dept: CARDIOLOGY | Facility: CLINIC | Age: 55
End: 2025-03-17
Payer: MEDICARE

## 2025-03-17 ENCOUNTER — LAB VISIT (OUTPATIENT)
Dept: LAB | Facility: HOSPITAL | Age: 55
End: 2025-03-17
Attending: FAMILY MEDICINE
Payer: MEDICARE

## 2025-03-17 ENCOUNTER — OFFICE VISIT (OUTPATIENT)
Dept: CARDIOLOGY | Facility: CLINIC | Age: 55
End: 2025-03-17
Payer: MEDICARE

## 2025-03-17 VITALS
OXYGEN SATURATION: 99 % | WEIGHT: 220.44 LBS | HEIGHT: 67 IN | DIASTOLIC BLOOD PRESSURE: 60 MMHG | HEART RATE: 104 BPM | BODY MASS INDEX: 34.6 KG/M2 | SYSTOLIC BLOOD PRESSURE: 102 MMHG

## 2025-03-17 DIAGNOSIS — Z79.01 LONG TERM (CURRENT) USE OF ANTICOAGULANTS: ICD-10-CM

## 2025-03-17 DIAGNOSIS — R06.09 DYSPNEA ON EXERTION: ICD-10-CM

## 2025-03-17 DIAGNOSIS — Z86.79 S/P MAZE OPERATION FOR ATRIAL FIBRILLATION: ICD-10-CM

## 2025-03-17 DIAGNOSIS — I50.20 HFREF (HEART FAILURE WITH REDUCED EJECTION FRACTION): ICD-10-CM

## 2025-03-17 DIAGNOSIS — Z98.890 S/P MAZE OPERATION FOR ATRIAL FIBRILLATION: ICD-10-CM

## 2025-03-17 DIAGNOSIS — Z95.2 S/P MVR (MITRAL VALVE REPLACEMENT): ICD-10-CM

## 2025-03-17 DIAGNOSIS — E87.6 HYPOKALEMIA: ICD-10-CM

## 2025-03-17 DIAGNOSIS — Z95.2 S/P MVR (MITRAL VALVE REPLACEMENT): Primary | ICD-10-CM

## 2025-03-17 DIAGNOSIS — E78.2 MIXED HYPERLIPIDEMIA: Primary | ICD-10-CM

## 2025-03-17 DIAGNOSIS — Z92.89 HISTORY OF CARDIOVERSION: ICD-10-CM

## 2025-03-17 DIAGNOSIS — I48.0 PAROXYSMAL ATRIAL FIBRILLATION: ICD-10-CM

## 2025-03-17 DIAGNOSIS — Z98.890 S/P TVR (TRICUSPID VALVE REPAIR): ICD-10-CM

## 2025-03-17 DIAGNOSIS — I48.91 ATRIAL FIBRILLATION, UNSPECIFIED TYPE: ICD-10-CM

## 2025-03-17 DIAGNOSIS — I34.0 MITRAL VALVE INSUFFICIENCY, UNSPECIFIED ETIOLOGY: ICD-10-CM

## 2025-03-17 DIAGNOSIS — I07.1 TRICUSPID VALVE INSUFFICIENCY, UNSPECIFIED ETIOLOGY: ICD-10-CM

## 2025-03-17 DIAGNOSIS — I05.1 RHEUMATIC MITRAL REGURGITATION: ICD-10-CM

## 2025-03-17 LAB
INR PPP: 4.1 (ref 0.8–1.2)
PROTHROMBIN TIME: 41.3 SEC (ref 9–12.5)

## 2025-03-17 PROCEDURE — G2211 COMPLEX E/M VISIT ADD ON: HCPCS | Mod: S$GLB,,,

## 2025-03-17 PROCEDURE — 93793 ANTICOAG MGMT PT WARFARIN: CPT | Mod: S$GLB,,,

## 2025-03-17 PROCEDURE — 99999 PR PBB SHADOW E&M-EST. PATIENT-LVL III: CPT | Mod: PBBFAC,,,

## 2025-03-17 PROCEDURE — 36415 COLL VENOUS BLD VENIPUNCTURE: CPT | Performed by: INTERNAL MEDICINE

## 2025-03-17 PROCEDURE — 99215 OFFICE O/P EST HI 40 MIN: CPT | Mod: S$GLB,,,

## 2025-03-17 PROCEDURE — 1159F MED LIST DOCD IN RCRD: CPT | Mod: CPTII,S$GLB,,

## 2025-03-17 PROCEDURE — 3008F BODY MASS INDEX DOCD: CPT | Mod: CPTII,S$GLB,,

## 2025-03-17 PROCEDURE — 93010 ELECTROCARDIOGRAM REPORT: CPT | Mod: S$GLB,,, | Performed by: INTERNAL MEDICINE

## 2025-03-17 PROCEDURE — 3044F HG A1C LEVEL LT 7.0%: CPT | Mod: CPTII,S$GLB,,

## 2025-03-17 PROCEDURE — 85610 PROTHROMBIN TIME: CPT | Performed by: INTERNAL MEDICINE

## 2025-03-17 PROCEDURE — 3078F DIAST BP <80 MM HG: CPT | Mod: CPTII,S$GLB,,

## 2025-03-17 PROCEDURE — 3074F SYST BP LT 130 MM HG: CPT | Mod: CPTII,S$GLB,,

## 2025-03-17 RX ORDER — DRONEDARONE 400 MG/1
400 TABLET, FILM COATED ORAL 2 TIMES DAILY
Qty: 60 TABLET | Refills: 11 | Status: SHIPPED | OUTPATIENT
Start: 2025-03-17

## 2025-03-17 RX ORDER — METOPROLOL TARTRATE 25 MG/1
12.5 TABLET, FILM COATED ORAL 2 TIMES DAILY
Qty: 30 TABLET | Refills: 11 | Status: SHIPPED | OUTPATIENT
Start: 2025-03-17

## 2025-03-17 NOTE — PROGRESS NOTES
[facility] - Cardiology [address]  Cardiology Clinic Note      Chief Complaint  Chief Complaint   Patient presents with    Follow-up       HPI:  Ms. Soto is a 54-year-old female with a past medical history of rheumatic mitral regurgitation status post mitral valve replacement (previously undergone a mitral valve repair June 2011 at [facility] by Dr Garcia) and tricuspid valve repair, history of atrial fibrillation status post Maze operation, history of cardioversion, hyperlipidemia, HFrEF echo 12/2023 EF 40-45% with normal diastolic and systolic function moderately dilated LA dilated RA normal function mechanical mitral valve and repair tricuspid valve normal CVP 3 improvement from echo 03/2022 EF 40%     Ms. Ritter is here for follow-up visit  Reports that she is doing fine and she feels great  She did have at INR 4.1 in which she has been in contact with the Coumadin Clinic for medication adjustments  Reports that she was recently on a cruise and may have taking the incorrect dose and also had an increase in alcohol intake  Otherwise she is doing well with medications and diet modifications as it relates to warfarin  Denies chest pain, palpitations, lightheadedness, dizziness, or syncope/presyncope  Denies cough, LE edema, orthopnea, or PND  Denies falls or head injures  Denies easy bruising, hematochezia, or hemoptysis  Denies fever, chills, or NVD  Denies any recent travels or close contact with sick individuals  Denies tobacco or alcohol use  She is independent and active without any debilities    Cardiology course:  Patient is new to me and here to establish care  Previously seen by Dr. Figueroa  Status post mitral valve replacement in tricuspid valve repair November 2022  Has been on warfarin since and is enrolled in Coumadin Clinic  She is aware of giving vitamin K level constant in diet while on warfarin  Patient reports that she is active feels good  She does endorse dyspnea on exertion but not  cause impairment in daily activities    EKG sinus tach heart rate 101    Medications  Current Medications[1]     History  Past Medical History:   Diagnosis Date    A-fib     Atrial fibrillation     Back pain     Hyperlipidemia     Mitral incompetence     Mitral valve stenosis     Neck pain      Past Surgical History:   Procedure Laterality Date    ABLATION N/A 11/07/2022    Procedure: MAZE;  Surgeon: Bobby Gilbert MD;  Location: [facility] OR [address] [place] [address];  Service: Cardiothoracic;  Laterality: N/A;  Cryoablation    CARDIAC SURGERY      to repair mitral valve    CHOLECYSTECTOMY      COLONOSCOPY      COLONOSCOPY N/A 06/08/2022    Procedure: COLONOSCOPY;  Surgeon: Sanford Andres MD;  Location: [facility];  Service: Endoscopy;  Laterality: N/A;    CORONARY ANGIOGRAPHY N/A 08/29/2022    Procedure: ANGIOGRAM, CORONARY ARTERY;  Surgeon: Lexa Harley MD;  Location: [facility] CATH LAB;  Service: Cardiology;  Laterality: N/A;    EGD, WITH CLOSED BIOPSY  11/13/2023    ESOPHAGOGASTRODUODENOSCOPY N/A 11/13/2023    Procedure: EGD (ESOPHAGOGASTRODUODENOSCOPY);  Surgeon: Shashi Michele MD;  Location: [facility];  Service: Endoscopy;  Laterality: N/A;    MITRAL VALVE REPLACEMENT N/A 11/07/2022    Procedure: REPLACEMENT, MITRAL VALVE;  Surgeon: Bobby Gilbert MD;  Location: [address];  Service: Cardiothoracic;  Laterality: N/A;  REDO STERNOTOMY    STERNOTOMY N/A 11/07/2022    Procedure: STERNOTOMY;  Surgeon: Bobby Gilbert MD;  Location: [address];  Service: Cardiothoracic;  Laterality: N/A;  Redo sternotomy    TRANSESOPHAGEAL ECHOCARDIOGRAPHY N/A 03/29/2022    Procedure: ECHOCARDIOGRAM, TRANSESOPHAGEAL;  Surgeon: Dominic Wallis MD;  Location: [facility] EP LAB;  Service: Cardiology;  Laterality: N/A;    TREATMENT OF CARDIAC ARRHYTHMIA N/A 03/29/2022    Procedure: Cardioversion or Defibrillation;  Surgeon: Elmo Jones MD;  Location: [facility] EP LAB;  Service: Cardiology;  Laterality: N/A;  afib, dccv, stanley,  anes, SC, 3prep, Provider to perform     TRICUSPID VALVULOPLASTY N/A 11/07/2022    Procedure: REPAIR, TRICUSPID VALVE;  Surgeon: Bobby Gilbert MD;  Location: [facility] OR [address] [place] [address];  Service: Cardiothoracic;  Laterality: N/A;     Social History[2]  Family History   Problem Relation Name Age of Onset    Breast cancer Sister 2     Colon cancer Maternal Aunt      Ovarian cancer Neg Hx          Allergies  Review of patient's allergies indicates:   Allergen Reactions    Amoxicillin Hives and Other (See Comments)       Review of Systems   Review of Systems   Constitutional: Negative for chills, decreased appetite, diaphoresis, fever, malaise/fatigue, weight gain and weight loss.   Eyes:  Negative for blurred vision.   Cardiovascular:  Negative for chest pain, claudication, dyspnea on exertion, irregular heartbeat, leg swelling, near-syncope, orthopnea, palpitations, paroxysmal nocturnal dyspnea and syncope.   Respiratory:  Negative for cough, shortness of breath, snoring, sputum production and wheezing.    Endocrine: Negative for cold intolerance, heat intolerance, polydipsia, polyphagia and polyuria.   Skin:  Negative for color change, dry skin, itching, nail changes and poor wound healing.   Musculoskeletal:  Negative for back pain, gout, joint pain and joint swelling.   Gastrointestinal:  Negative for bloating, abdominal pain, constipation, diarrhea, hematemesis, hematochezia, melena, nausea and vomiting.   Genitourinary:  Negative for dysuria and hematuria.   Neurological:  Negative for dizziness, headaches, light-headedness, numbness, paresthesias and weakness.   Psychiatric/Behavioral:  Negative for altered mental status, depression and memory loss.        Physical Exam  Vitals:    03/17/25 1305   BP: 102/60   Pulse: 104     Wt Readings from Last 1 Encounters:   03/17/25 100 kg (220 lb 7.4 oz)     Physical Exam  Constitutional:       General: She is not in acute distress.  HENT:      Head: Normocephalic and  atraumatic.      Mouth/Throat:      Mouth: Mucous membranes are moist.   Eyes:      Extraocular Movements: Extraocular movements intact.      Pupils: Pupils are equal, round, and reactive to light.   Neck:      Vascular: No carotid bruit or JVD.   Cardiovascular:      Rate and Rhythm: Regular rhythm. Tachycardia present.      Heart sounds: Murmur heard.      No friction rub. No gallop.   Pulmonary:      Effort: Pulmonary effort is normal.      Breath sounds: Normal breath sounds.   Abdominal:      General: Abdomen is flat.      Palpations: Abdomen is soft.   Musculoskeletal:      Right lower leg: No edema.      Left lower leg: No edema.   Skin:     General: Skin is warm.      Capillary Refill: Capillary refill takes less than 2 seconds.   Neurological:      General: No focal deficit present.   Psychiatric:         Mood and Affect: Mood normal.       Labs  Lab Visit on 03/17/2025   Component Date Value Ref Range Status    Prothrombin Time 03/17/2025 41.3 (H)  9.0 - 12.5 sec Final    INR 03/17/2025 4.1 (HH)  0.8 - 1.2 Final    Comment: Coumadin Therapy:  2.0 - 3.0 for INR for all indicators except mechanical heart valves  and antiphospholipid syndromes which should use 2.5 - 3.5.  INR critical result(s) called and verbal readback obtained from   Peg Yarbrough by BTI1 03/17/2025 11:17     Lab Visit on 03/03/2025   Component Date Value Ref Range Status    Prothrombin Time 03/03/2025 36.5 (H)  9.0 - 12.5 sec Final    INR 03/03/2025 3.6 (H)  0.8 - 1.2 Final    Comment: Coumadin Therapy:  2.0 - 3.0 for INR for all indicators except mechanical heart valves  and antiphospholipid syndromes which should use 2.5 - 3.5.     Lab Visit on 02/21/2025   Component Date Value Ref Range Status    Prothrombin Time 02/21/2025 26.6 (H)  9.0 - 12.5 sec Final    INR 02/21/2025 2.6 (H)  0.8 - 1.2 Final    Comment: Coumadin Therapy:  2.0 - 3.0 for INR for all indicators except mechanical heart valves  and antiphospholipid syndromes which  should use 2.5 - 3.5.     Lab Visit on 02/18/2025   Component Date Value Ref Range Status    WBC 02/18/2025 4.46  3.90 - 12.70 K/uL Final    RBC 02/18/2025 3.99 (L)  4.00 - 5.40 M/uL Final    Hemoglobin 02/18/2025 12.3  12.0 - 16.0 g/dL Final    Hematocrit 02/18/2025 38.3  37.0 - 48.5 % Final    MCV 02/18/2025 96  82 - 98 fL Final    MCH 02/18/2025 30.8  27.0 - 31.0 pg Final    MCHC 02/18/2025 32.1  32.0 - 36.0 g/dL Final    RDW 02/18/2025 12.8  11.5 - 14.5 % Final    Platelets 02/18/2025 250  150 - 450 K/uL Final    MPV 02/18/2025 10.8  9.2 - 12.9 fL Final    Immature Granulocytes 02/18/2025 0.2  0.0 - 0.5 % Final    Gran # (ANC) 02/18/2025 2.2  1.8 - 7.7 K/uL Final    Immature Grans (Abs) 02/18/2025 0.01  0.00 - 0.04 K/uL Final    Comment: Mild elevation in immature granulocytes is non specific and   can be seen in a variety of conditions including stress response,   acute inflammation, trauma and pregnancy. Correlation with other   laboratory and clinical findings is essential.      Lymph # 02/18/2025 1.6  1.0 - 4.8 K/uL Final    Mono # 02/18/2025 0.6  0.3 - 1.0 K/uL Final    Eos # 02/18/2025 0.1  0.0 - 0.5 K/uL Final    Baso # 02/18/2025 0.02  0.00 - 0.20 K/uL Final    nRBC 02/18/2025 0  0 /100 WBC Final    Gran % 02/18/2025 48.8  38.0 - 73.0 % Final    Lymph % 02/18/2025 36.5  18.0 - 48.0 % Final    Mono % 02/18/2025 12.8  4.0 - 15.0 % Final    Eosinophil % 02/18/2025 1.3  0.0 - 8.0 % Final    Basophil % 02/18/2025 0.4  0.0 - 1.9 % Final    Differential Method 02/18/2025 Automated   Final    Cholesterol 02/18/2025 202 (H)  120 - 199 mg/dL Final    Comment: The National Cholesterol Education Program (NCEP) has set the  following guidelines (reference ranges) for Cholesterol:  Optimal.....................<200 mg/dL  Borderline High.............200-239 mg/dL  High........................> or = 240 mg/dL      Triglycerides 02/18/2025 93  30 - 150 mg/dL Final    Comment: The National Cholesterol Education Program  (NCEP) has set the  following guidelines (reference values) for triglycerides:  Normal......................<150 mg/dL  Borderline High.............150-199 mg/dL  High........................200-499 mg/dL      HDL 02/18/2025 44  40 - 75 mg/dL Final    Comment: The National Cholesterol Education Program (NCEP) has set the  following guidelines (reference values) for HDL Cholesterol:  Low...............<40 mg/dL  Optimal...........>60 mg/dL      LDL Cholesterol 02/18/2025 139.4  63.0 - 159.0 mg/dL Final    Comment: The National Cholesterol Education Program (NCEP) has set the  following guidelines (reference values) for LDL Cholesterol:  Optimal.......................<130 mg/dL  Borderline High...............130-159 mg/dL  High..........................160-189 mg/dL  Very High.....................>190 mg/dL      HDL/Cholesterol Ratio 02/18/2025 21.8  20.0 - 50.0 % Final    Total Cholesterol/HDL Ratio 02/18/2025 4.6  2.0 - 5.0 Final    Non-HDL Cholesterol 02/18/2025 158  mg/dL Final    Comment: Risk category and Non-HDL cholesterol goals:  Coronary heart disease (CHD)or equivalent (10-year risk of CHD >20%):  Non-HDL cholesterol goal     <130 mg/dL  Two or more CHD risk factors and 10-year risk of CHD <= 20%:  Non-HDL cholesterol goal     <160 mg/dL  0 to 1 CHD risk factor:  Non-HDL cholesterol goal     <190 mg/dL      Sodium 02/18/2025 138  136 - 145 mmol/L Final    Potassium 02/18/2025 3.8  3.5 - 5.1 mmol/L Final    Chloride 02/18/2025 102  95 - 110 mmol/L Final    CO2 02/18/2025 25  23 - 29 mmol/L Final    Glucose 02/18/2025 94  70 - 110 mg/dL Final    BUN 02/18/2025 17  6 - 20 mg/dL Final    Creatinine 02/18/2025 0.9  0.5 - 1.4 mg/dL Final    Calcium 02/18/2025 9.4  8.7 - 10.5 mg/dL Final    Total Protein 02/18/2025 8.2  6.0 - 8.4 g/dL Final    Albumin 02/18/2025 3.9  3.5 - 5.2 g/dL Final    Total Bilirubin 02/18/2025 0.7  0.1 - 1.0 mg/dL Final    Comment: For infants and newborns, interpretation of results should  be based  on gestational age, weight and in agreement with clinical  observations.    Premature Infant recommended reference ranges:  Up to 24 hours.............<8.0 mg/dL  Up to 48 hours............<12.0 mg/dL  3-5 days..................<15.0 mg/dL  6-29 days.................<15.0 mg/dL      Alkaline Phosphatase 02/18/2025 103  40 - 150 U/L Final    AST 02/18/2025 22  10 - 40 U/L Final    ALT 02/18/2025 20  10 - 44 U/L Final    eGFR 02/18/2025 >60  >60 mL/min/1.73 m^2 Final    Anion Gap 02/18/2025 11  8 - 16 mmol/L Final    Hemoglobin A1C 02/18/2025 5.9  4.5 - 6.2 % Final    Comment: ADA Screening Guidelines:  5.7-6.4%  Consistent with prediabetes  >or=6.5%  Consistent with diabetes    High levels of fetal hemoglobin interfere with the HbA1C  assay. Heterozygous hemoglobin variants (HbS, HgC, etc)do  not significantly interfere with this assay.   However, presence of multiple variants may affect accuracy.      Estimated Avg Glucose 02/18/2025 123  68 - 131 mg/dL Final    Prothrombin Time 02/18/2025 40.5 (H)  9.0 - 12.5 sec Final    INR 02/18/2025 4.1 (HH)  0.8 - 1.2 Final    Comment: Coumadin Therapy:  2.0 - 3.0 for INR for all indicators except mechanical heart valves  and antiphospholipid syndromes which should use 2.5 - 3.5.  INR critical result(s) called and verbal readback obtained from Ruby Haney. by BTI1 02/18/2025 09:34     Lab Visit on 02/10/2025   Component Date Value Ref Range Status    Prothrombin Time 02/10/2025 31.4 (H)  9.0 - 12.5 sec Final    INR 02/10/2025 3.1 (H)  0.8 - 1.2 Final    Comment: Coumadin Therapy:  2.0 - 3.0 for INR for all indicators except mechanical heart valves  and antiphospholipid syndromes which should use 2.5 - 3.5.     Lab Visit on 02/03/2025   Component Date Value Ref Range Status    Prothrombin Time 02/03/2025 18.0 (H)  9.0 - 12.5 sec Final    INR 02/03/2025 1.7 (H)  0.8 - 1.2 Final    Comment: Coumadin Therapy:  2.0 - 3.0 for INR for all indicators except mechanical  heart valves  and antiphospholipid syndromes which should use 2.5 - 3.5.     Lab Visit on 01/24/2025   Component Date Value Ref Range Status    Prothrombin Time 01/24/2025 33.1 (H)  9.0 - 12.5 sec Final    INR 01/24/2025 3.3 (H)  0.8 - 1.2 Final    Comment: Coumadin Therapy:  2.0 - 3.0 for INR for all indicators except mechanical heart valves  and antiphospholipid syndromes which should use 2.5 - 3.5.     Lab Visit on 01/06/2025   Component Date Value Ref Range Status    Prothrombin Time 01/06/2025 20.1 (H)  9.0 - 12.5 sec Final    INR 01/06/2025 1.9 (H)  0.8 - 1.2 Final    Comment: Coumadin Therapy:  2.0 - 3.0 for INR for all indicators except mechanical heart valves  and antiphospholipid syndromes which should use 2.5 - 3.5.     Lab Visit on 12/23/2024   Component Date Value Ref Range Status    Prothrombin Time 12/23/2024 27.3 (H)  9.0 - 12.5 sec Final    INR 12/23/2024 2.7 (H)  0.8 - 1.2 Final    Comment: Coumadin Therapy:  2.0 - 3.0 for INR for all indicators except mechanical heart valves  and antiphospholipid syndromes which should use 2.5 - 3.5.     Lab Visit on 12/16/2024   Component Date Value Ref Range Status    Prothrombin Time 12/16/2024 21.0 (H)  9.0 - 12.5 sec Final    INR 12/16/2024 2.0 (H)  0.8 - 1.2 Final    Comment: Coumadin Therapy:  2.0 - 3.0 for INR for all indicators except mechanical heart valves  and antiphospholipid syndromes which should use 2.5 - 3.5.     There may be more visits with results that are not included.       EKG  Reviewed    Echo     Imaging  No results found.    Prior coronary angiogram / intervention:  08/2022 normal coronary arteries     Assessment and Plan  Ms. Soto is a 54-year-old female with a past medical history of rheumatic mitral regurgitation status post mitral valve replacement (previously undergone a mitral valve repair June 2011 at [facility] by Dr Garcia) and tricuspid valve repair, history of atrial fibrillation status post Maze operation, history  of cardioversion, hyperlipidemia, HFrEF echo 12/2023 EF 40-45% with normal diastolic and systolic function moderately dilated LA dilated RA normal function mechanical mitral valve and repair tricuspid valve normal CVP 3 improvement from echo 03/2022 EF 40%     Mitral valve insufficiency, unspecified etiology  S/P MVR (mitral valve replacement)  S/P TVR (tricuspid valve repair)  INR 4.1  Coumadin Clinic May patient aware of medication adjustments; upcoming repeat labs  Encouraged appropriate diet modifications in regards to Coumadin, verbalized understanding  Continue with beta-blocker and anticoagulation     Paroxysmal atrial fibrillation  History of cardioversion  S/P Maze operation for atrial fibrillation  Most recent EKG normal sinus rhythm  Continue anticoagulation and beta-blocker    Long term (current) use of anticoagulants  No bleeding issues at this time  Also seen in Coumadin Clinic     HFrEF (heart failure with reduced ejection fraction)  SOB (shortness of breath)/Dyspnea on exertion  Clinically euvolemic  NYHA 2; SHULTZ with increased activity such long distance walking or climbing stairs  Echo ordered  Unable to tolerate ACEi/ARB in the past due to salt blood pressure readings  Continue beta-blocker  Wt Readings from Last 3 Encounters:   03/17/25 100 kg (220 lb 7.4 oz)   01/28/25 99.2 kg (218 lb 11.1 oz)   11/18/24 99.4 kg (219 lb 2.2 oz)      Pure hypercholesterolemia (Primary)  Continue statin     Hypokalemia  Stable    Dyspnea on exertion  Chronic and stable    Follow Up  Follow up in about 3 months (around 6/17/2025), or if symptoms worsen or fail to improve.       Brandi R. Carter, FNP-C [facility] - Cardiology    Total professional time spent for the encounter: 40 minutes  Time was spent preparing to see the patient, reviewing results of prior testing, obtaining and/or reviewing separately obtained history, performing a medically appropriate examination and interview, counseling and educating the  patient/family, ordering medications/tests/procedures, referring and communicating with other health care professionals, documenting clinical information in the electronic health record, and independently interpreting results.    Disclaimer: This document was created using voice recognition software ([employer]). Although it may be edited, this document may contain errors related to incorrect recognition of the spoken word. Please call the physician if clarification is needed.          [1]   Current Outpatient Medications   Medication Sig Dispense Refill    atorvastatin (LIPITOR) 20 MG tablet Take 1 tablet (20 mg total) by mouth once daily. 90 tablet 3    metoprolol tartrate (LOPRESSOR) 25 MG tablet TAKE 1/2 TABLET(12.5 MG) BY MOUTH TWICE DAILY 30 tablet 11    MULTAQ 400 mg Tab TAKE 1 TABLET(400 MG) BY MOUTH TWICE DAILY WITH MEALS 60 tablet 11    warfarin (COUMADIN) 5 MG tablet TAKE 1 TO 1 AND 1/2 TABLETS(5 TO 7.5 MG) BY MOUTH DAILY AS DIRECTED BY COUMADIN CLINIC 135 tablet 3    methocarbamoL (ROBAXIN) 500 MG Tab 1 p.o. q.h.s. p.r.n. muscle spasm 40 tablet 5    methylPREDNISolone (MEDROL DOSEPACK) 4 mg tablet use as directed 21 each 0     No current facility-administered medications for this visit.     Facility-Administered Medications Ordered in Other Visits   Medication Dose Route Frequency Provider Last Rate Last Admin    0.9%  NaCl infusion   Intravenous Continuous Shashi Michele MD        LIDOcaine (PF) 10 mg/ml (1%) injection 10 mg  1 mL Intradermal Once PRN Shashi Michele MD       [2]   Social History  Socioeconomic History    Marital status: Single   Tobacco Use    Smoking status: Never    Smokeless tobacco: Never   Substance and Sexual Activity    Alcohol use: Yes     Comment: socially    Drug use: No    Sexual activity: Yes     Partners: Male     Social Drivers of Health     Financial Resource Strain: Low Risk  (1/19/2022)    Overall Financial Resource Strain (CARDIA)      Difficulty of Paying Living Expenses: Not hard at all   Food Insecurity: No Food Insecurity (1/19/2022)    Hunger Vital Sign     Worried About Running Out of Food in the Last Year: Never true     Ran Out of Food in the Last Year: Never true   Transportation Needs: No Transportation Needs (1/19/2022)    PRAPARE - Transportation     Lack of Transportation (Medical): No     Lack of Transportation (Non-Medical): No   Stress: No Stress Concern Present (1/19/2022)    [place] [employer] - Occupational Stress Questionnaire     Feeling of Stress : Not at all   Housing Stability: Unknown (1/19/2022)    Housing Stability Vital Sign     Unable to Pay for Housing in the Last Year: No     Unstable Housing in the Last Year: No

## 2025-03-17 NOTE — PROGRESS NOTES
[facility]    2025 12:07 PM    Assessment/Plan:    Patient presents today with supratherapeutic INR.    Assessment of patient findings and chart review: pt reports taking higher dose than advised last week    Recommendation for patient's warfarin regimen: Lower dose today to 2.5mg then resume current maintenance dose    Recommend repeat INR in 3 days  _________________________________________________________________    Riya Soto (54 y.o.) is followed by the [facility].    Anticoagulation Summary  As of 3/17/2025      INR goal:  2.5-3.5   TTR:  48.3% (2.3 y)   INR used for dosin.1 (3/17/2025)   Warfarin maintenance plan:  5 mg (5 mg x 1) every Sun; 7.5 mg (5 mg x 1.5) all other days   Weekly warfarin total:  50 mg   Plan last modified:  Skylar Marcelo, PharmD (2025)   Next INR check:  3/20/2025   Target end date:  --    Indications    S/P MVR (mitral valve replacement) [Z95.2]  Atrial fibrillation [I48.91]  Long term (current) use of anticoagulants [Z79.01]                 Anticoagulation Episode Summary       INR check location:  Clinic Lab    Preferred lab:  --    Send INR reminders to:  [facility] COUMADIN MONITORING POOL    Comments:  NMJAYLIN or KRIS [facility] only Mon - Thu    (D/C ) [facility] [phone]          Anticoagulation Care Providers       Provider Role Specialty Phone number    Celsa Knight PA-C Referring Cardiothoracic Surgery [phone]    Ralf Figueroa MD Responsible Cardiology [phone]    Antoni Easton MD  Interventional Cardiology [phone]

## 2025-03-20 ENCOUNTER — ANTI-COAG VISIT (OUTPATIENT)
Dept: CARDIOLOGY | Facility: CLINIC | Age: 55
End: 2025-03-20
Payer: MEDICARE

## 2025-03-20 ENCOUNTER — LAB VISIT (OUTPATIENT)
Dept: LAB | Facility: HOSPITAL | Age: 55
End: 2025-03-20
Attending: FAMILY MEDICINE
Payer: MEDICARE

## 2025-03-20 DIAGNOSIS — Z95.2 S/P MVR (MITRAL VALVE REPLACEMENT): Primary | ICD-10-CM

## 2025-03-20 DIAGNOSIS — I48.0 PAROXYSMAL ATRIAL FIBRILLATION: ICD-10-CM

## 2025-03-20 DIAGNOSIS — Z79.01 LONG TERM (CURRENT) USE OF ANTICOAGULANTS: ICD-10-CM

## 2025-03-20 DIAGNOSIS — Z95.2 S/P MVR (MITRAL VALVE REPLACEMENT): ICD-10-CM

## 2025-03-20 LAB
INR PPP: 2.4 (ref 0.8–1.2)
OHS QRS DURATION: 84 MS
OHS QTC CALCULATION: 487 MS
PROTHROMBIN TIME: 25.4 SEC (ref 9–12.5)

## 2025-03-20 PROCEDURE — 93793 ANTICOAG MGMT PT WARFARIN: CPT | Mod: S$GLB,,,

## 2025-03-20 PROCEDURE — 36415 COLL VENOUS BLD VENIPUNCTURE: CPT | Performed by: INTERNAL MEDICINE

## 2025-03-20 PROCEDURE — 85610 PROTHROMBIN TIME: CPT | Performed by: INTERNAL MEDICINE

## 2025-03-20 NOTE — PROGRESS NOTES
[facility]    2025 11:28 AM    Assessment/Plan:    Patient presents today with therapeutic INR.    Assessment of patient findings and chart review: pt has upcoming cruise, plans to consume ETOH daily    Recommendation for patient's warfarin regimen: Decrease maintenance dose empirically    Recommend repeat INR in 10 days  _________________________________________________________________    Riya Soto (54 y.o.) is followed by the [facility].    Anticoagulation Summary  As of 3/20/2025      INR goal:  2.5-3.5   TTR:  48.4% (2.3 y)   INR used for dosin.4 (3/20/2025)   Warfarin maintenance plan:  5 mg (5 mg x 1) every Sun; 7.5 mg (5 mg x 1.5) all other days   Weekly warfarin total:  50 mg   Plan last modified:  Skylar Marcelo, PharmD (2025)   Next INR check:  3/31/2025   Target end date:  --    Indications    S/P MVR (mitral valve replacement) [Z95.2]  Atrial fibrillation [I48.91]  Long term (current) use of anticoagulants [Z79.01]                 Anticoagulation Episode Summary       INR check location:  Clinic Lab    Preferred lab:  --    Send INR reminders to:  [facility] COUMADIN MONITORING POOL    Comments:  NM or [facility] only     (D/C ) [facility] [phone]          Anticoagulation Care Providers       Provider Role Specialty Phone number    Celsa Knight PA-C Referring Cardiothoracic Surgery [phone]    Ralf Figueroa MD Responsible Cardiology [phone]    Antoni Easton MD  Interventional Cardiology [phone]

## 2025-03-31 ENCOUNTER — LAB VISIT (OUTPATIENT)
Dept: LAB | Facility: HOSPITAL | Age: 55
End: 2025-03-31
Attending: FAMILY MEDICINE
Payer: MEDICARE

## 2025-03-31 ENCOUNTER — ANTI-COAG VISIT (OUTPATIENT)
Dept: CARDIOLOGY | Facility: CLINIC | Age: 55
End: 2025-03-31
Payer: MEDICARE

## 2025-03-31 DIAGNOSIS — I48.0 PAROXYSMAL ATRIAL FIBRILLATION: ICD-10-CM

## 2025-03-31 DIAGNOSIS — Z79.01 LONG TERM (CURRENT) USE OF ANTICOAGULANTS: ICD-10-CM

## 2025-03-31 DIAGNOSIS — Z95.2 S/P MVR (MITRAL VALVE REPLACEMENT): Primary | ICD-10-CM

## 2025-03-31 DIAGNOSIS — Z95.2 S/P MVR (MITRAL VALVE REPLACEMENT): ICD-10-CM

## 2025-03-31 LAB
INR PPP: 2.7 (ref 0.8–1.2)
PROTHROMBIN TIME: 28 SECONDS (ref 9–12.5)

## 2025-03-31 PROCEDURE — 36415 COLL VENOUS BLD VENIPUNCTURE: CPT

## 2025-03-31 PROCEDURE — 85610 PROTHROMBIN TIME: CPT

## 2025-03-31 NOTE — PROGRESS NOTES
[facility]    2025 1:40 PM    Assessment/Plan:    Patient presents today with therapeutic INR.    Assessment of patient findings and chart review: Patient Confirmed warfarin 5 mg  on 3/24 and 3/28 instead of 7.5mg dose.   All other doses taken per calendar. This adjustment was made since patient was on a cruise (3/22-3/29) and had a few drinks but no alcohol intake for the last four days of the cruise   No change in diet    Recommendation for patient's warfarin regimen:  per calendar    Recommend repeat INR in 1 week  _________________________________________________________________    Riya Nancy Soto (54 y.o.) is followed by the [facility].    Anticoagulation Summary  As of 3/31/2025      INR goal:  2.5-3.5   TTR:  48.6% (2.4 y)   INR used for dosin.7 (3/31/2025)   Warfarin maintenance plan:  5 mg (5 mg x 1) every Sun; 7.5 mg (5 mg x 1.5) all other days   Weekly warfarin total:  50 mg   Plan last modified:  Skylar Marcelo, PharmD (2025)   Next INR check:  --   Target end date:  --    Indications    S/P MVR (mitral valve replacement) [Z95.2]  Atrial fibrillation [I48.91]  Long term (current) use of anticoagulants [Z79.01]                 Anticoagulation Episode Summary       INR check location:  Clinic Lab    Preferred lab:  --    Send INR reminders to:  [facility] COUMADIN MONITORING POOL    Comments:  [facility] only Mon - Thu    (D/C ) [facility]  [place] [phone]          Anticoagulation Care Providers       Provider Role Specialty Phone number    Celsa Knight PA-C Referring Cardiothoracic Surgery [phone]    Ralf Figueroa MD Responsible Cardiology [phone]    Antoni Easton MD  Interventional Cardiology [phone]

## 2025-04-07 ENCOUNTER — ANTI-COAG VISIT (OUTPATIENT)
Dept: CARDIOLOGY | Facility: CLINIC | Age: 55
End: 2025-04-07
Payer: MEDICARE

## 2025-04-07 ENCOUNTER — LAB VISIT (OUTPATIENT)
Dept: LAB | Facility: HOSPITAL | Age: 55
End: 2025-04-07
Attending: FAMILY MEDICINE
Payer: MEDICARE

## 2025-04-07 DIAGNOSIS — I48.0 PAROXYSMAL ATRIAL FIBRILLATION: ICD-10-CM

## 2025-04-07 DIAGNOSIS — Z95.2 S/P MVR (MITRAL VALVE REPLACEMENT): ICD-10-CM

## 2025-04-07 DIAGNOSIS — Z79.01 LONG TERM (CURRENT) USE OF ANTICOAGULANTS: ICD-10-CM

## 2025-04-07 DIAGNOSIS — Z79.01 LONG TERM (CURRENT) USE OF ANTICOAGULANTS: Primary | ICD-10-CM

## 2025-04-07 LAB
INR PPP: 2.2 (ref 0.8–1.2)
PROTHROMBIN TIME: 23.1 SECONDS (ref 9–12.5)

## 2025-04-07 PROCEDURE — 85610 PROTHROMBIN TIME: CPT

## 2025-04-07 PROCEDURE — 93793 ANTICOAG MGMT PT WARFARIN: CPT | Mod: S$GLB,,,

## 2025-04-07 PROCEDURE — 36415 COLL VENOUS BLD VENIPUNCTURE: CPT

## 2025-04-07 NOTE — PROGRESS NOTES
[facility]    2025 9:18 AM    Assessment/Plan:    Patient presents today with subtherapeutic  INR.    Assessment of patient findings and chart review: INR slightly below goal and no changes reported per patient to account    Recommendation for patient's warfarin regimen: Boost dose today to 10mg then resume current maintenance dose    Recommend repeat INR in 2 weeks  _________________________________________________________________    Riya Soto (54 y.o.) is followed by the [facility].    Anticoagulation Summary  As of 2025      INR goal:  2.5-3.5   TTR:  48.5% (2.4 y)   INR used for dosin.2 (2025)   Warfarin maintenance plan:  5 mg (5 mg x 1) every Sun, Thu; 7.5 mg (5 mg x 1.5) all other days   Weekly warfarin total:  47.5 mg   Plan last modified:  Braxton Mendez, PharmD (3/31/2025)   Next INR check:  2025   Target end date:  --    Indications    S/P MVR (mitral valve replacement) [Z95.2]  Atrial fibrillation [I48.91]  Long term (current) use of anticoagulants [Z79.01]                 Anticoagulation Episode Summary       INR check location:  Clinic Lab    Preferred lab:  --    Send INR reminders to:  [facility] COUMADIN MONITORING POOL    Comments:  [facility] only Mon - Thu    (D/C ) [facility] [phone]          Anticoagulation Care Providers       Provider Role Specialty Phone number    Celsa Knight PA-C Referring Cardiothoracic Surgery [phone]    Ralf Figueroa MD Responsible Cardiology [phone]    Antoni Easton MD  Interventional Cardiology [phone]

## 2025-04-16 ENCOUNTER — LAB VISIT (OUTPATIENT)
Dept: LAB | Facility: HOSPITAL | Age: 55
End: 2025-04-16
Attending: FAMILY MEDICINE
Payer: MEDICARE

## 2025-04-16 ENCOUNTER — ANTI-COAG VISIT (OUTPATIENT)
Dept: CARDIOLOGY | Facility: CLINIC | Age: 55
End: 2025-04-16
Payer: MEDICARE

## 2025-04-16 DIAGNOSIS — I48.0 PAROXYSMAL ATRIAL FIBRILLATION: ICD-10-CM

## 2025-04-16 DIAGNOSIS — Z95.2 S/P MVR (MITRAL VALVE REPLACEMENT): Primary | ICD-10-CM

## 2025-04-16 DIAGNOSIS — Z79.01 LONG TERM (CURRENT) USE OF ANTICOAGULANTS: ICD-10-CM

## 2025-04-16 DIAGNOSIS — Z95.2 S/P MVR (MITRAL VALVE REPLACEMENT): ICD-10-CM

## 2025-04-16 LAB
INR PPP: 2.3 (ref 0.8–1.2)
PROTHROMBIN TIME: 23.6 SECONDS (ref 9–12.5)

## 2025-04-16 PROCEDURE — 85610 PROTHROMBIN TIME: CPT

## 2025-04-16 PROCEDURE — 36415 COLL VENOUS BLD VENIPUNCTURE: CPT

## 2025-04-16 PROCEDURE — 93793 ANTICOAG MGMT PT WARFARIN: CPT | Mod: S$GLB,,,

## 2025-04-16 NOTE — PROGRESS NOTES
[facility]    2025 11:35 AM    Assessment/Plan:    Patient presents today with subtherapeutic  INR.    Assessment of patient findings and chart review: INR still below range despite boost from last week. Pt confirms correct dosing.     Recommendation for patient's warfarin regimen: Boost dose today to 10mg then increase maintenance dose starting next week.     Recommend repeat INR in 1 week  _________________________________________________________________    Riyajoanie Thompsonph (54 y.o.) is followed by the [facility].    Anticoagulation Summary  As of 2025      INR goal:  2.5-3.5   TTR:  48.0% (2.4 y)   INR used for dosin.3 (2025)   Warfarin maintenance plan:  5 mg (5 mg x 1) every Thu; 7.5 mg (5 mg x 1.5) all other days   Weekly warfarin total:  50 mg   Plan last modified:  Cortez Billings, PharmD (2025)   Next INR check:  2025   Target end date:  --    Indications    S/P MVR (mitral valve replacement) [Z95.2]  Atrial fibrillation [I48.91]  Long term (current) use of anticoagulants [Z79.01]                 Anticoagulation Episode Summary       INR check location:  Clinic Lab    Preferred lab:  --    Send INR reminders to:  [facility] COUMADIN MONITORING POOL    Comments:  [facility] only Mon - Thu    (D/C ) [facility] [phone]          Anticoagulation Care Providers       Provider Role Specialty Phone number    Pantera Yadav MD [facility] Medicine [phone]

## 2025-04-17 ENCOUNTER — RESULTS FOLLOW-UP (OUTPATIENT)
Dept: PRIMARY CARE CLINIC | Facility: CLINIC | Age: 55
End: 2025-04-17
Payer: MEDICARE

## 2025-04-23 ENCOUNTER — HOSPITAL ENCOUNTER (OUTPATIENT)
Dept: RADIOLOGY | Facility: CLINIC | Age: 55
Discharge: HOME | End: 2025-04-23
Payer: MEDICARE

## 2025-04-23 ENCOUNTER — OFFICE VISIT (OUTPATIENT)
Dept: PODIATRY | Facility: CLINIC | Age: 55
End: 2025-04-23
Payer: MEDICARE

## 2025-04-23 ENCOUNTER — LAB VISIT (OUTPATIENT)
Dept: LAB | Facility: HOSPITAL | Age: 55
End: 2025-04-23
Attending: FAMILY MEDICINE
Payer: MEDICARE

## 2025-04-23 ENCOUNTER — ANTI-COAG VISIT (OUTPATIENT)
Dept: CARDIOLOGY | Facility: CLINIC | Age: 55
End: 2025-04-23
Payer: MEDICARE

## 2025-04-23 VITALS — HEART RATE: 83 BPM | HEIGHT: 67 IN | WEIGHT: 220 LBS | OXYGEN SATURATION: 98 % | BODY MASS INDEX: 34.53 KG/M2

## 2025-04-23 DIAGNOSIS — Z98.1 HISTORY OF FUSION OF TALOTIBIAL JOINT AND SUBTALAR JOINT: ICD-10-CM

## 2025-04-23 DIAGNOSIS — Z79.01 LONG TERM (CURRENT) USE OF ANTICOAGULANTS: ICD-10-CM

## 2025-04-23 DIAGNOSIS — Z95.2 S/P MVR (MITRAL VALVE REPLACEMENT): Primary | ICD-10-CM

## 2025-04-23 DIAGNOSIS — G89.29 CHRONIC FOOT PAIN, LEFT: ICD-10-CM

## 2025-04-23 DIAGNOSIS — I48.0 PAROXYSMAL ATRIAL FIBRILLATION: ICD-10-CM

## 2025-04-23 DIAGNOSIS — Z87.81 HISTORY OF FRACTURE OF FOOT: ICD-10-CM

## 2025-04-23 DIAGNOSIS — M79.672 LEFT FOOT PAIN: Primary | ICD-10-CM

## 2025-04-23 DIAGNOSIS — M19.172 POST-TRAUMATIC ARTHRITIS OF LEFT FOOT: ICD-10-CM

## 2025-04-23 DIAGNOSIS — Z96.9 RETAINED ORTHOPEDIC HARDWARE: ICD-10-CM

## 2025-04-23 DIAGNOSIS — M79.672 CHRONIC FOOT PAIN, LEFT: ICD-10-CM

## 2025-04-23 DIAGNOSIS — M21.6X9 EQUINUS DEFORMITY OF FOOT: ICD-10-CM

## 2025-04-23 DIAGNOSIS — Z95.2 S/P MVR (MITRAL VALVE REPLACEMENT): ICD-10-CM

## 2025-04-23 DIAGNOSIS — M19.072 ARTHRITIS OF LEFT FOOT: ICD-10-CM

## 2025-04-23 LAB
INR PPP: 2.5 (ref 0.8–1.2)
PROTHROMBIN TIME: 26.2 SECONDS (ref 9–12.5)

## 2025-04-23 PROCEDURE — 93793 ANTICOAG MGMT PT WARFARIN: CPT | Mod: S$GLB,,,

## 2025-04-23 PROCEDURE — 99999 PR PBB SHADOW E&M-EST. PATIENT-LVL III: CPT | Mod: PBBFAC,,,

## 2025-04-23 PROCEDURE — 3044F HG A1C LEVEL LT 7.0%: CPT | Mod: CPTII,S$GLB,,

## 2025-04-23 PROCEDURE — 3008F BODY MASS INDEX DOCD: CPT | Mod: CPTII,S$GLB,,

## 2025-04-23 PROCEDURE — 99204 OFFICE O/P NEW MOD 45 MIN: CPT | Mod: S$GLB,,,

## 2025-04-23 PROCEDURE — 36415 COLL VENOUS BLD VENIPUNCTURE: CPT

## 2025-04-23 PROCEDURE — 85610 PROTHROMBIN TIME: CPT

## 2025-04-23 PROCEDURE — 73630 X-RAY EXAM OF FOOT: CPT | Mod: LT,S$GLB,, | Performed by: RADIOLOGY

## 2025-04-23 NOTE — PROGRESS NOTES
[facility]    2025 9:55 AM    Assessment/Plan:    Patient presents today with therapeutic INR.    Assessment of patient findings and chart review: INR at goal. Medications and chart reviewed. Will monitor closely to see if she can be maintained on this dose.     Recommendation for patient's warfarin regimen: Continue current maintenance dose    Recommend repeat INR in 1 week  _________________________________________________________________    Riya Soto (54 y.o.) is followed by the [facility].    Anticoagulation Summary  As of 2025      INR goal:  2.5-3.5   TTR:  47.6% (2.4 y)   INR used for dosin.5 (2025)   Warfarin maintenance plan:  7.5 mg (5 mg x 1.5) every day   Weekly warfarin total:  52.5 mg   Plan last modified:  Cortez Billings, PharmD (2025)   Next INR check:  2025   Target end date:  --    Indications    S/P MVR (mitral valve replacement) [Z95.2]  Atrial fibrillation [I48.91]  Long term (current) use of anticoagulants [Z79.01]                 Anticoagulation Episode Summary       INR check location:  Clinic Lab    Preferred lab:  --    Send INR reminders to:  [facility] COUMADIN MONITORING POOL    Comments:  [facility] only Mon - Thu    (D/C ) [facility] [phone]          Anticoagulation Care Providers       Provider Role Specialty Phone number    Pantera Yadav MD [facility] Medicine [phone]
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
## 2025-05-07 ENCOUNTER — LAB VISIT (OUTPATIENT)
Dept: LAB | Facility: HOSPITAL | Age: 55
End: 2025-05-07
Attending: FAMILY MEDICINE
Payer: MEDICARE

## 2025-05-07 ENCOUNTER — ANTI-COAG VISIT (OUTPATIENT)
Dept: CARDIOLOGY | Facility: CLINIC | Age: 55
End: 2025-05-07
Payer: MEDICARE

## 2025-05-07 DIAGNOSIS — Z79.01 LONG TERM (CURRENT) USE OF ANTICOAGULANTS: ICD-10-CM

## 2025-05-07 DIAGNOSIS — Z95.2 S/P MVR (MITRAL VALVE REPLACEMENT): ICD-10-CM

## 2025-05-07 DIAGNOSIS — I48.0 PAROXYSMAL ATRIAL FIBRILLATION: ICD-10-CM

## 2025-05-07 DIAGNOSIS — Z95.2 S/P MVR (MITRAL VALVE REPLACEMENT): Primary | ICD-10-CM

## 2025-05-07 LAB
INR PPP: 4.8 (ref 0.8–1.2)
PROTHROMBIN TIME: 47.8 SECONDS (ref 9–12.5)

## 2025-05-07 PROCEDURE — 85610 PROTHROMBIN TIME: CPT

## 2025-05-07 PROCEDURE — 93793 ANTICOAG MGMT PT WARFARIN: CPT | Mod: S$GLB,,,

## 2025-05-07 PROCEDURE — 36415 COLL VENOUS BLD VENIPUNCTURE: CPT

## 2025-05-07 NOTE — PROGRESS NOTES
[place] with [facility] called in a verbal result as: INR 4.8 dated today 5/07/25

## 2025-05-07 NOTE — PROGRESS NOTES
[facility]    2025 10:40 AM    Assessment/Plan:    Patient presents today with supratherapeutic INR.    Assessment of patient findings and chart review: INR critical today. Pt reports taking wrong doses on 5/3 and , but weekly dose was correct. She denies and changes in medication, diet, or health. No s/sx of bleeding.     Recommendation for patient's warfarin regimen: Hold dose for 2 days    Recommend repeat INR in 2 days  _________________________________________________________________    Riya Soto (54 y.o.) is followed by the [facility].    Anticoagulation Summary  As of 2025      INR goal:  2.5-3.5   TTR:  47.6% (2.5 y)   INR used for dosin.8 (2025)   Warfarin maintenance plan:  5 mg (5 mg x 1) every Thu; 7.5 mg (5 mg x 1.5) all other days   Weekly warfarin total:  50 mg   Plan last modified:  Cortez Billings, PharmD (2025)   Next INR check:  2025   Target end date:  --    Indications    S/P MVR (mitral valve replacement) [Z95.2]  Atrial fibrillation [I48.91]  Long term (current) use of anticoagulants [Z79.01]                 Anticoagulation Episode Summary       INR check location:  Clinic Lab    Preferred lab:  --    Send INR reminders to:  [facility] COUMADIN MONITORING POOL    Comments:  [facility] or [facility] only Mon - Thu    (D/C ) [facility]  [place] [phone]          Anticoagulation Care Providers       Provider Role Specialty Phone number    Pantera Yadav MD [facility] [phone]

## 2025-05-09 ENCOUNTER — LAB VISIT (OUTPATIENT)
Dept: LAB | Facility: HOSPITAL | Age: 55
End: 2025-05-09
Attending: FAMILY MEDICINE
Payer: MEDICARE

## 2025-05-09 ENCOUNTER — ANTI-COAG VISIT (OUTPATIENT)
Dept: CARDIOLOGY | Facility: CLINIC | Age: 55
End: 2025-05-09
Payer: MEDICARE

## 2025-05-09 DIAGNOSIS — Z95.2 S/P MVR (MITRAL VALVE REPLACEMENT): ICD-10-CM

## 2025-05-09 DIAGNOSIS — I48.0 PAROXYSMAL ATRIAL FIBRILLATION: ICD-10-CM

## 2025-05-09 DIAGNOSIS — Z95.2 S/P MVR (MITRAL VALVE REPLACEMENT): Primary | ICD-10-CM

## 2025-05-09 DIAGNOSIS — Z79.01 LONG TERM (CURRENT) USE OF ANTICOAGULANTS: ICD-10-CM

## 2025-05-09 LAB
INR PPP: 1.9 (ref 0.8–1.2)
PROTHROMBIN TIME: 20.1 SECONDS (ref 9–12.5)

## 2025-05-09 PROCEDURE — 36415 COLL VENOUS BLD VENIPUNCTURE: CPT

## 2025-05-09 PROCEDURE — 85610 PROTHROMBIN TIME: CPT

## 2025-05-09 NOTE — PROGRESS NOTES
[facility]    2025 11:58 AM    Assessment/Plan:    Patient presents today with subtherapeutic  INR.    Assessment of patient findings and chart review: INR low but expected due to holding coumadin x 48 hours s/p critical elevated INR. We will resume coumadin today with close watch. Patient was re-educated on situations that would require placing a call to the coumadin clinic, including bleeding or unusual bruising issues, changes in health, diet or medications, upcoming procedures that require warfarin interruption, and missed coumadin dose(s). Patient expressed understanding that avoidance of consistency with these parameters could cause fluctuations in INR, leading to more frequent visits and increase risk of adverse events.       Recommendation for patient's warfarin regimen: per calendar    Recommend repeat INR in 4 days  _________________________________________________________________    Riya Soto (54 y.o.) is followed by the [facility].    Anticoagulation Summary  As of 2025      INR goal:  2.5-3.5   TTR:  47.5% (2.5 y)   INR used for dosin.9 (2025)   Warfarin maintenance plan:  5 mg (5 mg x 1) every Thu; 7.5 mg (5 mg x 1.5) all other days   Weekly warfarin total:  50 mg   Plan last modified:  Cortez Billings, PharmD (2025)   Next INR check:  2025   Target end date:  --    Indications    S/P MVR (mitral valve replacement) [Z95.2]  Atrial fibrillation [I48.91]  Long term (current) use of anticoagulants [Z79.01]                 Anticoagulation Episode Summary       INR check location:  Clinic Lab    Preferred lab:  --    Send INR reminders to:  [facility] COUMADIN MONITORING POOL    Comments:  [facility] [place] [facility] only Mon - Thu    (D/C ) [place] [phone]          Anticoagulation Care Providers       Provider Role Specialty Phone number    Vicenta  Pantera BEGUM MD [facility] [phone]

## 2025-05-12 ENCOUNTER — HOSPITAL ENCOUNTER (EMERGENCY)
Facility: HOSPITAL | Age: 55
Discharge: HOME | End: 2025-05-13
Attending: STUDENT IN AN ORGANIZED HEALTH CARE EDUCATION/TRAINING PROGRAM
Payer: MEDICARE
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
## 2025-05-12 DIAGNOSIS — M25.522 LEFT ELBOW PAIN: ICD-10-CM

## 2025-05-12 DIAGNOSIS — M79.605 LEFT LEG PAIN: Primary | ICD-10-CM

## 2025-05-12 LAB
HCV AB SERPL QL IA: NORMAL
HIV 1+2 AB+HIV1 P24 AG SERPL QL IA: NORMAL

## 2025-05-12 PROCEDURE — 36415 COLL VENOUS BLD VENIPUNCTURE: CPT | Performed by: EMERGENCY MEDICINE

## 2025-05-12 PROCEDURE — 86803 HEPATITIS C AB TEST: CPT | Performed by: EMERGENCY MEDICINE

## 2025-05-12 PROCEDURE — 99284 EMERGENCY DEPT VISIT MOD MDM: CPT | Mod: 25

## 2025-05-12 PROCEDURE — 87389 HIV-1 AG W/HIV-1&-2 AB AG IA: CPT | Performed by: EMERGENCY MEDICINE

## 2025-05-12 NOTE — PROGRESS NOTES
> Patient has been pleasantly confused for months in the setting of brain mass.  > acutely worsened due to UTI and bacteremia. S/p 7 day course of antibx  > Continues to lack insight to his current situation and is severely cognitively impaired.  He does not get agitated for long periods of time and is mostly confused and impulsive.  > A&O 1 on [facility] admission    - Continue seroquel 12.5mg at noon with 50mg HS  - PRN Zyprexa 2.5mg IM as needed for agitation  -Overstimulation precautions, frequent re-orientation, re-direction, re-assurance  -Sleep log and agitation monitoring if needed  -Optimize sleep-wake cycle  -Limit sedating medications when possible  - Ensure optimal management electrolytes, nutrition, and hydration  - Ensure optimal bowel/bladder management  - Ensure optimal pain management   -Patient/family/caregiver education and training   -Continue 1:1 due to overall safety, impulsivity, lack of safety awareness and poor insight.  -Fall precautions with frequent rounding; proactive toileting program, patient should not be unattended in bathroom       INR at goal. Medications and chart reviewed. No changes noted to necessitate adjustment of warfarin or follow-up plan. Patient's DCCV 2/13 was cancelled d/t insurance approval pending. Will continue weekly INRs at this time. See calendar.

## 2025-05-13 VITALS
BODY MASS INDEX: 28.89 KG/M2 | WEIGHT: 184.06 LBS | DIASTOLIC BLOOD PRESSURE: 59 MMHG | RESPIRATION RATE: 19 BRPM | OXYGEN SATURATION: 95 % | HEIGHT: 67 IN | SYSTOLIC BLOOD PRESSURE: 130 MMHG | HEART RATE: 69 BPM | TEMPERATURE: 98 F

## 2025-05-13 LAB — HOLD SPECIMEN: NORMAL
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
## 2025-05-13 NOTE — DISCHARGE INSTRUCTIONS
Please return to the ED if you have new or worsening symptoms.  Follow up with PCP for further evaluation.
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
## 2025-05-14 ENCOUNTER — LAB VISIT (OUTPATIENT)
Dept: LAB | Facility: HOSPITAL | Age: 55
End: 2025-05-14
Attending: FAMILY MEDICINE
Payer: MEDICARE

## 2025-05-14 ENCOUNTER — ANTI-COAG VISIT (OUTPATIENT)
Dept: CARDIOLOGY | Facility: CLINIC | Age: 55
End: 2025-05-14
Payer: MEDICARE

## 2025-05-14 DIAGNOSIS — I48.0 PAROXYSMAL ATRIAL FIBRILLATION: ICD-10-CM

## 2025-05-14 DIAGNOSIS — Z95.2 S/P MVR (MITRAL VALVE REPLACEMENT): Primary | ICD-10-CM

## 2025-05-14 DIAGNOSIS — Z79.01 LONG TERM (CURRENT) USE OF ANTICOAGULANTS: ICD-10-CM

## 2025-05-14 DIAGNOSIS — Z95.2 S/P MVR (MITRAL VALVE REPLACEMENT): ICD-10-CM

## 2025-05-14 LAB
INR PPP: 1.4 (ref 0.8–1.2)
PROTHROMBIN TIME: 14.9 SECONDS (ref 9–12.5)

## 2025-05-14 PROCEDURE — 36415 COLL VENOUS BLD VENIPUNCTURE: CPT

## 2025-05-14 PROCEDURE — 85610 PROTHROMBIN TIME: CPT

## 2025-05-14 PROCEDURE — 93793 ANTICOAG MGMT PT WARFARIN: CPT | Mod: S$GLB,,,
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
## 2025-05-19 ENCOUNTER — ANTI-COAG VISIT (OUTPATIENT)
Dept: CARDIOLOGY | Facility: CLINIC | Age: 55
End: 2025-05-19
Payer: MEDICARE

## 2025-05-19 ENCOUNTER — LAB VISIT (OUTPATIENT)
Dept: LAB | Facility: HOSPITAL | Age: 55
End: 2025-05-19
Attending: INTERNAL MEDICINE
Payer: MEDICARE

## 2025-05-19 DIAGNOSIS — Z79.01 LONG TERM (CURRENT) USE OF ANTICOAGULANTS: ICD-10-CM

## 2025-05-19 DIAGNOSIS — Z95.2 S/P MVR (MITRAL VALVE REPLACEMENT): ICD-10-CM

## 2025-05-19 DIAGNOSIS — I48.0 PAROXYSMAL ATRIAL FIBRILLATION: ICD-10-CM

## 2025-05-19 DIAGNOSIS — Z95.2 S/P MVR (MITRAL VALVE REPLACEMENT): Primary | ICD-10-CM

## 2025-05-19 LAB
INR PPP: 2.6 (ref 0.8–1.2)
PROTHROMBIN TIME: 27.2 SECONDS (ref 9–12.5)

## 2025-05-19 PROCEDURE — 36415 COLL VENOUS BLD VENIPUNCTURE: CPT

## 2025-05-19 PROCEDURE — 85610 PROTHROMBIN TIME: CPT

## 2025-05-19 PROCEDURE — 93793 ANTICOAG MGMT PT WARFARIN: CPT | Mod: S$GLB,,, | Performed by: PHARMACIST

## 2025-05-26 ENCOUNTER — LAB VISIT (OUTPATIENT)
Dept: LAB | Facility: HOSPITAL | Age: 55
End: 2025-05-26
Attending: FAMILY MEDICINE
Payer: MEDICARE

## 2025-05-26 ENCOUNTER — RESULTS FOLLOW-UP (OUTPATIENT)
Dept: PRIMARY CARE CLINIC | Facility: CLINIC | Age: 55
End: 2025-05-26
Payer: MEDICARE

## 2025-05-26 ENCOUNTER — ANTI-COAG VISIT (OUTPATIENT)
Dept: CARDIOLOGY | Facility: CLINIC | Age: 55
End: 2025-05-26
Payer: MEDICARE

## 2025-05-26 DIAGNOSIS — Z95.2 S/P MVR (MITRAL VALVE REPLACEMENT): Primary | ICD-10-CM

## 2025-05-26 DIAGNOSIS — Z79.01 LONG TERM (CURRENT) USE OF ANTICOAGULANTS: ICD-10-CM

## 2025-05-26 DIAGNOSIS — I48.0 PAROXYSMAL ATRIAL FIBRILLATION: ICD-10-CM

## 2025-05-26 DIAGNOSIS — Z95.2 S/P MVR (MITRAL VALVE REPLACEMENT): ICD-10-CM

## 2025-05-26 LAB
INR PPP: 2.4 (ref 0.8–1.2)
PROTHROMBIN TIME: 25.3 SECONDS (ref 9–12.5)

## 2025-05-26 PROCEDURE — 36415 COLL VENOUS BLD VENIPUNCTURE: CPT

## 2025-05-26 PROCEDURE — 93793 ANTICOAG MGMT PT WARFARIN: CPT | Mod: S$GLB,,, | Performed by: PHARMACIST

## 2025-05-26 PROCEDURE — 85610 PROTHROMBIN TIME: CPT

## 2025-05-26 NOTE — PROGRESS NOTES
[facility]    2025 12:03 PM    Assessment/Plan:    Patient presents today with subtherapeutic  INR.    Assessment of patient findings and chart review: INR not at goal. Medications, chart, and patient findings reviewed. See calendar for adjustments to dose and follow up plan.      Recommendation for patient's warfarin regimen: Increase maintenance dose    Recommend repeat INR in 1 week  _________________________________________________________________    Riya Soto (54 y.o.) is followed by the [facility] [facility]ticoagulation Management Program.    Anticoagulation Summary  As of 2025      INR goal:  2.5-3.5   TTR:  47.1% (2.5 y)   INR used for dosin.4 (2025)   Warfarin maintenance plan:  7.5 mg (5 mg x 1.5) every day   Weekly warfarin total:  52.5 mg   Plan last modified:  Braxton Mendez, PharmD (2025)   Next INR check:  2025   Target end date:  --    Indications    S/P MVR (mitral valve replacement) [Z95.2]  Atrial fibrillation [I48.91]  Long term (current) use of anticoagulants [Z79.01]                 Anticoagulation Episode Summary       INR check location:  Clinic Lab    Preferred lab:  --    Send INR reminders to:  [facility] COUMADIN MONITORING POOL    Comments:  [facility] [place] [facility] Mon - Thu    (D/C ) [facility] [place] [phone]          Anticoagulation Care Providers       Provider Role Specialty Phone number    Pantera Yadav MD [facility] Medicine [phone]

## 2025-05-28 NOTE — PROGRESS NOTES
Called and inform pt her PT/INR is at a therapeutic level. Pt said she knows, the Coumadin clinic calls and tells her every week

## 2025-06-02 ENCOUNTER — LAB VISIT (OUTPATIENT)
Dept: LAB | Facility: HOSPITAL | Age: 55
End: 2025-06-02
Attending: FAMILY MEDICINE
Payer: MEDICARE

## 2025-06-02 ENCOUNTER — RESULTS FOLLOW-UP (OUTPATIENT)
Dept: PRIMARY CARE CLINIC | Facility: CLINIC | Age: 55
End: 2025-06-02
Payer: MEDICARE

## 2025-06-02 ENCOUNTER — ANTI-COAG VISIT (OUTPATIENT)
Dept: CARDIOLOGY | Facility: CLINIC | Age: 55
End: 2025-06-02
Payer: MEDICARE

## 2025-06-02 DIAGNOSIS — I48.0 PAROXYSMAL ATRIAL FIBRILLATION: ICD-10-CM

## 2025-06-02 DIAGNOSIS — Z79.01 LONG TERM (CURRENT) USE OF ANTICOAGULANTS: ICD-10-CM

## 2025-06-02 DIAGNOSIS — Z95.2 S/P MVR (MITRAL VALVE REPLACEMENT): Primary | ICD-10-CM

## 2025-06-02 DIAGNOSIS — Z95.2 S/P MVR (MITRAL VALVE REPLACEMENT): ICD-10-CM

## 2025-06-02 LAB
INR PPP: 3.2 (ref 0.8–1.2)
PROTHROMBIN TIME: 33 SECONDS (ref 9–12.5)

## 2025-06-02 PROCEDURE — 85610 PROTHROMBIN TIME: CPT

## 2025-06-02 PROCEDURE — 93793 ANTICOAG MGMT PT WARFARIN: CPT | Mod: S$GLB,,, | Performed by: PHARMACIST

## 2025-06-02 PROCEDURE — 36415 COLL VENOUS BLD VENIPUNCTURE: CPT

## 2025-06-09 ENCOUNTER — LAB VISIT (OUTPATIENT)
Dept: LAB | Facility: HOSPITAL | Age: 55
End: 2025-06-09
Attending: FAMILY MEDICINE
Payer: MEDICARE

## 2025-06-09 ENCOUNTER — ANTI-COAG VISIT (OUTPATIENT)
Dept: CARDIOLOGY | Facility: CLINIC | Age: 55
End: 2025-06-09
Payer: MEDICARE

## 2025-06-09 DIAGNOSIS — I48.0 PAROXYSMAL ATRIAL FIBRILLATION: ICD-10-CM

## 2025-06-09 DIAGNOSIS — Z79.01 LONG TERM (CURRENT) USE OF ANTICOAGULANTS: ICD-10-CM

## 2025-06-09 DIAGNOSIS — Z95.2 S/P MVR (MITRAL VALVE REPLACEMENT): Primary | ICD-10-CM

## 2025-06-09 DIAGNOSIS — Z95.2 S/P MVR (MITRAL VALVE REPLACEMENT): ICD-10-CM

## 2025-06-09 LAB
INR PPP: 3.5 (ref 0.8–1.2)
PROTHROMBIN TIME: 35.4 SECONDS (ref 9–12.5)

## 2025-06-09 PROCEDURE — 36415 COLL VENOUS BLD VENIPUNCTURE: CPT

## 2025-06-09 PROCEDURE — 93793 ANTICOAG MGMT PT WARFARIN: CPT | Mod: S$GLB,,, | Performed by: PHARMACIST

## 2025-06-09 PROCEDURE — 85610 PROTHROMBIN TIME: CPT

## 2025-06-09 NOTE — PROGRESS NOTES
[facility]    06/09/2025 1:23 PM    Assessment/Plan:    Patient presents today with therapeutic INR.    Assessment of patient findings and chart review: INR at goal. Medications and chart reviewed. No changes noted to necessitate adjustment of warfarin or follow-up plan. See calendar.  Watch for upward INR trend    Recommendation for patient's warfarin regimen: per calendar    Recommend repeat INR in 1 week  _________________________________________________________________    Riya Soto (54 y.o.) is followed by the [facility] [facility].    Anticoagulation Summary  As of 6/9/2025      INR goal:  2.5-3.5   TTR:  47.8% (2.6 y)   INR used for dosing:  3.5 (6/9/2025)   Warfarin maintenance plan:  7.5 mg (5 mg x 1.5) every day   Weekly warfarin total:  52.5 mg   Plan last modified:  Braxton Mendez, PharmD (5/26/2025)   Next INR check:  6/16/2025   Target end date:  --    Indications    S/P MVR (mitral valve replacement) [Z95.2]  Atrial fibrillation [I48.91]  Long term (current) use of anticoagulants [Z79.01]                 Anticoagulation Episode Summary       INR check location:  Clinic Lab    Preferred lab:  --    Send INR reminders to:  [facility] COUMADIN MONITORING POOL    Comments:  [facility] or KR[facility] [place] [facility] only Mon - Thu    (D/C 12/13) [facility]  [place] [phone]          Anticoagulation Care Providers       Provider Role Specialty Phone number    Pantera Yadav MD [facility] Medicine [phone]

## 2025-06-16 ENCOUNTER — ANTI-COAG VISIT (OUTPATIENT)
Dept: CARDIOLOGY | Facility: CLINIC | Age: 55
End: 2025-06-16
Payer: MEDICARE

## 2025-06-16 ENCOUNTER — LAB VISIT (OUTPATIENT)
Dept: LAB | Facility: HOSPITAL | Age: 55
End: 2025-06-16
Attending: FAMILY MEDICINE
Payer: MEDICARE

## 2025-06-16 DIAGNOSIS — I48.0 PAROXYSMAL ATRIAL FIBRILLATION: ICD-10-CM

## 2025-06-16 DIAGNOSIS — Z79.01 LONG TERM (CURRENT) USE OF ANTICOAGULANTS: ICD-10-CM

## 2025-06-16 DIAGNOSIS — Z95.2 S/P MVR (MITRAL VALVE REPLACEMENT): ICD-10-CM

## 2025-06-16 DIAGNOSIS — Z95.2 S/P MVR (MITRAL VALVE REPLACEMENT): Primary | ICD-10-CM

## 2025-06-16 LAB
INR PPP: 3.2 (ref 0.8–1.2)
PROTHROMBIN TIME: 32.9 SECONDS (ref 9–12.5)

## 2025-06-16 PROCEDURE — 85610 PROTHROMBIN TIME: CPT

## 2025-06-16 PROCEDURE — 93793 ANTICOAG MGMT PT WARFARIN: CPT | Mod: S$GLB,,, | Performed by: PHARMACIST

## 2025-06-16 PROCEDURE — 36415 COLL VENOUS BLD VENIPUNCTURE: CPT

## 2025-06-16 NOTE — PROGRESS NOTES
[facility]    06/16/2025 9:59 AM    Assessment/Plan:    Patient presents today with therapeutic INR.    Assessment of patient findings and chart review: INR at goal. Medications and chart reviewed. No changes noted to necessitate adjustment of warfarin or follow-up plan. See calendar.      Recommendation for patient's warfarin regimen: Continue current maintenance dose    Recommend repeat INR in 1 week  _________________________________________________________________    Riya Soto (54 y.o.) is followed by the [facility].    Anticoagulation Summary  As of 6/16/2025      INR goal:  2.5-3.5   TTR:  48.2% (2.6 y)   INR used for dosing:  3.2 (6/16/2025)   Warfarin maintenance plan:  7.5 mg (5 mg x 1.5) every day   Weekly warfarin total:  52.5 mg   Plan last modified:  Braxton Mendez, PharmD (5/26/2025)   Next INR check:  6/23/2025   Target end date:  --    Indications    S/P MVR (mitral valve replacement) [Z95.2]  Atrial fibrillation [I48.91]  Long term (current) use of anticoagulants [Z79.01]                 Anticoagulation Episode Summary       INR check location:  Clinic Lab    Preferred lab:  --    Send INR reminders to:  [facility] COUMADIN MONITORING POOL    Comments:  NMJAYLIN or KRIS [facility] [place] [facility] only Mon - Thu    (D/C 12/13) [facility] [place] [phone]          Anticoagulation Care Providers       Provider Role Specialty Phone number    Pantera Yadav MD [facility] Medicine [phone]

## 2025-06-18 ENCOUNTER — RESULTS FOLLOW-UP (OUTPATIENT)
Dept: PRIMARY CARE CLINIC | Facility: CLINIC | Age: 55
End: 2025-06-18
Payer: MEDICARE

## 2025-06-23 ENCOUNTER — LAB VISIT (OUTPATIENT)
Dept: LAB | Facility: HOSPITAL | Age: 55
End: 2025-06-23
Attending: FAMILY MEDICINE
Payer: MEDICARE

## 2025-06-23 ENCOUNTER — ANTI-COAG VISIT (OUTPATIENT)
Dept: CARDIOLOGY | Facility: CLINIC | Age: 55
End: 2025-06-23
Payer: MEDICARE

## 2025-06-23 DIAGNOSIS — Z79.01 LONG TERM (CURRENT) USE OF ANTICOAGULANTS: ICD-10-CM

## 2025-06-23 DIAGNOSIS — I48.0 PAROXYSMAL ATRIAL FIBRILLATION: ICD-10-CM

## 2025-06-23 DIAGNOSIS — Z95.2 S/P MVR (MITRAL VALVE REPLACEMENT): Primary | ICD-10-CM

## 2025-06-23 DIAGNOSIS — Z95.2 S/P MVR (MITRAL VALVE REPLACEMENT): ICD-10-CM

## 2025-06-23 LAB
INR PPP: 3.9 (ref 0.8–1.2)
PROTHROMBIN TIME: 39.3 SECONDS (ref 9–12.5)

## 2025-06-23 PROCEDURE — 93793 ANTICOAG MGMT PT WARFARIN: CPT | Mod: S$GLB,,,

## 2025-06-23 PROCEDURE — 85610 PROTHROMBIN TIME: CPT

## 2025-06-23 PROCEDURE — 36415 COLL VENOUS BLD VENIPUNCTURE: CPT

## 2025-06-23 NOTE — PROGRESS NOTES
[facility]    06/23/2025 10:20 AM    Assessment/Plan:    Patient presents today with supratherapeutic INR.    Assessment of patient findings and chart review: pt reports recent ETOH consumption, does not drink consistently    Recommendation for patient's warfarin regimen: Lower dose today to 5mg then resume current maintenance dose    Recommend repeat INR in 1 week  _________________________________________________________________    Riyajoanie Soto (54 y.o.) is followed by the [facility].    Anticoagulation Summary  As of 6/23/2025      INR goal:  2.5-3.5   TTR:  48.1% (2.6 y)   INR used for dosing:  3.9 (6/23/2025)   Warfarin maintenance plan:  7.5 mg (5 mg x 1.5) every day   Weekly warfarin total:  52.5 mg   Plan last modified:  Braxton Mendez, PharmD (5/26/2025)   Next INR check:  6/30/2025   Target end date:  --    Indications    S/P MVR (mitral valve replacement) [Z95.2]  Atrial fibrillation [I48.91]  Long term (current) use of anticoagulants [Z79.01]                 Anticoagulation Episode Summary       INR check location:  Clinic Lab    Preferred lab:  --    Send INR reminders to:  [facility] COUMADIN MONITORING POOL    Comments:  [facility] or [facility] only Mon - Thu    (D/C 12/13) [facility] [place] [phone]          Anticoagulation Care Providers       Provider Role Specialty Phone number    Pantera Yadav MD [facility] Medicine [phone]

## 2025-06-24 ENCOUNTER — RESULTS FOLLOW-UP (OUTPATIENT)
Dept: PRIMARY CARE CLINIC | Facility: CLINIC | Age: 55
End: 2025-06-24
Payer: MEDICARE

## 2025-06-30 ENCOUNTER — LAB VISIT (OUTPATIENT)
Dept: LAB | Facility: HOSPITAL | Age: 55
End: 2025-06-30
Attending: FAMILY MEDICINE
Payer: MEDICARE

## 2025-06-30 ENCOUNTER — ANTI-COAG VISIT (OUTPATIENT)
Dept: CARDIOLOGY | Facility: CLINIC | Age: 55
End: 2025-06-30
Payer: MEDICARE

## 2025-06-30 ENCOUNTER — TELEPHONE (OUTPATIENT)
Dept: PRIMARY CARE CLINIC | Facility: CLINIC | Age: 55
End: 2025-06-30
Payer: MEDICARE

## 2025-06-30 DIAGNOSIS — Z79.01 LONG TERM (CURRENT) USE OF ANTICOAGULANTS: ICD-10-CM

## 2025-06-30 DIAGNOSIS — Z95.2 S/P MVR (MITRAL VALVE REPLACEMENT): Primary | ICD-10-CM

## 2025-06-30 DIAGNOSIS — Z95.2 S/P MVR (MITRAL VALVE REPLACEMENT): ICD-10-CM

## 2025-06-30 DIAGNOSIS — I48.0 PAROXYSMAL ATRIAL FIBRILLATION: ICD-10-CM

## 2025-06-30 LAB
INR PPP: 4.1 (ref 0.8–1.2)
PROTHROMBIN TIME: 41.2 SECONDS (ref 9–12.5)

## 2025-06-30 PROCEDURE — 36415 COLL VENOUS BLD VENIPUNCTURE: CPT

## 2025-06-30 PROCEDURE — 85610 PROTHROMBIN TIME: CPT

## 2025-06-30 PROCEDURE — 93793 ANTICOAG MGMT PT WARFARIN: CPT | Mod: S$GLB,,,

## 2025-06-30 NOTE — PROGRESS NOTES
[facility]    2025 12:43 PM    Assessment/Plan:    Patient presents today with supratherapeutic INR.    Assessment of patient findings and chart review: no changes reported per pt    Recommendation for patient's warfarin regimen: Lower dose today to 2.5mg then decrease maintenance dose    Recommend repeat INR in 1 week  _________________________________________________________________    Riya Soto (54 y.o.) is followed by the [employer] [facility]ticoagulation Management Program.    Anticoagulation Summary  As of 2025      INR goal:  2.5-3.5   TTR:  47.8% (2.6 y)   INR used for dosin.1 (2025)   Warfarin maintenance plan:  5 mg (5 mg x 1) every Sun; 7.5 mg (5 mg x 1.5) all other days   Weekly warfarin total:  50 mg   Plan last modified:  Skylar Macrelo, PharmD (2025)   Next INR check:  2025   Target end date:  --    Indications    S/P MVR (mitral valve replacement) [Z95.2]  Atrial fibrillation [I48.91]  Long term (current) use of anticoagulants [Z79.01]                 Anticoagulation Episode Summary       INR check location:  Clinic Lab    Preferred lab:  --    Send INR reminders to:  [facility] COUMADIN MONITORING POOL    Comments:  [facility] only Mon - Thu    (D/C ) [facility] [place] [phone]          Anticoagulation Care Providers       Provider Role Specialty Phone number    Pantera Yadav MD [facility] Medicine [phone]

## 2025-06-30 NOTE — TELEPHONE ENCOUNTER
Dr. Yadav notified and primary care sticky note has been added since patient does go to a coumadin clinic. Dr. Yadav also does not want pt/inr results.

## 2025-06-30 NOTE — PROGRESS NOTES
Spoke to Cortez at [facility] and he inform me that pt has an INR of 4.1, PT of 41.2 sec. Called Coumadin Clinic an spoke to Eladia Tam MA to inform them of  PT/INR results. Called and inform pt of results and asked how is she taking her Coumadin. Pt stated she take 1mg,1 day and 1.5 mg all other days. Pt said the Coumadin clinic will call me if I need new orders. MD notified

## 2025-06-30 NOTE — TELEPHONE ENCOUNTER
Cortez with [facility] called with INR of 4.1 and PT 41.2 secs. Pt see the Coumadin Clinic. I inform the Coumadin Clinic and the pt. Pt takes 1mg, 1 day and 1.5 mg all the other days.

## 2025-07-07 ENCOUNTER — ANTI-COAG VISIT (OUTPATIENT)
Dept: CARDIOLOGY | Facility: CLINIC | Age: 55
End: 2025-07-07
Payer: MEDICARE

## 2025-07-07 ENCOUNTER — LAB VISIT (OUTPATIENT)
Dept: LAB | Facility: HOSPITAL | Age: 55
End: 2025-07-07
Attending: FAMILY MEDICINE
Payer: MEDICARE

## 2025-07-07 DIAGNOSIS — Z95.2 S/P MVR (MITRAL VALVE REPLACEMENT): Primary | ICD-10-CM

## 2025-07-07 DIAGNOSIS — Z79.01 LONG TERM (CURRENT) USE OF ANTICOAGULANTS: ICD-10-CM

## 2025-07-07 DIAGNOSIS — Z95.2 S/P MVR (MITRAL VALVE REPLACEMENT): ICD-10-CM

## 2025-07-07 DIAGNOSIS — I48.0 PAROXYSMAL ATRIAL FIBRILLATION: ICD-10-CM

## 2025-07-07 LAB
INR PPP: 3 (ref 0.8–1.2)
PROTHROMBIN TIME: 30.7 SECONDS (ref 9–12.5)

## 2025-07-07 PROCEDURE — 93793 ANTICOAG MGMT PT WARFARIN: CPT | Mod: S$GLB,,,

## 2025-07-07 PROCEDURE — 36415 COLL VENOUS BLD VENIPUNCTURE: CPT

## 2025-07-07 PROCEDURE — 85610 PROTHROMBIN TIME: CPT

## 2025-07-07 NOTE — PROGRESS NOTES
[facility]    07/07/2025 11:09 AM    Assessment/Plan:    Patient presents today with therapeutic INR.    Recommendation for patient's warfarin regimen: Continue current maintenance dose    Recommend repeat INR in 1 week  _________________________________________________________________    Riya Soto (54 y.o.) is followed by the [facility]tico[facility] Management [facility].    Anticoagulation Summary  As of 7/7/2025      INR goal:  2.5-3.5   TTR:  47.8% (2.6 y)   INR used for dosing:  3.0 (7/7/2025)   Warfarin maintenance plan:  5 mg (5 mg x 1) every Sun; 7.5 mg (5 mg x 1.5) all other days   Weekly warfarin total:  50 mg   No change documented:  Skylar Marcelo PharmD   Plan last modified:  Skylar Marcelo PharmD (6/30/2025)   Next INR check:  7/16/2025   Target end date:  --    Indications    S/P MVR (mitral valve replacement) [Z95.2]  Atrial fibrillation [I48.91]  Long term (current) use of anticoagulants [Z79.01]                 Anticoagulation Episode Summary       INR check location:  Clinic Lab    Preferred lab:  --    Send INR reminders to:  [facility] COUMADIN MONITORING POOL    Comments:  [facility] or KRIS [facility] only Mon - Thu    (D/C 12/13) [facility] [place] [phone]          Anticoagulation Care Providers       Provider Role Specialty Phone number    Pantera Yadav MD [facility] Medicine [phone]

## 2025-07-09 ENCOUNTER — TELEPHONE (OUTPATIENT)
Dept: PRIMARY CARE CLINIC | Facility: CLINIC | Age: 55
End: 2025-07-09
Payer: MEDICARE

## 2025-07-09 DIAGNOSIS — I48.91 ATRIAL FIBRILLATION, UNSPECIFIED TYPE: Primary | ICD-10-CM

## 2025-07-09 NOTE — TELEPHONE ENCOUNTER
Called for pt, no answer. LM on VM to return call  regarding her recent PT/INR - needs to repeat in 1 month per MD - orders pended and apt. Scheduled for 8/7 for lab

## 2025-07-09 NOTE — TELEPHONE ENCOUNTER
----- Message from Pantera Yadav MD sent at 7/8/2025 11:20 PM CDT -----  INR now 3 so therapeutic Needs be 2-3 Redo PT INR in one month   ----- Message -----  From: Lab, Background User  Sent: 7/7/2025  10:59 AM CDT  To: Pantera Yadav MD

## 2025-07-11 ENCOUNTER — PATIENT MESSAGE (OUTPATIENT)
Dept: CARDIOLOGY | Facility: CLINIC | Age: 55
End: 2025-07-11
Payer: MEDICARE

## 2025-07-16 ENCOUNTER — ANTI-COAG VISIT (OUTPATIENT)
Dept: CARDIOLOGY | Facility: CLINIC | Age: 55
End: 2025-07-16
Payer: MEDICARE

## 2025-07-16 ENCOUNTER — LAB VISIT (OUTPATIENT)
Dept: LAB | Facility: HOSPITAL | Age: 55
End: 2025-07-16
Attending: FAMILY MEDICINE
Payer: MEDICARE

## 2025-07-16 ENCOUNTER — OFFICE VISIT (OUTPATIENT)
Dept: PODIATRY | Facility: CLINIC | Age: 55
End: 2025-07-16
Payer: MEDICARE

## 2025-07-16 VITALS — BODY MASS INDEX: 33.57 KG/M2 | HEIGHT: 67 IN | WEIGHT: 213.88 LBS | RESPIRATION RATE: 16 BRPM

## 2025-07-16 DIAGNOSIS — I48.0 PAROXYSMAL ATRIAL FIBRILLATION: ICD-10-CM

## 2025-07-16 DIAGNOSIS — L84 CORN OR CALLUS: ICD-10-CM

## 2025-07-16 DIAGNOSIS — Z79.01 LONG TERM (CURRENT) USE OF ANTICOAGULANTS: ICD-10-CM

## 2025-07-16 DIAGNOSIS — Z95.2 S/P MVR (MITRAL VALVE REPLACEMENT): ICD-10-CM

## 2025-07-16 DIAGNOSIS — Z95.2 S/P MVR (MITRAL VALVE REPLACEMENT): Primary | ICD-10-CM

## 2025-07-16 DIAGNOSIS — D23.72 BENIGN NEOPLASM OF SKIN OF LEFT LOWER EXTREMITY, INCLUDING HIP: Primary | ICD-10-CM

## 2025-07-16 DIAGNOSIS — M79.672 LEFT FOOT PAIN: ICD-10-CM

## 2025-07-16 LAB
INR PPP: 3.1 (ref 0.8–1.2)
PROTHROMBIN TIME: 32.1 SECONDS (ref 9–12.5)

## 2025-07-16 PROCEDURE — 85610 PROTHROMBIN TIME: CPT

## 2025-07-16 PROCEDURE — 99999 PR PBB SHADOW E&M-EST. PATIENT-LVL III: CPT | Mod: PBBFAC,,,

## 2025-07-16 PROCEDURE — 36415 COLL VENOUS BLD VENIPUNCTURE: CPT

## 2025-07-16 PROCEDURE — 93793 ANTICOAG MGMT PT WARFARIN: CPT | Mod: S$GLB,,,

## 2025-07-16 RX ORDER — FLUOROURACIL 50 MG/G
CREAM TOPICAL 2 TIMES DAILY
Qty: 40 G | Refills: 2 | Status: SHIPPED | OUTPATIENT
Start: 2025-07-16 | End: 2025-08-05

## 2025-07-16 NOTE — PROGRESS NOTES
[facility]    07/16/2025 10:49 AM    Assessment/Plan:    Patient presents today with therapeutic INR.    Recommendation for patient's warfarin regimen: Continue current maintenance dose    Recommend repeat INR in 2 weeks  _________________________________________________________________    Riya Soto (54 y.o.) is followed by the [facility].    Anticoagulation Summary  As of 7/16/2025      INR goal:  2.5-3.5   TTR:  48.3% (2.7 y)   INR used for dosing:  3.1 (7/16/2025)   Warfarin maintenance plan:  5 mg (5 mg x 1) every Sun; 7.5 mg (5 mg x 1.5) all other days   Weekly warfarin total:  50 mg   No change documented:  Skylar Marcelo PharmD   Plan last modified:  Skylar Marcelo PharmD (6/30/2025)   Next INR check:  7/30/2025   Target end date:  --    Indications    S/P MVR (mitral valve replacement) [Z95.2]  Atrial fibrillation [I48.91]  Long term (current) use of anticoagulants [Z79.01]                 Anticoagulation Episode Summary       INR check location:  Clinic Lab    Preferred lab:  --    Send INR reminders to:  [facility] COUMADIN MONITORING POOL    Comments:  [facility] only Mon - Thu    (D/C 12/13) [facility] [phone]          Anticoagulation Care Providers       Provider Role Specialty Phone number    Pantera Yadav MD [facility] Medicine [phone]

## 2025-07-30 ENCOUNTER — OFFICE VISIT (OUTPATIENT)
Dept: CARDIOLOGY | Facility: CLINIC | Age: 55
End: 2025-07-30
Payer: MEDICARE

## 2025-07-30 ENCOUNTER — LAB VISIT (OUTPATIENT)
Dept: LAB | Facility: HOSPITAL | Age: 55
End: 2025-07-30
Attending: FAMILY MEDICINE
Payer: MEDICARE

## 2025-07-30 ENCOUNTER — ANTI-COAG VISIT (OUTPATIENT)
Dept: CARDIOLOGY | Facility: CLINIC | Age: 55
End: 2025-07-30
Payer: MEDICARE

## 2025-07-30 VITALS
HEART RATE: 85 BPM | DIASTOLIC BLOOD PRESSURE: 74 MMHG | WEIGHT: 215.81 LBS | BODY MASS INDEX: 33.8 KG/M2 | SYSTOLIC BLOOD PRESSURE: 122 MMHG | OXYGEN SATURATION: 96 %

## 2025-07-30 DIAGNOSIS — Z98.890 S/P TVR (TRICUSPID VALVE REPAIR): ICD-10-CM

## 2025-07-30 DIAGNOSIS — Z79.01 ANTICOAGULANT LONG-TERM USE: ICD-10-CM

## 2025-07-30 DIAGNOSIS — Z98.890 S/P MAZE OPERATION FOR ATRIAL FIBRILLATION: ICD-10-CM

## 2025-07-30 DIAGNOSIS — Z95.2 S/P MVR (MITRAL VALVE REPLACEMENT): Primary | ICD-10-CM

## 2025-07-30 DIAGNOSIS — Z79.01 LONG TERM (CURRENT) USE OF ANTICOAGULANTS: ICD-10-CM

## 2025-07-30 DIAGNOSIS — I48.0 PAROXYSMAL ATRIAL FIBRILLATION: ICD-10-CM

## 2025-07-30 DIAGNOSIS — Z92.89 HISTORY OF CARDIOVERSION: ICD-10-CM

## 2025-07-30 DIAGNOSIS — Z86.79 S/P MAZE OPERATION FOR ATRIAL FIBRILLATION: ICD-10-CM

## 2025-07-30 DIAGNOSIS — I50.20 HFREF (HEART FAILURE WITH REDUCED EJECTION FRACTION): ICD-10-CM

## 2025-07-30 DIAGNOSIS — E78.2 MIXED HYPERLIPIDEMIA: ICD-10-CM

## 2025-07-30 DIAGNOSIS — Z95.2 S/P MVR (MITRAL VALVE REPLACEMENT): ICD-10-CM

## 2025-07-30 LAB
INR PPP: 3 (ref 0.8–1.2)
PROTHROMBIN TIME: 30.5 SECONDS (ref 9–12.5)

## 2025-07-30 PROCEDURE — 36415 COLL VENOUS BLD VENIPUNCTURE: CPT

## 2025-07-30 PROCEDURE — 85610 PROTHROMBIN TIME: CPT

## 2025-07-30 PROCEDURE — 99999 PR PBB SHADOW E&M-EST. PATIENT-LVL III: CPT | Mod: PBBFAC,,,

## 2025-07-30 NOTE — PROGRESS NOTES
[facility] - Cardiology [address]  Cardiology Clinic Note      7/31/2025  4:49 PM    Problem list  Problem List[1]    History of Present Illness    CHIEF COMPLAINT:  Patient presents today with a cough.    HISTORY OF PRESENT ILLNESS:  She reports intermittent cough that she attributes to ceiling fan exposure and occasional colds. The cough occurs periodically and is not constant. She denies associated symptoms such as nighttime coughing, leg or ankle swelling, or difficulty laying flat.    CARDIOVASCULAR:  She has a history of cardiac surgery with mechanical valve placement and denies current chest pain. She experiences dyspnea with physical activity, specifically noting she can only dance through half a song before becoming short of breath. She acknowledges limitations in her physical activity due to her cardiac condition.    MEDICATIONS:  She continues Coumadin therapy with regular monitoring through Coumadin clinic. Clinic staff frequently contact her to provide medication management guidance.    LABS:  Recent cholesterol lab results showed total cholesterol increased from 145 to 202. She acknowledges the elevation and notes her preference for total cholesterol to be less than 200.    SOCIAL HISTORY:  She has a history of prior alcohol use but currently denies regular alcohol consumption. She describes occasional social drinking, specifically noting preference for [place] wine, with intent to limit intake to 2-3 cups during upcoming trip. She no longer consumes hard liquor and characterizes current drinking habits as minimal and mellow.      ROS:  General: -fever, -chills, +fatigue, -weight gain, -weight loss  Eyes: -vision changes, -redness, -discharge  ENT: -ear pain, -nasal congestion, -sore throat  Cardiovascular: -chest pain, -palpitations, -lower extremity edema  Respiratory: +cough, -shortness of breath, +exertional dyspnea  Gastrointestinal: -abdominal pain, -nausea, -vomiting, -diarrhea, -constipation,  -blood in stool  Genitourinary: -dysuria, -hematuria, -frequency  Musculoskeletal: -joint pain, -muscle pain  Skin: -rash, -lesion  Neurological: -headache, -dizziness, -numbness, -tingling  Psychiatric: -anxiety, -depression, -sleep difficulty           Medications  Current Medications[2]   Prior to Admission medications    Medication Sig Start Date End Date Taking? Authorizing Provider   atorvastatin (LIPITOR) 20 MG tablet Take 1 tablet (20 mg total) by mouth once daily. 2/18/25 2/18/26 Yes Pantera Yadav MD   methocarbamoL (ROBAXIN) 500 MG Tab 1 p.o. q.h.s. p.r.n. muscle spasm 7/24/24  Yes Pantera Yadav MD   metoprolol tartrate (LOPRESSOR) 25 MG tablet Take 0.5 tablets (12.5 mg total) by mouth 2 (two) times daily. 3/17/25  Yes Yokasta Jules FNP-C   warfarin (COUMADIN) 5 MG tablet TAKE 1 TO 1 AND 1/2 TABLETS(5 TO 7.5 MG) BY MOUTH DAILY AS DIRECTED BY COUMADIN CLINIC 12/14/24  Yes Pantera Yadav MD   dronedarone (MULTAQ) 400 mg Tab Take 1 tablet (400 mg total) by mouth 2 (two) times daily.  Patient not taking: Reported on 7/30/2025 3/17/25   Yokasta Jules FNP-C   fluorouraciL (EFUDEX) 5 % cream Apply topically 2 (two) times daily. 7/16/25   Jones Fonseca DPM   methylPREDNISolone (MEDROL DOSEPACK) 4 mg tablet use as directed 11/18/24   Antoni Perry MD   citalopram (CELEXA) 10 MG tablet Take 1 tablet (10 mg total) by mouth once daily. 2/23/22 3/29/22  Pantera Yadav MD         History  Past Medical History:   Diagnosis Date    A-fib     Atrial fibrillation     Back pain     Hyperlipidemia     Mitral incompetence     Mitral valve stenosis     Neck pain      Past Surgical History:   Procedure Laterality Date    ABLATION N/A 11/07/2022    Procedure: MAZE;  Surgeon: Bobby Gilbert MD;  Location: [facility] OR [address] [place] [address];  Service: Cardiothoracic;  Laterality: N/A;  Cryoablation    CARDIAC SURGERY      to repair mitral valve    CHOLECYSTECTOMY      COLONOSCOPY      COLONOSCOPY N/A 06/08/2022     Procedure: COLONOSCOPY;  Surgeon: Sanford Andres MD;  Location: [facility] ENDO;  Service: Endoscopy;  Laterality: N/A;    CORONARY ANGIOGRAPHY N/A 08/29/2022    Procedure: ANGIOGRAM, CORONARY ARTERY;  Surgeon: Lexa Harley MD;  Location: [facility] CATH LAB;  Service: Cardiology;  Laterality: N/A;    EGD, WITH CLOSED BIOPSY  11/13/2023    ESOPHAGOGASTRODUODENOSCOPY N/A 11/13/2023    Procedure: EGD (ESOPHAGOGASTRODUODENOSCOPY);  Surgeon: Shashi Michele MD;  Location: [facility] ENDO;  Service: Endoscopy;  Laterality: N/A;    MITRAL VALVE REPLACEMENT N/A 11/07/2022    Procedure: REPLACEMENT, MITRAL VALVE;  Surgeon: Bobby Gilbert MD;  [facility];  Service: Cardiothoracic;  Laterality: N/A;  REDO STERNOTOMY    STERNOTOMY N/A 11/07/2022    Procedure: STERNOTOMY;  Surgeon: Bobby Gilbert MD;  [facility];  Service: Cardiothoracic;  Laterality: N/A;  Redo sternotomy    TRANSESOPHAGEAL ECHOCARDIOGRAPHY N/A 03/29/2022    Procedure: ECHOCARDIOGRAM, TRANSESOPHAGEAL;  Surgeon: Dominic Wallis MD;  Location: [facility] EP LAB;  Service: Cardiology;  Laterality: N/A;    TREATMENT OF CARDIAC ARRHYTHMIA N/A 03/29/2022    Procedure: Cardioversion or Defibrillation;  Surgeon: Elmo Jones MD;  Location: [facility] EP LAB;  Service: Cardiology;  Laterality: N/A;  afib, dccv, stanley, anes, AR, 3prep, Provider to perform     TRICUSPID VALVULOPLASTY N/A 11/07/2022    Procedure: REPAIR, TRICUSPID VALVE;  Surgeon: Bobby Gilbert MD;  Location: [facility] OR [facility] FLR;  Service: Cardiothoracic;  Laterality: N/A;     Social History[3]      Allergies  Review of patient's allergies indicates:   Allergen Reactions    Amoxicillin Hives and Other (See Comments)         Review of Systems   ROS      Physical Exam  Wt Readings from Last 1 Encounters:   07/30/25 97.9 kg (215 lb 13.3 oz)     BP Readings from Last 3 Encounters:   07/30/25 122/74   05/13/25 (!) 130/59   03/17/25 102/60     Pulse Readings from Last 1  Encounters:   07/30/25 85     Body mass index is 33.8 kg/m².    Physical Exam  Constitutional:       General: She is not in acute distress.  HENT:      Head: Normocephalic and atraumatic.      Mouth/Throat:      Mouth: Mucous membranes are moist.   Eyes:      Extraocular Movements: Extraocular movements intact.      Pupils: Pupils are equal, round, and reactive to light.   Neck:      Vascular: No carotid bruit or JVD.   Cardiovascular:      Rate and Rhythm: Normal rate and regular rhythm.      Heart sounds: Murmur heard.      No friction rub. No gallop.   Pulmonary:      Effort: Pulmonary effort is normal.      Breath sounds: Normal breath sounds.   Abdominal:      General: Abdomen is flat.      Palpations: Abdomen is soft.   Musculoskeletal:      Right lower leg: No edema.      Left lower leg: No edema.   Skin:     General: Skin is warm.      Capillary Refill: Capillary refill takes less than 2 seconds.   Neurological:      General: No focal deficit present.   Psychiatric:         Mood and Affect: Mood normal.           Assessment & Plan    I50.20 HFrEF (heart failure with reduced EF)  Z95.2 S/P MVR (mitral valve replacement)  E78.2 Mixed hyperlipidemia  Z92.89 History of cardioversion  Z98.890 S/P TVR (tricuspid valve repair)  Z98.890, Z86.79 S/P Maze operation for atrial fibrillation  Z79.01 Anticoagulant long-term use    IMPRESSION:  - Cholesterol levels are slightly elevated (202 mg/dL).  - Anticoagulation management is ongoing with Coumadin clinic follow-up, continuing Coumadin as previously prescribed.    HFREF (HEART FAILURE WITH REDUCED EF):  Clinically euvolemic.  - Explained importance of moderating alcohol intake during upcoming trips.  - Continue following low sodium, heart-healthy diet.  - Ordered annual echocardiogram.  - Follow up in 6 months.    S/P MVR (MITRAL VALVE REPLACEMENT):  Followed by Coumadin Clinic.  - Ordered annual echocardiogram.    MIXED HYPERLIPIDEMIA:  - Continue following  heart-healthy diet.  Continue statin.    S/P TVR (TRICUSPID VALVE REPAIR):  - Ordered annual echocardiogram.              Brandi R. Carter, FNP-C [facility] - Cardiology.      Total professional time spent for the encounter: 40 minutes  Time was spent preparing to see the patient, reviewing results of prior testing, obtaining and/or reviewing separately obtained history, performing a medically appropriate examination and interview, counseling and educating the patient/family, ordering medications/tests/procedures, referring and communicating with other health care professionals, documenting clinical information in the electronic health record, and independently interpreting results.    This note was generated with the assistance of ambient listening technology. Verbal consent was obtained by the patient and accompanying visitor(s) for the recording of patient appointment to facilitate this note. I attest to having reviewed and edited the generated note for accuracy, though some syntax or spelling errors may persist. Please contact the author of this note for any clarification.         [1]   Patient Active Problem List  Diagnosis    Overweight (BMI 25.0-29.9)    History of cholecystectomy    S/P MVR (mitral valve replacement)    Lumbosacral strain    Cervical strain    Chronic pain syndrome    Hyperlipidemia    Left ankle pain    Weakness of left upper extremity    Impaired range of motion of left shoulder    Impaired functional mobility and activity tolerance    Atrial fibrillation    History of cardioversion    Anticoagulant long-term use    HFrEF (heart failure with reduced ejection fraction)    Preprocedural cardiovascular examination    Diverticulosis    Rheumatic mitral regurgitation    Mitral valve insufficiency    Tricuspid regurgitation    Stress hyperglycemia    S/P TVR (tricuspid valve repair)    S/P Maze operation for atrial fibrillation    Hypophosphatemia    Acute blood loss anemia    Long term  (current) use of anticoagulants    History of gastric ulcer    Dyspnea on exertion    Hypokalemia    Right knee pain    Osteoarthritis of knees, bilateral   [2]   Current Outpatient Medications   Medication Sig Dispense Refill    atorvastatin (LIPITOR) 20 MG tablet Take 1 tablet (20 mg total) by mouth once daily. 90 tablet 3    methocarbamoL (ROBAXIN) 500 MG Tab 1 p.o. q.h.s. p.r.n. muscle spasm 40 tablet 5    metoprolol tartrate (LOPRESSOR) 25 MG tablet Take 0.5 tablets (12.5 mg total) by mouth 2 (two) times daily. 30 tablet 11    warfarin (COUMADIN) 5 MG tablet TAKE 1 TO 1 AND 1/2 TABLETS(5 TO 7.5 MG) BY MOUTH DAILY AS DIRECTED BY COUMADIN CLINIC 135 tablet 3    dronedarone (MULTAQ) 400 mg Tab Take 1 tablet (400 mg total) by mouth 2 (two) times daily. (Patient not taking: Reported on 7/30/2025) 60 tablet 11    fluorouraciL (EFUDEX) 5 % cream Apply topically 2 (two) times daily. 40 g 2    methylPREDNISolone (MEDROL DOSEPACK) 4 mg tablet use as directed 21 each 0     No current facility-administered medications for this visit.     Facility-Administered Medications Ordered in Other Visits   Medication Dose Route Frequency Provider Last Rate Last Admin    0.9%  NaCl infusion   Intravenous Continuous Shashi Michele MD        LIDOcaine (PF) 10 mg/ml (1%) injection 10 mg  1 mL Intradermal Once PRN Shashi Michele MD       [3]   Social History  Socioeconomic History    Marital status: Single   Tobacco Use    Smoking status: Never    Smokeless tobacco: Never   Substance and Sexual Activity    Alcohol use: Yes     Comment: socially    Drug use: No    Sexual activity: Yes     Partners: Male     Social Drivers of Health     Financial Resource Strain: Low Risk  (1/19/2022)    Overall Financial Resource Strain (CARDIA)     Difficulty of Paying Living Expenses: Not hard at all   Food Insecurity: No Food Insecurity (1/19/2022)    Hunger Vital Sign     Worried About Running Out of Food in the Last Year: Never  true     Ran Out of Food in the Last Year: Never true   Transportation Needs: No Transportation Needs (1/19/2022)    PRAPARE - Transportation     Lack of Transportation (Medical): No     Lack of Transportation (Non-Medical): No   Stress: No Stress Concern Present (1/19/2022)    [employer] - Occupational Stress Questionnaire     Feeling of Stress : Not at all   Housing Stability: Unknown (1/19/2022)    Housing Stability Vital Sign     Unable to Pay for Housing in the Last Year: No     Unstable Housing in the Last Year: No

## 2025-07-30 NOTE — PROGRESS NOTES
[facility]    07/30/2025 11:04 AM    Assessment/Plan:    Patient presents today with therapeutic INR.    Recommendation for patient's warfarin regimen: Continue current maintenance dose    Recommend repeat INR in 3 weeks  _________________________________________________________________    Riya Soto (54 y.o.) is followed by the [facility].    Anticoagulation Summary  As of 7/30/2025      INR goal:  2.5-3.5   TTR:  49.0% (2.7 y)   INR used for dosing:  3.0 (7/30/2025)   Warfarin maintenance plan:  5 mg (5 mg x 1) every Sun; 7.5 mg (5 mg x 1.5) all other days   Weekly warfarin total:  50 mg   No change documented:  Skylar Marcelo PharmD   Plan last modified:  Skylar Marcelo PharmD (6/30/2025)   Next INR check:  8/20/2025   Target end date:  --    Indications    S/P MVR (mitral valve replacement) [Z95.2]  Atrial fibrillation [I48.91]  Long term (current) use of anticoagulants [Z79.01]                 Anticoagulation Episode Summary       INR check location:  Clinic Lab    Preferred lab:  --    Send INR reminders to:  [facility] COUMADIN MONITORING POOL    Comments:  [facility] only Mon - Thu    (D/C 12/13) [facility] [phone]          Anticoagulation Care Providers       Provider Role Specialty Phone number    Pantera Yadav MD [facility] Medicine [phone]

## 2025-08-03 ENCOUNTER — RESULTS FOLLOW-UP (OUTPATIENT)
Dept: PRIMARY CARE CLINIC | Facility: CLINIC | Age: 55
End: 2025-08-03
Payer: MEDICARE

## 2025-08-05 ENCOUNTER — OFFICE VISIT (OUTPATIENT)
Dept: PRIMARY CARE CLINIC | Facility: CLINIC | Age: 55
End: 2025-08-05
Payer: MEDICARE

## 2025-08-05 ENCOUNTER — TELEPHONE (OUTPATIENT)
Dept: PRIMARY CARE CLINIC | Facility: CLINIC | Age: 55
End: 2025-08-05
Payer: MEDICARE

## 2025-08-05 VITALS
WEIGHT: 215.5 LBS | SYSTOLIC BLOOD PRESSURE: 100 MMHG | TEMPERATURE: 98 F | HEIGHT: 67 IN | OXYGEN SATURATION: 98 % | BODY MASS INDEX: 33.82 KG/M2 | DIASTOLIC BLOOD PRESSURE: 64 MMHG | HEART RATE: 73 BPM | RESPIRATION RATE: 16 BRPM

## 2025-08-05 DIAGNOSIS — Z79.01 ANTICOAGULANT LONG-TERM USE: ICD-10-CM

## 2025-08-05 DIAGNOSIS — Z87.11 HISTORY OF GASTRIC ULCER: ICD-10-CM

## 2025-08-05 DIAGNOSIS — Z98.890 S/P MAZE OPERATION FOR ATRIAL FIBRILLATION: ICD-10-CM

## 2025-08-05 DIAGNOSIS — Z92.89 HISTORY OF CARDIOVERSION: ICD-10-CM

## 2025-08-05 DIAGNOSIS — Z12.31 ENCOUNTER FOR SCREENING MAMMOGRAM FOR MALIGNANT NEOPLASM OF BREAST: Primary | ICD-10-CM

## 2025-08-05 DIAGNOSIS — I34.0 MITRAL VALVE INSUFFICIENCY, UNSPECIFIED ETIOLOGY: ICD-10-CM

## 2025-08-05 DIAGNOSIS — Z86.79 S/P MAZE OPERATION FOR ATRIAL FIBRILLATION: ICD-10-CM

## 2025-08-05 DIAGNOSIS — Z12.31 ENCOUNTER FOR SCREENING MAMMOGRAM FOR MALIGNANT NEOPLASM OF BREAST: ICD-10-CM

## 2025-08-05 DIAGNOSIS — I05.1 RHEUMATIC MITRAL REGURGITATION: ICD-10-CM

## 2025-08-05 DIAGNOSIS — Z95.2 S/P MVR (MITRAL VALVE REPLACEMENT): ICD-10-CM

## 2025-08-05 DIAGNOSIS — E78.00 PURE HYPERCHOLESTEROLEMIA: ICD-10-CM

## 2025-08-05 DIAGNOSIS — I48.91 ATRIAL FIBRILLATION, UNSPECIFIED TYPE: Primary | ICD-10-CM

## 2025-08-05 DIAGNOSIS — G89.4 CHRONIC PAIN SYNDROME: ICD-10-CM

## 2025-08-05 PROCEDURE — 99999 PR PBB SHADOW E&M-EST. PATIENT-LVL III: CPT | Mod: PBBFAC,,, | Performed by: FAMILY MEDICINE
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
## 2025-08-05 NOTE — TELEPHONE ENCOUNTER
----- Message from Caitlyn sent at 8/5/2025  2:10 PM CDT -----  Regarding: RE: order  Im not sure you may have to call her. She said she told this to the nurse.  ----- Message -----  From: Jovanny Villar MA  Sent: 8/5/2025   1:42 PM CDT  To: Caitlyn Burr  Subject: RE: order                                        Nathalie Brady.     Where at in [place]?  ----- Message -----  From: Caitlyn Burr  Sent: 8/5/2025   1:40 PM CDT  To: Vicenta Mott Staff  Subject: order                                            Patient is requesting this order be sent to [facility].    Thanks  Caitlyn

## 2025-08-05 NOTE — TELEPHONE ENCOUNTER
Spoke to patient and she wants her mammogram done at [facility] in [place].  Faxed order to them.

## 2025-08-20 ENCOUNTER — ANTI-COAG VISIT (OUTPATIENT)
Dept: CARDIOLOGY | Facility: CLINIC | Age: 55
End: 2025-08-20
Payer: MEDICARE

## 2025-08-20 ENCOUNTER — LAB VISIT (OUTPATIENT)
Dept: LAB | Facility: HOSPITAL | Age: 55
End: 2025-08-20
Attending: FAMILY MEDICINE
Payer: MEDICARE

## 2025-08-20 DIAGNOSIS — I48.0 PAROXYSMAL ATRIAL FIBRILLATION: ICD-10-CM

## 2025-08-20 DIAGNOSIS — Z95.2 S/P MVR (MITRAL VALVE REPLACEMENT): ICD-10-CM

## 2025-08-20 DIAGNOSIS — Z79.01 LONG TERM (CURRENT) USE OF ANTICOAGULANTS: ICD-10-CM

## 2025-08-20 DIAGNOSIS — Z95.2 S/P MVR (MITRAL VALVE REPLACEMENT): Primary | ICD-10-CM

## 2025-08-20 LAB
INR PPP: 3.7 (ref 0.8–1.2)
PROTHROMBIN TIME: 36.9 SECONDS (ref 9–12.5)

## 2025-08-20 PROCEDURE — 85610 PROTHROMBIN TIME: CPT

## 2025-08-20 PROCEDURE — 93793 ANTICOAG MGMT PT WARFARIN: CPT | Mod: S$GLB,,,

## 2025-08-20 PROCEDURE — 36415 COLL VENOUS BLD VENIPUNCTURE: CPT

## 2025-08-22 ENCOUNTER — RESULTS FOLLOW-UP (OUTPATIENT)
Dept: PRIMARY CARE CLINIC | Facility: CLINIC | Age: 55
End: 2025-08-22
Payer: MEDICARE

## 2025-08-27 ENCOUNTER — TELEPHONE (OUTPATIENT)
Dept: PRIMARY CARE CLINIC | Facility: CLINIC | Age: 55
End: 2025-08-27
Payer: MEDICARE

## 2025-08-27 ENCOUNTER — LAB VISIT (OUTPATIENT)
Dept: LAB | Facility: HOSPITAL | Age: 55
End: 2025-08-27
Attending: FAMILY MEDICINE
Payer: MEDICARE

## 2025-08-27 ENCOUNTER — ANTI-COAG VISIT (OUTPATIENT)
Dept: CARDIOLOGY | Facility: CLINIC | Age: 55
End: 2025-08-27
Payer: MEDICARE

## 2025-08-27 DIAGNOSIS — Z95.2 S/P MVR (MITRAL VALVE REPLACEMENT): ICD-10-CM

## 2025-08-27 DIAGNOSIS — Z95.2 S/P MVR (MITRAL VALVE REPLACEMENT): Primary | ICD-10-CM

## 2025-08-27 DIAGNOSIS — I48.0 PAROXYSMAL ATRIAL FIBRILLATION: ICD-10-CM

## 2025-08-27 DIAGNOSIS — Z79.01 LONG TERM (CURRENT) USE OF ANTICOAGULANTS: ICD-10-CM

## 2025-08-27 LAB
INR PPP: 3.9 (ref 0.8–1.2)
PROTHROMBIN TIME: 39.5 SECONDS (ref 9–12.5)

## 2025-08-27 PROCEDURE — 93793 ANTICOAG MGMT PT WARFARIN: CPT | Mod: S$GLB,,,

## 2025-08-27 PROCEDURE — 85610 PROTHROMBIN TIME: CPT

## 2025-08-27 PROCEDURE — 36415 COLL VENOUS BLD VENIPUNCTURE: CPT

## 2025-09-03 ENCOUNTER — ANTI-COAG VISIT (OUTPATIENT)
Dept: CARDIOLOGY | Facility: CLINIC | Age: 55
End: 2025-09-03
Payer: MEDICARE

## 2025-09-03 DIAGNOSIS — Z79.01 LONG TERM (CURRENT) USE OF ANTICOAGULANTS: ICD-10-CM

## 2025-09-03 DIAGNOSIS — I48.0 PAROXYSMAL ATRIAL FIBRILLATION: ICD-10-CM

## 2025-09-03 DIAGNOSIS — Z95.2 S/P MVR (MITRAL VALVE REPLACEMENT): Primary | ICD-10-CM

## 2025-09-03 PROCEDURE — 93793 ANTICOAG MGMT PT WARFARIN: CPT | Mod: S$GLB,,,

## (undated) DEVICE — INSERTS STEALTH FIBRA SIZE 5

## (undated) DEVICE — SUT PROLENE 4-0 SH BLU 36IN

## (undated) DEVICE — RETRACTOR OCTOBASE INSERT HOLD

## (undated) DEVICE — SUT 2/0 30IN ETHIBOND

## (undated) DEVICE — DRAIN CHEST DRY SUCTION

## (undated) DEVICE — AMPLATZER.035 SUPER STIFF INT

## (undated) DEVICE — GAUZE SPONGE 4'X4 12 PLY

## (undated) DEVICE — SET DECANTER MEDICHOICE

## (undated) DEVICE — CANNULA RETROGRADE CARDIOPLEG

## (undated) DEVICE — SUT SILK BLK BR. 2 2-60

## (undated) DEVICE — DRESSING ADH ISLAND 3.6 X 14

## (undated) DEVICE — HEMOSTAT VASC BAND REG 24CM

## (undated) DEVICE — SPONGE GAUZE 16PLY 4X4

## (undated) DEVICE — Device

## (undated) DEVICE — FOGERTY SOFT JAW DISP 2/PK

## (undated) DEVICE — BLADE STERN 65.8MM

## (undated) DEVICE — DRAIN CHANNEL ROUND 19FR

## (undated) DEVICE — DRESSING AQUACEL SACRAL 9 X 9

## (undated) DEVICE — PAD DEFIB CADENCE ADULT R2

## (undated) DEVICE — LOOP VESSEL BLUE MAXI

## (undated) DEVICE — TAPE SURG MEDIPORE 6X72IN

## (undated) DEVICE — SEE MEDLINE ITEM 156894

## (undated) DEVICE — DRAPE CVMAX SPLIT ANES SCRN

## (undated) DEVICE — CATH DXTERITY JL35 100CM 6FR

## (undated) DEVICE — CATH JACKY RADIAL 5FR 100CM

## (undated) DEVICE — GAUZE SPONGE 4X4 12PLY

## (undated) DEVICE — SEE MEDLINE ITEM 157187

## (undated) DEVICE — CANNULA AORTIC ROOT 12 GAUGE 9

## (undated) DEVICE — TOWEL OR XRAY WHITE 17X26IN

## (undated) DEVICE — KIT URINARY CATH URINE METER

## (undated) DEVICE — KIT CUSTOM MANIFOLD

## (undated) DEVICE — PROBE CRYO ABLATION 10CM

## (undated) DEVICE — SOL NACL 0.9% INJ 500ML BG

## (undated) DEVICE — BOWL STERILE LARGE 32OZ

## (undated) DEVICE — BLADE SAW STERNAL 5/BX

## (undated) DEVICE — LEAFLET TESTER

## (undated) DEVICE — SYR 30CC LUER LOCK

## (undated) DEVICE — SUT SILK 2-0 SH 18IN BLACK

## (undated) DEVICE — SUT PROLENE 5-0 24 C-1 BL

## (undated) DEVICE — SPIKE CONTRAST CONTROLLER

## (undated) DEVICE — SOL 9P NACL IRR PIC IL

## (undated) DEVICE — BRA POST SURGICAL WHT 40-42IN

## (undated) DEVICE — CONTAINER SPECIMEN STRL 4OZ

## (undated) DEVICE — SUT 2/0 36IN COATED VICRYL

## (undated) DEVICE — TIP YANKAUERS BULB NO VENT

## (undated) DEVICE — SUT VICRYL PLUS 3-0 FS1 27

## (undated) DEVICE — GUIDEWIRE SUPRA CORE 035 190CM

## (undated) DEVICE — PROTECTION STATION PLUS

## (undated) DEVICE — SUT PROLENE 4-0 RB-1 BL MO

## (undated) DEVICE — DRESSING TEGADERM 4.4X5IN

## (undated) DEVICE — DRAPE INCISE IOBAN 2 23X17IN

## (undated) DEVICE — OMNIPAQUE 350 200ML

## (undated) DEVICE — COVER SET UP STRL 54X54 20/BX

## (undated) DEVICE — SUT ETHIBOND XTRA 2-0 SH-2

## (undated) DEVICE — CLOSURE SKIN STERI STRIP 1/2X4

## (undated) DEVICE — CANNULA 29/29FR VACUUM ASSIST

## (undated) DEVICE — KIT SAHARA DRAPE DRAW/LIFT

## (undated) DEVICE — SUT 3-0 CTD VICRYL 27IN PS

## (undated) DEVICE — SUT ETHIBOND EXCEL 2-0 SH-2

## (undated) DEVICE — ELECTRODE REM PLYHSV RETURN 9

## (undated) DEVICE — SUT 2-0 VICRYL / CT-1

## (undated) DEVICE — SUT PROLENE 5-0 BL C-1 4-24

## (undated) DEVICE — KIT GLIDESHEATH SLEND 6FR 10CM

## (undated) DEVICE — DRAPE SLUSH WARMER WITH DISC

## (undated) DEVICE — CANNULA MULTIPLE PERFUSIONSET

## (undated) DEVICE — NDL 22GA X1 1/2 REG BEVEL

## (undated) DEVICE — ELECTRODE PAD DEFIB STERILE

## (undated) DEVICE — SUT VICRYL BR 1 GEN 27 CT-1

## (undated) DEVICE — GLOVE BIOGEL PI MICRO SZ 7.5

## (undated) DEVICE — SOL NS 1000CC

## (undated) DEVICE — TRAY HEART OMC

## (undated) DEVICE — SUT PROLENE 3-0 SH DA 36 BL

## (undated) DEVICE — SUT 6 18IN STEEL MONO CCS